# Patient Record
Sex: MALE | URBAN - METROPOLITAN AREA
[De-identification: names, ages, dates, MRNs, and addresses within clinical notes are randomized per-mention and may not be internally consistent; named-entity substitution may affect disease eponyms.]

---

## 2019-08-09 ENCOUNTER — APPOINTMENT (RX ONLY)
Dept: URBAN - METROPOLITAN AREA CLINIC 20 | Facility: CLINIC | Age: 59
Setting detail: DERMATOLOGY
End: 2019-08-09

## 2019-08-09 DIAGNOSIS — L82.1 OTHER SEBORRHEIC KERATOSIS: ICD-10-CM

## 2019-08-09 DIAGNOSIS — D18.0 HEMANGIOMA: ICD-10-CM

## 2019-08-09 DIAGNOSIS — Z71.89 OTHER SPECIFIED COUNSELING: ICD-10-CM

## 2019-08-09 DIAGNOSIS — L81.4 OTHER MELANIN HYPERPIGMENTATION: ICD-10-CM

## 2019-08-09 DIAGNOSIS — D22 MELANOCYTIC NEVI: ICD-10-CM

## 2019-08-09 PROBLEM — E13.9 OTHER SPECIFIED DIABETES MELLITUS WITHOUT COMPLICATIONS: Status: ACTIVE | Noted: 2019-08-09

## 2019-08-09 PROBLEM — D22.62 MELANOCYTIC NEVI OF LEFT UPPER LIMB, INCLUDING SHOULDER: Status: ACTIVE | Noted: 2019-08-09

## 2019-08-09 PROBLEM — D22.5 MELANOCYTIC NEVI OF TRUNK: Status: ACTIVE | Noted: 2019-08-09

## 2019-08-09 PROBLEM — D18.01 HEMANGIOMA OF SKIN AND SUBCUTANEOUS TISSUE: Status: ACTIVE | Noted: 2019-08-09

## 2019-08-09 PROBLEM — I10 ESSENTIAL (PRIMARY) HYPERTENSION: Status: ACTIVE | Noted: 2019-08-09

## 2019-08-09 PROBLEM — D22.61 MELANOCYTIC NEVI OF RIGHT UPPER LIMB, INCLUDING SHOULDER: Status: ACTIVE | Noted: 2019-08-09

## 2019-08-09 PROCEDURE — ? OBSERVATION AND MEASURE

## 2019-08-09 PROCEDURE — ? ADDITIONAL NOTES

## 2019-08-09 PROCEDURE — 99203 OFFICE O/P NEW LOW 30 MIN: CPT

## 2019-08-09 PROCEDURE — ? COUNSELING

## 2019-08-09 PROCEDURE — ? SUNSCREEN RECOMMENDATIONS

## 2019-08-09 ASSESSMENT — LOCATION SIMPLE DESCRIPTION DERM
LOCATION SIMPLE: RIGHT FOREHEAD
LOCATION SIMPLE: LEFT SCALP
LOCATION SIMPLE: RIGHT HAND
LOCATION SIMPLE: UPPER BACK
LOCATION SIMPLE: LEFT HAND
LOCATION SIMPLE: RIGHT FOREARM
LOCATION SIMPLE: RIGHT UPPER BACK
LOCATION SIMPLE: LEFT FOREARM

## 2019-08-09 ASSESSMENT — LOCATION DETAILED DESCRIPTION DERM
LOCATION DETAILED: LEFT RADIAL DORSAL HAND
LOCATION DETAILED: RIGHT INFERIOR MEDIAL FOREHEAD
LOCATION DETAILED: RIGHT INFERIOR UPPER BACK
LOCATION DETAILED: RIGHT VENTRAL DISTAL FOREARM
LOCATION DETAILED: RIGHT MEDIAL UPPER BACK
LOCATION DETAILED: LEFT CENTRAL FRONTAL SCALP
LOCATION DETAILED: LEFT PROXIMAL DORSAL FOREARM
LOCATION DETAILED: SUPERIOR THORACIC SPINE
LOCATION DETAILED: LEFT VENTRAL PROXIMAL FOREARM
LOCATION DETAILED: INFERIOR THORACIC SPINE
LOCATION DETAILED: RIGHT RADIAL DORSAL HAND
LOCATION DETAILED: RIGHT PROXIMAL DORSAL FOREARM

## 2019-08-09 ASSESSMENT — LOCATION ZONE DERM
LOCATION ZONE: HAND
LOCATION ZONE: SCALP
LOCATION ZONE: ARM
LOCATION ZONE: FACE
LOCATION ZONE: TRUNK

## 2019-11-14 ENCOUNTER — EH NON-PROVIDER (OUTPATIENT)
Dept: OCCUPATIONAL MEDICINE | Facility: CLINIC | Age: 59
End: 2019-11-14

## 2019-11-14 ENCOUNTER — EMPLOYEE HEALTH (OUTPATIENT)
Dept: OCCUPATIONAL MEDICINE | Facility: CLINIC | Age: 59
End: 2019-11-14

## 2019-11-14 ENCOUNTER — HOSPITAL ENCOUNTER (OUTPATIENT)
Facility: MEDICAL CENTER | Age: 59
End: 2019-11-14
Attending: NURSE PRACTITIONER
Payer: COMMERCIAL

## 2019-11-14 DIAGNOSIS — Z02.89 VISIT FOR OCCUPATIONAL HEALTH EXAMINATION: ICD-10-CM

## 2019-11-14 DIAGNOSIS — Z02.89 VISIT FOR OCCUPATIONAL HEALTH EXAMINATION: Primary | ICD-10-CM

## 2019-11-14 LAB
AMP AMPHETAMINE: NORMAL
BAR BARBITURATES: NORMAL
BZO BENZODIAZEPINES: NORMAL
COC COCAINE: NORMAL
INT CON NEG: NORMAL
INT CON POS: NORMAL
MDMA ECSTASY: NORMAL
MET METHAMPHETAMINES: NORMAL
MTD METHADONE: NORMAL
OPI OPIATES: NORMAL
OXY OXYCODONE: NORMAL
PCP PHENCYCLIDINE: NORMAL
POC URINE DRUG SCREEN OCDRS: NORMAL
THC: NORMAL

## 2019-11-14 PROCEDURE — 94375 RESPIRATORY FLOW VOLUME LOOP: CPT | Performed by: NURSE PRACTITIONER

## 2019-11-14 PROCEDURE — 86735 MUMPS ANTIBODY: CPT | Performed by: NURSE PRACTITIONER

## 2019-11-14 PROCEDURE — 90686 IIV4 VACC NO PRSV 0.5 ML IM: CPT | Performed by: NURSE PRACTITIONER

## 2019-11-14 PROCEDURE — 86480 TB TEST CELL IMMUN MEASURE: CPT | Performed by: NURSE PRACTITIONER

## 2019-11-14 PROCEDURE — 86762 RUBELLA ANTIBODY: CPT | Performed by: NURSE PRACTITIONER

## 2019-11-14 PROCEDURE — 90715 TDAP VACCINE 7 YRS/> IM: CPT | Performed by: NURSE PRACTITIONER

## 2019-11-14 PROCEDURE — 86765 RUBEOLA ANTIBODY: CPT | Performed by: NURSE PRACTITIONER

## 2019-11-14 PROCEDURE — 8915 PR COMPREHENSIVE PHYSICAL: Performed by: PREVENTIVE MEDICINE

## 2019-11-14 PROCEDURE — 80305 DRUG TEST PRSMV DIR OPT OBS: CPT | Performed by: NURSE PRACTITIONER

## 2019-11-14 PROCEDURE — 86787 VARICELLA-ZOSTER ANTIBODY: CPT | Performed by: NURSE PRACTITIONER

## 2019-11-15 LAB
MEV IGG SER IA-ACNC: 2.16
MUV IGG SER IA-ACNC: 2.42
RUBV AB SER QL: 283.2 IU/ML
VZV IGG SER IA-ACNC: 2.19

## 2019-11-18 LAB
GAMMA INTERFERON BACKGROUND BLD IA-ACNC: 0.17 IU/ML
M TB IFN-G BLD-IMP: NEGATIVE
M TB IFN-G CD4+ BCKGRND COR BLD-ACNC: 0.03 IU/ML
MITOGEN IGNF BCKGRD COR BLD-ACNC: >10 IU/ML
QFT TB2 - NIL TBQ2: 0.04 IU/ML

## 2019-11-20 ENCOUNTER — EH NON-PROVIDER (OUTPATIENT)
Dept: OCCUPATIONAL MEDICINE | Facility: CLINIC | Age: 59
End: 2019-11-20

## 2019-11-20 DIAGNOSIS — Z71.85 IMMUNIZATION COUNSELING: ICD-10-CM

## 2021-03-15 DIAGNOSIS — Z23 NEED FOR VACCINATION: ICD-10-CM

## 2022-04-05 ENCOUNTER — OFFICE VISIT (OUTPATIENT)
Dept: INTERNAL MEDICINE | Facility: OTHER | Age: 62
End: 2022-04-05
Payer: COMMERCIAL

## 2022-04-05 VITALS
HEIGHT: 65 IN | DIASTOLIC BLOOD PRESSURE: 89 MMHG | OXYGEN SATURATION: 97 % | TEMPERATURE: 100.2 F | HEART RATE: 60 BPM | WEIGHT: 153.8 LBS | SYSTOLIC BLOOD PRESSURE: 156 MMHG | BODY MASS INDEX: 25.62 KG/M2

## 2022-04-05 DIAGNOSIS — I25.10 CORONARY ARTERY DISEASE INVOLVING NATIVE CORONARY ARTERY OF NATIVE HEART WITHOUT ANGINA PECTORIS: ICD-10-CM

## 2022-04-05 DIAGNOSIS — I10 PRIMARY HYPERTENSION: ICD-10-CM

## 2022-04-05 DIAGNOSIS — Z76.89 ENCOUNTER TO ESTABLISH CARE: ICD-10-CM

## 2022-04-05 DIAGNOSIS — R68.82 LOW LIBIDO: ICD-10-CM

## 2022-04-05 DIAGNOSIS — Z87.898 HISTORY OF PREDIABETES: ICD-10-CM

## 2022-04-05 DIAGNOSIS — Z12.2 SCREENING FOR LUNG CANCER: ICD-10-CM

## 2022-04-05 DIAGNOSIS — R06.2 WHEEZING: ICD-10-CM

## 2022-04-05 PROCEDURE — 99204 OFFICE O/P NEW MOD 45 MIN: CPT | Mod: GC | Performed by: STUDENT IN AN ORGANIZED HEALTH CARE EDUCATION/TRAINING PROGRAM

## 2022-04-05 RX ORDER — METOPROLOL SUCCINATE 50 MG/1
50 TABLET, EXTENDED RELEASE ORAL DAILY
COMMUNITY
Start: 2020-09-24 | End: 2022-05-13 | Stop reason: SDUPTHER

## 2022-04-05 RX ORDER — ATORVASTATIN CALCIUM 80 MG/1
80 TABLET, FILM COATED ORAL DAILY
COMMUNITY
Start: 2020-09-24 | End: 2022-04-05

## 2022-04-05 RX ORDER — LOSARTAN POTASSIUM 100 MG/1
50 TABLET ORAL DAILY
COMMUNITY
Start: 2020-09-24 | End: 2022-06-10 | Stop reason: SDUPTHER

## 2022-04-05 ASSESSMENT — ENCOUNTER SYMPTOMS
NAUSEA: 0
BLOOD IN STOOL: 0
VOMITING: 0
CONSTIPATION: 0
SPEECH CHANGE: 0
NECK PAIN: 0
SENSORY CHANGE: 0
BLURRED VISION: 0
ORTHOPNEA: 0
CLAUDICATION: 0
NERVOUS/ANXIOUS: 0
FOCAL WEAKNESS: 0
PALPITATIONS: 0
DIZZINESS: 0
HEMOPTYSIS: 0
SHORTNESS OF BREATH: 0
BRUISES/BLEEDS EASILY: 0
SINUS PAIN: 0
WHEEZING: 0
WEIGHT LOSS: 0
MYALGIAS: 0
DOUBLE VISION: 0
COUGH: 0
HEADACHES: 0
PHOTOPHOBIA: 0
EYE PAIN: 0
FEVER: 0
HEARTBURN: 0
SPUTUM PRODUCTION: 0
BACK PAIN: 0
SORE THROAT: 0
CHILLS: 0
DEPRESSION: 0
TREMORS: 0

## 2022-04-05 ASSESSMENT — PATIENT HEALTH QUESTIONNAIRE - PHQ9: CLINICAL INTERPRETATION OF PHQ2 SCORE: 0

## 2022-04-05 ASSESSMENT — LIFESTYLE VARIABLES: SUBSTANCE_ABUSE: 0

## 2022-04-05 NOTE — PROGRESS NOTES
Teaching Physician Attestation      Level of Participation    I have personally interviewed and examined the patient.  In addition, I discussed with the resident physician the patient's history, exam, assessment and plan in detail.  Topics listed in my addendum were the focus of the visit.  Healthcare maintenance was not addressed this visit unless listed as a topic in my addendum.  I agree with the plan as written along with the following additions/modifications:      Establishing Care      PMH: Tobacco, CAD/Hx MI s/p stent    No chest pain or shortness of breath.  Note has pre-existing right eye and mouth facial droop from history of trauma.  No history of stroke.    HTN  -Counseled on DASH diet, check home measurements, follow-up in 1 to 2 weeks    wheezing  -No respiratory distress, satting well.  Per report good exercise tolerance. Counseled on smoking cessation, check PFTs      Nocturia, low libido  -Next visit, checking a1c, also testostone level given report of low testosternoe in past per patient in prep for next visit.        encournage cardio f/u    Return to clinic in 1 or 2 weeks

## 2022-04-05 NOTE — PATIENT INSTRUCTIONS
- Continue current medications  - Get lab work done fasting  - Get CT chest and PFT's  - Referral Cards  - Follow up in 2-4 weeks

## 2022-04-05 NOTE — PROGRESS NOTES
New Patient    Hugo Villela is a 61 y.o. male who presents today with the following:    CC: Establish Care with Primary Care Physician     HPI:    Mr. Villela is a 60 y/o male with known History of CAD s/p RCA stent placement in 2009, HTN, DLD and prediabetes.   He comes today to establish care with me.  Patient used to get health care in [place], but he moved to [place] about 8 months ago.  Records showed that he has not followed up with Cards since 2017, back then he was supposed to get an ECHO and follow up with cardiology but I did not find records of follow up.  Patient denies any SOB, chest pain or palpitations, he is able to walk and climb stairs w/o experiencing SOB or chest pain.  He takes Metoprolol 50 mg daily, Losartan 100 mg daily, Lipitor 80 mg daily, Aspirin 81 mg daily.  He has not had any lab work in recent years.  Today he compliant of low libido and nocturia for about 6 months, he states that gets up to the restroom at night 1-2 times.  In the past 6 years ago or so received Testosterone injections for low testosterone.  On physical exam noticed mild right eye and face droop which is his baseline He had a work related accident decades ago and fractured his skull He hit his right side of his head.    ROS:       Review of Systems   Constitutional: Negative for chills, fever, malaise/fatigue and weight loss.   HENT: Negative for congestion, nosebleeds, sinus pain, sore throat and tinnitus.    Eyes: Negative for blurred vision, double vision, photophobia and pain.   Respiratory: Negative for cough, hemoptysis, sputum production, shortness of breath and wheezing.    Cardiovascular: Negative for chest pain, palpitations, orthopnea, claudication and leg swelling.   Gastrointestinal: Negative for blood in stool, constipation, heartburn, melena, nausea and vomiting.   Genitourinary: Negative for dysuria, frequency, hematuria and urgency.   Musculoskeletal: Negative for back pain, joint pain, myalgias and  neck pain.   Skin: Negative for rash.   Neurological: Negative for dizziness, tremors, sensory change, speech change, focal weakness and headaches.   Endo/Heme/Allergies: Does not bruise/bleed easily.   Psychiatric/Behavioral: Negative for depression, substance abuse and suicidal ideas. The patient is not nervous/anxious.          Past Medical History:   Diagnosis Date   • Acute myocardial infarction of other inferior wall, episode of care unspecified 6/25/2012   • Acute myocardial infarction of other lateral wall, episode of care unspecified 6/25/2012   • Chest pain, unspecified 6/25/2012   • Coronary atherosclerosis of native coronary artery 6/25/2012   • Dizziness and giddiness 6/25/2012   • Mixed hyperlipidemia 6/25/2012   • Pure hyperglyceridemia 6/25/2012   • S/P coronary artery stent placement 6/25/2012   • Tobacco use disorder 6/25/2012       Past Surgical History:   Procedure Laterality Date   • ORIF, FRACTURE, FEMUR  1994   • STENT PLACEMENT         Family History   Problem Relation Age of Onset   • Stroke Mother        Social History     Tobacco Use   • Smoking status: Current Every Day Smoker     Packs/day: 0.50     Years: 40.00     Pack years: 20.00     Types: Cigarettes     Start date: 4/5/1982   • Smokeless tobacco: Never Used   Vaping Use   • Vaping Use: Never used   Substance Use Topics   • Alcohol use: Yes     Alcohol/week: 1.2 oz     Types: 2 Cans of beer per week     Comment: almost every day in the past year.   • Drug use: Never       Current Outpatient Medications   Medication Sig Dispense Refill   • losartan (COZAAR) 100 MG Tab Take 100 mg by mouth every day.     • metoprolol SR (TOPROL XL) 50 MG TABLET SR 24 HR Take 50 mg by mouth every day.     • aspirin EC (ECOTRIN) 81 MG Tablet Delayed Response Take 81 mg by mouth every day.     • atorvastatin (LIPITOR) 80 MG tablet TAKE 1 TABLET BY MOUTH AT BEDTIME 30 Tab 6     No current facility-administered medications for this visit.       Physical  "Exam:  /89 (BP Location: Left arm, Patient Position: Sitting, BP Cuff Size: Adult)   Pulse 60   Temp 37.9 °C (100.2 °F) (Temporal)   Ht 1.651 m (5' 5\")   Wt 69.8 kg (153 lb 12.8 oz)   SpO2 97%   BMI 25.59 kg/m²      General: Well-developed, well-nourished male in no distress  HEENT: Conjuntiva and lids are within normal limits.  External auditory canals are within normal limits.  Tympanic membranes are clear with a normal light refflex. Mild right and face droop at baseline, sec to work related trauma many years ago. Nasal mucosa is normal.  Oral pharynx is normal and free of exudate.  Neck: Supple, non-tender with nothyromegaly noted.  Lympatic: Pre and Post auricular, sub-mandibular, anterior and posterior cervical and supraclavicular areas are without notable lymphadenopathy  Lungs: Clear to auscultation and perucssion bilaterally with good respiratory effort. Mild wheezes upper lobes with deep inspirartion, No crackes or rhonci are heard.  Heart: Normal rate, regular rhythm with no murmurs, rubs or gallops heard.  Abdomen: Soft, non-tender, non-distended with normal-active bowel sounds.  Nor organomegaly noted.  Extremites: No edema  Psych: Patient is awake, alert and oriented with recent and remote memory intact. Mood and affect are normal.       Assessment and Plan:     1. Coronary artery disease involving native coronary artery of native heart without angina pectoris  - RCA stent in 2009 used to follow up with cards no records since 2017  - Compliant with current therapy (statin, aspirin, BB and ARB)  - No recent lab work (many years)  - Patient denied any CP, SOB or palpitations  - He has good exercise tolerance, can walk for long periods of time and climb stairs w/o experiencing symptoms         PLAN:  - Referral to Cards  - Continue current therapy  - Weight loss, regular exercise, low salt  - Stop Smoking, counseling given  - Patient wants to stop smoking  - Follow up in 2-4 weeks    2. " Primary hypertension  - Today elevated 156/89  - He mentioned that his BP is usually higher when he goes for his Dr or Dentist appointments, at home normal, but he has not been checking his BP recently frequently  - Recommended to monitor BP daily and bring BP log next visit  - Low salt diet, regular exercise, stop smoking    3. Wheezing  - Wheezing with deep breathing only, mild wheezes  - Denies SOB or exercise intolerance  - Sent for PFT's  - Stop smoking      4. Low libido  - Hx of low testosterone on therapy years ago  - I addressed potential side effects of T in a person with CAD and smoking  - We will get serum T    5. History of prediabetes  - Hx of Pre DM  - Patient with nocturia, but no frequency during the day  - We will check CMP and A1c    6. Screening for lung cancer  - Hx of smoking for more than 40 years  - Currently 1/2 pack daily  - We will get CT chest low dose  - Counseling given to stop smoking              Problem List Items Addressed This Visit     Coronary artery disease involving native coronary artery of native heart without angina pectoris    Relevant Medications    losartan (COZAAR) 100 MG Tab    metoprolol SR (TOPROL XL) 50 MG TABLET SR 24 HR    Other Relevant Orders    CBC WITH DIFFERENTIAL    Comp Metabolic Panel    Lipid Profile    REFERRAL TO CARDIOLOGY      Other Visit Diagnoses     Primary hypertension        Relevant Medications    losartan (COZAAR) 100 MG Tab    metoprolol SR (TOPROL XL) 50 MG TABLET SR 24 HR    Other Relevant Orders    CBC WITH DIFFERENTIAL    Comp Metabolic Panel    Wheezing        Relevant Orders    PULMONARY FUNCTION TESTS -Test requested: Complete Pulmonary Function Test    Low libido        Relevant Orders    TESTOSTERONE SERUM    History of prediabetes        Relevant Orders    HEMOGLOBIN A1C    Screening for lung cancer        Relevant Orders    CT-CHEST (THORAX) W/O          Return in about 2 weeks (around 4/19/2022).    There are no Patient Instructions  on file for this visit.    Signed by: Evert Everett M.D.      Dr. Karlos M.D. PGY-2  [facility]

## 2022-04-11 ENCOUNTER — APPOINTMENT (OUTPATIENT)
Dept: RADIOLOGY | Facility: MEDICAL CENTER | Age: 62
End: 2022-04-11
Attending: STUDENT IN AN ORGANIZED HEALTH CARE EDUCATION/TRAINING PROGRAM
Payer: COMMERCIAL

## 2022-04-11 ENCOUNTER — TELEPHONE (OUTPATIENT)
Dept: HEMATOLOGY ONCOLOGY | Facility: MEDICAL CENTER | Age: 62
End: 2022-04-11
Payer: COMMERCIAL

## 2022-04-11 NOTE — TELEPHONE ENCOUNTER
Received referral to lung cancer screening program.  Chart review to assess for lung cancer screening program eligibility.   1. Age 55-77 yrs of age? Yes 61 y.o.  2. 30 pack year hx of smoking, or greater? No 0.5 [id number]= [id number] hx  3. Current smoker or if quit, has pt quit within last 15 yrs?Yes  Current smoker  4. Any signs or symptoms of lung cancer? None noted  5. Previous history of lung cancer? None noted  6. Chest CT within past 12 mos.? None noted  Patient does not meet eligibility criteria. [facility] scheduling notified to schedule the shared decision making visit.      Patient DOES NOT meet criteria for LCSP due to pack year history. The LCSP requirement is to meet at least a 30 pack year history and the pt is at 20.

## 2022-04-19 ENCOUNTER — OFFICE VISIT (OUTPATIENT)
Dept: INTERNAL MEDICINE | Facility: OTHER | Age: 62
End: 2022-04-19
Payer: COMMERCIAL

## 2022-04-19 VITALS
HEIGHT: 65 IN | DIASTOLIC BLOOD PRESSURE: 77 MMHG | SYSTOLIC BLOOD PRESSURE: 145 MMHG | HEART RATE: 96 BPM | WEIGHT: 155.6 LBS | BODY MASS INDEX: 25.92 KG/M2 | TEMPERATURE: 97.9 F | OXYGEN SATURATION: 93 %

## 2022-04-19 DIAGNOSIS — R79.89 ELEVATED SERUM CREATININE: ICD-10-CM

## 2022-04-19 DIAGNOSIS — N18.31 STAGE 3A CHRONIC KIDNEY DISEASE: ICD-10-CM

## 2022-04-19 DIAGNOSIS — I10 PRIMARY HYPERTENSION: ICD-10-CM

## 2022-04-19 DIAGNOSIS — R73.03 PREDIABETES: ICD-10-CM

## 2022-04-19 PROCEDURE — 99213 OFFICE O/P EST LOW 20 MIN: CPT | Mod: GE | Performed by: STUDENT IN AN ORGANIZED HEALTH CARE EDUCATION/TRAINING PROGRAM

## 2022-04-19 ASSESSMENT — ENCOUNTER SYMPTOMS
SINUS PAIN: 0
WEIGHT LOSS: 0
SENSORY CHANGE: 0
BRUISES/BLEEDS EASILY: 0
PALPITATIONS: 0
TINGLING: 0
SHORTNESS OF BREATH: 0
HALLUCINATIONS: 0
FOCAL WEAKNESS: 0
NAUSEA: 0
SPUTUM PRODUCTION: 0
SPEECH CHANGE: 0
MYALGIAS: 0
HEMOPTYSIS: 0
SORE THROAT: 0
ORTHOPNEA: 0
HEADACHES: 0
NECK PAIN: 0
ABDOMINAL PAIN: 0
VOMITING: 0
BLOOD IN STOOL: 0
DEPRESSION: 0
PHOTOPHOBIA: 0
DOUBLE VISION: 0
DIZZINESS: 0
CHILLS: 0
BACK PAIN: 0
FEVER: 0
COUGH: 0
HEARTBURN: 0
WEAKNESS: 0
BLURRED VISION: 0

## 2022-04-19 ASSESSMENT — LIFESTYLE VARIABLES: SUBSTANCE_ABUSE: 0

## 2022-04-19 ASSESSMENT — FIBROSIS 4 INDEX: FIB4 SCORE: 2.03

## 2022-04-19 NOTE — PROGRESS NOTES
Teaching Physician Attestation      Level of Participation    I discussed with the resident physician the patient's history, exam, assessment and plan in detail.  Topics listed in my addendum were the focus of the visit.  Healthcare maintenance was not addressed this visit unless listed as a topic in my addendum.  I agree with plan as written along with the following additions/modifications:      Elevated Cr, etiology unclear.  Possibly secondary to CKD from chronic hypertension  -no sx, hydrated.  -Repeat BMP in 2 weeks with continued hydration to make sure stable, will also check urine micro to creatinine.  If any symptoms he calls immediately.    False glycohemoglobin elevation  -Does not correlate with fasting glucose, which is only 106.  This would be expected given his elevated creatinine, particularly if chronic.  We will use fasting glucoses to track in the future, no other intervention at this time    HTN  -home bps normal, although consistently moderately elevated here.  He will bring in BP cuff in 2 weeks for calibration and education, further management at this time.      reducting tobacco.  pfts tomorrow.  Cardio appt upcoming

## 2022-04-19 NOTE — PROGRESS NOTES
Established Patient    Patient Care Team:  Evert Everett M.D. as PCP - General (Internal Medicine)    HPI:  Hugo Villela is a 61 y.o. male who presents today with the following Chief Complaint(s):   Chief Complaint   Patient presents with   • Lab Results     Mr. Villela is here for follow up with lab work. He just established care with me 2 weeks ago, he has known CAD s/p RCA stent placement in 2009, DLD, Prediabetes, HTN.  Labs were relevant for elevated creatinine 1.82 and reduced GFR:42, no known CKD per chart review, but also not recent lab work available in his records.  Patient is not taking any medications that could hurt his kidneys and was not aware of this issue in the past.  Other relevant lab work 6.3% A1c this interpretation could be not reliable due to CKD? We will repeat BMP in a week.  Testosterone was wnl, lipid panel mild elevated triglycerides.  He is scheduled to get PFT's tomorrow and made an appointment with Cards scheduled for May 5th.  Patient brought BP log with all his reading at target, most of them 120/70 range. Again here noticed elevated BP.  Patient did not meet criteria to get low dose CT chest.  Otherwise patient states feeling well, does not have any symptoms or complaints, he is urinating fine, denied frequency or nocturia, or sensation of bloated lower abdomen, no other urinary symptoms or flank pain.     Review of Systems   Review of Systems   Constitutional: Negative for chills, fever, malaise/fatigue and weight loss.   HENT: Negative for ear discharge, nosebleeds, sinus pain, sore throat and tinnitus.    Eyes: Negative for blurred vision, double vision and photophobia.   Respiratory: Negative for cough, hemoptysis, sputum production and shortness of breath.    Cardiovascular: Negative for chest pain, palpitations, orthopnea and leg swelling.   Gastrointestinal: Negative for abdominal pain, blood in stool, heartburn, melena, nausea and vomiting.   Genitourinary:  Negative for dysuria, frequency, hematuria and urgency.   Musculoskeletal: Negative for back pain, joint pain, myalgias and neck pain.   Skin: Negative for rash.   Neurological: Negative for dizziness, tingling, sensory change, speech change, focal weakness, weakness and headaches.   Endo/Heme/Allergies: Does not bruise/bleed easily.   Psychiatric/Behavioral: Negative for depression, hallucinations, substance abuse and suicidal ideas.         Past Medical History:   Diagnosis Date   • Acute myocardial infarction of other inferior wall, episode of care unspecified 6/25/2012   • Acute myocardial infarction of other lateral wall, episode of care unspecified 6/25/2012   • Chest pain, unspecified 6/25/2012   • Coronary atherosclerosis of native coronary artery 6/25/2012   • Dizziness and giddiness 6/25/2012   • Mixed hyperlipidemia 6/25/2012   • Pure hyperglyceridemia 6/25/2012   • S/P coronary artery stent placement 6/25/2012   • Tobacco use disorder 6/25/2012       Patient Active Problem List    Diagnosis Date Noted   • Prediabetes 05/18/2015   • HTN (hypertension) 05/07/2015   • Acute myocardial infarction of other lateral wall, episode of care unspecified 06/25/2012   • Coronary artery disease involving native coronary artery of native heart without angina pectoris 06/25/2012   • Pure hyperglyceridemia 06/25/2012   • Tobacco use disorder 06/25/2012   • Acute myocardial infarction of other inferior wall, episode of care unspecified 06/25/2012   • S/P coronary artery stent placement 06/25/2012       Allergies:Patient has no known allergies.    Current Outpatient Medications   Medication Sig Dispense Refill   • losartan (COZAAR) 100 MG Tab Take 100 mg by mouth every day.     • metoprolol SR (TOPROL XL) 50 MG TABLET SR 24 HR Take 50 mg by mouth every day.     • aspirin EC (ECOTRIN) 81 MG Tablet Delayed Response Take 81 mg by mouth every day.     • atorvastatin (LIPITOR) 80 MG tablet TAKE 1 TABLET BY MOUTH AT BEDTIME 30  "Tab 6     No current facility-administered medications for this visit.       Social History     Tobacco Use   • Smoking status: Current Every Day Smoker     Packs/day: 0.50     Years: 40.00     Pack years: 20.00     Types: Cigarettes     Start date: 4/5/1982   • Smokeless tobacco: Never Used   Vaping Use   • Vaping Use: Never used   Substance Use Topics   • Alcohol use: Yes     Alcohol/week: 1.2 oz     Types: 2 Cans of beer per week     Comment: almost every day in the past year.   • Drug use: Never       Family History   Problem Relation Age of Onset   • Stroke Mother          Physical Exam  Vitals:  /77 (BP Location: Left arm, Patient Position: Sitting, BP Cuff Size: Adult)   Pulse 96   Temp 36.6 °C (97.9 °F) (Temporal)   Ht 1.651 m (5' 5\")   Wt 70.6 kg (155 lb 9.6 oz)   SpO2 93%  Body mass index is 25.89 kg/m².     Constitutional:  Not in acute distress, well appearing.  HEENT:   NC/AT, PERRLA  Cardiovascular: Regular rate and rhythm. No murmurs or gallops.      Lungs:   Clear to auscultation bilaterally. No wheezes or crackles. No respiratory distress.  Abdomen: Not distended, soft, not tender. No guarding or rigidity. No masses.  Extremities:  No cyanosis/clubbing/edema. No obvious deformities.  Skin:  Warm and dry.  No visible rashes.  Neurologic: Alert & oriented x 3, CN II-XII grossly intact, strength and sensation grossly intact.  No focal deficits noted.  Psychiatric:  Affect normal, mood normal, judgment normal.      Assessment and Plan:     Elevated creatinine  - Initial labs after establishing relevant for elevated creatinine and reduced GFR  - NO known Kidney disease, no recent lab available  - Unclear chronicity  - Patient does not have any complaints, no urinary symptoms, not taking any medications that could hurt his kidneys  - Creatinine:1.82, GFR:42          PLAN:  - Repeat CMP next week  - Avoid nephrotoxins  - Drink plenty fluid 20497 oz daily  - Follow up in 4 weeks    CAD  - " Optimized medications  - Compliant  - He will establish care with Cards May 5th    HTN  - Normotensive at home BP average 120/70  - Here elevated both visits  - CTM BP few times per week  - Bring BP machine to office in 4 weeks    ? Prediabetes  - A1c 6.3%  - A1c could be overestimated due to renal dysfunction  - We will repeat BMP in a week  - Microalb/creat ratio  - Repeat A1c in 3 months  - NO medical therapy for now  - Encourage weight loss, regular exercise, healthy diet    Return in about 4 weeks (around 5/17/2022).        Please note that this dictation was created using voice recognition software. I have made every reasonable attempt to correct obvious errors, but I expect that there are errors of grammar and possibly content that I did not discover before finalizing the note.    Total face-to-face time spent in medical decision making:    Dr. Karlos M.D. PGY-2  [facility]

## 2022-04-19 NOTE — PATIENT INSTRUCTIONS
- Continue home medications  - Monitor BP 3 times per week  - Bring BP machine next visit  - Get BMP and microalb/creatinine ratio next week  - A1c in 3 months  - Get PFT's and follow up with Cardiologist  - Follow up next month

## 2022-04-20 ENCOUNTER — NON-PROVIDER VISIT (OUTPATIENT)
Dept: SLEEP MEDICINE | Facility: MEDICAL CENTER | Age: 62
End: 2022-04-20
Attending: STUDENT IN AN ORGANIZED HEALTH CARE EDUCATION/TRAINING PROGRAM
Payer: COMMERCIAL

## 2022-04-20 VITALS — HEIGHT: 64 IN | BODY MASS INDEX: 26.12 KG/M2 | WEIGHT: 153 LBS

## 2022-04-20 DIAGNOSIS — R06.2 WHEEZING: ICD-10-CM

## 2022-04-20 PROCEDURE — 94729 DIFFUSING CAPACITY: CPT | Performed by: INTERNAL MEDICINE

## 2022-04-20 PROCEDURE — 94060 EVALUATION OF WHEEZING: CPT | Performed by: INTERNAL MEDICINE

## 2022-04-20 PROCEDURE — 94726 PLETHYSMOGRAPHY LUNG VOLUMES: CPT | Performed by: INTERNAL MEDICINE

## 2022-04-20 ASSESSMENT — PULMONARY FUNCTION TESTS
FEV1/FVC: 57
FVC: 2.57
FEV1_PERCENT_CHANGE: 57
FEV1/FVC: 57
FEV1/FVC: 53.31
FEV1/FVC_PERCENT_PREDICTED: 68
FEV1_LLN: 2.43
FEV1/FVC_PREDICTED: 78
FEV1/FVC_PERCENT_CHANGE: 82
FEV1/FVC_PERCENT_PREDICTED: 73
FEV1_PERCENT_CHANGE: 47
FEV1/FVC_PERCENT_PREDICTED: 78
FVC_PERCENT_PREDICTED: 44
FEV1/FVC_PERCENT_PREDICTED: 68
FEV1: 1.37
FEV1/FVC_PERCENT_CHANGE: -6
FEV1/FVC_PERCENT_PREDICTED: 72
FVC: 1.63
FVC_LLN: 3.10
FVC_PERCENT_PREDICTED: 69
FEV1_PERCENT_PREDICTED: 47
FEV1_PREDICTED: 2.91
FVC_PREDICTED: 3.71
FEV1/FVC: 53
FEV1: 0.93
FEV1/FVC_PERCENT_LLN: 66
FEV1_PERCENT_PREDICTED: 32

## 2022-04-20 ASSESSMENT — FIBROSIS 4 INDEX: FIB4 SCORE: 2.03

## 2022-04-20 NOTE — PROCEDURES
Technician: Yue Elkins RRT, CPFT  Good patient effort & cooperation.  The results of this test meet the ATS/ERS standards for acceptability & reproducibility.  Test was performed on the [employer] Plethysmograph-Elite DX system.  Predicted equations for Spirometry are GLI-2012, ITS for lung volumes, and GLI-2017 for DLCO.  The DLCO was uncorrected for Hgb.  A bronchodilator of Ventolin HFA -2puffs via spacer administered.  DLCO performed during dilation period. Patient C/O being claustrophobic, but was able to perform the Pleth maneuver without incident.    Interpretation;   Baseline spirometry shows airflow obstruction with FEV1/FVC ratio 57 and FEV1 of 0.93 L or 32% predicted.  There is robust bronchodilator response with postbronchodilator FEV1 of 1.37 L or 47% predicted.  Total lung capacity is within normal limits at 106% predicted however there is significant air trapping with residual volume of 194% predicted.  Diffusion capacity is preserved at 88% predicted.  Pulmonary function testing shows airflow obstruction with significant bronchodilator responsiveness and air trapping with preserved DLCO.  Constellation of findings may be related to COPD, alternatively bronchiectasis or chronic obstructive asthma may present similarly.  Correlate clinically.

## 2022-04-28 ENCOUNTER — TELEPHONE (OUTPATIENT)
Dept: CARDIOLOGY | Facility: MEDICAL CENTER | Age: 62
End: 2022-04-28
Payer: COMMERCIAL

## 2022-04-28 NOTE — TELEPHONE ENCOUNTER
Spoke to patient in regards to obtaining records for NP appointment with Dr. Gonzales. Per patient has never been treated by a previous cardiologist. Confirmed all recent notes, labs, and cardiac imaging are in Epic. Confirmed with patient appointment time, location, and date.

## 2022-05-05 ENCOUNTER — OFFICE VISIT (OUTPATIENT)
Dept: CARDIOLOGY | Facility: MEDICAL CENTER | Age: 62
End: 2022-05-05
Attending: STUDENT IN AN ORGANIZED HEALTH CARE EDUCATION/TRAINING PROGRAM
Payer: COMMERCIAL

## 2022-05-05 VITALS
HEART RATE: 74 BPM | WEIGHT: 155 LBS | BODY MASS INDEX: 25.83 KG/M2 | SYSTOLIC BLOOD PRESSURE: 130 MMHG | DIASTOLIC BLOOD PRESSURE: 70 MMHG | RESPIRATION RATE: 16 BRPM | OXYGEN SATURATION: 93 % | HEIGHT: 65 IN

## 2022-05-05 DIAGNOSIS — I10 ESSENTIAL HYPERTENSION: ICD-10-CM

## 2022-05-05 DIAGNOSIS — N18.31 STAGE 3A CHRONIC KIDNEY DISEASE: ICD-10-CM

## 2022-05-05 DIAGNOSIS — R42 DIZZINESS: ICD-10-CM

## 2022-05-05 DIAGNOSIS — E78.5 DYSLIPIDEMIA: ICD-10-CM

## 2022-05-05 DIAGNOSIS — I45.10 RBBB: ICD-10-CM

## 2022-05-05 DIAGNOSIS — Z95.5 S/P CORONARY ARTERY STENT PLACEMENT: ICD-10-CM

## 2022-05-05 DIAGNOSIS — R07.89 OTHER CHEST PAIN: ICD-10-CM

## 2022-05-05 DIAGNOSIS — I25.10 CORONARY ARTERY DISEASE INVOLVING NATIVE CORONARY ARTERY OF NATIVE HEART WITHOUT ANGINA PECTORIS: ICD-10-CM

## 2022-05-05 DIAGNOSIS — R73.03 PREDIABETES: ICD-10-CM

## 2022-05-05 PROCEDURE — 99406 BEHAV CHNG SMOKING 3-10 MIN: CPT | Performed by: INTERNAL MEDICINE

## 2022-05-05 PROCEDURE — 93000 ELECTROCARDIOGRAM COMPLETE: CPT | Performed by: INTERNAL MEDICINE

## 2022-05-05 PROCEDURE — 99204 OFFICE O/P NEW MOD 45 MIN: CPT | Mod: 25 | Performed by: INTERNAL MEDICINE

## 2022-05-05 ASSESSMENT — ENCOUNTER SYMPTOMS
PND: 0
IRREGULAR HEARTBEAT: 0
NAUSEA: 0
BACK PAIN: 0
WHEEZING: 0
SYNCOPE: 0
NUMBNESS: 0
LIGHT-HEADEDNESS: 0
CONSTIPATION: 0
PALPITATIONS: 0
PARESTHESIAS: 0
WEAKNESS: 0
COUGH: 0
VOMITING: 0
SLEEP DISTURBANCES DUE TO BREATHING: 0
EXCESSIVE DAYTIME SLEEPINESS: 0
MYALGIAS: 0
BLURRED VISION: 0
SORE THROAT: 0
NEAR-SYNCOPE: 1
DIARRHEA: 0
DIAPHORESIS: 0
FLANK PAIN: 0
DOUBLE VISION: 0
DIZZINESS: 1
SHORTNESS OF BREATH: 1
ORTHOPNEA: 0
DYSPNEA ON EXERTION: 0
BLOATING: 0
LOSS OF BALANCE: 0
NIGHT SWEATS: 0
FALLS: 0
FEVER: 0
DECREASED APPETITE: 0
HEADACHES: 0

## 2022-05-05 ASSESSMENT — FIBROSIS 4 INDEX: FIB4 SCORE: 2.03

## 2022-05-05 NOTE — PROGRESS NOTES
Cardiology Initial Consultation Note    Date of note:    5/5/2022    Primary Care Provider: Evert Everett M.D.  Referring Provider: Evert Everett M.D.     Patient Name: Hugo Villela   YOB: 1960  MRN:              [id number]    Chief Complaint   Patient presents with   • Coronary Artery Disease     NP Dx: Coronary artery disease involving native coronary artery of native heart without angina pectoris       History of Present Illness: Mr. Hugo Villela is a 61 y.o. male whose current medical problems include CAD s/p BMS to RCA in 2009, hypertension, dyslipidemia, familial hyperlipidemia who is here for cardiac consultation for CAD and to establish care.    Patient states that he was at home, woke up in the morning around 6 AM, went to restroom and felt that something was wrong.  Passed out twice at home, wife brought him to the hospital and underwent LHC with stent placemen to the RCA.  Did not have chest pain/pressure or shortness of breath with the event.  No episodes such since then.  Was seeing Dr. Swartz.    Was living in [place] for the past few years for work.  Recently moved back to [place].  Last cardiac workup was in 2017.    In terms of physical activity, does not feel that he can exert himself.  Feels that he is going to pass out if he tries to lift something heavy.  Has always been active.  Symptoms started couple of months ago.    Cardiovascular Risk Factors:  1. Smoking status: Current smoker  2. Type II Diabetes Mellitus: Prediabetes   Lab Results   Component Value Date/Time    HBA1C 6.1 (H) 04/27/2022 07:26 AM    HBA1C 6.3 (H) 04/08/2022 07:45 AM     3. Hypertension: Yes  4. Dyslipidemia: Yes   Cholesterol,Tot   Date Value Ref Range Status   04/08/2022 165 100 - 199 mg/dL Final     HDL   Date Value Ref Range Status   04/08/2022 59 >39 mg/dL Final     Triglycerides   Date Value Ref Range Status   04/08/2022 170 (H) 0 - 149 mg/dL Final     5. Family history of early Coronary Artery  Disease in a first degree relative (Male less than 55 years of age; Female less than 65 years of age): Denies  6.  Obesity and/or Metabolic Syndrome: No  7. Sedentary lifestyle: No    Review of Systems   Constitutional: Negative for decreased appetite, diaphoresis, fever, malaise/fatigue and night sweats.   HENT: Negative for congestion and sore throat.    Eyes: Negative for blurred vision and double vision.   Cardiovascular: Positive for chest pain and near-syncope. Negative for cyanosis, dyspnea on exertion, irregular heartbeat, leg swelling, orthopnea, palpitations, paroxysmal nocturnal dyspnea and syncope.   Respiratory: Positive for shortness of breath. Negative for cough, sleep disturbances due to breathing and wheezing.    Endocrine: Negative for cold intolerance and heat intolerance.   Musculoskeletal: Negative for back pain, falls and myalgias.   Gastrointestinal: Negative for bloating, constipation, diarrhea, nausea and vomiting.   Genitourinary: Negative for dysuria and flank pain.   Neurological: Positive for dizziness. Negative for excessive daytime sleepiness, headaches, light-headedness, loss of balance, numbness, paresthesias and weakness.       Past Medical History:   Diagnosis Date   • Acute myocardial infarction of other inferior wall, episode of care unspecified 6/25/2012   • Acute myocardial infarction of other lateral wall, episode of care unspecified 6/25/2012   • Chest pain, unspecified 6/25/2012   • Coronary atherosclerosis of native coronary artery 6/25/2012   • Dizziness and giddiness 6/25/2012   • Mixed hyperlipidemia 6/25/2012   • Pure hyperglyceridemia 6/25/2012   • S/P coronary artery stent placement 6/25/2012   • Tobacco use disorder 6/25/2012         Past Surgical History:   Procedure Laterality Date   • ORIF, FRACTURE, FEMUR  1994   • STENT PLACEMENT           Current Outpatient Medications   Medication Sig Dispense Refill   • losartan (COZAAR) 100 MG Tab Take 100 mg by mouth every  "day.     • metoprolol SR (TOPROL XL) 50 MG TABLET SR 24 HR Take 50 mg by mouth every day.     • aspirin EC (ECOTRIN) 81 MG Tablet Delayed Response Take 81 mg by mouth every day.     • atorvastatin (LIPITOR) 80 MG tablet TAKE 1 TABLET BY MOUTH AT BEDTIME 30 Tab 6     No current facility-administered medications for this visit.         No Known Allergies      Family History   Problem Relation Age of Onset   • Stroke Mother          Social History     Socioeconomic History   • Marital status:      Spouse name: Not on file   • Number of children: Not on file   • Years of education: Not on file   • Highest education level: Not on file   Occupational History   • Not on file   Tobacco Use   • Smoking status: Current Every Day Smoker     Packs/day: 0.50     Years: 40.00     Pack years: 20.00     Types: Cigarettes     Start date: 4/5/1982   • Smokeless tobacco: Never Used   Vaping Use   • Vaping Use: Never used   Substance and Sexual Activity   • Alcohol use: Not Currently     Alcohol/week: 1.2 oz     Types: 2 Cans of beer per week     Comment: almost every day in the past year.   • Drug use: Never   • Sexual activity: Yes     Partners: Female     Birth control/protection: None   Other Topics Concern   • Not on file   Social History Narrative   • Not on file     Social Determinants of Health     Financial Resource Strain: Not on file   Food Insecurity: Not on file   Transportation Needs: Not on file   Physical Activity: Not on file   Stress: Not on file   Social Connections: Not on file   Intimate Partner Violence: Not on file   Housing Stability: Not on file         Physical Exam:  Ambulatory Vitals  /70 (BP Location: Left arm, Patient Position: Sitting, BP Cuff Size: Adult)   Pulse 74   Resp 16   Ht 1.651 m (5' 5\")   Wt 70.3 kg (155 lb)   SpO2 93%    Oxygen Therapy:  Pulse Oximetry: 93 %  BP Readings from Last 4 Encounters:   05/05/22 130/70   04/19/22 145/77   04/05/22 156/89   07/21/14 130/80 "       Weight/BMI: Body mass index is 25.79 kg/m².  Wt Readings from Last 4 Encounters:   05/05/22 70.3 kg (155 lb)   04/20/22 69.4 kg (153 lb)   04/19/22 70.6 kg (155 lb 9.6 oz)   04/05/22 69.8 kg (153 lb 12.8 oz)       General: No acute distress. Well nourished.  HEENT: EOM grossly intact, no scleral icterus, no pharyngeal erythema.   Neck:  Trachea midline, no visible masses  CVS: RRR. No JVD at 90  Resp: Normal respiratory effort. Normal appearing chest.  Abdomen: Not overtly obese.  MSK/Ext: No clubbing or cyanosis.  Skin: Dry appearing, no rashes.  Neurological: CN III-XII grossly intact. No focal deficits.   Psych: A&O x 3, appropriate affect, good judgement      Lab Data Review:  Lab Results   Component Value Date/Time    CHOLSTRLTOT 165 04/08/2022 07:45 AM    HDL 59 04/08/2022 07:45 AM    TRIGLYCERIDE 170 (H) 04/08/2022 07:45 AM       Lab Results   Component Value Date/Time    SODIUM 142 04/27/2022 07:26 AM    POTASSIUM 5.0 04/27/2022 07:26 AM    CHLORIDE 107 (H) 04/27/2022 07:26 AM    CO2 20 04/27/2022 07:26 AM    GLUCOSE 111 (H) 04/27/2022 07:26 AM    BUN 22 04/27/2022 07:26 AM    CREATININE 1.39 (H) 04/27/2022 07:26 AM    BUNCREATRAT 16 04/27/2022 07:26 AM     Lab Results   Component Value Date/Time    ALKPHOSPHAT 76 04/08/2022 07:45 AM    ASTSGOT 39 04/08/2022 07:45 AM    ALTSGPT 21 04/08/2022 07:45 AM    TBILIRUBIN 0.5 04/08/2022 07:45 AM      Lab Results   Component Value Date/Time    WBC 8.1 04/08/2022 07:45 AM     Lab Results   Component Value Date/Time    HBA1C 6.1 (H) 04/27/2022 07:26 AM    HBA1C 6.3 (H) 04/08/2022 07:45 AM         Cardiac Imaging and Procedures Review:    EKG dated 5/5/2022: My personal interpretation is sinus rhythm with RBBB and LAFB    Outside EKG dated 8/8/2017: Sinus rhythm, RBBB and LAFB    Outside Echo dated 8/29/2017:    Summary    Overall normal LV systolic function, EF is 68%    Basal inferior wall hypokinesis    Mild mitral insufficiency    Estimated RVSP is 13mmHg     No prior echo for comparison     Outside Nuclear Perfusion Imaging (8/23/2017):   1. Severe predominantly fixed perfusion defect   involving 31% of the inferoseptal segments and apex   with associated hypokinesis, probably infarcted   myocardium with minimal shai-infarct ischemia involving    the proximal inferoseptal wall.     2. Normal left ventricular size with calculated LVEF of    50%.         Assessment & Plan     1. Coronary artery disease involving native coronary artery of native heart without angina pectoris  EKG    EC-ECHOCARDIOGRAM COMPLETE W/O CONT    NM-CARDIAC STRESS TEST   2. RBBB  EC-ECHOCARDIOGRAM COMPLETE W/O CONT    NM-CARDIAC STRESS TEST   3. Dyslipidemia     4. S/P coronary artery stent placement  NM-CARDIAC STRESS TEST   5. Essential hypertension     6. Prediabetes     7. Stage 3a chronic kidney disease (HCC)     8. Other chest pain  EC-ECHOCARDIOGRAM COMPLETE W/O CONT    NM-CARDIAC STRESS TEST   9. Dizziness           Shared Medical Decision Making:  Patient reports new onset symptoms of dizziness and feelings of passing out with heavy exertion which are similar to his previous symptoms when he underwent coronary angiogram with stent placement.  Has not had cardiac testing done since 2017.  After discussion, obtain pharmacological nuclear cardiac stress test to rule out obstructive CAD.  Patient received bare-metal stent in 2009 and continues to smoke.    Obtain transthoracic echocardiogram to evaluate underlying cardiac structure and function.  Rule out structural or valvular heart abnormality given new onset dizziness.    Continue aspirin 81 mg daily.    Reports low blood pressure in the morning along with feeling tired and fatigued.  Advised to decrease losartan to 50 mg daily and continue to monitor BP with goal less than 130/80.  Understands that if BP starts to increase above goal, can increase the dose back to 100 mg daily.     Continue Toprol-XL 50 mg daily.    Lipid panel reviewed  with LDL within goal.  Continue Lipitor 80 mg daily.    In regards to tobacco dependence, spent approximately 4 minutes with the patient to discuss harmful cardiovascular effects including in-stent restenosis, risk factor for stroke and recurrent heart attack.  After discussion, patient is motivated to quit smoking.  Will discuss with his PCP if he needs nicotine replacement to help with quitting.      All of patient's excellent questions were answered to the best of my knowledge and to his satisfaction.  It was a pleasure seeing Mr. Hugo Villela in my clinic today. Return in about 3 months (around 8/5/2022). Patient is aware to call the cardiology clinic with any questions or concerns.      Prince Gonzales MD  [facility]  [facility], [facility]  [address] [address]  Phone: [phone]  Fax: [phone]    Please note that this dictation was created using voice recognition software. I have made every reasonable attempt to correct obvious errors, but it is possible there are errors of grammar and possibly content that I did not discover before finalizing the note.

## 2022-05-05 NOTE — PATIENT INSTRUCTIONS
Take losartan 50 mg (1/2 tablet) and monitor your BP.  Goal BP less than 130/80 but if your BP starts to increase above 140/80 go back to 100 mg.

## 2022-05-06 LAB — EKG IMPRESSION: NORMAL

## 2022-05-13 ENCOUNTER — OFFICE VISIT (OUTPATIENT)
Dept: INTERNAL MEDICINE | Facility: OTHER | Age: 62
End: 2022-05-13
Payer: COMMERCIAL

## 2022-05-13 VITALS
BODY MASS INDEX: 26.09 KG/M2 | DIASTOLIC BLOOD PRESSURE: 85 MMHG | HEIGHT: 65 IN | WEIGHT: 156.6 LBS | HEART RATE: 56 BPM | OXYGEN SATURATION: 91 % | SYSTOLIC BLOOD PRESSURE: 140 MMHG | TEMPERATURE: 98.8 F

## 2022-05-13 DIAGNOSIS — R73.03 PREDIABETES: ICD-10-CM

## 2022-05-13 DIAGNOSIS — N18.31 STAGE 3A CHRONIC KIDNEY DISEASE: ICD-10-CM

## 2022-05-13 DIAGNOSIS — J44.9 CHRONIC OBSTRUCTIVE PULMONARY DISEASE, UNSPECIFIED COPD TYPE (HCC): ICD-10-CM

## 2022-05-13 DIAGNOSIS — E78.5 HLD (HYPERLIPIDEMIA): ICD-10-CM

## 2022-05-13 DIAGNOSIS — I25.10 CORONARY ARTERY DISEASE INVOLVING NATIVE CORONARY ARTERY OF NATIVE HEART WITHOUT ANGINA PECTORIS: ICD-10-CM

## 2022-05-13 PROCEDURE — 99214 OFFICE O/P EST MOD 30 MIN: CPT | Mod: GC | Performed by: STUDENT IN AN ORGANIZED HEALTH CARE EDUCATION/TRAINING PROGRAM

## 2022-05-13 RX ORDER — METOPROLOL SUCCINATE 50 MG/1
50 TABLET, EXTENDED RELEASE ORAL DAILY
Qty: 30 TABLET | Refills: 24 | Status: SHIPPED | OUTPATIENT
Start: 2022-05-13 | End: 2022-06-09 | Stop reason: SDUPTHER

## 2022-05-13 RX ORDER — ATORVASTATIN CALCIUM 80 MG/1
80 TABLET, FILM COATED ORAL
Qty: 30 TABLET | Refills: 6 | Status: SHIPPED | OUTPATIENT
Start: 2022-05-13 | End: 2023-02-06 | Stop reason: SDUPTHER

## 2022-05-13 RX ORDER — BUDESONIDE AND FORMOTEROL FUMARATE DIHYDRATE 160; 4.5 UG/1; UG/1
2 AEROSOL RESPIRATORY (INHALATION) 2 TIMES DAILY
Qty: 1 EACH | Refills: 3 | Status: SHIPPED | OUTPATIENT
Start: 2022-05-13 | End: 2022-06-10

## 2022-05-13 RX ORDER — ALBUTEROL SULFATE 90 UG/1
2 AEROSOL, METERED RESPIRATORY (INHALATION) EVERY 4 HOURS PRN
Qty: 1 EACH | Refills: 3 | Status: SHIPPED | OUTPATIENT
Start: 2022-05-13

## 2022-05-13 ASSESSMENT — ENCOUNTER SYMPTOMS
SPEECH CHANGE: 0
MYALGIAS: 0
DIZZINESS: 0
HEADACHES: 0
VOMITING: 0
WEIGHT LOSS: 0
TINGLING: 0
COUGH: 0
CHILLS: 0
BRUISES/BLEEDS EASILY: 0
FOCAL WEAKNESS: 0
BACK PAIN: 0
HEARTBURN: 0
ABDOMINAL PAIN: 0
DOUBLE VISION: 0
NAUSEA: 0
FEVER: 0
PHOTOPHOBIA: 0
BLOOD IN STOOL: 0
BLURRED VISION: 0
NERVOUS/ANXIOUS: 0
ORTHOPNEA: 0
SENSORY CHANGE: 0
SPUTUM PRODUCTION: 0
PALPITATIONS: 0
SORE THROAT: 0
SINUS PAIN: 0
HEMOPTYSIS: 0
NECK PAIN: 0
CONSTIPATION: 0
DEPRESSION: 0
SHORTNESS OF BREATH: 0

## 2022-05-13 ASSESSMENT — LIFESTYLE VARIABLES: SUBSTANCE_ABUSE: 0

## 2022-05-13 ASSESSMENT — FIBROSIS 4 INDEX: FIB4 SCORE: 2.03

## 2022-05-13 NOTE — PATIENT INSTRUCTIONS
- Continue same regimen heart medications  -- Losartan 50 mg daily not 100  - Smoking cessation  - Daily BP monitoring  - Start Symbicort twice daily  - Albuterol as needed   -- Follow up in 1 month

## 2022-05-13 NOTE — PROGRESS NOTES
Established Patient    Patient Care Team:  Evert Everett M.D. as PCP - General (Internal Medicine)    HPI:  Hugo Villela is a 61 y.o. male who presents today with the following Chief Complaint(s):   Chief Complaint   Patient presents with   • Lab Results   • Medication Refill     Mr. Villela is a 62 y/o male with CAD, HTN, CKD3a, COPD, DLD. Comes for follow up with lab work  Labs showed improved A1c, and also improved kidney function.  Patient had a recent appointment with cards they are getting NMST and ECHO in the following few weeks.  Patient had some episodes of lightheadedness and low BP readings, this addressed by CARDS recommending decreasing Losartan to 50 mg.  Since adjustment in therapy BP has been between 110-120's.  Today his BP at home was 122/77, but BP here elevated 177/82, I rechecked his BP getting a reading of 140/85 manually.    Review of Systems   Review of Systems   Constitutional: Negative for chills, fever, malaise/fatigue and weight loss.   HENT: Negative for ear discharge, nosebleeds, sinus pain, sore throat and tinnitus.    Eyes: Negative for blurred vision, double vision and photophobia.   Respiratory: Negative for cough, hemoptysis, sputum production and shortness of breath.    Cardiovascular: Negative for chest pain, palpitations, orthopnea and leg swelling.   Gastrointestinal: Negative for abdominal pain, blood in stool, constipation, heartburn, nausea and vomiting.   Genitourinary: Negative for dysuria, frequency, hematuria and urgency.   Musculoskeletal: Negative for back pain, joint pain, myalgias and neck pain.   Skin: Negative for rash.   Neurological: Negative for dizziness, tingling, sensory change, speech change, focal weakness and headaches.   Endo/Heme/Allergies: Does not bruise/bleed easily.   Psychiatric/Behavioral: Negative for depression, substance abuse and suicidal ideas. The patient is not nervous/anxious.          Past Medical History:   Diagnosis Date   •  Acute myocardial infarction of other inferior wall, episode of care unspecified 6/25/2012   • Acute myocardial infarction of other lateral wall, episode of care unspecified 6/25/2012   • Chest pain, unspecified 6/25/2012   • Coronary atherosclerosis of native coronary artery 6/25/2012   • Dizziness and giddiness 6/25/2012   • Mixed hyperlipidemia 6/25/2012   • Pure hyperglyceridemia 6/25/2012   • S/P coronary artery stent placement 6/25/2012   • Tobacco use disorder 6/25/2012       Patient Active Problem List    Diagnosis Date Noted   • Prediabetes 05/18/2015   • HTN (hypertension) 05/07/2015   • Coronary artery disease involving native coronary artery of native heart without angina pectoris 06/25/2012   • Pure hyperglyceridemia 06/25/2012   • Tobacco use disorder 06/25/2012   • S/P coronary artery stent placement 06/25/2012       Allergies:Patient has no known allergies.    Current Outpatient Medications   Medication Sig Dispense Refill   • metoprolol SR (TOPROL XL) 50 MG TABLET SR 24 HR Take 1 Tablet by mouth every day. 30 Tablet 24   • aspirin EC (ECOTRIN) 81 MG Tablet Delayed Response Take 1 Tablet by mouth every day. 30 Tablet 3   • atorvastatin (LIPITOR) 80 MG tablet Take 1 Tablet by mouth at bedtime. 30 Tablet 6   • budesonide-formoterol (SYMBICORT) 160-4.5 MCG/ACT Aerosol Inhale 2 Puffs 2 times a day. 1 Each 3   • albuterol 108 (90 Base) MCG/ACT Aero Soln inhalation aerosol Inhale 2 Puffs every four hours as needed for Shortness of Breath. 1 Each 3   • losartan (COZAAR) 100 MG Tab Take 50 mg by mouth every day.       No current facility-administered medications for this visit.       Social History     Tobacco Use   • Smoking status: Current Every Day Smoker     Packs/day: 0.50     Years: 40.00     Pack years: 20.00     Types: Cigarettes     Start date: 4/5/1982   • Smokeless tobacco: Never Used   Vaping Use   • Vaping Use: Never used   Substance Use Topics   • Alcohol use: Not Currently     Alcohol/week: 1.2  "oz     Types: 2 Cans of beer per week     Comment: almost every day in the past year.   • Drug use: Never       Family History   Problem Relation Age of Onset   • Stroke Mother          Physical Exam  Vitals:  BP (!) 177/82 (BP Location: Left arm, Patient Position: Sitting, BP Cuff Size: Adult)   Pulse (!) 56   Temp 37.1 °C (98.8 °F) (Temporal)   Ht 1.651 m (5' 5\")   Wt 71 kg (156 lb 9.6 oz)   SpO2 91%  Body mass index is 26.06 kg/m².     Constitutional:  Not in acute distress, well appearing.  HEENT:   NC/AT, PERRLA.  Cardiovascular: Regular rate and rhythm. No murmurs or gallops.      Lungs:   Clear to auscultation bilaterally. No wheezes or crackles. No respiratory distress.  Abdomen: Not distended, soft, not tender. No guarding or rigidity. No masses.  Extremities:  No cyanosis/clubbing/edema. No obvious deformities.  Skin:  Warm and dry.  No visible rashes.  Neurologic: Alert & oriented x 3, CN II-XII grossly intact, strength and sensation grossly intact.  No focal deficits noted.  Psychiatric:  Affect normal, mood normal, judgment normal.      Assessment and Plan:     CAD s/p stent placement in 2017  - Patient now established with [facility]  - Recent visit to [facility] on 5/5 scheduled for NMST and ECHO   - Follow up with [facility] in July after above completion   - Patient stated occasionally experiencing wheezing         PLAN:  - Continue heart medications   - New Dx of COPD we will start therapy  - Get NMST on 5/19 and ECHO in [place] [facility]  - Follow up with cards in July   - Follow up with me in 3-4 months    CKD 3a  - New labs slightly improved creatinine and GFR  - Creat: 1.39, GFR:58  - BP well controlled  - A1c:6.1 improved  - We will monitor Kidney function every 6 months or so    Hypertension   - BP recent weeks was in the soft side and patient had episodes of lightheadedness  - BP log showed some BP reading in the low 100's and high 90's  - Losartan was decreased to 50 mg from 100 by Cardiology in recent " appointment.  - Today elevated BP initially 177/82 but at 9:15 I got a 140/85 manually.  - Since adjustment in dose his BP has been stable in the range of 110-120's and asymptomatic  - Patient instructed to continue Losartan 50 mg     HLD  - On statin 80 mg  - Lipid at goal LDL:77  - Continue therapy    Prediabetes  - Improved  - 6.1 from 6.3  - CTM    COPD  - PFT's consistent with COPD  - FEV1/FVC <70%  - COPD stage 2  - Starting Symbicort BID  - Albuterol PRN  - Follow up in 4 weeks        Return in about 4 weeks (around 6/10/2022).      Please note that this dictation was created using voice recognition software. I have made every reasonable attempt to correct obvious errors, but I expect that there are errors of grammar and possibly content that I did not discover before finalizing the note.    Total face-to-face time spent in medical decision making:    Dr. Karlos M.D. PGY-2  [facility]

## 2022-05-19 ENCOUNTER — HOSPITAL ENCOUNTER (OUTPATIENT)
Dept: RADIOLOGY | Facility: MEDICAL CENTER | Age: 62
End: 2022-05-19
Attending: INTERNAL MEDICINE
Payer: COMMERCIAL

## 2022-05-19 DIAGNOSIS — I45.10 RBBB: ICD-10-CM

## 2022-05-19 DIAGNOSIS — R07.89 OTHER CHEST PAIN: ICD-10-CM

## 2022-05-19 DIAGNOSIS — I25.10 CORONARY ARTERY DISEASE INVOLVING NATIVE CORONARY ARTERY OF NATIVE HEART WITHOUT ANGINA PECTORIS: ICD-10-CM

## 2022-05-19 DIAGNOSIS — Z95.5 S/P CORONARY ARTERY STENT PLACEMENT: ICD-10-CM

## 2022-05-19 PROCEDURE — 78452 HT MUSCLE IMAGE SPECT MULT: CPT | Mod: 26 | Performed by: INTERNAL MEDICINE

## 2022-05-19 PROCEDURE — 700111 HCHG RX REV CODE 636 W/ 250 OVERRIDE (IP)

## 2022-05-19 PROCEDURE — 93018 CV STRESS TEST I&R ONLY: CPT | Performed by: INTERNAL MEDICINE

## 2022-05-19 PROCEDURE — A9502 TC99M TETROFOSMIN: HCPCS

## 2022-05-19 RX ORDER — REGADENOSON 0.08 MG/ML
INJECTION, SOLUTION INTRAVENOUS
Status: COMPLETED
Start: 2022-05-19 | End: 2022-05-19

## 2022-05-19 RX ORDER — AMINOPHYLLINE 25 MG/ML
100 INJECTION, SOLUTION INTRAVENOUS
Status: DISCONTINUED | OUTPATIENT
Start: 2022-05-19 | End: 2022-05-20 | Stop reason: HOSPADM

## 2022-05-19 RX ORDER — REGADENOSON 0.08 MG/ML
0.4 INJECTION, SOLUTION INTRAVENOUS ONCE
Status: COMPLETED | OUTPATIENT
Start: 2022-05-19 | End: 2022-05-19

## 2022-05-19 RX ADMIN — REGADENOSON 0.4 MG: 0.08 INJECTION, SOLUTION INTRAVENOUS at 11:56

## 2022-05-27 ENCOUNTER — TELEPHONE (OUTPATIENT)
Dept: CARDIOLOGY | Facility: MEDICAL CENTER | Age: 62
End: 2022-05-27
Payer: COMMERCIAL

## 2022-05-27 NOTE — TELEPHONE ENCOUNTER
----- Message from Prince Gonzales M.D. sent at 5/27/2022  9:37 AM PDT -----  Can you please let the patient know that the stress test shows evidence of previous heart attack with no reversible ischemia.  His ejection fraction is decreased on the stress test so we will wait for the echocardiogram to be completed for better assessment.   Thanks.

## 2022-06-03 ENCOUNTER — HOSPITAL ENCOUNTER (OUTPATIENT)
Dept: CARDIOLOGY | Facility: MEDICAL CENTER | Age: 62
End: 2022-06-03
Attending: INTERNAL MEDICINE
Payer: COMMERCIAL

## 2022-06-03 DIAGNOSIS — R07.89 OTHER CHEST PAIN: ICD-10-CM

## 2022-06-03 DIAGNOSIS — I25.10 CORONARY ARTERY DISEASE INVOLVING NATIVE CORONARY ARTERY OF NATIVE HEART WITHOUT ANGINA PECTORIS: ICD-10-CM

## 2022-06-03 DIAGNOSIS — I45.10 RBBB: ICD-10-CM

## 2022-06-03 LAB
LV EJECT FRACT  99904: 55
LV EJECT FRACT MOD 2C 99903: 50.86
LV EJECT FRACT MOD 4C 99902: 59.08
LV EJECT FRACT MOD BP 99901: 54.78

## 2022-06-03 PROCEDURE — 93306 TTE W/DOPPLER COMPLETE: CPT | Mod: 26 | Performed by: INTERNAL MEDICINE

## 2022-06-03 PROCEDURE — 93306 TTE W/DOPPLER COMPLETE: CPT

## 2022-06-09 DIAGNOSIS — I25.10 CORONARY ARTERY DISEASE INVOLVING NATIVE CORONARY ARTERY OF NATIVE HEART WITHOUT ANGINA PECTORIS: ICD-10-CM

## 2022-06-09 RX ORDER — METOPROLOL SUCCINATE 50 MG/1
50 TABLET, EXTENDED RELEASE ORAL DAILY
Qty: 30 TABLET | Refills: 6 | Status: SHIPPED | OUTPATIENT
Start: 2022-06-09 | End: 2022-12-07 | Stop reason: SDUPTHER

## 2022-06-09 NOTE — TELEPHONE ENCOUNTER
Last seen: 05.13.2022 by Dr. Everett  Next appt: 06.10.2022 with Dr. Everett    Was the patient seen in the last year in this department? Yes   Does patient have an active prescription for medications requested? No   Received Request Via: Pharmacy

## 2022-06-10 ENCOUNTER — OFFICE VISIT (OUTPATIENT)
Dept: INTERNAL MEDICINE | Facility: OTHER | Age: 62
End: 2022-06-10
Payer: COMMERCIAL

## 2022-06-10 VITALS
BODY MASS INDEX: 24.83 KG/M2 | TEMPERATURE: 98.9 F | HEIGHT: 65 IN | DIASTOLIC BLOOD PRESSURE: 68 MMHG | SYSTOLIC BLOOD PRESSURE: 121 MMHG | OXYGEN SATURATION: 92 % | HEART RATE: 60 BPM | WEIGHT: 149 LBS

## 2022-06-10 DIAGNOSIS — I10 PRIMARY HYPERTENSION: ICD-10-CM

## 2022-06-10 DIAGNOSIS — N18.31 STAGE 3A CHRONIC KIDNEY DISEASE: ICD-10-CM

## 2022-06-10 DIAGNOSIS — R73.03 PREDIABETES: ICD-10-CM

## 2022-06-10 DIAGNOSIS — J44.9 CHRONIC OBSTRUCTIVE PULMONARY DISEASE, UNSPECIFIED COPD TYPE (HCC): ICD-10-CM

## 2022-06-10 PROCEDURE — 99213 OFFICE O/P EST LOW 20 MIN: CPT | Mod: GE | Performed by: STUDENT IN AN ORGANIZED HEALTH CARE EDUCATION/TRAINING PROGRAM

## 2022-06-10 RX ORDER — FLUTICASONE PROPIONATE AND SALMETEROL 250; 50 UG/1; UG/1
1 POWDER RESPIRATORY (INHALATION) EVERY 12 HOURS
Qty: 1 EACH | Refills: 3 | Status: SHIPPED | OUTPATIENT
Start: 2022-06-10 | End: 2022-10-06 | Stop reason: SDUPTHER

## 2022-06-10 RX ORDER — LOSARTAN POTASSIUM 100 MG/1
100 TABLET ORAL DAILY
Qty: 90 EACH | Refills: 6 | Status: SHIPPED | OUTPATIENT
Start: 2022-06-10 | End: 2023-06-12 | Stop reason: SDUPTHER

## 2022-06-10 ASSESSMENT — ENCOUNTER SYMPTOMS
VOMITING: 0
WEIGHT LOSS: 0
COUGH: 0
ABDOMINAL PAIN: 0
EYE PAIN: 0
SPUTUM PRODUCTION: 0
DOUBLE VISION: 0
NECK PAIN: 0
WEAKNESS: 0
NAUSEA: 0
SHORTNESS OF BREATH: 0
BLURRED VISION: 0
SORE THROAT: 0
HEADACHES: 0
BRUISES/BLEEDS EASILY: 0
DIZZINESS: 0
BLOOD IN STOOL: 0
SENSORY CHANGE: 0
HALLUCINATIONS: 0
CHILLS: 0
HEMOPTYSIS: 0
HEARTBURN: 0
FOCAL WEAKNESS: 0
TINGLING: 0
SPEECH CHANGE: 0
MYALGIAS: 0
BACK PAIN: 0
PALPITATIONS: 0
ORTHOPNEA: 0
FEVER: 0
DEPRESSION: 0
PHOTOPHOBIA: 0
SINUS PAIN: 0
NERVOUS/ANXIOUS: 0

## 2022-06-10 ASSESSMENT — LIFESTYLE VARIABLES: SUBSTANCE_ABUSE: 0

## 2022-06-10 ASSESSMENT — FIBROSIS 4 INDEX: FIB4 SCORE: 2.03

## 2022-06-10 ASSESSMENT — PATIENT HEALTH QUESTIONNAIRE - PHQ9: CLINICAL INTERPRETATION OF PHQ2 SCORE: 0

## 2022-06-10 NOTE — PATIENT INSTRUCTIONS
- Continue Heart medicines unchanged  - Stop Symbicort  - Start Advair 1 puff twice a day  - Get lab work  - Follow up in 2-4 weeks

## 2022-06-10 NOTE — PROGRESS NOTES
"Teaching Physician Attestation      Level of Participation    I discussed with the resident physician the patient's history, exam, assessment and plan in detail.  Topics listed in my addendum were the focus of the visit.  Healthcare maintenance was not addressed this visit unless listed as a topic in my addendum.  I agree with plan as written along with the following additions/modifications:    Asthma with possible COPD overlap in setting of tobacco use  -Wheezing significantly improved on Symbicort, but patient feels that he had \"cold\" like symptoms that began with using the inhaler but have improved over the last few weeks, notable for loss of taste.  No need for rescue inhalers.  PFTs show reduced ratio with significant bronchodilator response and RV trapping.  Symbicort is not covered by his insurance    Plan  -Change controller medication to Advair  -Patient has rescue inhaler with spacer if needed  -Not currently complaining of upper respiratory allergy symptoms, but if develops would treat aggressively  -Patient is working on smoking cessation  -Symptoms sound more like a viral URI than side effect of the Symbicort, but advised he can rinse his mouth with water after using the inhaler to prevent any potential oral side effects.  Since he is more than 2 weeks out from the likely URI symptoms no utility for swabbing for COVID as would not .    Return to clinic in 2 weeks to follow-up on possible viral URI symptoms and treatment of asthma.  "

## 2022-06-11 NOTE — PROGRESS NOTES
Established Patient    Patient Care Team:  Evert Everett M.D. as PCP - General (Internal Medicine)    HPI:  Hugo Villela is a 61 y.o. male who presents today with the following Chief Complaint(s):   Chief Complaint   Patient presents with   • Follow-Up     Patient here for cardiac work up follow up     Mr. Villela is a 62 y/o male with history CAD, HTN, CKD 3a, DLD and recent diagnosis of COPD.  Patient is here for follow up since he was just started on Symbicort 2 weeks ago. PFT's showed: Pulmonary function testing shows airflow obstruction with significant bronchodilator responsiveness and air trapping with preserved DLCO.  Constellation of findings may be related to COPD, alternatively bronchiectasis or chronic obstructive asthma may present similarly.   Patient states significant improvement in his breathing, but also mentioned right after he started new inhaler he felt his throat raspy, also mentioned stuffy nose and difficulties smelling, these symptoms much improved over the past few days.  Patient also tells me that his Insurance did not cover Symbicort and he had to pay out of pocket few hundred dollars.    Review of Systems   Review of Systems   Constitutional: Negative for chills, fever, malaise/fatigue and weight loss.   HENT: Positive for congestion. Negative for ear discharge, nosebleeds, sinus pain, sore throat and tinnitus.    Eyes: Negative for blurred vision, double vision, photophobia and pain.   Respiratory: Negative for cough, hemoptysis, sputum production and shortness of breath.    Cardiovascular: Negative for chest pain, palpitations, orthopnea and leg swelling.   Gastrointestinal: Negative for abdominal pain, blood in stool, heartburn, melena, nausea and vomiting.   Genitourinary: Negative for dysuria, frequency, hematuria and urgency.   Musculoskeletal: Negative for back pain, joint pain, myalgias and neck pain.   Skin: Negative for rash.   Neurological: Negative for dizziness,  tingling, sensory change, speech change, focal weakness, weakness and headaches.   Endo/Heme/Allergies: Does not bruise/bleed easily.   Psychiatric/Behavioral: Negative for depression, hallucinations, substance abuse and suicidal ideas. The patient is not nervous/anxious.          Past Medical History:   Diagnosis Date   • Acute myocardial infarction of other inferior wall, episode of care unspecified 6/25/2012   • Acute myocardial infarction of other lateral wall, episode of care unspecified 6/25/2012   • Chest pain, unspecified 6/25/2012   • Coronary atherosclerosis of native coronary artery 6/25/2012   • Dizziness and giddiness 6/25/2012   • Mixed hyperlipidemia 6/25/2012   • Pure hyperglyceridemia 6/25/2012   • S/P coronary artery stent placement 6/25/2012   • Tobacco use disorder 6/25/2012       Patient Active Problem List    Diagnosis Date Noted   • Prediabetes 05/18/2015   • HTN (hypertension) 05/07/2015   • Coronary artery disease involving native coronary artery of native heart without angina pectoris 06/25/2012   • Pure hyperglyceridemia 06/25/2012   • Tobacco use disorder 06/25/2012   • S/P coronary artery stent placement 06/25/2012       Allergies:Patient has no known allergies.    Current Outpatient Medications   Medication Sig Dispense Refill   • losartan (COZAAR) 100 MG Tab Take 1 Tablet by mouth every day. 90 Each 6   • fluticasone-salmeterol (ADVAIR) 250-50 MCG/ACT AEROSOL POWDER, BREATH ACTIVATED Inhale 1 Puff every 12 hours. 1 Each 3   • metoprolol SR (TOPROL XL) 50 MG TABLET SR 24 HR Take 1 Tablet by mouth every day. 30 Tablet 6   • aspirin EC (ECOTRIN) 81 MG Tablet Delayed Response Take 1 Tablet by mouth every day. 30 Tablet 3   • atorvastatin (LIPITOR) 80 MG tablet Take 1 Tablet by mouth at bedtime. 30 Tablet 6   • albuterol 108 (90 Base) MCG/ACT Aero Soln inhalation aerosol Inhale 2 Puffs every four hours as needed for Shortness of Breath. 1 Each 3     No current facility-administered  "medications for this visit.       Social History     Tobacco Use   • Smoking status: Current Every Day Smoker     Packs/day: 0.50     Years: 40.00     Pack years: 20.00     Types: Cigarettes     Start date: 4/5/1982   • Smokeless tobacco: Never Used   • Tobacco comment: only 4-6 cigarettes daily   Vaping Use   • Vaping Use: Never used   Substance Use Topics   • Alcohol use: Not Currently     Alcohol/week: 1.2 oz     Types: 2 Cans of beer per week     Comment: almost every day in the past year.   • Drug use: Never       Family History   Problem Relation Age of Onset   • Stroke Mother          Physical Exam  Vitals:  /68 (BP Location: Right arm, Patient Position: Sitting, BP Cuff Size: Large adult)   Pulse 60   Temp 37.2 °C (98.9 °F) (Temporal)   Ht 1.651 m (5' 5\")   Wt 67.6 kg (149 lb)   SpO2 92%  Body mass index is 24.79 kg/m².       Constitutional:  Not in acute distress, well appearing.  HEENT:   NC/AT, PERRLA.  Cardiovascular: Regular rate and rhythm. No murmurs or gallops.      Lungs:   Clear to auscultation bilaterally. No wheezes or crackles. No respiratory distress.  Abdomen: Not distended, soft, not tender. No guarding or rigidity. No masses.  Extremities:  No cyanosis/clubbing/edema. No obvious deformities.  Skin:  Warm and dry.  No visible rashes.  Neurologic: Alert & oriented x 3, CN II-XII grossly intact, strength and sensation grossly intact.  No focal deficits noted.  Psychiatric:  Affect normal, mood normal, judgment normal.      Assessment and Plan:     COPD  - Long time smoker  - Since he established care with me this year noticed wheezing  - Current smoker but has cut down smoking considerably, currently smoking 4-5 cigarettes daily.  - PFT's:  Show Airflow obstruction with significant bronchodilator responsiveness and air trapping with preserved DLCO.  Constellation of findings may be related to COPD, alternatively bronchiectasis or chronic obstructive asthma may present similarly.   - " Improvement after started Symbicort, unfortunately not covered by his Insurance and it is very expensive.  - Also some small concerns for potential side effects (stuffy nose, sensation of something stuck in his throat) symptoms improved.  - Discussed with patient changing inhaler to Advair        PLAN:  - Start Advair 1 puff BID  - Rescue inhaler PRN  - Stop smoking   - If new inhaler not covered we could consider corticosteroid inhaler since he had excellent response to treatment and PFT's findings.  - Follow up in 2-4 weeks      Refilled BP medication, also placed lab work for next visit.    Return in about 4 weeks (around 7/8/2022).        Please note that this dictation was created using voice recognition software. I have made every reasonable attempt to correct obvious errors, but I expect that there are errors of grammar and possibly content that I did not discover before finalizing the note.    Total face-to-face time spent in medical decision making:    Dr. Karlos M.D. PGY2  [facility]

## 2022-07-11 ENCOUNTER — OFFICE VISIT (OUTPATIENT)
Dept: INTERNAL MEDICINE | Facility: OTHER | Age: 62
End: 2022-07-11
Payer: COMMERCIAL

## 2022-07-11 VITALS
WEIGHT: 152 LBS | BODY MASS INDEX: 25.33 KG/M2 | HEIGHT: 65 IN | OXYGEN SATURATION: 98 % | HEART RATE: 74 BPM | DIASTOLIC BLOOD PRESSURE: 80 MMHG | SYSTOLIC BLOOD PRESSURE: 130 MMHG | TEMPERATURE: 99.2 F

## 2022-07-11 DIAGNOSIS — J44.9 CHRONIC OBSTRUCTIVE PULMONARY DISEASE, UNSPECIFIED COPD TYPE (HCC): ICD-10-CM

## 2022-07-11 DIAGNOSIS — N18.31 STAGE 3A CHRONIC KIDNEY DISEASE: ICD-10-CM

## 2022-07-11 DIAGNOSIS — R73.03 PREDIABETES: ICD-10-CM

## 2022-07-11 PROCEDURE — 99213 OFFICE O/P EST LOW 20 MIN: CPT | Mod: GE | Performed by: STUDENT IN AN ORGANIZED HEALTH CARE EDUCATION/TRAINING PROGRAM

## 2022-07-11 ASSESSMENT — ENCOUNTER SYMPTOMS
SPEECH CHANGE: 0
SPUTUM PRODUCTION: 0
WEIGHT LOSS: 0
PHOTOPHOBIA: 0
CLAUDICATION: 0
FEVER: 0
HALLUCINATIONS: 0
COUGH: 0
PALPITATIONS: 0
DEPRESSION: 0
HEMOPTYSIS: 0
NAUSEA: 0
SORE THROAT: 0
WEAKNESS: 0
DIZZINESS: 0
DOUBLE VISION: 0
CHILLS: 0
BLURRED VISION: 0
BRUISES/BLEEDS EASILY: 0
HEADACHES: 0
NECK PAIN: 0
FOCAL WEAKNESS: 0
BACK PAIN: 0
SHORTNESS OF BREATH: 0
ABDOMINAL PAIN: 0
NERVOUS/ANXIOUS: 0
HEARTBURN: 0
VOMITING: 0
SENSORY CHANGE: 0
ORTHOPNEA: 0
TINGLING: 0
SINUS PAIN: 0
BLOOD IN STOOL: 0
MYALGIAS: 0

## 2022-07-11 ASSESSMENT — FIBROSIS 4 INDEX: FIB4 SCORE: 2.06

## 2022-07-11 ASSESSMENT — LIFESTYLE VARIABLES: SUBSTANCE_ABUSE: 0

## 2022-07-11 NOTE — PATIENT INSTRUCTIONS
- Continue same therapy  - Continue Advair  - Follow up with cards  - Follow up with me in 3-4  months  - Get labs 1 week before appointment

## 2022-07-11 NOTE — PROGRESS NOTES
Teaching Physician Attestation      Level of Participation    I discussed with the resident physician the patient's history, exam, assessment and plan in detail.  Topics listed in my addendum were the focus of the visit.  Healthcare maintenance was not addressed this visit unless listed as a topic in my addendum.  I agree with plan as written along with the following additions/modifications:      Asthma with possible COPD component  -Improved dramatically on Advair, no rescue inhaler use, clear lungs, saturation has gone from 92% to 98% on room air.  Continue Advair, rescue inhaler as needed  -Counseled on critical nature of tobacco cessation to improve his lung symptoms, he is gone from 1 pack/day to 5 cigarettes/day, would praised patient for symptoms, patient wishes to continue to reduce cigarettes on his own, will check in in 3 months for progress    HTN  -repeat bp 130/80, nl at home, thus at goal, no symptoms.  -Repeat BMP for monitoring    CKD  -Possibly secondary to hypertension, stable at approximately 1.4, controlling hypertension as above, will follow up within one month for full workup.    Prediabetes  -Previously discussed, stable at 6.1, cont diet and exercise    Return to clinic in 3 months

## 2022-07-11 NOTE — PROGRESS NOTES
Established Patient    Patient Care Team:  Evert Everett M.D. as PCP - General (Internal Medicine)    HPI:  Hugo Villela is a 62 y.o. male who presents today with the following Chief Complaint(s):   Chief Complaint   Patient presents with   • Follow-Up     4 week follow up   • Lab Results   • COPD   • Medication Reaction     Pt would like to discuss medications and if they are working   • Medication Refill     Needs refills on all medications- 90 day supplies     Mr. Villela is a 61 y/o male with known Hx of CAD, CKD3a, DLD, Prediabetes and recent diagnosis of mild COPD.  He comes today with new lab work and also to follow up for COPD follow up since we just changed medications last month.  Patient was started initially on Symbicort for COPD but was not covered by Insurance and patient unable to pay out of pocket, he was started last month on Advair. He states feeling well, denied any adverse symptoms, SOB , chest pain or wheezing.  Lab work unchanged CKD3a, creatinine stable 1.4, GFR:54.  A1c:6.1% also unchanged.    Review of Systems   Review of Systems   Constitutional: Negative for chills, fever, malaise/fatigue and weight loss.   HENT: Negative for congestion, nosebleeds, sinus pain, sore throat and tinnitus.    Eyes: Negative for blurred vision, double vision and photophobia.   Respiratory: Negative for cough, hemoptysis, sputum production and shortness of breath.    Cardiovascular: Negative for chest pain, palpitations, orthopnea, claudication and leg swelling.   Gastrointestinal: Negative for abdominal pain, blood in stool, heartburn, melena, nausea and vomiting.   Genitourinary: Negative for dysuria, frequency, hematuria and urgency.   Musculoskeletal: Negative for back pain, joint pain, myalgias and neck pain.   Skin: Negative for rash.   Neurological: Negative for dizziness, tingling, sensory change, speech change, focal weakness, weakness and headaches.   Endo/Heme/Allergies: Negative for  environmental allergies. Does not bruise/bleed easily.   Psychiatric/Behavioral: Negative for depression, hallucinations, substance abuse and suicidal ideas. The patient is not nervous/anxious.          Past Medical History:   Diagnosis Date   • Acute myocardial infarction of other inferior wall, episode of care unspecified 6/25/2012   • Acute myocardial infarction of other lateral wall, episode of care unspecified 6/25/2012   • Chest pain, unspecified 6/25/2012   • Coronary atherosclerosis of native coronary artery 6/25/2012   • Dizziness and giddiness 6/25/2012   • Mixed hyperlipidemia 6/25/2012   • Pure hyperglyceridemia 6/25/2012   • S/P coronary artery stent placement 6/25/2012   • Tobacco use disorder 6/25/2012       Patient Active Problem List    Diagnosis Date Noted   • Prediabetes 05/18/2015   • HTN (hypertension) 05/07/2015   • Coronary artery disease involving native coronary artery of native heart without angina pectoris 06/25/2012   • Pure hyperglyceridemia 06/25/2012   • Tobacco use disorder 06/25/2012   • S/P coronary artery stent placement 06/25/2012       Allergies:Patient has no known allergies.    Current Outpatient Medications   Medication Sig Dispense Refill   • losartan (COZAAR) 100 MG Tab Take 1 Tablet by mouth every day. 90 Each 6   • fluticasone-salmeterol (ADVAIR) 250-50 MCG/ACT AEROSOL POWDER, BREATH ACTIVATED Inhale 1 Puff every 12 hours. 1 Each 3   • metoprolol SR (TOPROL XL) 50 MG TABLET SR 24 HR Take 1 Tablet by mouth every day. 30 Tablet 6   • aspirin EC (ECOTRIN) 81 MG Tablet Delayed Response Take 1 Tablet by mouth every day. 30 Tablet 3   • atorvastatin (LIPITOR) 80 MG tablet Take 1 Tablet by mouth at bedtime. 30 Tablet 6   • albuterol 108 (90 Base) MCG/ACT Aero Soln inhalation aerosol Inhale 2 Puffs every four hours as needed for Shortness of Breath. 1 Each 3     No current facility-administered medications for this visit.       Social History     Tobacco Use   • Smoking status:  "Current Every Day Smoker     Packs/day: 0.50     Years: 40.00     Pack years: 20.00     Types: Cigarettes     Start date: 4/5/1982   • Smokeless tobacco: Never Used   • Tobacco comment: only 4-6 cigarettes daily   Vaping Use   • Vaping Use: Never used   Substance Use Topics   • Alcohol use: Not Currently     Alcohol/week: 1.2 oz     Types: 2 Cans of beer per week     Comment: almost every day in the past year.   • Drug use: Never       Family History   Problem Relation Age of Onset   • Stroke Mother          Physical Exam  Vitals:  BP (!) 155/84 (BP Location: Left arm, Patient Position: Sitting, BP Cuff Size: Adult)   Pulse 74   Temp 37.3 °C (99.2 °F) (Temporal)   Ht 1.651 m (5' 5\")   Wt 68.9 kg (152 lb)   SpO2 98%  Body mass index is 25.29 kg/m².     Constitutional:  Not in acute distress, well appearing.  HEENT:   NC/AT, PERRLA.  Cardiovascular: Regular rate and rhythm. No murmurs or gallops.      Lungs:   Clear to auscultation bilaterally. No wheezes or crackles. No respiratory distress.  Abdomen: Not distended, soft, not tender. No guarding or rigidity. No masses.  Extremities:  No cyanosis/clubbing/edema. No obvious deformities.  Skin:  Warm and dry.  No visible rashes.  Neurologic: Alert & oriented x 3, CN II-XII grossly intact, strength and sensation grossly intact.  No focal deficits noted.  Psychiatric:  Affect normal, mood normal, judgment normal.      Assessment and Plan:     COPD  - Recent diagnosis  - Initially started on Symbicort but is not covered by Insurance and can not afford it out of pocket (over 300 $ month)  - Started last month on Advair with great results  - Denies SOB, chest pain or wheezing  - Continues to smoke 4-5 cigarettes a day, but significantly improved since he started seeing me (used to smoke a pack daily).  - Physical exam unremarkable, Lung fields well ventilated, no wheezing.        PLAN:  - Continue Advair  - Stop smoking  - Follow up within 3-4 months    Prediabetes  - " A1c unchanged 6.1%  - Repeat within 4-6 months  - Weight loss, regular exercise    CKD 3a  - At baseline  - Recent labs creat:1.46, GFR:54  - Avoid NSAID's  - Stop smoking, BP control, regular exercise    ### Schedule Annual Visit within 3-4 months      Return in about 4 months (around 11/11/2022).        Please note that this dictation was created using voice recognition software. I have made every reasonable attempt to correct obvious errors, but I expect that there are errors of grammar and possibly content that I did not discover before finalizing the note.    Total face-to-face time spent in medical decision making:    Dr. Karlos M.D. PGY-3  [facility]

## 2022-08-15 ENCOUNTER — OFFICE VISIT (OUTPATIENT)
Dept: CARDIOLOGY | Facility: MEDICAL CENTER | Age: 62
End: 2022-08-15
Payer: COMMERCIAL

## 2022-08-15 VITALS
HEART RATE: 79 BPM | RESPIRATION RATE: 16 BRPM | WEIGHT: 153 LBS | HEIGHT: 65 IN | DIASTOLIC BLOOD PRESSURE: 70 MMHG | SYSTOLIC BLOOD PRESSURE: 134 MMHG | OXYGEN SATURATION: 93 % | BODY MASS INDEX: 25.49 KG/M2

## 2022-08-15 DIAGNOSIS — I25.10 CORONARY ARTERY DISEASE INVOLVING NATIVE CORONARY ARTERY OF NATIVE HEART WITHOUT ANGINA PECTORIS: ICD-10-CM

## 2022-08-15 DIAGNOSIS — I10 ESSENTIAL HYPERTENSION: ICD-10-CM

## 2022-08-15 DIAGNOSIS — E78.5 DYSLIPIDEMIA: ICD-10-CM

## 2022-08-15 DIAGNOSIS — Z95.5 S/P CORONARY ARTERY STENT PLACEMENT: ICD-10-CM

## 2022-08-15 PROCEDURE — 99214 OFFICE O/P EST MOD 30 MIN: CPT | Performed by: INTERNAL MEDICINE

## 2022-08-15 ASSESSMENT — FIBROSIS 4 INDEX: FIB4 SCORE: 2.06

## 2022-08-15 NOTE — PROGRESS NOTES
Cardiology Follow-up Consultation Note    Date of note:    8/19/2022    Primary Care Provider: Evert Everett M.D.    Name:             Hugo Villela   YOB: 1960  MRN:               [id number]    Chief Complaint   Patient presents with    Coronary Artery Disease     F/V Dx: Coronary artery disease involving native coronary artery of native heart without angina pectoris       HISTORY OF PRESENT ILLNESS  Mr. Hugo Villela is a 62 y.o. male who returns to see us for follow-up     Pertinent history:  CAD s/p BMS to RCA in 2009: was at home, woke up in the morning around 6 AM, went to restroom and felt that something was wrong.  Passed out twice at home, wife brought him to the hospital and underwent LHC with stent placemen to the RCA.  Did not have chest pain/pressure or shortness of breath with the event.  No similar episodes since then.  Was seeing Dr. Swartz.  Hypertension  Dyslipidemia  Familial hyperlipidemia    Last clinic visit: 5/5/2022    Interim History:  During his last visit, he reported symptoms of dizziness and feelings of passing out with heavy exertion.  Was diagnosed with asthma/COPD and started on inhaler with resolution of symptoms.  No episodes since then.  Feels well without chest pain, pressure, presyncope or syncopal events.      Past Medical History:   Diagnosis Date    Acute myocardial infarction of other inferior wall, episode of care unspecified 6/25/2012    Acute myocardial infarction of other lateral wall, episode of care unspecified 6/25/2012    Chest pain, unspecified 6/25/2012    Coronary atherosclerosis of native coronary artery 6/25/2012    Dizziness and giddiness 6/25/2012    Mixed hyperlipidemia 6/25/2012    Pure hyperglyceridemia 6/25/2012    S/P coronary artery stent placement 6/25/2012    Tobacco use disorder 6/25/2012         Past Surgical History:   Procedure Laterality Date    ORIF, FRACTURE, FEMUR  1994    STENT PLACEMENT           Current Outpatient  Medications   Medication Sig Dispense Refill    losartan (COZAAR) 100 MG Tab Take 1 Tablet by mouth every day. 90 Each 6    fluticasone-salmeterol (ADVAIR) 250-50 MCG/ACT AEROSOL POWDER, BREATH ACTIVATED Inhale 1 Puff every 12 hours. 1 Each 3    metoprolol SR (TOPROL XL) 50 MG TABLET SR 24 HR Take 1 Tablet by mouth every day. 30 Tablet 6    aspirin EC (ECOTRIN) 81 MG Tablet Delayed Response Take 1 Tablet by mouth every day. 30 Tablet 3    atorvastatin (LIPITOR) 80 MG tablet Take 1 Tablet by mouth at bedtime. 30 Tablet 6    albuterol 108 (90 Base) MCG/ACT Aero Soln inhalation aerosol Inhale 2 Puffs every four hours as needed for Shortness of Breath. 1 Each 3     No current facility-administered medications for this visit.         No Known Allergies      Family History   Problem Relation Age of Onset    Stroke Mother          Social History     Socioeconomic History    Marital status:      Spouse name: Not on file    Number of children: Not on file    Years of education: Not on file    Highest education level: Not on file   Occupational History    Not on file   Tobacco Use    Smoking status: Every Day     Packs/day: 0.25     Years: 40.00     Pack years: 10.00     Types: Cigarettes     Start date: 4/5/1982    Smokeless tobacco: Never    Tobacco comments:     Only 6 cigarettes daily   Vaping Use    Vaping Use: Never used   Substance and Sexual Activity    Alcohol use: Yes     Alcohol/week: 2.4 - 3.6 oz     Types: 4 - 6 Cans of beer per week     Comment: every week, 2x a week    Drug use: Never    Sexual activity: Yes     Partners: Female     Birth control/protection: None   Other Topics Concern    Not on file   Social History Narrative    Not on file     Social Determinants of Health     Financial Resource Strain: Not on file   Food Insecurity: Not on file   Transportation Needs: Not on file   Physical Activity: Not on file   Stress: Not on file   Social Connections: Not on file   Intimate Partner Violence: Not  "on file   Housing Stability: Not on file         Physical Exam:  Ambulatory Vitals  /70 (BP Location: Left arm, Patient Position: Sitting, BP Cuff Size: Adult)   Pulse 79   Resp 16   Ht 1.651 m (5' 5\")   Wt 69.4 kg (153 lb)   SpO2 93%    Oxygen Therapy:     BP Readings from Last 4 Encounters:   08/15/22 134/70   07/11/22 130/80   06/10/22 121/68   05/13/22 (!) 140/85       Weight/BMI: Body mass index is 25.46 kg/m².  Wt Readings from Last 4 Encounters:   08/15/22 69.4 kg (153 lb)   07/11/22 68.9 kg (152 lb)   06/10/22 67.6 kg (149 lb)   05/13/22 71 kg (156 lb 9.6 oz)       GEN: Well developed, well nourished and in no acute distress.  HEART: no significant JVD, regular rate and rhythm, normal S1 and S2, no murmurs, no third heart sounds, normal cardiac palpation  LUNG: clear to auscultation bilaterally, no wheezing, no crackles, normal respiratory effort on room air  ABDOMEN: soft, non-tender, non-distended, normal bowel sounds throughout  EXTREMITIES: no peripheral edema noted  VASCULAR: no significantly elevated jugular venous pressure, no carotid bruits noted, radial pulses 2+ and equal      Lab Data Review:  Lab Results   Component Value Date/Time    CHOLSTRLTOT 165 04/08/2022 07:45 AM    HDL 59 04/08/2022 07:45 AM    TRIGLYCERIDE 170 (H) 04/08/2022 07:45 AM       Lab Results   Component Value Date/Time    SODIUM 138 07/08/2022 08:27 AM    POTASSIUM 5.2 07/08/2022 08:27 AM    CHLORIDE 103 07/08/2022 08:27 AM    CO2 20 07/08/2022 08:27 AM    GLUCOSE 108 (H) 07/08/2022 08:27 AM    BUN 32 (H) 07/08/2022 08:27 AM    CREATININE 1.46 (H) 07/08/2022 08:27 AM    BUNCREATRAT 22 07/08/2022 08:27 AM     Lab Results   Component Value Date/Time    ALKPHOSPHAT 76 04/08/2022 07:45 AM    ASTSGOT 39 04/08/2022 07:45 AM    ALTSGPT 21 04/08/2022 07:45 AM    TBILIRUBIN 0.5 04/08/2022 07:45 AM      Lab Results   Component Value Date/Time    WBC 8.1 04/08/2022 07:45 AM     Lab Results   Component Value Date/Time    HBA1C " 6.1 (H) 07/08/2022 08:27 AM    HBA1C 6.1 (H) 04/27/2022 07:26 AM       Cardiac Imaging and Procedures Review:    EKG dated 5/5/2022: My personal interpretation is sinus rhythm, RBBB and LAFB,  ms    Echo dated 6/3/2022:   No normal left ventricular systolic function with EF 50-55%, mildly dilated LV.  Abnormal septal motion consistent with bundle branch block otherwise no wall motion abnormality    Nuclear Perfusion Imaging (5/19/2022):    NUCLEAR IMAGING INTERPRETATION   There is area of decreased perfusion of severe severity in the apex and    moderate severity from apical to basal inferior and inferoseptal wall    segments consistent with prior infarct vs artifact with shai-infarct    ischemia.  Extracardiac uptake noted.   Global hypokinesis.  LV ejection fraction = 40%.  Correlate with    echocardiogram.   ECG INTERPRETATION   Nondiagnostic stress ECG portion of regadenoson study.      Radiology test Review:  PFTs 4/20/2022:   Interpretation;   Baseline spirometry shows airflow obstruction with FEV1/FVC ratio 57 and FEV1 of 0.93 L or 32% predicted.  There is robust bronchodilator response with postbronchodilator FEV1 of 1.37 L or 47% predicted.  Total lung capacity is within normal limits at 106% predicted however there is significant air trapping with residual volume of 194% predicted.  Diffusion capacity is preserved at 88% predicted.  Pulmonary function testing shows airflow obstruction with significant bronchodilator responsiveness and air trapping with preserved DLCO.  Constellation of findings may be related to COPD, alternatively bronchiectasis or chronic obstructive asthma may present similarly.  Correlate clinically.       Assessment & Plan     1. Coronary artery disease involving native coronary artery of native heart without angina pectoris        2. S/P coronary artery stent placement        3. Essential hypertension        4. Dyslipidemia            Doing well from a cardiovascular  perspective.  Episodes of dizziness and feelings of passing out with exertion have resolved since he started Spiriva.  Stress test showed prior infarct with no ischemia.  Continue aspirin 80 mg daily.    Blood pressure under excellent control.  Continue losartan 100 mg and metoprolol XL 50 mg daily.    Lipid panel reviewed with LDL within goal.  Continue Lipitor 80 mg daily.      All of patient's excellent questions were answered to the best of my knowledge and to his satisfaction.  It was a pleasure seeing Mr. Hugo Villela in my clinic today. Return in about 1 year (around 8/15/2023). Patient is aware to call the cardiology clinic with any questions or concerns.      Prince Gonzales MD  [facility]  [place] [facility]  [address] [address]  Phone: [phone]  Fax: [phone]    Please note that this dictation was created using voice recognition software. I have made every reasonable attempt to correct obvious errors, but it is possible there are errors of grammar and possibly content that I did not discover before finalizing the note.

## 2022-10-06 DIAGNOSIS — J44.9 CHRONIC OBSTRUCTIVE PULMONARY DISEASE, UNSPECIFIED COPD TYPE (HCC): ICD-10-CM

## 2022-10-06 RX ORDER — FLUTICASONE PROPIONATE AND SALMETEROL 250; 50 UG/1; UG/1
1 POWDER RESPIRATORY (INHALATION) EVERY 12 HOURS
Qty: 1 EACH | Refills: 1 | Status: SHIPPED | OUTPATIENT
Start: 2022-10-06 | End: 2022-12-09 | Stop reason: SDUPTHER

## 2022-10-06 NOTE — TELEPHONE ENCOUNTER
Last seen: 7.11.22 by Dr. Everett  Next appt: 2.6.23 with Dr. Evertet    Was the patient seen in the last year in this department? Yes   Does patient have an active prescription for medications requested? No   Received Request Via: Pharmacy

## 2022-12-07 DIAGNOSIS — I25.10 CORONARY ARTERY DISEASE INVOLVING NATIVE CORONARY ARTERY OF NATIVE HEART WITHOUT ANGINA PECTORIS: ICD-10-CM

## 2022-12-07 DIAGNOSIS — J44.9 CHRONIC OBSTRUCTIVE PULMONARY DISEASE, UNSPECIFIED COPD TYPE (HCC): ICD-10-CM

## 2022-12-07 NOTE — TELEPHONE ENCOUNTER
Received request via: Pharmacy    Was the patient seen in the last year in this department? Yes    Does the patient have an active prescription (recently filled or refills available) for medication(s) requested? No    Does the patient have USP Plus and need 100 day supply (blood pressure, diabetes and cholesterol meds only)? No

## 2022-12-09 RX ORDER — METOPROLOL SUCCINATE 50 MG/1
50 TABLET, EXTENDED RELEASE ORAL DAILY
Qty: 30 TABLET | Refills: 6 | Status: SHIPPED | OUTPATIENT
Start: 2022-12-09 | End: 2023-06-13 | Stop reason: SDUPTHER

## 2022-12-09 RX ORDER — FLUTICASONE PROPIONATE AND SALMETEROL 250; 50 UG/1; UG/1
1 POWDER RESPIRATORY (INHALATION) EVERY 12 HOURS
Qty: 1 EACH | Refills: 1 | Status: SHIPPED | OUTPATIENT
Start: 2022-12-09 | End: 2023-02-10 | Stop reason: SDUPTHER

## 2023-02-06 DIAGNOSIS — E78.5 HLD (HYPERLIPIDEMIA): ICD-10-CM

## 2023-02-06 RX ORDER — ATORVASTATIN CALCIUM 80 MG/1
80 TABLET, FILM COATED ORAL
Qty: 30 TABLET | Refills: 6 | Status: SHIPPED | OUTPATIENT
Start: 2023-02-06 | End: 2023-08-07 | Stop reason: SDUPTHER

## 2023-02-06 NOTE — TELEPHONE ENCOUNTER
Received request via: Pharmacy    Was the patient seen in the last year in this department? Yes    Does the patient have an active prescription (recently filled or refills available) for medication(s) requested? No    Does the patient have longterm Plus and need 100 day supply (blood pressure, diabetes and cholesterol meds only)? Patient does not have SCP

## 2023-02-10 ENCOUNTER — TELEPHONE (OUTPATIENT)
Dept: INTERNAL MEDICINE | Facility: OTHER | Age: 63
End: 2023-02-10
Payer: COMMERCIAL

## 2023-02-10 DIAGNOSIS — J44.9 CHRONIC OBSTRUCTIVE PULMONARY DISEASE, UNSPECIFIED COPD TYPE (HCC): ICD-10-CM

## 2023-02-10 RX ORDER — FLUTICASONE PROPIONATE AND SALMETEROL 250; 50 UG/1; UG/1
1 POWDER RESPIRATORY (INHALATION) EVERY 12 HOURS
Qty: 1 EACH | Refills: 1 | Status: SHIPPED | OUTPATIENT
Start: 2023-02-10 | End: 2023-04-15 | Stop reason: SDUPTHER

## 2023-02-10 NOTE — TELEPHONE ENCOUNTER
Received request via: Pharmacy    Was the patient seen in the last year in this department? Yes    Does the patient have an active prescription (recently filled or refills available) for medication(s) requested? No    Does the patient have USP Plus and need 100 day supply (blood pressure, diabetes and cholesterol meds only)? Patient does not have SCP    Requesting a refill on Wixela

## 2023-04-14 ENCOUNTER — TELEPHONE (OUTPATIENT)
Dept: INTERNAL MEDICINE | Facility: OTHER | Age: 63
End: 2023-04-14
Payer: COMMERCIAL

## 2023-04-14 DIAGNOSIS — J44.9 CHRONIC OBSTRUCTIVE PULMONARY DISEASE, UNSPECIFIED COPD TYPE (HCC): ICD-10-CM

## 2023-04-14 NOTE — TELEPHONE ENCOUNTER
Wixela 250-50inb     Received request via: Pharmacy    Was the patient seen in the last year in this department? Yes    Does the patient have an active prescription (recently filled or refills available) for medication(s) requested? No    Does the patient have intermediate Plus and need 100 day supply (blood pressure, diabetes and cholesterol meds only)? Patient does not have SCP

## 2023-04-15 RX ORDER — FLUTICASONE PROPIONATE AND SALMETEROL 250; 50 UG/1; UG/1
1 POWDER RESPIRATORY (INHALATION) EVERY 12 HOURS
Qty: 1 EACH | Refills: 1 | Status: SHIPPED | OUTPATIENT
Start: 2023-04-15 | End: 2023-06-14 | Stop reason: SDUPTHER

## 2023-06-12 DIAGNOSIS — I10 PRIMARY HYPERTENSION: ICD-10-CM

## 2023-06-12 RX ORDER — LOSARTAN POTASSIUM 100 MG/1
100 TABLET ORAL DAILY
Qty: 90 EACH | Refills: 6 | Status: SHIPPED | OUTPATIENT
Start: 2023-06-12 | End: 2023-09-11 | Stop reason: SDUPTHER

## 2023-06-13 DIAGNOSIS — I25.10 CORONARY ARTERY DISEASE INVOLVING NATIVE CORONARY ARTERY OF NATIVE HEART WITHOUT ANGINA PECTORIS: ICD-10-CM

## 2023-06-13 RX ORDER — METOPROLOL SUCCINATE 50 MG/1
50 TABLET, EXTENDED RELEASE ORAL DAILY
Qty: 30 TABLET | Refills: 6 | Status: SHIPPED | OUTPATIENT
Start: 2023-06-13 | End: 2023-09-11 | Stop reason: SDUPTHER

## 2023-06-14 DIAGNOSIS — J44.9 CHRONIC OBSTRUCTIVE PULMONARY DISEASE, UNSPECIFIED COPD TYPE (HCC): ICD-10-CM

## 2023-06-14 RX ORDER — FLUTICASONE PROPIONATE AND SALMETEROL 250; 50 UG/1; UG/1
1 POWDER RESPIRATORY (INHALATION) EVERY 12 HOURS
Qty: 1 EACH | Refills: 1 | Status: SHIPPED | OUTPATIENT
Start: 2023-06-14 | End: 2023-08-14 | Stop reason: SDUPTHER

## 2023-08-07 DIAGNOSIS — E78.5 HLD (HYPERLIPIDEMIA): ICD-10-CM

## 2023-08-07 RX ORDER — ATORVASTATIN CALCIUM 80 MG/1
80 TABLET, FILM COATED ORAL
Qty: 30 TABLET | Refills: 6 | Status: SHIPPED | OUTPATIENT
Start: 2023-08-07 | End: 2023-09-11 | Stop reason: SDUPTHER

## 2023-08-14 DIAGNOSIS — J44.9 CHRONIC OBSTRUCTIVE PULMONARY DISEASE, UNSPECIFIED COPD TYPE (HCC): ICD-10-CM

## 2023-08-14 RX ORDER — FLUTICASONE PROPIONATE AND SALMETEROL 250; 50 UG/1; UG/1
1 POWDER RESPIRATORY (INHALATION) EVERY 12 HOURS
Qty: 1 EACH | Refills: 1 | Status: SHIPPED | OUTPATIENT
Start: 2023-08-14 | End: 2023-09-11

## 2023-09-07 DIAGNOSIS — I25.10 CORONARY ARTERY DISEASE INVOLVING NATIVE CORONARY ARTERY OF NATIVE HEART WITHOUT ANGINA PECTORIS: ICD-10-CM

## 2023-09-08 SDOH — ECONOMIC STABILITY: FOOD INSECURITY: WITHIN THE PAST 12 MONTHS, YOU WORRIED THAT YOUR FOOD WOULD RUN OUT BEFORE YOU GOT MONEY TO BUY MORE.: NEVER TRUE

## 2023-09-08 SDOH — ECONOMIC STABILITY: HOUSING INSECURITY: IN THE LAST 12 MONTHS, HOW MANY PLACES HAVE YOU LIVED?: 1

## 2023-09-08 SDOH — ECONOMIC STABILITY: INCOME INSECURITY: IN THE LAST 12 MONTHS, WAS THERE A TIME WHEN YOU WERE NOT ABLE TO PAY THE MORTGAGE OR RENT ON TIME?: NO

## 2023-09-08 SDOH — ECONOMIC STABILITY: TRANSPORTATION INSECURITY
IN THE PAST 12 MONTHS, HAS LACK OF RELIABLE TRANSPORTATION KEPT YOU FROM MEDICAL APPOINTMENTS, MEETINGS, WORK OR FROM GETTING THINGS NEEDED FOR DAILY LIVING?: NO

## 2023-09-08 SDOH — ECONOMIC STABILITY: TRANSPORTATION INSECURITY
IN THE PAST 12 MONTHS, HAS THE LACK OF TRANSPORTATION KEPT YOU FROM MEDICAL APPOINTMENTS OR FROM GETTING MEDICATIONS?: NO

## 2023-09-08 SDOH — ECONOMIC STABILITY: FOOD INSECURITY: WITHIN THE PAST 12 MONTHS, THE FOOD YOU BOUGHT JUST DIDN'T LAST AND YOU DIDN'T HAVE MONEY TO GET MORE.: NEVER TRUE

## 2023-09-08 SDOH — ECONOMIC STABILITY: HOUSING INSECURITY
IN THE LAST 12 MONTHS, WAS THERE A TIME WHEN YOU DID NOT HAVE A STEADY PLACE TO SLEEP OR SLEPT IN A SHELTER (INCLUDING NOW)?: NO

## 2023-09-08 SDOH — HEALTH STABILITY: PHYSICAL HEALTH: ON AVERAGE, HOW MANY MINUTES DO YOU ENGAGE IN EXERCISE AT THIS LEVEL?: 20 MIN

## 2023-09-08 SDOH — HEALTH STABILITY: MENTAL HEALTH
STRESS IS WHEN SOMEONE FEELS TENSE, NERVOUS, ANXIOUS, OR CAN'T SLEEP AT NIGHT BECAUSE THEIR MIND IS TROUBLED. HOW STRESSED ARE YOU?: RATHER MUCH

## 2023-09-08 SDOH — HEALTH STABILITY: PHYSICAL HEALTH: ON AVERAGE, HOW MANY DAYS PER WEEK DO YOU ENGAGE IN MODERATE TO STRENUOUS EXERCISE (LIKE A BRISK WALK)?: 5 DAYS

## 2023-09-08 SDOH — ECONOMIC STABILITY: TRANSPORTATION INSECURITY
IN THE PAST 12 MONTHS, HAS LACK OF TRANSPORTATION KEPT YOU FROM MEETINGS, WORK, OR FROM GETTING THINGS NEEDED FOR DAILY LIVING?: NO

## 2023-09-08 ASSESSMENT — LIFESTYLE VARIABLES
HOW OFTEN DO YOU HAVE SIX OR MORE DRINKS ON ONE OCCASION: NEVER
HOW MANY STANDARD DRINKS CONTAINING ALCOHOL DO YOU HAVE ON A TYPICAL DAY: 1 OR 2

## 2023-09-08 ASSESSMENT — SOCIAL DETERMINANTS OF HEALTH (SDOH)
HOW OFTEN DO YOU HAVE SIX OR MORE DRINKS ON ONE OCCASION: NEVER
HOW OFTEN DO YOU ATTEND CHURCH OR RELIGIOUS SERVICES?: NEVER
HOW OFTEN DO YOU ATTEND CHURCH OR RELIGIOUS SERVICES?: NEVER
HOW OFTEN DO YOU GET TOGETHER WITH FRIENDS OR RELATIVES?: NEVER
HOW MANY DRINKS CONTAINING ALCOHOL DO YOU HAVE ON A TYPICAL DAY WHEN YOU ARE DRINKING: 1 OR 2
HOW OFTEN DO YOU GET TOGETHER WITH FRIENDS OR RELATIVES?: NEVER
WITHIN THE PAST 12 MONTHS, YOU WORRIED THAT YOUR FOOD WOULD RUN OUT BEFORE YOU GOT THE MONEY TO BUY MORE: NEVER TRUE

## 2023-09-11 ENCOUNTER — OFFICE VISIT (OUTPATIENT)
Dept: MEDICAL GROUP | Facility: MEDICAL CENTER | Age: 63
End: 2023-09-11
Payer: COMMERCIAL

## 2023-09-11 VITALS
DIASTOLIC BLOOD PRESSURE: 78 MMHG | OXYGEN SATURATION: 96 % | BODY MASS INDEX: 24.49 KG/M2 | TEMPERATURE: 98 F | HEART RATE: 62 BPM | HEIGHT: 65 IN | WEIGHT: 147 LBS | SYSTOLIC BLOOD PRESSURE: 130 MMHG

## 2023-09-11 DIAGNOSIS — N18.30 STAGE 3 CHRONIC KIDNEY DISEASE, UNSPECIFIED WHETHER STAGE 3A OR 3B CKD: ICD-10-CM

## 2023-09-11 DIAGNOSIS — E11.9 TYPE 2 DIABETES MELLITUS WITHOUT COMPLICATION, WITHOUT LONG-TERM CURRENT USE OF INSULIN (HCC): ICD-10-CM

## 2023-09-11 DIAGNOSIS — I10 PRIMARY HYPERTENSION: ICD-10-CM

## 2023-09-11 DIAGNOSIS — E78.5 HLD (HYPERLIPIDEMIA): ICD-10-CM

## 2023-09-11 DIAGNOSIS — J44.9 CHRONIC OBSTRUCTIVE PULMONARY DISEASE, UNSPECIFIED COPD TYPE (HCC): ICD-10-CM

## 2023-09-11 DIAGNOSIS — I25.10 CORONARY ARTERY DISEASE INVOLVING NATIVE CORONARY ARTERY OF NATIVE HEART WITHOUT ANGINA PECTORIS: ICD-10-CM

## 2023-09-11 DIAGNOSIS — T14.8XXA BRUISING: ICD-10-CM

## 2023-09-11 PROCEDURE — 99204 OFFICE O/P NEW MOD 45 MIN: CPT | Performed by: STUDENT IN AN ORGANIZED HEALTH CARE EDUCATION/TRAINING PROGRAM

## 2023-09-11 PROCEDURE — 3075F SYST BP GE 130 - 139MM HG: CPT | Performed by: STUDENT IN AN ORGANIZED HEALTH CARE EDUCATION/TRAINING PROGRAM

## 2023-09-11 PROCEDURE — 3078F DIAST BP <80 MM HG: CPT | Performed by: STUDENT IN AN ORGANIZED HEALTH CARE EDUCATION/TRAINING PROGRAM

## 2023-09-11 RX ORDER — METOPROLOL SUCCINATE 50 MG/1
50 TABLET, EXTENDED RELEASE ORAL DAILY
Qty: 90 TABLET | Refills: 3 | Status: SHIPPED | OUTPATIENT
Start: 2023-09-11 | End: 2025-09-10

## 2023-09-11 RX ORDER — LOSARTAN POTASSIUM 100 MG/1
100 TABLET ORAL DAILY
Qty: 90 EACH | Refills: 3 | Status: SHIPPED | OUTPATIENT
Start: 2023-09-11 | End: 2025-12-22

## 2023-09-11 RX ORDER — TIOTROPIUM BROMIDE 18 UG/1
18 CAPSULE ORAL; RESPIRATORY (INHALATION) DAILY
Qty: 30 CAPSULE | Refills: 3 | Status: SHIPPED | OUTPATIENT
Start: 2023-09-11

## 2023-09-11 RX ORDER — ATORVASTATIN CALCIUM 80 MG/1
80 TABLET, FILM COATED ORAL
Qty: 90 TABLET | Refills: 3 | Status: SHIPPED | OUTPATIENT
Start: 2023-09-11

## 2023-09-11 RX ORDER — FLUTICASONE PROPIONATE AND SALMETEROL 250; 50 UG/1; UG/1
1 POWDER RESPIRATORY (INHALATION) EVERY 12 HOURS
COMMUNITY

## 2023-09-11 ASSESSMENT — FIBROSIS 4 INDEX: FIB4 SCORE: 1.6

## 2023-09-11 NOTE — PROGRESS NOTES
"Subjective:     CC:  Diagnoses of Coronary artery disease involving native coronary artery of native heart without angina pectoris, Stage 3 chronic kidney disease, unspecified whether stage 3a or 3b CKD (HCC), HLD (hyperlipidemia), Primary hypertension, Chronic obstructive pulmonary disease, unspecified COPD type (HCC), Type 2 diabetes mellitus without complication, without long-term current use of insulin (HCC), and Bruising were pertinent to this visit.    HISTORY OF THE PRESENT ILLNESS: Patient is a 63 y.o. male. This pleasant patient is here today to establish care and discuss     Former patient with Dr. Everett    Last seen 7/11/2022 by primary care  Past medical history of CAD status post stent, CKD 3A, DLD, prediabetes, mild COPD  Followed by cardiology 8/15/2022  On losartan, metoprolol, aspirin, on Spiriva    Problem   Stage 3 Chronic Kidney Disease (Hcc)    This is a chronic condition.  First noted around 2022, etiology unclear. However, in the setting of hypertension, diabetes (controlled lifestyle), denies NSAID use  \" 7/9/2022 ultrasound from [place] \" el estudio no demostro lesiones en las estructuras anatomicas de los rinones. \"        Copd (Chronic Obstructive Pulmonary Disease) (Hcc)      Prior PFT: FEV1/FVC ratio 57 and FEV1 of 0.93 L or 32% predicted  postbronchodilator FEV1 of 1.37 L or 47% predicted.  Total lung capacity is within normal limits at 106%      Type 2 Diabetes Mellitus (Hcc)   Htn (Hypertension)    On losartan 100mg     Coronary Artery Disease Involving Native Coronary Artery of Native Heart Without Angina Pectoris    Had Anteolateral wall MI s/p stent  On asa/ atorvastatin 80mg daily          Health Maintenance:     ROS:   ROS at baseline      Objective:       Exam: /78 (BP Location: Left arm, Patient Position: Sitting)   Pulse 62   Temp 36.7 °C (98 °F) (Temporal)   Ht 1.651 m (5' 5\")   Wt 66.7 kg (147 lb)   SpO2 96%  Body mass index is 24.46 kg/m².    Physical " Exam  Constitutional:       Appearance: Normal appearance.   Cardiovascular:      Rate and Rhythm: Normal rate and regular rhythm.   Pulmonary:      Effort: Pulmonary effort is normal.      Breath sounds: Normal breath sounds.   Neurological:      Mental Status: He is alert.           Labs:     Assessment & Plan:   63 y.o. male with the following -      1. Coronary artery disease involving native coronary artery of native heart without angina pectoris  Chronic, stable  Aspirin atorvastatin and metoprolol taking without adverse effect  - CBC WITHOUT DIFFERENTIAL; Future  - HEMOGLOBIN A1C; Future  - TSH WITH REFLEX TO FT4; Future  - Lipid Profile; Future  - atorvastatin (LIPITOR) 80 MG tablet; Take 1 Tablet by mouth at bedtime.  Dispense: 90 Tablet; Refill: 3  - metoprolol SR (TOPROL XL) 50 MG TABLET SR 24 HR; Take 1 Tablet by mouth every day.  Dispense: 90 Tablet; Refill: 3    2. Stage 3 chronic kidney disease, unspecified whether stage 3a or 3b CKD (HCC)  Chronic, unstable  Etiology unknown in setting of hypertension and type 2 diabetes on losartan 100 mg daily  We will repeat labs consider referral to nephrology  - CBC WITHOUT DIFFERENTIAL; Future  - HEMOGLOBIN A1C; Future  - TSH WITH REFLEX TO FT4; Future  - Lipid Profile; Future  - Comp Metabolic Panel; Future  - PROTEIN/CREAT RATIO URINE; Future  - VITAMIN D,25 HYDROXY (DEFICIENCY); Future  - URINALYSIS; Future  - URIC ACID; Future    3. HLD (hyperlipidemia)  Chronic, stable  Atorvastatin 80 mg daily in setting of CAD status post stent    4. Primary hypertension  , Stable  Well-controlled on losartan 100 mg daily  - losartan (COZAAR) 100 MG Tab; Take 1 Tablet by mouth every day.  Dispense: 90 Each; Refill: 3    5. Chronic obstructive pulmonary disease, unspecified COPD type (HCC)  COPD diagnosed by prior PFT response to inhalers  Currently on Wixela  We will send in Spiriva  - tiotropium (SPIRIVA HANDIHALER) 18 MCG Cap; Place 1 Capsule into inhaler and inhale  every day.  Dispense: 30 Capsule; Refill: 3    6. Type 2 diabetes mellitus without complication, without long-term current use of insulin (HCC)  Chronic, stable  Controlled by lifestyle  On atorvastatin and losartan    - HEMOGLOBIN A1C; Future    7. Bruising  Chronic, stable reported in last few months has noted increase in bruising possibly related to weight loss versus CKD versus aspirin patient request for INR  - Prothrombin Time; Future        Return in about 4 weeks (around 10/9/2023) for Lab review, Med check reviewed kidney labs refer to nephrology.    Please note that this dictation was created using voice recognition software. I have made every reasonable attempt to correct obvious errors, but I expect that there are errors of grammar and possibly content that I did not discover before finalizing the note.

## 2023-09-27 ENCOUNTER — HOSPITAL ENCOUNTER (OUTPATIENT)
Dept: LAB | Facility: MEDICAL CENTER | Age: 63
End: 2023-09-27
Attending: STUDENT IN AN ORGANIZED HEALTH CARE EDUCATION/TRAINING PROGRAM
Payer: COMMERCIAL

## 2023-09-27 DIAGNOSIS — E11.9 TYPE 2 DIABETES MELLITUS WITHOUT COMPLICATION, WITHOUT LONG-TERM CURRENT USE OF INSULIN (HCC): ICD-10-CM

## 2023-09-27 DIAGNOSIS — T14.8XXA BRUISING: ICD-10-CM

## 2023-09-27 DIAGNOSIS — N18.30 STAGE 3 CHRONIC KIDNEY DISEASE, UNSPECIFIED WHETHER STAGE 3A OR 3B CKD: ICD-10-CM

## 2023-09-27 DIAGNOSIS — I25.10 CORONARY ARTERY DISEASE INVOLVING NATIVE CORONARY ARTERY OF NATIVE HEART WITHOUT ANGINA PECTORIS: ICD-10-CM

## 2023-09-27 LAB
25(OH)D3 SERPL-MCNC: 41 NG/ML (ref 30–100)
ALBUMIN SERPL BCP-MCNC: 4.7 G/DL (ref 3.2–4.9)
ALBUMIN/GLOB SERPL: 1.7 G/DL
ALP SERPL-CCNC: 67 U/L (ref 30–99)
ALT SERPL-CCNC: 17 U/L (ref 2–50)
ANION GAP SERPL CALC-SCNC: 10 MMOL/L (ref 7–16)
APPEARANCE UR: CLEAR
AST SERPL-CCNC: 27 U/L (ref 12–45)
BACTERIA #/AREA URNS HPF: NEGATIVE /HPF
BILIRUB SERPL-MCNC: 0.5 MG/DL (ref 0.1–1.5)
BILIRUB UR QL STRIP.AUTO: NEGATIVE
BUN SERPL-MCNC: 38 MG/DL (ref 8–22)
CALCIUM ALBUM COR SERPL-MCNC: 8.6 MG/DL (ref 8.5–10.5)
CALCIUM SERPL-MCNC: 9.2 MG/DL (ref 8.5–10.5)
CHLORIDE SERPL-SCNC: 107 MMOL/L (ref 96–112)
CHOLEST SERPL-MCNC: 149 MG/DL (ref 100–199)
CO2 SERPL-SCNC: 24 MMOL/L (ref 20–33)
COLOR UR: YELLOW
CREAT SERPL-MCNC: 1.97 MG/DL (ref 0.5–1.4)
EPI CELLS #/AREA URNS HPF: NEGATIVE /HPF
ERYTHROCYTE [DISTWIDTH] IN BLOOD BY AUTOMATED COUNT: 53.4 FL (ref 35.9–50)
EST. AVERAGE GLUCOSE BLD GHB EST-MCNC: 111 MG/DL
FASTING STATUS PATIENT QL REPORTED: NORMAL
GFR SERPLBLD CREATININE-BSD FMLA CKD-EPI: 37 ML/MIN/1.73 M 2
GLOBULIN SER CALC-MCNC: 2.8 G/DL (ref 1.9–3.5)
GLUCOSE SERPL-MCNC: 107 MG/DL (ref 65–99)
GLUCOSE UR STRIP.AUTO-MCNC: NEGATIVE MG/DL
HBA1C MFR BLD: 5.5 % (ref 4–5.6)
HCT VFR BLD AUTO: 41.9 % (ref 42–52)
HDLC SERPL-MCNC: 74 MG/DL
HGB BLD-MCNC: 13.2 G/DL (ref 14–18)
HYALINE CASTS #/AREA URNS LPF: ABNORMAL /LPF
INR PPP: 0.95 (ref 0.87–1.13)
KETONES UR STRIP.AUTO-MCNC: NEGATIVE MG/DL
LDLC SERPL CALC-MCNC: 64 MG/DL
LEUKOCYTE ESTERASE UR QL STRIP.AUTO: NEGATIVE
MCH RBC QN AUTO: 34 PG (ref 27–33)
MCHC RBC AUTO-ENTMCNC: 31.5 G/DL (ref 32.3–36.5)
MCV RBC AUTO: 108 FL (ref 81.4–97.8)
MICRO URNS: ABNORMAL
NITRITE UR QL STRIP.AUTO: NEGATIVE
PH UR STRIP.AUTO: 6 [PH] (ref 5–8)
PLATELET # BLD AUTO: 233 K/UL (ref 164–446)
PMV BLD AUTO: 11 FL (ref 9–12.9)
POTASSIUM SERPL-SCNC: 4.6 MMOL/L (ref 3.6–5.5)
PROT SERPL-MCNC: 7.5 G/DL (ref 6–8.2)
PROT UR QL STRIP: 30 MG/DL
PROTHROMBIN TIME: 12.8 SEC (ref 12–14.6)
RBC # BLD AUTO: 3.88 M/UL (ref 4.7–6.1)
RBC # URNS HPF: ABNORMAL /HPF
RBC UR QL AUTO: NEGATIVE
SODIUM SERPL-SCNC: 141 MMOL/L (ref 135–145)
SP GR UR STRIP.AUTO: 1.02
TRIGL SERPL-MCNC: 55 MG/DL (ref 0–149)
TSH SERPL DL<=0.005 MIU/L-ACNC: 2.32 UIU/ML (ref 0.38–5.33)
URATE SERPL-MCNC: 8.8 MG/DL (ref 2.5–8.3)
UROBILINOGEN UR STRIP.AUTO-MCNC: 0.2 MG/DL
WBC # BLD AUTO: 8.7 K/UL (ref 4.8–10.8)
WBC #/AREA URNS HPF: ABNORMAL /HPF

## 2023-09-27 PROCEDURE — 82306 VITAMIN D 25 HYDROXY: CPT

## 2023-09-27 PROCEDURE — 84443 ASSAY THYROID STIM HORMONE: CPT

## 2023-09-27 PROCEDURE — 82570 ASSAY OF URINE CREATININE: CPT

## 2023-09-27 PROCEDURE — 36415 COLL VENOUS BLD VENIPUNCTURE: CPT

## 2023-09-27 PROCEDURE — 81001 URINALYSIS AUTO W/SCOPE: CPT

## 2023-09-27 PROCEDURE — 83036 HEMOGLOBIN GLYCOSYLATED A1C: CPT

## 2023-09-27 PROCEDURE — 85610 PROTHROMBIN TIME: CPT

## 2023-09-27 PROCEDURE — 80061 LIPID PANEL: CPT

## 2023-09-27 PROCEDURE — 84550 ASSAY OF BLOOD/URIC ACID: CPT

## 2023-09-27 PROCEDURE — 80053 COMPREHEN METABOLIC PANEL: CPT

## 2023-09-27 PROCEDURE — 85027 COMPLETE CBC AUTOMATED: CPT

## 2023-09-27 PROCEDURE — 84156 ASSAY OF PROTEIN URINE: CPT

## 2023-09-29 LAB
CREAT UR-MCNC: 143.13 MG/DL
PROT UR-MCNC: 56 MG/DL (ref 0–15)
PROT/CREAT UR: 391 MG/G (ref 15–68)

## 2023-10-19 ENCOUNTER — OFFICE VISIT (OUTPATIENT)
Dept: MEDICAL GROUP | Facility: MEDICAL CENTER | Age: 63
End: 2023-10-19
Payer: COMMERCIAL

## 2023-10-19 VITALS
SYSTOLIC BLOOD PRESSURE: 158 MMHG | TEMPERATURE: 98.2 F | HEIGHT: 65 IN | BODY MASS INDEX: 24.46 KG/M2 | DIASTOLIC BLOOD PRESSURE: 86 MMHG | HEART RATE: 66 BPM | OXYGEN SATURATION: 95 % | WEIGHT: 146.8 LBS

## 2023-10-19 DIAGNOSIS — M10.9 GOUT, UNSPECIFIED CAUSE, UNSPECIFIED CHRONICITY, UNSPECIFIED SITE: ICD-10-CM

## 2023-10-19 DIAGNOSIS — N18.32 STAGE 3B CHRONIC KIDNEY DISEASE: ICD-10-CM

## 2023-10-19 DIAGNOSIS — E11.9 TYPE 2 DIABETES MELLITUS WITHOUT COMPLICATION, WITHOUT LONG-TERM CURRENT USE OF INSULIN (HCC): ICD-10-CM

## 2023-10-19 DIAGNOSIS — I10 PRIMARY HYPERTENSION: ICD-10-CM

## 2023-10-19 DIAGNOSIS — N18.32 STAGE 3B CHRONIC KIDNEY DISEASE (CKD): ICD-10-CM

## 2023-10-19 LAB
HBA1C MFR BLD: 5.5 % (ref ?–5.8)
POCT INT CON NEG: NEGATIVE
POCT INT CON POS: POSITIVE

## 2023-10-19 PROCEDURE — 3079F DIAST BP 80-89 MM HG: CPT | Performed by: STUDENT IN AN ORGANIZED HEALTH CARE EDUCATION/TRAINING PROGRAM

## 2023-10-19 PROCEDURE — 83036 HEMOGLOBIN GLYCOSYLATED A1C: CPT | Performed by: STUDENT IN AN ORGANIZED HEALTH CARE EDUCATION/TRAINING PROGRAM

## 2023-10-19 PROCEDURE — 3077F SYST BP >= 140 MM HG: CPT | Performed by: STUDENT IN AN ORGANIZED HEALTH CARE EDUCATION/TRAINING PROGRAM

## 2023-10-19 PROCEDURE — 92250 FUNDUS PHOTOGRAPHY W/I&R: CPT | Mod: TC | Performed by: STUDENT IN AN ORGANIZED HEALTH CARE EDUCATION/TRAINING PROGRAM

## 2023-10-19 PROCEDURE — 99214 OFFICE O/P EST MOD 30 MIN: CPT | Performed by: STUDENT IN AN ORGANIZED HEALTH CARE EDUCATION/TRAINING PROGRAM

## 2023-10-19 RX ORDER — ALLOPURINOL 100 MG/1
50 TABLET ORAL DAILY
Qty: 45 TABLET | Refills: 3 | Status: SHIPPED | OUTPATIENT
Start: 2023-10-19

## 2023-10-19 ASSESSMENT — PATIENT HEALTH QUESTIONNAIRE - PHQ9: CLINICAL INTERPRETATION OF PHQ2 SCORE: 0

## 2023-10-19 ASSESSMENT — FIBROSIS 4 INDEX: FIB4 SCORE: 1.77

## 2023-10-19 NOTE — PROGRESS NOTES
"Subjective:     CC:     HPI:   Hugo presents today with    Last seen by me 9/11/2023    History of CKD stage III  CAD status post stent  COPD mild  Presents for lab review    CMP shows creatinine 1.97, liver enzymes not elevated, IFG  Continue area on UA  CBC showed macrocytic anemia normal white blood cell and platelet count  LDL less than 70 at goal  Uric acid 8.8  GFR 37  TSH was fine    Etiology of CKD likely related to hypertension/cardiac disease  Refer to nephrology   Last Friday seen at outside clinic and report BP was 120s.       Problem   Stage 3 Chronic Kidney Disease (Hcc)    This is a chronic condition.  First noted around 2022, in the setting of hypertension, diabetes (controlled lifestyle), denies NSAID use.  Initial creatinine was 1.8 that improved to 1.4.  From 2022 through 2023 patient was in [place] reportedly had followed with nephrologist and had ultrasound which did not show any lesions/anatomical abnormalities of the kidneys per translation.  Recent creatinine back to 1.9.  There is proteinuria    \" 7/9/2022 ultrasound from [place] \" el estudio no demostro lesiones en las estructuras anatomicas de los rinones. \"        Type 2 Diabetes Mellitus (Hcc)    Well-controlled     Htn (Hypertension)    On losartan 100mg  In setting of whitecoat hypertension  His wife works in the laboratory and report does checks patient's blood pressure annually and no blood pressure between 130s at home         Health Maintenance:     ROS:  ROS    Objective:     Exam:  BP (!) 158/86 (BP Location: Left arm, Patient Position: Sitting)   Pulse 66   Temp 36.8 °C (98.2 °F) (Temporal)   Ht 1.651 m (5' 5\")   Wt 66.6 kg (146 lb 12.8 oz)   SpO2 95%   BMI 24.43 kg/m²  Body mass index is 24.43 kg/m².    Physical Exam  Constitutional:       Appearance: Normal appearance.   Cardiovascular:      Rate and Rhythm: Normal rate and regular rhythm.   Pulmonary:      Effort: Pulmonary effort is normal.      Breath sounds: Normal " breath sounds.   Musculoskeletal:      Cervical back: Normal range of motion and neck supple.   Lymphadenopathy:      Cervical: No cervical adenopathy.   Neurological:      Mental Status: He is alert.       Diabetic foot exam: No lesions or calluses noted. 2+ pedal pulses. Sensation intact with 10 out of 10 on monofilament test.    Labs:   Assessment & Plan:     63 y.o. male with the following -     1. Type 2 diabetes mellitus without complication, without long-term current use of insulin ([facility])  Chronic, stable  Well controlled  We will start Jardiance 10 mg in setting of proteinuria, type 2 diabetes, CKD.  Adverse effect discussed  - POCT Hemoglobin A1C  - US-RENAL; Future  - Empagliflozin (JARDIANCE) 10 MG Tab tablet; Take 1 Tablet by mouth every day.  Dispense: 90 Each; Refill: 3  - Diabetic Monofilament LE Exam  - POCT Retinal Eye Exam  - Referral to Nephrology    2. Stage 3b chronic kidney disease (CKD) ([facility])  Chronic, stable  The setting of diabetes, hypertension, gout  Plan  We will start allopurinol at 50 mg daily  We will start Jardiance 10 mg daily in setting of CKD 3 type 2 diabetes and proteinuria  We will refer to nephrology for further guidance  - US-RENAL; Future  - Empagliflozin (JARDIANCE) 10 MG Tab tablet; Take 1 Tablet by mouth every day.  Dispense: 90 Each; Refill: 3  - allopurinol (ZYLOPRIM) 100 MG Tab; Take 0.5 Tablets by mouth every day.  Dispense: 45 Tablet; Refill: 3  - URIC ACID; Future  - Basic Metabolic Panel; Future  - Referral to Nephrology    3. Primary hypertension  Chronic, stable  Poorly controlled in clinic however patient does have a history of whitecoat hypertension  Report home blood pressure in the 130s and last week was seen in clinic elsewhere with blood pressure in the 130s  - US-RENAL; Future  - Referral to Nephrology    4. Gout, unspecified cause, unspecified chronicity, unspecified site  chronic, Stable  Reported history of gout uric acid elevated 8+  - allopurinol  (ZYLOPRIM) 100 MG Tab; Take 0.5 Tablets by mouth every day.  Dispense: 45 Tablet; Refill: 3  - URIC ACID; Future    5. Stage 3b chronic kidney disease (HCC)  See assessment and plan to          Return in about 3 months (around 1/19/2024) for Lab review.    Please note that this dictation was created using voice recognition software. I have made every reasonable attempt to correct obvious errors, but I expect that there are errors of grammar and possibly content that I did not discover before finalizing the note.

## 2023-10-24 ENCOUNTER — OFFICE VISIT (OUTPATIENT)
Dept: NEPHROLOGY | Facility: MEDICAL CENTER | Age: 63
End: 2023-10-24
Payer: COMMERCIAL

## 2023-10-24 VITALS
SYSTOLIC BLOOD PRESSURE: 144 MMHG | BODY MASS INDEX: 24.32 KG/M2 | DIASTOLIC BLOOD PRESSURE: 84 MMHG | HEART RATE: 68 BPM | HEIGHT: 65 IN | TEMPERATURE: 97 F | OXYGEN SATURATION: 96 % | WEIGHT: 146 LBS

## 2023-10-24 DIAGNOSIS — I10 PRIMARY HYPERTENSION: ICD-10-CM

## 2023-10-24 DIAGNOSIS — N18.32 STAGE 3B CHRONIC KIDNEY DISEASE: ICD-10-CM

## 2023-10-24 DIAGNOSIS — E11.9 TYPE 2 DIABETES MELLITUS WITHOUT COMPLICATION, WITHOUT LONG-TERM CURRENT USE OF INSULIN (HCC): ICD-10-CM

## 2023-10-24 DIAGNOSIS — R80.9 PROTEINURIA, UNSPECIFIED TYPE: ICD-10-CM

## 2023-10-24 PROCEDURE — 3077F SYST BP >= 140 MM HG: CPT | Performed by: INTERNAL MEDICINE

## 2023-10-24 PROCEDURE — 3079F DIAST BP 80-89 MM HG: CPT | Performed by: INTERNAL MEDICINE

## 2023-10-24 PROCEDURE — 99204 OFFICE O/P NEW MOD 45 MIN: CPT | Performed by: INTERNAL MEDICINE

## 2023-10-24 ASSESSMENT — ENCOUNTER SYMPTOMS
HYPERTENSION: 1
COUGH: 0
CHILLS: 0
VOMITING: 0
SHORTNESS OF BREATH: 0
FEVER: 0
NAUSEA: 0

## 2023-10-24 ASSESSMENT — FIBROSIS 4 INDEX: FIB4 SCORE: 1.77

## 2023-10-24 NOTE — PROGRESS NOTES
"Subjective     Hugo Villela is a 63 y.o. male who presents with Chronic Kidney Disease and Hypertension            The patient is Greek-speaking gentleman, communication was through   He was diagnosed with chronic kidney disease about 2 years ago, creatinine was elevated at 1.2 and since been stable  Patient has questionable history of diabetes but recent hemoglobin A1c at 5.5  Patient has no recent use of NSAIDs or IV contrast exposure.    Chronic Kidney Disease  This is a chronic problem. The current episode started more than 1 year ago. The problem occurs constantly. The problem has been waxing and waning. Pertinent negatives include no chest pain, chills, coughing, fever, nausea, urinary symptoms or vomiting.   Hypertension  This is a chronic problem. The current episode started more than 1 year ago. The problem has been waxing and waning since onset. The problem is uncontrolled. Pertinent negatives include no chest pain, malaise/fatigue, peripheral edema or shortness of breath. Risk factors for coronary artery disease include male gender and diabetes mellitus. Past treatments include angiotensin blockers and beta blockers. The current treatment provides significant improvement. There are no compliance problems.  Hypertensive end-organ damage includes kidney disease. Identifiable causes of hypertension include chronic renal disease.       Review of Systems   Constitutional:  Negative for chills, fever and malaise/fatigue.   Respiratory:  Negative for cough and shortness of breath.    Cardiovascular:  Negative for chest pain and leg swelling.   Gastrointestinal:  Negative for nausea and vomiting.   Genitourinary:  Negative for dysuria, frequency and urgency.              Objective     BP (!) 144/84 (BP Location: Right arm, Patient Position: Sitting, BP Cuff Size: Adult)   Pulse 68   Temp 36.1 °C (97 °F) (Temporal)   Ht 1.651 m (5' 5\")   Wt 66.2 kg (146 lb)   SpO2 96%   BMI 24.30 kg/m²  "     Physical Exam  Vitals and nursing note reviewed.   Constitutional:       General: He is awake.      Appearance: He is not ill-appearing.   HENT:      Head: Normocephalic and atraumatic.      Right Ear: External ear normal.      Left Ear: External ear normal.      Nose: Nose normal.      Mouth/Throat:      Pharynx: No oropharyngeal exudate or posterior oropharyngeal erythema.   Eyes:      General:         Right eye: No discharge.         Left eye: No discharge.      Conjunctiva/sclera: Conjunctivae normal.   Cardiovascular:      Rate and Rhythm: Normal rate and regular rhythm.   Pulmonary:      Effort: Pulmonary effort is normal. No respiratory distress.      Breath sounds: Normal breath sounds. No wheezing.   Abdominal:      General: Abdomen is flat. Bowel sounds are normal.   Musculoskeletal:         General: No tenderness.      Cervical back: No rigidity. No muscular tenderness.      Right lower leg: No edema.      Left lower leg: No edema.   Skin:     General: Skin is warm and dry.      Coloration: Skin is not jaundiced.      Findings: No lesion or rash.   Neurological:      General: No focal deficit present.      Mental Status: He is alert and oriented to person, place, and time. Mental status is at baseline.   Psychiatric:         Mood and Affect: Mood normal.         Behavior: Behavior normal.         Thought Content: Thought content normal.       Past Medical History:   Diagnosis Date    Acute myocardial infarction of other inferior wall, episode of care unspecified 6/25/2012    Acute myocardial infarction of other lateral wall, episode of care unspecified 6/25/2012    Chest pain, unspecified 6/25/2012    Coronary atherosclerosis of native coronary artery 6/25/2012    Dizziness and giddiness 6/25/2012    Mixed hyperlipidemia 6/25/2012    Pure hyperglyceridemia 6/25/2012    S/P coronary artery stent placement 6/25/2012    Tobacco use disorder 6/25/2012     Social History     Socioeconomic History     Marital status:      Spouse name: Not on file    Number of children: Not on file    Years of education: Not on file    Highest education level: 9th grade   Occupational History    Not on file   Tobacco Use    Smoking status: Every Day     Current packs/day: 0.25     Average packs/day: 0.3 packs/day for 41.6 years (10.4 ttl pk-yrs)     Types: Cigarettes     Start date: 4/5/1982    Smokeless tobacco: Never    Tobacco comments:     Only 6 cigarettes daily   Vaping Use    Vaping Use: Never used   Substance and Sexual Activity    Alcohol use: Not Currently     Alcohol/week: 2.4 - 3.6 oz     Types: 4 - 6 Cans of beer per week     Comment: every week, 2x a week    Drug use: Never    Sexual activity: Yes     Partners: Female     Birth control/protection: None   Other Topics Concern    Not on file   Social History Narrative    Not on file     Social Determinants of Health     Financial Resource Strain: Not on file   Food Insecurity: No Food Insecurity (9/8/2023)    Hunger Vital Sign     Worried About Running Out of Food in the Last Year: Never true     Ran Out of Food in the Last Year: Never true   Transportation Needs: No Transportation Needs (9/8/2023)    PRAPARE - Transportation     Lack of Transportation (Medical): No     Lack of Transportation (Non-Medical): No   Physical Activity: Insufficiently Active (9/8/2023)    Exercise Vital Sign     Days of Exercise per Week: 5 days     Minutes of Exercise per Session: 20 min   Stress: Stress Concern Present (9/8/2023)    [place] [employer] - Occupational Stress Questionnaire     Feeling of Stress : Rather much   Social Connections: Unknown (9/8/2023)    Social Connection and Isolation Panel [NHANES]     Frequency of Communication with Friends and Family: Not on file     Frequency of Social Gatherings with Friends and Family: Never     Attends Zoroastrian Services: Never     Active Member of Clubs or Organizations: Not on file     Attends Club or  Organization Meetings: Not on file     Marital Status:    Intimate Partner Violence: Not on file   Housing Stability: Low Risk  (9/8/2023)    Housing Stability Vital Sign     Unable to Pay for Housing in the Last Year: No     Number of Places Lived in the Last Year: 1     Unstable Housing in the Last Year: No     Family History   Problem Relation Age of Onset    Stroke Mother      Recent Labs     06/29/23  0607 09/27/23  0735   ALBUMIN 4.9* 4.7   HDL  --  74   TRIGLYCERIDE  --  55   SODIUM 138 141   POTASSIUM 4.9 4.6   CHLORIDE 104 107   CO2 19* 24   BUN 21 38*   CREATININE 1.94* 1.97*                             Assessment & Plan        1. Stage 3b chronic kidney disease (HCC)  Etiology is not very clear  Patient has no uremic symptoms  No acute need for dialysis  Check renal ultrasound  Renal dose of medication  Avoid nephrotoxins  Continue same medication regimen  Patient was advised to call us if symptoms worsen  Repeat labs  Consider a kidney biopsy if no significant improvement    2. Primary hypertension  Uncontrolled  Patient was advised to be on low-sodium diet  Continue ARB  Patient check blood pressure at home regularly to see if need to adjust medication    3. Type 2 diabetes mellitus without complication, without long-term current use of insulin ([facility])    4. Proteinuria, unspecified type  Repeat labs  Consider a kidney biopsy

## 2023-11-16 ENCOUNTER — HOSPITAL ENCOUNTER (OUTPATIENT)
Dept: RADIOLOGY | Facility: MEDICAL CENTER | Age: 63
End: 2023-11-16
Attending: STUDENT IN AN ORGANIZED HEALTH CARE EDUCATION/TRAINING PROGRAM
Payer: COMMERCIAL

## 2023-11-16 DIAGNOSIS — I10 PRIMARY HYPERTENSION: ICD-10-CM

## 2023-11-16 DIAGNOSIS — N18.32 STAGE 3B CHRONIC KIDNEY DISEASE (CKD): ICD-10-CM

## 2023-11-16 DIAGNOSIS — E11.9 TYPE 2 DIABETES MELLITUS WITHOUT COMPLICATION, WITHOUT LONG-TERM CURRENT USE OF INSULIN (HCC): ICD-10-CM

## 2023-11-16 PROCEDURE — 76775 US EXAM ABDO BACK WALL LIM: CPT

## 2024-01-24 ENCOUNTER — APPOINTMENT (OUTPATIENT)
Dept: NEPHROLOGY | Facility: MEDICAL CENTER | Age: 64
End: 2024-01-24
Payer: COMMERCIAL

## 2024-01-25 ENCOUNTER — APPOINTMENT (OUTPATIENT)
Dept: MEDICAL GROUP | Facility: MEDICAL CENTER | Age: 64
End: 2024-01-25
Payer: COMMERCIAL

## 2024-02-16 ENCOUNTER — PATIENT MESSAGE (OUTPATIENT)
Dept: HEALTH INFORMATION MANAGEMENT | Facility: OTHER | Age: 64
End: 2024-02-16

## 2024-04-17 ENCOUNTER — OFFICE VISIT (OUTPATIENT)
Dept: MEDICAL GROUP | Facility: MEDICAL CENTER | Age: 64
End: 2024-04-17
Payer: COMMERCIAL

## 2024-04-17 VITALS
TEMPERATURE: 98 F | WEIGHT: 145.5 LBS | BODY MASS INDEX: 24.24 KG/M2 | HEART RATE: 65 BPM | SYSTOLIC BLOOD PRESSURE: 112 MMHG | DIASTOLIC BLOOD PRESSURE: 68 MMHG | HEIGHT: 65 IN | OXYGEN SATURATION: 94 %

## 2024-04-17 DIAGNOSIS — M10.9 GOUT, UNSPECIFIED CAUSE, UNSPECIFIED CHRONICITY, UNSPECIFIED SITE: ICD-10-CM

## 2024-04-17 DIAGNOSIS — N18.32 TYPE 2 DIABETES MELLITUS WITH STAGE 3B CHRONIC KIDNEY DISEASE, WITHOUT LONG-TERM CURRENT USE OF INSULIN (HCC): ICD-10-CM

## 2024-04-17 DIAGNOSIS — N18.32 STAGE 3B CHRONIC KIDNEY DISEASE (CKD): ICD-10-CM

## 2024-04-17 DIAGNOSIS — E11.22 TYPE 2 DIABETES MELLITUS WITH STAGE 3B CHRONIC KIDNEY DISEASE, WITHOUT LONG-TERM CURRENT USE OF INSULIN (HCC): ICD-10-CM

## 2024-04-17 DIAGNOSIS — J44.9 CHRONIC OBSTRUCTIVE PULMONARY DISEASE, UNSPECIFIED COPD TYPE (HCC): ICD-10-CM

## 2024-04-17 LAB
HBA1C MFR BLD: 5.5 % (ref ?–5.8)
POCT INT CON NEG: NEGATIVE
POCT INT CON POS: POSITIVE

## 2024-04-17 PROCEDURE — 83036 HEMOGLOBIN GLYCOSYLATED A1C: CPT | Performed by: STUDENT IN AN ORGANIZED HEALTH CARE EDUCATION/TRAINING PROGRAM

## 2024-04-17 PROCEDURE — 99214 OFFICE O/P EST MOD 30 MIN: CPT | Performed by: STUDENT IN AN ORGANIZED HEALTH CARE EDUCATION/TRAINING PROGRAM

## 2024-04-17 PROCEDURE — 3078F DIAST BP <80 MM HG: CPT | Performed by: STUDENT IN AN ORGANIZED HEALTH CARE EDUCATION/TRAINING PROGRAM

## 2024-04-17 PROCEDURE — 3074F SYST BP LT 130 MM HG: CPT | Performed by: STUDENT IN AN ORGANIZED HEALTH CARE EDUCATION/TRAINING PROGRAM

## 2024-04-17 RX ORDER — ALBUTEROL SULFATE 90 UG/1
2 AEROSOL, METERED RESPIRATORY (INHALATION) EVERY 4 HOURS PRN
Qty: 1 EACH | Refills: 11 | Status: SHIPPED | OUTPATIENT
Start: 2024-04-17

## 2024-04-17 RX ORDER — TIOTROPIUM BROMIDE 18 UG/1
18 CAPSULE ORAL; RESPIRATORY (INHALATION) DAILY
Qty: 90 CAPSULE | Refills: 3 | Status: SHIPPED | OUTPATIENT
Start: 2024-04-17

## 2024-04-17 RX ORDER — DAPAGLIFLOZIN 10 MG/1
10 TABLET, FILM COATED ORAL DAILY
Qty: 90 TABLET | Refills: 3 | Status: SHIPPED | OUTPATIENT
Start: 2024-04-17

## 2024-04-17 RX ORDER — ALLOPURINOL 100 MG/1
50 TABLET ORAL DAILY
Qty: 45 TABLET | Refills: 3 | Status: SHIPPED | OUTPATIENT
Start: 2024-04-17

## 2024-04-17 RX ORDER — FLUTICASONE PROPIONATE AND SALMETEROL 250; 50 UG/1; UG/1
1 POWDER RESPIRATORY (INHALATION) EVERY 12 HOURS
Qty: 180 EACH | Refills: 3 | Status: SHIPPED | OUTPATIENT
Start: 2024-04-17

## 2024-04-17 ASSESSMENT — ENCOUNTER SYMPTOMS
NAUSEA: 0
HEADACHES: 0
WEIGHT LOSS: 0
PALPITATIONS: 0
SHORTNESS OF BREATH: 0
FEVER: 0
CHILLS: 0
DIZZINESS: 0
WHEEZING: 0
VOMITING: 0

## 2024-04-17 ASSESSMENT — FIBROSIS 4 INDEX: FIB4 SCORE: 1.77

## 2024-04-17 ASSESSMENT — PATIENT HEALTH QUESTIONNAIRE - PHQ9: CLINICAL INTERPRETATION OF PHQ2 SCORE: 0

## 2024-04-17 NOTE — PROGRESS NOTES
"Subjective:     CC:     HPI:   Hugo presents today with    Past medical history of CAD status post stent, CKD 3B, DLD, T2DM, mild COPD  Specialist  - nephrology- [facility]  - cardiology- [facility]    Last seen by nephrology 10/2023  - repeat lab, consider biopsy if not improvement  - HTN uncontrolled continue arb  - proteinuria- unspecified  - labs from nephrology not done.     Did not receive jardiance.   Will start farxiga.   A1c today 5.5    Health Maintenance:     ROS:  Review of Systems   Constitutional:  Negative for chills, fever and weight loss.   HENT:  Negative for hearing loss.    Respiratory:  Negative for shortness of breath and wheezing.    Cardiovascular:  Negative for chest pain and palpitations.   Gastrointestinal:  Negative for nausea and vomiting.   Genitourinary:  Negative for frequency and urgency.   Skin:  Negative for rash.   Neurological:  Negative for dizziness and headaches.       Objective:     Exam:  /68 (BP Location: Left arm)   Pulse 65   Temp 36.7 °C (98 °F)   Ht 1.651 m (5' 5\")   Wt 66 kg (145 lb 8.1 oz)   SpO2 94%   BMI 24.21 kg/m²  Body mass index is 24.21 kg/m².    Physical Exam  Constitutional:       Appearance: Normal appearance.   Cardiovascular:      Rate and Rhythm: Normal rate and regular rhythm.   Pulmonary:      Effort: Pulmonary effort is normal.      Breath sounds: Normal breath sounds.   Neurological:      Mental Status: He is alert.         Labs:     Assessment & Plan:     63 y.o. male with the following -     1. Type 2 diabetes mellitus with stage 3b chronic kidney disease, without long-term current use of insulin (HCC)  Chronic stable  History of A1c > 6.5%  Will start farxiga in setting of CKD and T2DM  - POCT Hemoglobin A1C    2. Chronic obstructive pulmonary disease, unspecified COPD type (HCC)  Chronic stable well controlled with advair and spiriva    3. Stage 3b chronic kidney disease (CKD)  Chronic stable  Avoid nephrotoxin  Will start " farxiga      4. Gout, unspecified cause, unspecified chronicity, unspecified site  Chronic stable   Allopurinol in setting of CKD       Return in about 6 months (around 10/17/2024) for Lab review, Med check.    Please note that this dictation was created using voice recognition software. I have made every reasonable attempt to correct obvious errors, but I expect that there are errors of grammar and possibly content that I did not discover before finalizing the note.

## 2024-04-17 NOTE — LETTER
April 17, 2024        Hugo Villela      Current Outpatient Medications   Medication Sig Dispense Refill    allopurinol (ZYLOPRIM) 100 MG Tab Take 0.5 Tablets by mouth every day. 45 Tablet 3    fluticasone-salmeterol (ADVAIR) 250-50 MCG/ACT AEROSOL POWDER, BREATH ACTIVATED Inhale 1 Puff every 12 hours.      atorvastatin (LIPITOR) 80 MG tablet Take 1 Tablet by mouth at bedtime. 90 Tablet 3    losartan (COZAAR) 100 MG Tab Take 1 Tablet by mouth every day. 90 Each 3    metoprolol SR (TOPROL XL) 50 MG TABLET SR 24 HR Take 1 Tablet by mouth every day. 90 Tablet 3    tiotropium (SPIRIVA HANDIHALER) 18 MCG Cap Place 1 Capsule into inhaler and inhale every day. 30 Capsule 3    aspirin EC (ECOTRIN) 81 MG Tablet Delayed Response Take 1 Tablet by mouth every day. 30 Tablet 3    albuterol 108 (90 Base) MCG/ACT Aero Soln inhalation aerosol Inhale 2 Puffs every four hours as needed for Shortness of Breath. 1 Each 3     No current facility-administered medications for this visit.                               Dmitry Ritter M.D.

## 2024-06-07 ENCOUNTER — HOSPITAL ENCOUNTER (OUTPATIENT)
Dept: LAB | Facility: MEDICAL CENTER | Age: 64
End: 2024-06-07
Attending: STUDENT IN AN ORGANIZED HEALTH CARE EDUCATION/TRAINING PROGRAM
Payer: COMMERCIAL

## 2024-06-07 ENCOUNTER — HOSPITAL ENCOUNTER (OUTPATIENT)
Dept: LAB | Facility: MEDICAL CENTER | Age: 64
End: 2024-06-07
Attending: INTERNAL MEDICINE
Payer: COMMERCIAL

## 2024-06-07 DIAGNOSIS — E11.9 TYPE 2 DIABETES MELLITUS WITHOUT COMPLICATION, WITHOUT LONG-TERM CURRENT USE OF INSULIN (HCC): ICD-10-CM

## 2024-06-07 DIAGNOSIS — I10 PRIMARY HYPERTENSION: ICD-10-CM

## 2024-06-07 DIAGNOSIS — N18.32 STAGE 3B CHRONIC KIDNEY DISEASE (CKD): ICD-10-CM

## 2024-06-07 DIAGNOSIS — N18.32 STAGE 3B CHRONIC KIDNEY DISEASE: ICD-10-CM

## 2024-06-07 DIAGNOSIS — M10.9 GOUT, UNSPECIFIED CAUSE, UNSPECIFIED CHRONICITY, UNSPECIFIED SITE: ICD-10-CM

## 2024-06-07 LAB
ANION GAP SERPL CALC-SCNC: 12 MMOL/L (ref 7–16)
ANION GAP SERPL CALC-SCNC: 12 MMOL/L (ref 7–16)
BUN SERPL-MCNC: 36 MG/DL (ref 8–22)
BUN SERPL-MCNC: 37 MG/DL (ref 8–22)
CALCIUM SERPL-MCNC: 9.3 MG/DL (ref 8.5–10.5)
CALCIUM SERPL-MCNC: 9.3 MG/DL (ref 8.5–10.5)
CHLORIDE SERPL-SCNC: 107 MMOL/L (ref 96–112)
CHLORIDE SERPL-SCNC: 107 MMOL/L (ref 96–112)
CO2 SERPL-SCNC: 21 MMOL/L (ref 20–33)
CO2 SERPL-SCNC: 21 MMOL/L (ref 20–33)
CREAT SERPL-MCNC: 2.42 MG/DL (ref 0.5–1.4)
CREAT SERPL-MCNC: 2.43 MG/DL (ref 0.5–1.4)
CREAT UR-MCNC: 110.82 MG/DL
ERYTHROCYTE [DISTWIDTH] IN BLOOD BY AUTOMATED COUNT: 48.3 FL (ref 35.9–50)
GFR SERPLBLD CREATININE-BSD FMLA CKD-EPI: 29 ML/MIN/1.73 M 2
GFR SERPLBLD CREATININE-BSD FMLA CKD-EPI: 29 ML/MIN/1.73 M 2
GLUCOSE SERPL-MCNC: 101 MG/DL (ref 65–99)
GLUCOSE SERPL-MCNC: 101 MG/DL (ref 65–99)
HCT VFR BLD AUTO: 43.6 % (ref 42–52)
HGB BLD-MCNC: 14.3 G/DL (ref 14–18)
MCH RBC QN AUTO: 33.6 PG (ref 27–33)
MCHC RBC AUTO-ENTMCNC: 32.8 G/DL (ref 32.3–36.5)
MCV RBC AUTO: 102.3 FL (ref 81.4–97.8)
MICROALBUMIN UR-MCNC: 2.1 MG/DL
MICROALBUMIN/CREAT UR: 19 MG/G (ref 0–30)
PLATELET # BLD AUTO: 222 K/UL (ref 164–446)
PMV BLD AUTO: 10.7 FL (ref 9–12.9)
POTASSIUM SERPL-SCNC: 5.3 MMOL/L (ref 3.6–5.5)
POTASSIUM SERPL-SCNC: 5.6 MMOL/L (ref 3.6–5.5)
RBC # BLD AUTO: 4.26 M/UL (ref 4.7–6.1)
SODIUM SERPL-SCNC: 140 MMOL/L (ref 135–145)
SODIUM SERPL-SCNC: 140 MMOL/L (ref 135–145)
URATE SERPL-MCNC: 9.1 MG/DL (ref 2.5–8.3)
WBC # BLD AUTO: 7.9 K/UL (ref 4.8–10.8)

## 2024-06-07 PROCEDURE — 80048 BASIC METABOLIC PNL TOTAL CA: CPT

## 2024-06-07 PROCEDURE — 82570 ASSAY OF URINE CREATININE: CPT

## 2024-06-07 PROCEDURE — 84550 ASSAY OF BLOOD/URIC ACID: CPT

## 2024-06-07 PROCEDURE — 36415 COLL VENOUS BLD VENIPUNCTURE: CPT

## 2024-06-07 PROCEDURE — 80048 BASIC METABOLIC PNL TOTAL CA: CPT | Mod: 91

## 2024-06-07 PROCEDURE — 82043 UR ALBUMIN QUANTITATIVE: CPT

## 2024-06-07 PROCEDURE — 85027 COMPLETE CBC AUTOMATED: CPT

## 2024-06-12 ENCOUNTER — OFFICE VISIT (OUTPATIENT)
Dept: NEPHROLOGY | Facility: MEDICAL CENTER | Age: 64
End: 2024-06-12
Payer: COMMERCIAL

## 2024-06-12 VITALS
OXYGEN SATURATION: 97 % | WEIGHT: 146 LBS | SYSTOLIC BLOOD PRESSURE: 118 MMHG | HEIGHT: 65 IN | DIASTOLIC BLOOD PRESSURE: 66 MMHG | BODY MASS INDEX: 24.32 KG/M2 | TEMPERATURE: 98.9 F | HEART RATE: 76 BPM

## 2024-06-12 DIAGNOSIS — N17.9 AKI (ACUTE KIDNEY INJURY) (HCC): ICD-10-CM

## 2024-06-12 DIAGNOSIS — R80.9 PROTEINURIA, UNSPECIFIED TYPE: ICD-10-CM

## 2024-06-12 DIAGNOSIS — E11.69 TYPE 2 DIABETES MELLITUS WITH OTHER SPECIFIED COMPLICATION, WITHOUT LONG-TERM CURRENT USE OF INSULIN (HCC): ICD-10-CM

## 2024-06-12 DIAGNOSIS — N18.32 STAGE 3B CHRONIC KIDNEY DISEASE: ICD-10-CM

## 2024-06-12 DIAGNOSIS — I10 PRIMARY HYPERTENSION: ICD-10-CM

## 2024-06-12 PROCEDURE — 99214 OFFICE O/P EST MOD 30 MIN: CPT | Performed by: INTERNAL MEDICINE

## 2024-06-12 PROCEDURE — 3074F SYST BP LT 130 MM HG: CPT | Performed by: INTERNAL MEDICINE

## 2024-06-12 PROCEDURE — 3078F DIAST BP <80 MM HG: CPT | Performed by: INTERNAL MEDICINE

## 2024-06-12 ASSESSMENT — FIBROSIS 4 INDEX: FIB4 SCORE: 1.86

## 2024-06-12 ASSESSMENT — ENCOUNTER SYMPTOMS
FEVER: 0
HYPERTENSION: 1
VOMITING: 0
NAUSEA: 0
CHILLS: 0
COUGH: 0
SHORTNESS OF BREATH: 0

## 2024-06-12 NOTE — PROGRESS NOTES
"Subjective     Hugo Villela is a 63 y.o. male who presents with Chronic Kidney Disease            Chronic Kidney Disease  This is a chronic problem. The current episode started more than 1 year ago. The problem occurs constantly. The problem has been gradually worsening. Pertinent negatives include no chest pain, chills, coughing, fever, nausea, urinary symptoms or vomiting.   Hypertension  This is a chronic problem. The current episode started more than 1 year ago. The problem is unchanged. The problem is controlled. Pertinent negatives include no chest pain, malaise/fatigue, peripheral edema or shortness of breath. Risk factors for coronary artery disease include diabetes mellitus and male gender. Past treatments include angiotensin blockers. The current treatment provides significant improvement. There are no compliance problems.  Hypertensive end-organ damage includes kidney disease. Identifiable causes of hypertension include chronic renal disease.       Review of Systems   Constitutional:  Negative for chills, fever and malaise/fatigue.   Respiratory:  Negative for cough and shortness of breath.    Cardiovascular:  Negative for chest pain and leg swelling.   Gastrointestinal:  Negative for nausea and vomiting.   Genitourinary:  Negative for dysuria, frequency and urgency.              Objective     /66 (BP Location: Right arm, Patient Position: Sitting, BP Cuff Size: Adult)   Pulse 76   Temp 37.2 °C (98.9 °F) (Temporal)   Ht 1.651 m (5' 5\")   Wt 66.2 kg (146 lb)   SpO2 97%   BMI 24.30 kg/m²      Physical Exam  Vitals and nursing note reviewed.   Constitutional:       General: He is not in acute distress.     Appearance: He is not ill-appearing.   HENT:      Head: Normocephalic and atraumatic.      Right Ear: External ear normal.      Left Ear: External ear normal.      Nose: Nose normal.   Eyes:      General:         Right eye: No discharge.         Left eye: No discharge.      " Conjunctiva/sclera: Conjunctivae normal.   Cardiovascular:      Rate and Rhythm: Normal rate and regular rhythm.      Heart sounds: No murmur heard.  Pulmonary:      Effort: Pulmonary effort is normal. No respiratory distress.      Breath sounds: Normal breath sounds. No wheezing.   Musculoskeletal:         General: No tenderness or deformity.      Right lower leg: No edema.      Left lower leg: No edema.   Skin:     General: Skin is warm.   Neurological:      General: No focal deficit present.      Mental Status: He is alert and oriented to person, place, and time.   Psychiatric:         Mood and Affect: Mood normal.         Behavior: Behavior normal.       Past Medical History:   Diagnosis Date    Acute myocardial infarction of other inferior wall, episode of care unspecified 6/25/2012    Acute myocardial infarction of other lateral wall, episode of care unspecified 6/25/2012    Chest pain, unspecified 6/25/2012    Coronary atherosclerosis of native coronary artery 6/25/2012    Dizziness and giddiness 6/25/2012    Mixed hyperlipidemia 6/25/2012    Pure hyperglyceridemia 6/25/2012    S/P coronary artery stent placement 6/25/2012    Tobacco use disorder 6/25/2012     Social History     Socioeconomic History    Marital status:      Spouse name: Not on file    Number of children: Not on file    Years of education: Not on file    Highest education level: 9th grade   Occupational History    Not on file   Tobacco Use    Smoking status: Every Day     Current packs/day: 0.25     Average packs/day: 0.3 packs/day for 42.2 years (10.5 ttl pk-yrs)     Types: Cigarettes     Start date: 4/5/1982    Smokeless tobacco: Never    Tobacco comments:     Only 6 cigarettes daily   Vaping Use    Vaping status: Never Used   Substance and Sexual Activity    Alcohol use: Not Currently     Alcohol/week: 2.4 - 3.6 oz     Types: 4 - 6 Cans of beer per week     Comment: every week, 2x a week    Drug use: Never    Sexual activity: Yes      Partners: Female     Birth control/protection: None   Other Topics Concern    Not on file   Social History Narrative    Not on file     Social Determinants of Health     Financial Resource Strain: Not on file   Food Insecurity: No Food Insecurity (9/8/2023)    Hunger Vital Sign     Worried About Running Out of Food in the Last Year: Never true     Ran Out of Food in the Last Year: Never true   Transportation Needs: No Transportation Needs (9/8/2023)    PRAPARE - Transportation     Lack of Transportation (Medical): No     Lack of Transportation (Non-Medical): No   Physical Activity: Insufficiently Active (9/8/2023)    Exercise Vital Sign     Days of Exercise per Week: 5 days     Minutes of Exercise per Session: 20 min   Stress: Stress Concern Present (9/8/2023)    [employer] [place] [employer] - Occupational Stress Questionnaire     Feeling of Stress : Rather much   Social Connections: Unknown (9/8/2023)    Social Connection and Isolation Panel [NHANES]     Frequency of Communication with Friends and Family: Not on file     Frequency of Social Gatherings with Friends and Family: Never     Attends Islam Services: Never     Active Member of Clubs or Organizations: Not on file     Attends Club or Organization Meetings: Not on file     Marital Status:    Intimate Partner Violence: Not on file   Housing Stability: Low Risk  (9/8/2023)    Housing Stability Vital Sign     Unable to Pay for Housing in the Last Year: No     Number of Places Lived in the Last Year: 1     Unstable Housing in the Last Year: No     Family History   Problem Relation Age of Onset    Stroke Mother      Recent Labs     06/29/23  0607 09/27/23  0735 06/07/24  0812 06/07/24  0813   ALBUMIN 4.9* 4.7  --   --    HDL  --  74  --   --    TRIGLYCERIDE  --  55  --   --    SODIUM 138 141 140 140   POTASSIUM 4.9 4.6 5.3 5.6*   CHLORIDE 104 107 107 107   CO2 19* 24 21 21   BUN 21 38* 37* 36*   CREATININE 1.94* 1.97* 2.42* 2.43*                            Assessment & Plan        1. ROBERT (acute kidney injury) (HCC)  Etiology is not very clear, possible diabetic nephropathy however I am concerned about the rapid worsening  I advised the patient to undergo a kidney biopsy  Check serology  2. Stage 3b chronic kidney disease  No uremic symptoms  Renal dose of medication  Avoid nephrotoxins  Continue same medication regimen  Patient was advised to call us if symptoms worsen      3. Primary hypertension  Controlled  Continue same medication regimen  Continue low-sodium diet      4. Proteinuria, unspecified type  Continue ARB  Kidney biopsy    5. Type 2 diabetes mellitus with other specified complication, without long-term current use of insulin (HCC)

## 2024-06-19 ENCOUNTER — APPOINTMENT (OUTPATIENT)
Dept: ADMISSIONS | Facility: MEDICAL CENTER | Age: 64
End: 2024-06-19
Attending: INTERNAL MEDICINE
Payer: COMMERCIAL

## 2024-06-24 ENCOUNTER — PRE-ADMISSION TESTING (OUTPATIENT)
Dept: ADMISSIONS | Facility: MEDICAL CENTER | Age: 64
End: 2024-06-24
Attending: INTERNAL MEDICINE
Payer: COMMERCIAL

## 2024-06-26 DIAGNOSIS — E11.22 TYPE 2 DIABETES MELLITUS WITH STAGE 3B CHRONIC KIDNEY DISEASE, WITHOUT LONG-TERM CURRENT USE OF INSULIN (HCC): ICD-10-CM

## 2024-06-26 DIAGNOSIS — J44.9 CHRONIC OBSTRUCTIVE PULMONARY DISEASE, UNSPECIFIED COPD TYPE (HCC): ICD-10-CM

## 2024-06-26 DIAGNOSIS — N18.32 TYPE 2 DIABETES MELLITUS WITH STAGE 3B CHRONIC KIDNEY DISEASE, WITHOUT LONG-TERM CURRENT USE OF INSULIN (HCC): ICD-10-CM

## 2024-06-26 NOTE — TELEPHONE ENCOUNTER
Received request via: Pharmacy    Was the patient seen in the last year in this department? Yes    Does the patient have an active prescription (recently filled or refills available) for medication(s) requested? No    Pharmacy Name: [facility]/pharmacy #[id number] - [place], [place] - [address]     Does the patient have longterm Plus and need 100 day supply (blood pressure, diabetes and cholesterol meds only)? Patient does not have SCP

## 2024-06-27 RX ORDER — DAPAGLIFLOZIN 10 MG/1
10 TABLET, FILM COATED ORAL DAILY
Qty: 90 TABLET | Refills: 3 | Status: SHIPPED | OUTPATIENT
Start: 2024-06-27

## 2024-07-02 ENCOUNTER — APPOINTMENT (OUTPATIENT)
Dept: ADMISSIONS | Facility: MEDICAL CENTER | Age: 64
End: 2024-07-02
Attending: INTERNAL MEDICINE
Payer: COMMERCIAL

## 2024-07-02 DIAGNOSIS — Z01.812 PRE-OPERATIVE LABORATORY EXAMINATION: ICD-10-CM

## 2024-07-02 LAB
ERYTHROCYTE [DISTWIDTH] IN BLOOD BY AUTOMATED COUNT: 47.8 FL (ref 35.9–50)
HCT VFR BLD AUTO: 46.3 % (ref 42–52)
HGB BLD-MCNC: 15.5 G/DL (ref 14–18)
MCH RBC QN AUTO: 33.4 PG (ref 27–33)
MCHC RBC AUTO-ENTMCNC: 33.5 G/DL (ref 32.3–36.5)
MCV RBC AUTO: 99.8 FL (ref 81.4–97.8)
PLATELET # BLD AUTO: 192 K/UL (ref 164–446)
PMV BLD AUTO: 11.8 FL (ref 9–12.9)
RBC # BLD AUTO: 4.64 M/UL (ref 4.7–6.1)
WBC # BLD AUTO: 9.6 K/UL (ref 4.8–10.8)

## 2024-07-02 PROCEDURE — 36415 COLL VENOUS BLD VENIPUNCTURE: CPT

## 2024-07-02 PROCEDURE — 85027 COMPLETE CBC AUTOMATED: CPT

## 2024-07-10 ENCOUNTER — APPOINTMENT (OUTPATIENT)
Dept: RADIOLOGY | Facility: MEDICAL CENTER | Age: 64
End: 2024-07-10
Attending: INTERNAL MEDICINE
Payer: COMMERCIAL

## 2024-07-10 ENCOUNTER — HOSPITAL ENCOUNTER (OUTPATIENT)
Facility: MEDICAL CENTER | Age: 64
End: 2024-07-10
Attending: INTERNAL MEDICINE | Admitting: INTERNAL MEDICINE
Payer: COMMERCIAL

## 2024-07-10 VITALS
DIASTOLIC BLOOD PRESSURE: 71 MMHG | SYSTOLIC BLOOD PRESSURE: 115 MMHG | WEIGHT: 146.16 LBS | RESPIRATION RATE: 16 BRPM | HEIGHT: 65 IN | TEMPERATURE: 97.2 F | HEART RATE: 52 BPM | BODY MASS INDEX: 24.35 KG/M2 | OXYGEN SATURATION: 97 %

## 2024-07-10 DIAGNOSIS — N17.9 AKI (ACUTE KIDNEY INJURY) (HCC): ICD-10-CM

## 2024-07-10 DIAGNOSIS — I10 PRIMARY HYPERTENSION: ICD-10-CM

## 2024-07-10 DIAGNOSIS — N18.32 STAGE 3B CHRONIC KIDNEY DISEASE: ICD-10-CM

## 2024-07-10 LAB
INR PPP: 0.92 (ref 0.87–1.13)
PATHOLOGY CONSULT NOTE: NORMAL
PROTHROMBIN TIME: 12.4 SEC (ref 12–14.6)

## 2024-07-10 PROCEDURE — 160047 HCHG PACU  - EA ADDL 30 MINS PHASE II

## 2024-07-10 PROCEDURE — 700105 HCHG RX REV CODE 258: Performed by: STUDENT IN AN ORGANIZED HEALTH CARE EDUCATION/TRAINING PROGRAM

## 2024-07-10 PROCEDURE — 700111 HCHG RX REV CODE 636 W/ 250 OVERRIDE (IP)

## 2024-07-10 PROCEDURE — 85610 PROTHROMBIN TIME: CPT

## 2024-07-10 PROCEDURE — 4401636 CT-NEEDLE CORE BX-RENAL

## 2024-07-10 PROCEDURE — 160035 HCHG PACU - 1ST 60 MINS PHASE I

## 2024-07-10 PROCEDURE — 160046 HCHG PACU - 1ST 60 MINS PHASE II

## 2024-07-10 PROCEDURE — 88300 SURGICAL PATH GROSS: CPT

## 2024-07-10 PROCEDURE — 700111 HCHG RX REV CODE 636 W/ 250 OVERRIDE (IP): Performed by: STUDENT IN AN ORGANIZED HEALTH CARE EDUCATION/TRAINING PROGRAM

## 2024-07-10 PROCEDURE — 160002 HCHG RECOVERY MINUTES (STAT)

## 2024-07-10 RX ORDER — MIDAZOLAM HYDROCHLORIDE 1 MG/ML
INJECTION INTRAMUSCULAR; INTRAVENOUS
Status: COMPLETED
Start: 2024-07-10 | End: 2024-07-10

## 2024-07-10 RX ORDER — ONDANSETRON 2 MG/ML
4 INJECTION INTRAMUSCULAR; INTRAVENOUS PRN
Status: DISCONTINUED | OUTPATIENT
Start: 2024-07-10 | End: 2024-07-10 | Stop reason: HOSPADM

## 2024-07-10 RX ORDER — MIDAZOLAM HYDROCHLORIDE 1 MG/ML
.5-2 INJECTION INTRAMUSCULAR; INTRAVENOUS PRN
Status: DISCONTINUED | OUTPATIENT
Start: 2024-07-10 | End: 2024-07-10 | Stop reason: HOSPADM

## 2024-07-10 RX ORDER — SODIUM CHLORIDE 9 MG/ML
500 INJECTION, SOLUTION INTRAVENOUS
Status: DISCONTINUED | OUTPATIENT
Start: 2024-07-10 | End: 2024-07-10 | Stop reason: HOSPADM

## 2024-07-10 RX ADMIN — FENTANYL CITRATE 25 MCG: 50 INJECTION, SOLUTION INTRAMUSCULAR; INTRAVENOUS at 13:45

## 2024-07-10 RX ADMIN — MIDAZOLAM HYDROCHLORIDE 1 MG: 1 INJECTION, SOLUTION INTRAMUSCULAR; INTRAVENOUS at 13:37

## 2024-07-10 RX ADMIN — MIDAZOLAM HYDROCHLORIDE 1 MG: 2 INJECTION, SOLUTION INTRAMUSCULAR; INTRAVENOUS at 13:37

## 2024-07-10 RX ADMIN — FENTANYL CITRATE 50 MCG: 50 INJECTION, SOLUTION INTRAMUSCULAR; INTRAVENOUS at 13:37

## 2024-07-10 RX ADMIN — MIDAZOLAM HYDROCHLORIDE 0.5 MG: 2 INJECTION, SOLUTION INTRAMUSCULAR; INTRAVENOUS at 13:45

## 2024-07-10 ASSESSMENT — FIBROSIS 4 INDEX: FIB4 SCORE: 2.182820625326996762

## 2024-07-18 ENCOUNTER — TELEPHONE (OUTPATIENT)
Dept: NEPHROLOGY | Facility: MEDICAL CENTER | Age: 64
End: 2024-07-18

## 2024-07-18 ENCOUNTER — OFFICE VISIT (OUTPATIENT)
Dept: MEDICAL GROUP | Facility: MEDICAL CENTER | Age: 64
End: 2024-07-18
Payer: COMMERCIAL

## 2024-07-18 VITALS
BODY MASS INDEX: 24.72 KG/M2 | TEMPERATURE: 97.4 F | SYSTOLIC BLOOD PRESSURE: 126 MMHG | DIASTOLIC BLOOD PRESSURE: 68 MMHG | HEIGHT: 65 IN | OXYGEN SATURATION: 93 % | WEIGHT: 148.37 LBS | HEART RATE: 67 BPM

## 2024-07-18 DIAGNOSIS — N18.32 STAGE 3B CHRONIC KIDNEY DISEASE: ICD-10-CM

## 2024-07-18 DIAGNOSIS — I10 PRIMARY HYPERTENSION: ICD-10-CM

## 2024-07-18 DIAGNOSIS — J44.9 CHRONIC OBSTRUCTIVE PULMONARY DISEASE, UNSPECIFIED COPD TYPE (HCC): ICD-10-CM

## 2024-07-18 PROCEDURE — 3078F DIAST BP <80 MM HG: CPT | Performed by: STUDENT IN AN ORGANIZED HEALTH CARE EDUCATION/TRAINING PROGRAM

## 2024-07-18 PROCEDURE — 99214 OFFICE O/P EST MOD 30 MIN: CPT | Performed by: STUDENT IN AN ORGANIZED HEALTH CARE EDUCATION/TRAINING PROGRAM

## 2024-07-18 PROCEDURE — 3074F SYST BP LT 130 MM HG: CPT | Performed by: STUDENT IN AN ORGANIZED HEALTH CARE EDUCATION/TRAINING PROGRAM

## 2024-07-18 RX ORDER — FLUTICASONE FUROATE AND VILANTEROL 100; 25 UG/1; UG/1
1 POWDER RESPIRATORY (INHALATION) DAILY
Qty: 60 EACH | Refills: 5 | Status: SHIPPED | OUTPATIENT
Start: 2024-07-18

## 2024-07-18 ASSESSMENT — ENCOUNTER SYMPTOMS
FEVER: 0
PALPITATIONS: 0
WHEEZING: 0
CHILLS: 0
DIZZINESS: 0
SHORTNESS OF BREATH: 0
NAUSEA: 0
HEADACHES: 0
WEIGHT LOSS: 0
VOMITING: 0

## 2024-07-18 ASSESSMENT — FIBROSIS 4 INDEX: FIB4 SCORE: 2.182820625326996762

## 2024-07-24 ENCOUNTER — HOSPITAL ENCOUNTER (OUTPATIENT)
Dept: LAB | Facility: MEDICAL CENTER | Age: 64
End: 2024-07-24
Attending: INTERNAL MEDICINE
Payer: COMMERCIAL

## 2024-07-24 DIAGNOSIS — N18.32 STAGE 3B CHRONIC KIDNEY DISEASE: ICD-10-CM

## 2024-07-24 DIAGNOSIS — I10 PRIMARY HYPERTENSION: ICD-10-CM

## 2024-07-24 DIAGNOSIS — N17.9 AKI (ACUTE KIDNEY INJURY) (HCC): ICD-10-CM

## 2024-07-24 LAB
ANION GAP SERPL CALC-SCNC: 12 MMOL/L (ref 7–16)
BUN SERPL-MCNC: 25 MG/DL (ref 8–22)
C3 SERPL-MCNC: 88.4 MG/DL (ref 87–200)
C4 SERPL-MCNC: 22.8 MG/DL (ref 19–52)
CALCIUM SERPL-MCNC: 10.3 MG/DL (ref 8.5–10.5)
CHLORIDE SERPL-SCNC: 106 MMOL/L (ref 96–112)
CO2 SERPL-SCNC: 23 MMOL/L (ref 20–33)
CREAT SERPL-MCNC: 1.63 MG/DL (ref 0.5–1.4)
CREAT UR-MCNC: 155.35 MG/DL
ERYTHROCYTE [DISTWIDTH] IN BLOOD BY AUTOMATED COUNT: 46.8 FL (ref 35.9–50)
GFR SERPLBLD CREATININE-BSD FMLA CKD-EPI: 47 ML/MIN/1.73 M 2
GLUCOSE SERPL-MCNC: 98 MG/DL (ref 65–99)
HCT VFR BLD AUTO: 43.2 % (ref 42–52)
HGB BLD-MCNC: 14.4 G/DL (ref 14–18)
MCH RBC QN AUTO: 33.1 PG (ref 27–33)
MCHC RBC AUTO-ENTMCNC: 33.3 G/DL (ref 32.3–36.5)
MCV RBC AUTO: 99.3 FL (ref 81.4–97.8)
MICROALBUMIN UR-MCNC: 2.2 MG/DL
MICROALBUMIN/CREAT UR: 14 MG/G (ref 0–30)
PLATELET # BLD AUTO: 176 K/UL (ref 164–446)
PMV BLD AUTO: 12.1 FL (ref 9–12.9)
POTASSIUM SERPL-SCNC: 4.3 MMOL/L (ref 3.6–5.5)
PROT UR-MCNC: 18 MG/DL (ref 0–15)
RBC # BLD AUTO: 4.35 M/UL (ref 4.7–6.1)
SODIUM SERPL-SCNC: 141 MMOL/L (ref 135–145)
WBC # BLD AUTO: 8.4 K/UL (ref 4.8–10.8)

## 2024-07-24 PROCEDURE — 85027 COMPLETE CBC AUTOMATED: CPT

## 2024-07-24 PROCEDURE — 86038 ANTINUCLEAR ANTIBODIES: CPT

## 2024-07-24 PROCEDURE — 82570 ASSAY OF URINE CREATININE: CPT

## 2024-07-24 PROCEDURE — 86160 COMPLEMENT ANTIGEN: CPT | Mod: 91

## 2024-07-24 PROCEDURE — 82043 UR ALBUMIN QUANTITATIVE: CPT

## 2024-07-24 PROCEDURE — 84155 ASSAY OF PROTEIN SERUM: CPT

## 2024-07-24 PROCEDURE — 83516 IMMUNOASSAY NONANTIBODY: CPT | Mod: 91

## 2024-07-24 PROCEDURE — 84156 ASSAY OF PROTEIN URINE: CPT

## 2024-07-24 PROCEDURE — 80048 BASIC METABOLIC PNL TOTAL CA: CPT

## 2024-07-24 PROCEDURE — 84165 PROTEIN E-PHORESIS SERUM: CPT

## 2024-07-24 PROCEDURE — 86162 COMPLEMENT TOTAL (CH50): CPT

## 2024-07-24 PROCEDURE — 36415 COLL VENOUS BLD VENIPUNCTURE: CPT

## 2024-07-27 LAB
CH50 SERPL-ACNC: 62.5 U/ML (ref 38.7–89.9)
NUCLEAR IGG SER QL IA: NORMAL

## 2024-07-28 LAB
MYELOPEROXIDASE AB SER-ACNC: 0 AU/ML (ref 0–19)
PROTEINASE3 AB SER-ACNC: 2 AU/ML (ref 0–19)

## 2024-07-29 LAB
ALBUMIN SERPL ELPH-MCNC: 4.38 G/DL (ref 3.75–5.01)
ALPHA1 GLOB SERPL ELPH-MCNC: 0.23 G/DL (ref 0.19–0.46)
ALPHA2 GLOB SERPL ELPH-MCNC: 0.63 G/DL (ref 0.48–1.05)
B-GLOBULIN SERPL ELPH-MCNC: 0.83 G/DL (ref 0.48–1.1)
GAMMA GLOB SERPL ELPH-MCNC: 1.13 G/DL (ref 0.62–1.51)
INTERPRETATION SERPL IFE-IMP: NORMAL
MONOCLON BAND OBS SERPL: NORMAL
MONOCLONAL PROTEIN NL11656: NORMAL G/DL
PATHOLOGY STUDY: NORMAL
PROT SERPL-MCNC: 7.2 G/DL (ref 6.3–8.2)

## 2024-08-16 DIAGNOSIS — J44.9 CHRONIC OBSTRUCTIVE PULMONARY DISEASE, UNSPECIFIED COPD TYPE (HCC): ICD-10-CM

## 2024-08-16 DIAGNOSIS — I25.10 CORONARY ARTERY DISEASE INVOLVING NATIVE CORONARY ARTERY OF NATIVE HEART WITHOUT ANGINA PECTORIS: ICD-10-CM

## 2024-08-16 DIAGNOSIS — I10 PRIMARY HYPERTENSION: ICD-10-CM

## 2024-08-16 RX ORDER — LOSARTAN POTASSIUM 100 MG/1
100 TABLET ORAL DAILY
Qty: 90 EACH | Refills: 3 | Status: SHIPPED | OUTPATIENT
Start: 2024-08-16 | End: 2026-11-27

## 2024-08-16 RX ORDER — ALBUTEROL SULFATE 90 UG/1
2 AEROSOL, METERED RESPIRATORY (INHALATION) EVERY 4 HOURS PRN
Qty: 1 EACH | Refills: 11 | Status: SHIPPED | OUTPATIENT
Start: 2024-08-16

## 2024-08-16 RX ORDER — FLUTICASONE FUROATE AND VILANTEROL 100; 25 UG/1; UG/1
1 POWDER RESPIRATORY (INHALATION) DAILY
Qty: 60 EACH | Refills: 5 | Status: SHIPPED | OUTPATIENT
Start: 2024-08-16

## 2024-08-16 RX ORDER — METOPROLOL SUCCINATE 50 MG/1
50 TABLET, EXTENDED RELEASE ORAL DAILY
Qty: 90 TABLET | Refills: 3 | Status: SHIPPED | OUTPATIENT
Start: 2024-08-16 | End: 2026-08-16

## 2024-09-04 ENCOUNTER — OFFICE VISIT (OUTPATIENT)
Dept: NEPHROLOGY | Facility: MEDICAL CENTER | Age: 64
End: 2024-09-04
Payer: COMMERCIAL

## 2024-09-04 VITALS
TEMPERATURE: 99.2 F | OXYGEN SATURATION: 93 % | WEIGHT: 149 LBS | HEART RATE: 64 BPM | DIASTOLIC BLOOD PRESSURE: 70 MMHG | HEIGHT: 65 IN | BODY MASS INDEX: 24.83 KG/M2 | SYSTOLIC BLOOD PRESSURE: 126 MMHG

## 2024-09-04 DIAGNOSIS — N18.32 STAGE 3B CHRONIC KIDNEY DISEASE: ICD-10-CM

## 2024-09-04 DIAGNOSIS — R80.9 PROTEINURIA, UNSPECIFIED TYPE: ICD-10-CM

## 2024-09-04 DIAGNOSIS — I10 PRIMARY HYPERTENSION: ICD-10-CM

## 2024-09-04 PROCEDURE — G2211 COMPLEX E/M VISIT ADD ON: HCPCS | Performed by: INTERNAL MEDICINE

## 2024-09-04 PROCEDURE — 3074F SYST BP LT 130 MM HG: CPT | Performed by: INTERNAL MEDICINE

## 2024-09-04 PROCEDURE — 3078F DIAST BP <80 MM HG: CPT | Performed by: INTERNAL MEDICINE

## 2024-09-04 PROCEDURE — 99214 OFFICE O/P EST MOD 30 MIN: CPT | Performed by: INTERNAL MEDICINE

## 2024-09-04 ASSESSMENT — ENCOUNTER SYMPTOMS
CHILLS: 0
VOMITING: 0
HYPERTENSION: 1
SHORTNESS OF BREATH: 0
NAUSEA: 0
COUGH: 0
FEVER: 0

## 2024-09-04 ASSESSMENT — FIBROSIS 4 INDEX: FIB4 SCORE: 2.38

## 2024-09-04 NOTE — ASSESSMENT & PLAN NOTE
Controlled  Continue same medication regimen  Continue low-sodium diet      
Kidney biopsies showed arterionephrosclerosis  Kidney function slightly better  No uremic symptoms  Renal dose of medication  Avoid nephrotoxins  Continue same medication regimen  Patient was advised to call us if symptoms worsen    
Never smoker

## 2024-09-04 NOTE — PROGRESS NOTES
"Subjective     Hugo Villela is a 64 y.o. male who presents with Chronic Kidney Disease and Hypertension            Chronic Kidney Disease  This is a chronic problem. The current episode started more than 1 year ago. The problem occurs constantly. The problem has been gradually improving. Pertinent negatives include no chest pain, chills, coughing, fever, nausea, urinary symptoms or vomiting.   Hypertension  This is a chronic problem. The current episode started more than 1 year ago. The problem is unchanged. The problem is controlled. Pertinent negatives include no chest pain, malaise/fatigue, peripheral edema or shortness of breath. Risk factors for coronary artery disease include male gender. Past treatments include angiotensin blockers. The current treatment provides significant improvement. There are no compliance problems.  Hypertensive end-organ damage includes kidney disease. Identifiable causes of hypertension include chronic renal disease.       Review of Systems   Constitutional:  Negative for chills, fever and malaise/fatigue.   Respiratory:  Negative for cough and shortness of breath.    Cardiovascular:  Negative for chest pain and leg swelling.   Gastrointestinal:  Negative for nausea and vomiting.   Genitourinary:  Negative for dysuria, frequency and urgency.              Objective     /70 (BP Location: Right arm, Patient Position: Sitting, BP Cuff Size: Adult)   Pulse 64   Temp 37.3 °C (99.2 °F) (Temporal)   Ht 1.651 m (5' 5\")   Wt 67.6 kg (149 lb)   SpO2 93%   BMI 24.79 kg/m²      Physical Exam  Vitals and nursing note reviewed.   Constitutional:       General: He is not in acute distress.     Appearance: He is not ill-appearing.   HENT:      Head: Normocephalic and atraumatic.      Right Ear: External ear normal.      Left Ear: External ear normal.      Nose: Nose normal.   Eyes:      General:         Right eye: No discharge.         Left eye: No discharge.      Conjunctiva/sclera: " Conjunctivae normal.   Cardiovascular:      Rate and Rhythm: Normal rate and regular rhythm.      Heart sounds: No murmur heard.  Pulmonary:      Effort: Pulmonary effort is normal. No respiratory distress.      Breath sounds: Normal breath sounds. No wheezing.   Musculoskeletal:         General: No tenderness or deformity.      Right lower leg: No edema.      Left lower leg: No edema.   Skin:     General: Skin is warm.   Neurological:      General: No focal deficit present.      Mental Status: He is alert and oriented to person, place, and time.   Psychiatric:         Mood and Affect: Mood normal.         Behavior: Behavior normal.       Past Medical History:   Diagnosis Date    Acute myocardial infarction of other inferior wall, episode of care unspecified 06/25/2012    Acute myocardial infarction of other lateral wall, episode of care unspecified 06/25/2012    Asthma     Breath shortness     Bronchitis     Chest pain, unspecified 06/25/2012    Coronary atherosclerosis of native coronary artery 06/25/2012    Dental disorder     Diabetes (HCC)     Dizziness and giddiness 06/25/2012    High cholesterol     Hypertension     Mixed hyperlipidemia 06/25/2012    Pure hyperglyceridemia 06/25/2012    Renal disorder     S/P coronary artery stent placement 06/25/2012    Tobacco use disorder 06/25/2012     Social History     Socioeconomic History    Marital status:      Spouse name: Not on file    Number of children: Not on file    Years of education: Not on file    Highest education level: 9th grade   Occupational History    Not on file   Tobacco Use    Smoking status: Every Day     Current packs/day: 0.25     Average packs/day: 0.3 packs/day for 42.4 years (10.6 ttl pk-yrs)     Types: Cigarettes     Start date: 4/5/1982    Smokeless tobacco: Never    Tobacco comments:     Only 6 cigarettes daily.  Patient declines smoking cessation at this time.    Vaping Use    Vaping status: Never Used   Substance and Sexual  Activity    Alcohol use: Not Currently     Alcohol/week: 2.4 - 3.6 oz     Types: 4 - 6 Cans of beer per week     Comment: every week, 2x a week    Drug use: Never    Sexual activity: Yes     Partners: Female     Birth control/protection: None   Other Topics Concern    Not on file   Social History Narrative    Not on file     Social Determinants of Health     Financial Resource Strain: Not on file   Food Insecurity: No Food Insecurity (9/8/2023)    Hunger Vital Sign     Worried About Running Out of Food in the Last Year: Never true     Ran Out of Food in the Last Year: Never true   Transportation Needs: No Transportation Needs (9/8/2023)    PRAPARE - Transportation     Lack of Transportation (Medical): No     Lack of Transportation (Non-Medical): No   Physical Activity: Insufficiently Active (9/8/2023)    Exercise Vital Sign     Days of Exercise per Week: 5 days     Minutes of Exercise per Session: 20 min   Stress: Stress Concern Present (9/8/2023)    [employer] [place] [employer] - Occupational Stress Questionnaire     Feeling of Stress : Rather much   Social Connections: Unknown (9/8/2023)    Social Connection and Isolation Panel [NHANES]     Frequency of Communication with Friends and Family: Not on file     Frequency of Social Gatherings with Friends and Family: Never     Attends Orthodoxy Services: Never     Active Member of Clubs or Organizations: Not on file     Attends Club or Organization Meetings: Not on file     Marital Status:    Intimate Partner Violence: Not on file   Housing Stability: Low Risk  (9/8/2023)    Housing Stability Vital Sign     Unable to Pay for Housing in the Last Year: No     Number of Places Lived in the Last Year: 1     Unstable Housing in the Last Year: No     Family History   Problem Relation Age of Onset    Stroke Mother     Cancer Sister      Recent Labs     09/27/23  0735 06/07/24  0812 06/07/24  0813 07/24/24  1608   ALBUMIN 4.7  --   --  4.38   HDL 74  --   --    --    TRIGLYCERIDE 55  --   --   --    SODIUM 141 140 140 141   POTASSIUM 4.6 5.3 5.6* 4.3   CHLORIDE 107 107 107 106   CO2 24 21 21 23   BUN 38* 37* 36* 25*   CREATININE 1.97* 2.42* 2.43* 1.63*                             Assessment & Plan        Assessment & Plan  Primary hypertension  Controlled  Continue same medication regimen  Continue low-sodium diet      Stage 3b chronic kidney disease  Kidney biopsies showed arterionephrosclerosis  Kidney function slightly better  No uremic symptoms  Renal dose of medication  Avoid nephrotoxins  Continue same medication regimen  Patient was advised to call us if symptoms worsen    Proteinuria, unspecified type  Continue ARB

## 2024-12-17 ENCOUNTER — APPOINTMENT (OUTPATIENT)
Dept: RADIOLOGY | Facility: MEDICAL CENTER | Age: 64
DRG: 682 | End: 2024-12-17
Attending: EMERGENCY MEDICINE
Payer: COMMERCIAL

## 2024-12-17 ENCOUNTER — HOSPITAL ENCOUNTER (INPATIENT)
Facility: MEDICAL CENTER | Age: 64
LOS: 6 days | DRG: 682 | End: 2024-12-23
Attending: EMERGENCY MEDICINE | Admitting: INTERNAL MEDICINE
Payer: COMMERCIAL

## 2024-12-17 DIAGNOSIS — I10 PRIMARY HYPERTENSION: ICD-10-CM

## 2024-12-17 DIAGNOSIS — I50.21 ACUTE SYSTOLIC HEART FAILURE (HCC): ICD-10-CM

## 2024-12-17 DIAGNOSIS — R44.3 HALLUCINATIONS: ICD-10-CM

## 2024-12-17 DIAGNOSIS — J44.9 CHRONIC OBSTRUCTIVE PULMONARY DISEASE, UNSPECIFIED COPD TYPE (HCC): ICD-10-CM

## 2024-12-17 DIAGNOSIS — J44.1 ACUTE EXACERBATION OF CHRONIC OBSTRUCTIVE PULMONARY DISEASE (COPD) (HCC): ICD-10-CM

## 2024-12-17 DIAGNOSIS — R79.89 ELEVATED TROPONIN: ICD-10-CM

## 2024-12-17 DIAGNOSIS — K76.7 HEPATORENAL SYNDROME (HCC): ICD-10-CM

## 2024-12-17 PROBLEM — N17.9 ACUTE RENAL FAILURE (HCC): Status: ACTIVE | Noted: 2024-12-17

## 2024-12-17 LAB
ALBUMIN SERPL BCP-MCNC: 3.9 G/DL (ref 3.2–4.9)
ALBUMIN SERPL BCP-MCNC: 3.9 G/DL (ref 3.2–4.9)
ALBUMIN/GLOB SERPL: 1.3 G/DL
ALBUMIN/GLOB SERPL: 1.4 G/DL
ALP SERPL-CCNC: 223 U/L (ref 30–99)
ALP SERPL-CCNC: 234 U/L (ref 30–99)
ALT SERPL-CCNC: 3143 U/L (ref 2–50)
ALT SERPL-CCNC: 3241 U/L (ref 2–50)
AMMONIA PLAS-SCNC: 41 UMOL/L (ref 11–45)
AMORPHOUS CRYSTALS 1764: PRESENT /HPF
ANION GAP SERPL CALC-SCNC: 12 MMOL/L (ref 7–16)
ANION GAP SERPL CALC-SCNC: 19 MMOL/L (ref 7–16)
APAP SERPL-MCNC: <5 UG/ML (ref 10–30)
APPEARANCE UR: CLEAR
AST SERPL-CCNC: 4272 U/L (ref 12–45)
AST SERPL-CCNC: 4600 U/L (ref 12–45)
BACTERIA #/AREA URNS HPF: ABNORMAL /HPF
BASOPHILS # BLD AUTO: 0.4 % (ref 0–1.8)
BASOPHILS # BLD: 0.06 K/UL (ref 0–0.12)
BILIRUB SERPL-MCNC: 1 MG/DL (ref 0.1–1.5)
BILIRUB SERPL-MCNC: 1.3 MG/DL (ref 0.1–1.5)
BILIRUB UR QL STRIP.AUTO: NEGATIVE
BUN SERPL-MCNC: 77 MG/DL (ref 8–22)
BUN SERPL-MCNC: 80 MG/DL (ref 8–22)
CA OXALATE CRYSTAL  1765: PRESENT /HPF
CALCIUM ALBUM COR SERPL-MCNC: 8.4 MG/DL (ref 8.5–10.5)
CALCIUM ALBUM COR SERPL-MCNC: 8.6 MG/DL (ref 8.5–10.5)
CALCIUM SERPL-MCNC: 8.3 MG/DL (ref 8.5–10.5)
CALCIUM SERPL-MCNC: 8.5 MG/DL (ref 8.5–10.5)
CASTS URNS QL MICRO: ABNORMAL /LPF (ref 0–2)
CHLORIDE SERPL-SCNC: 94 MMOL/L (ref 96–112)
CHLORIDE SERPL-SCNC: 97 MMOL/L (ref 96–112)
CK SERPL-CCNC: 168 U/L (ref 0–154)
CO2 SERPL-SCNC: 19 MMOL/L (ref 20–33)
CO2 SERPL-SCNC: 25 MMOL/L (ref 20–33)
COLOR UR: YELLOW
CREAT SERPL-MCNC: 3.39 MG/DL (ref 0.5–1.4)
CREAT SERPL-MCNC: 3.91 MG/DL (ref 0.5–1.4)
EKG IMPRESSION: NORMAL
EOSINOPHIL # BLD AUTO: 0.01 K/UL (ref 0–0.51)
EOSINOPHIL NFR BLD: 0.1 % (ref 0–6.9)
EPITHELIAL CELLS 1715: ABNORMAL /HPF (ref 0–5)
EPITHELIAL CELLS RENAL  1715R: PRESENT /HPF
ERYTHROCYTE [DISTWIDTH] IN BLOOD BY AUTOMATED COUNT: 61.1 FL (ref 35.9–50)
ETHANOL BLD-MCNC: 17.3 MG/DL
GFR SERPLBLD CREATININE-BSD FMLA CKD-EPI: 16 ML/MIN/1.73 M 2
GFR SERPLBLD CREATININE-BSD FMLA CKD-EPI: 19 ML/MIN/1.73 M 2
GLOBULIN SER CALC-MCNC: 2.8 G/DL (ref 1.9–3.5)
GLOBULIN SER CALC-MCNC: 3 G/DL (ref 1.9–3.5)
GLUCOSE SERPL-MCNC: 111 MG/DL (ref 65–99)
GLUCOSE SERPL-MCNC: 113 MG/DL (ref 65–99)
GLUCOSE UR STRIP.AUTO-MCNC: NEGATIVE MG/DL
HAV IGM SERPL QL IA: NONREACTIVE
HBV CORE IGM SER QL: NONREACTIVE
HBV SURFACE AG SER QL: NONREACTIVE
HCT VFR BLD AUTO: 47.1 % (ref 42–52)
HCV AB SER QL: NONREACTIVE
HGB BLD-MCNC: 15 G/DL (ref 14–18)
HYALINE CAST   1831: PRESENT /LPF
IMM GRANULOCYTES # BLD AUTO: 0.06 K/UL (ref 0–0.11)
IMM GRANULOCYTES NFR BLD AUTO: 0.4 % (ref 0–0.9)
KETONES UR STRIP.AUTO-MCNC: NEGATIVE MG/DL
LACTATE SERPL-SCNC: 1.5 MMOL/L (ref 0.5–2)
LACTATE SERPL-SCNC: 1.8 MMOL/L (ref 0.5–2)
LEUKOCYTE ESTERASE UR QL STRIP.AUTO: NEGATIVE
LYMPHOCYTES # BLD AUTO: 0.41 K/UL (ref 1–4.8)
LYMPHOCYTES NFR BLD: 3 % (ref 22–41)
MCH RBC QN AUTO: 30.9 PG (ref 27–33)
MCHC RBC AUTO-ENTMCNC: 31.8 G/DL (ref 32.3–36.5)
MCV RBC AUTO: 96.9 FL (ref 81.4–97.8)
MICRO URNS: ABNORMAL
MONOCYTES # BLD AUTO: 1.25 K/UL (ref 0–0.85)
MONOCYTES NFR BLD AUTO: 9.1 % (ref 0–13.4)
NEUTROPHILS # BLD AUTO: 12.02 K/UL (ref 1.82–7.42)
NEUTROPHILS NFR BLD: 87 % (ref 44–72)
NITRITE UR QL STRIP.AUTO: NEGATIVE
NRBC # BLD AUTO: 0.09 K/UL
NRBC BLD-RTO: 0.7 /100 WBC (ref 0–0.2)
NT-PROBNP SERPL IA-MCNC: ABNORMAL PG/ML (ref 0–125)
OSMOLALITY SERPL: 313 MOSM/KG H2O (ref 278–298)
PH UR STRIP.AUTO: 5 [PH] (ref 5–8)
PLATELET # BLD AUTO: 163 K/UL (ref 164–446)
PMV BLD AUTO: 10.4 FL (ref 9–12.9)
POTASSIUM SERPL-SCNC: 6 MMOL/L (ref 3.6–5.5)
POTASSIUM SERPL-SCNC: 6.1 MMOL/L (ref 3.6–5.5)
PROT SERPL-MCNC: 6.7 G/DL (ref 6–8.2)
PROT SERPL-MCNC: 6.9 G/DL (ref 6–8.2)
PROT UR QL STRIP: 30 MG/DL
RBC # BLD AUTO: 4.86 M/UL (ref 4.7–6.1)
RBC # URNS HPF: ABNORMAL /HPF (ref 0–2)
RBC UR QL AUTO: ABNORMAL
SALICYLATES SERPL-MCNC: <1 MG/DL (ref 15–25)
SODIUM SERPL-SCNC: 132 MMOL/L (ref 135–145)
SODIUM SERPL-SCNC: 134 MMOL/L (ref 135–145)
SP GR UR STRIP.AUTO: 1.01
TROPONIN T SERPL-MCNC: 183 NG/L (ref 6–19)
UROBILINOGEN UR STRIP.AUTO-MCNC: 0.2 EU/DL
WBC # BLD AUTO: 13.8 K/UL (ref 4.8–10.8)
WBC #/AREA URNS HPF: ABNORMAL /HPF

## 2024-12-17 PROCEDURE — 83930 ASSAY OF BLOOD OSMOLALITY: CPT

## 2024-12-17 PROCEDURE — P9047 ALBUMIN (HUMAN), 25%, 50ML: HCPCS | Mod: JZ | Performed by: INTERNAL MEDICINE

## 2024-12-17 PROCEDURE — 93005 ELECTROCARDIOGRAM TRACING: CPT | Mod: TC | Performed by: EMERGENCY MEDICINE

## 2024-12-17 PROCEDURE — 81001 URINALYSIS AUTO W/SCOPE: CPT

## 2024-12-17 PROCEDURE — 83605 ASSAY OF LACTIC ACID: CPT

## 2024-12-17 PROCEDURE — 99253 IP/OBS CNSLTJ NEW/EST LOW 45: CPT | Performed by: INTERNAL MEDICINE

## 2024-12-17 PROCEDURE — 80179 DRUG ASSAY SALICYLATE: CPT

## 2024-12-17 PROCEDURE — 700111 HCHG RX REV CODE 636 W/ 250 OVERRIDE (IP): Mod: JZ | Performed by: INTERNAL MEDICINE

## 2024-12-17 PROCEDURE — 82140 ASSAY OF AMMONIA: CPT

## 2024-12-17 PROCEDURE — 700105 HCHG RX REV CODE 258: Performed by: EMERGENCY MEDICINE

## 2024-12-17 PROCEDURE — 80053 COMPREHEN METABOLIC PANEL: CPT | Mod: 91

## 2024-12-17 PROCEDURE — 700102 HCHG RX REV CODE 250 W/ 637 OVERRIDE(OP): Performed by: INTERNAL MEDICINE

## 2024-12-17 PROCEDURE — 82077 ASSAY SPEC XCP UR&BREATH IA: CPT

## 2024-12-17 PROCEDURE — 80074 ACUTE HEPATITIS PANEL: CPT

## 2024-12-17 PROCEDURE — 99291 CRITICAL CARE FIRST HOUR: CPT

## 2024-12-17 PROCEDURE — 82550 ASSAY OF CK (CPK): CPT

## 2024-12-17 PROCEDURE — 71045 X-RAY EXAM CHEST 1 VIEW: CPT

## 2024-12-17 PROCEDURE — 83880 ASSAY OF NATRIURETIC PEPTIDE: CPT

## 2024-12-17 PROCEDURE — 84484 ASSAY OF TROPONIN QUANT: CPT

## 2024-12-17 PROCEDURE — 93005 ELECTROCARDIOGRAM TRACING: CPT | Mod: TC

## 2024-12-17 PROCEDURE — 74176 CT ABD & PELVIS W/O CONTRAST: CPT

## 2024-12-17 PROCEDURE — 85025 COMPLETE CBC W/AUTO DIFF WBC: CPT

## 2024-12-17 PROCEDURE — A9270 NON-COVERED ITEM OR SERVICE: HCPCS | Performed by: INTERNAL MEDICINE

## 2024-12-17 PROCEDURE — 51798 US URINE CAPACITY MEASURE: CPT

## 2024-12-17 PROCEDURE — 80143 DRUG ASSAY ACETAMINOPHEN: CPT

## 2024-12-17 PROCEDURE — 36415 COLL VENOUS BLD VENIPUNCTURE: CPT

## 2024-12-17 PROCEDURE — 770022 HCHG ROOM/CARE - ICU (200)

## 2024-12-17 PROCEDURE — 87086 URINE CULTURE/COLONY COUNT: CPT

## 2024-12-17 RX ORDER — IPRATROPIUM BROMIDE AND ALBUTEROL SULFATE 2.5; .5 MG/3ML; MG/3ML
3 SOLUTION RESPIRATORY (INHALATION)
Status: DISCONTINUED | OUTPATIENT
Start: 2024-12-17 | End: 2024-12-18

## 2024-12-17 RX ORDER — CALCIUM GLUCONATE 20 MG/ML
2 INJECTION, SOLUTION INTRAVENOUS ONCE
Status: COMPLETED | OUTPATIENT
Start: 2024-12-17 | End: 2024-12-17

## 2024-12-17 RX ORDER — FOLIC ACID 1 MG/1
1 TABLET ORAL DAILY
Status: DISCONTINUED | OUTPATIENT
Start: 2024-12-17 | End: 2024-12-18

## 2024-12-17 RX ORDER — PROMETHAZINE HYDROCHLORIDE 25 MG/1
12.5-25 TABLET ORAL EVERY 4 HOURS PRN
Status: DISCONTINUED | OUTPATIENT
Start: 2024-12-17 | End: 2024-12-18

## 2024-12-17 RX ORDER — HEPARIN SODIUM 5000 [USP'U]/ML
5000 INJECTION, SOLUTION INTRAVENOUS; SUBCUTANEOUS EVERY 8 HOURS
Status: DISCONTINUED | OUTPATIENT
Start: 2024-12-17 | End: 2024-12-23 | Stop reason: HOSPADM

## 2024-12-17 RX ORDER — ONDANSETRON 2 MG/ML
4 INJECTION INTRAMUSCULAR; INTRAVENOUS EVERY 4 HOURS PRN
Status: DISCONTINUED | OUTPATIENT
Start: 2024-12-17 | End: 2024-12-23 | Stop reason: HOSPADM

## 2024-12-17 RX ORDER — ALBUMIN (HUMAN) 12.5 G/50ML
25 SOLUTION INTRAVENOUS EVERY 8 HOURS
Status: COMPLETED | OUTPATIENT
Start: 2024-12-17 | End: 2024-12-19

## 2024-12-17 RX ORDER — FUROSEMIDE 10 MG/ML
80 INJECTION INTRAMUSCULAR; INTRAVENOUS ONCE
Status: DISCONTINUED | OUTPATIENT
Start: 2024-12-17 | End: 2024-12-17

## 2024-12-17 RX ORDER — GAUZE BANDAGE 2" X 2"
100 BANDAGE TOPICAL DAILY
Status: DISCONTINUED | OUTPATIENT
Start: 2024-12-17 | End: 2024-12-18

## 2024-12-17 RX ORDER — PROMETHAZINE HYDROCHLORIDE 12.5 MG/1
12.5-25 SUPPOSITORY RECTAL EVERY 4 HOURS PRN
Status: DISCONTINUED | OUTPATIENT
Start: 2024-12-17 | End: 2024-12-23 | Stop reason: HOSPADM

## 2024-12-17 RX ORDER — ONDANSETRON 4 MG/1
4 TABLET, ORALLY DISINTEGRATING ORAL EVERY 4 HOURS PRN
Status: DISCONTINUED | OUTPATIENT
Start: 2024-12-17 | End: 2024-12-18

## 2024-12-17 RX ORDER — PROCHLORPERAZINE EDISYLATE 5 MG/ML
5-10 INJECTION INTRAMUSCULAR; INTRAVENOUS EVERY 4 HOURS PRN
Status: DISCONTINUED | OUTPATIENT
Start: 2024-12-17 | End: 2024-12-23 | Stop reason: HOSPADM

## 2024-12-17 RX ORDER — SODIUM CHLORIDE, SODIUM LACTATE, POTASSIUM CHLORIDE, CALCIUM CHLORIDE 600; 310; 30; 20 MG/100ML; MG/100ML; MG/100ML; MG/100ML
1000 INJECTION, SOLUTION INTRAVENOUS ONCE
Status: COMPLETED | OUTPATIENT
Start: 2024-12-17 | End: 2024-12-17

## 2024-12-17 RX ORDER — ACETAMINOPHEN 325 MG/1
650 TABLET ORAL EVERY 6 HOURS PRN
Status: DISCONTINUED | OUTPATIENT
Start: 2024-12-17 | End: 2024-12-18

## 2024-12-17 RX ORDER — HYDRALAZINE HYDROCHLORIDE 20 MG/ML
10 INJECTION INTRAMUSCULAR; INTRAVENOUS EVERY 4 HOURS PRN
Status: DISCONTINUED | OUTPATIENT
Start: 2024-12-17 | End: 2024-12-23 | Stop reason: HOSPADM

## 2024-12-17 RX ADMIN — HEPARIN SODIUM 5000 UNITS: 5000 INJECTION, SOLUTION INTRAVENOUS; SUBCUTANEOUS at 21:51

## 2024-12-17 RX ADMIN — ALBUMIN (HUMAN) 25 G: 0.25 INJECTION, SOLUTION INTRAVENOUS at 16:24

## 2024-12-17 RX ADMIN — FOLIC ACID 1 MG: 1 TABLET ORAL at 16:27

## 2024-12-17 RX ADMIN — CALCIUM GLUCONATE 2 G: 20 INJECTION, SOLUTION INTRAVENOUS at 19:55

## 2024-12-17 RX ADMIN — Medication 100 MG: at 16:27

## 2024-12-17 RX ADMIN — SODIUM CHLORIDE, POTASSIUM CHLORIDE, SODIUM LACTATE AND CALCIUM CHLORIDE 1000 ML: 600; 310; 30; 20 INJECTION, SOLUTION INTRAVENOUS at 12:54

## 2024-12-17 RX ADMIN — THERA TABS 1 TABLET: TAB at 16:27

## 2024-12-17 RX ADMIN — HEPARIN SODIUM 5000 UNITS: 5000 INJECTION, SOLUTION INTRAVENOUS; SUBCUTANEOUS at 16:25

## 2024-12-17 RX ADMIN — ALBUMIN (HUMAN) 25 G: 0.25 INJECTION, SOLUTION INTRAVENOUS at 21:56

## 2024-12-17 ASSESSMENT — ENCOUNTER SYMPTOMS
NAUSEA: 0
HEARTBURN: 0
DIARRHEA: 0
ABDOMINAL PAIN: 1
SHORTNESS OF BREATH: 0
CONSTIPATION: 0
CHILLS: 0
VOMITING: 0
PALPITATIONS: 0
COUGH: 0
FEVER: 0
SPUTUM PRODUCTION: 0
BLOOD IN STOOL: 0
LOSS OF CONSCIOUSNESS: 0

## 2024-12-17 ASSESSMENT — PAIN DESCRIPTION - PAIN TYPE
TYPE: ACUTE PAIN

## 2024-12-17 ASSESSMENT — FIBROSIS 4 INDEX
FIB4 SCORE: 31.73
FIB4 SCORE: 2.38

## 2024-12-17 NOTE — PROGRESS NOTES
Critical Care Progress Note    Date of admission  12/17/2024    Chief Complaint  64 y.o. male admitted 12/17/2024 with acute encephalopathy, acute on chronic kidney disease, acute hepatitis.  He has a history of primary hypertension, CKD, CAD with prior PCI, DM type II, dyslipidemia, asthma/COPD overlap syndrome.    Hospital Course      12/18 -    Vent day 1.  Start IV methylprednisolone, 62.5 mg every 6 hours.  Continue bronchodilators.  Begin metoprolol to tartrate, 25 mg twice daily      Interval Problem Update  Reviewed last 24 hour events:      SR  Obtunded on BiPAP - intubated  99.3  -283 mL in the last 24      Review of Systems  Review of Systems   Unable to perform ROS: Acuity of condition        Vital Signs for last 24 hours   Temp:  [36.6 °C (97.9 °F)] 36.6 °C (97.9 °F)  Pulse:  [77-93] 87  Resp:  [15-30] 24  BP: (114-153)/(56-75) 137/66  SpO2:  [74 %-100 %] 95 %    Hemodynamic parameters for last 24 hours       Respiratory Information for the last 24 hours  Vent Mode: APVCMV  Rate (breaths/min): 24  Vt Target (mL): 450  PEEP/CPAP: 10  MAP: 15  Control VTE (exp VT): 453    Physical Exam   Physical Exam  Constitutional:       Appearance: He is ill-appearing.   Eyes:      Pupils: Pupils are equal, round, and reactive to light.   Cardiovascular:      Comments: Sinus rhythm  Pulmonary:      Breath sounds: Wheezing and rales (Scattered crackles) present.      Comments: Poor air movement  Abdominal:      General: There is no distension.      Tenderness: There is no abdominal tenderness.   Musculoskeletal:      Cervical back: Neck supple. No rigidity.      Right lower leg: No edema.      Left lower leg: No edema.   Skin:     General: Skin is warm.   Neurological:      Comments: Obtunded prior to intubation         Medications  Current Facility-Administered Medications   Medication Dose Route Frequency Provider Last Rate Last Admin    Respiratory Therapy Consult   Nebulization Continuous RT John Galvez,  M.D.        famotidine (Pepcid) tablet 20 mg  20 mg Enteral Tube DAILY John Galvez M.D.        Or    famotidine (Pepcid) injection 20 mg  20 mg Intravenous DAILY John Galvez M.D.   20 mg at 12/18/24 0930    senna-docusate (Pericolace Or Senokot S) 8.6-50 MG per tablet 2 Tablet  2 Tablet Enteral Tube BID John Galvez M.D.        And    polyethylene glycol/lytes (Miralax) Packet 1 Packet  1 Packet Enteral Tube QDAY PRN John Galvez M.D.        And    magnesium hydroxide (Milk Of Magnesia) suspension 30 mL  30 mL Enteral Tube QDAY PRN John Galvez M.D.        And    bisacodyl (Dulcolax) suppository 10 mg  10 mg Rectal QDAY PRN John Galvez M.D. MD Alert...ICU Electrolyte Replacement per Pharmacy   Other PHARMACY TO DOSE John Galvez M.D.        lidocaine (Xylocaine) 1 % injection 2 mL  2 mL Tracheal Tube Q30 MIN PRN John Galvez M.D.        propofol (DIPRIVAN) injection  0-80 mcg/kg/min (Ideal) Intravenous Continuous John Galvez M.D. 7.4 mL/hr at 12/18/24 0931 20 mcg/kg/min at 12/18/24 0931    ipratropium-albuterol (DUONEB) nebulizer solution  3 mL Nebulization Q2HRS PRN (RT) John Galvez M.D.   3 mL at 12/18/24 1124    ipratropium-albuterol (DUONEB) nebulizer solution  3 mL Nebulization Q4HRS (RT) John Galvez M.D.   3 mL at 12/18/24 0905    HYDROmorphone (Dilaudid) injection 0.5-2 mg  0.5-2 mg Intravenous Q HOUR PRN John Galvez M.D.   1 mg at 12/18/24 1243    HYDROmorphone (DILAUDID) 0.2 mg/mL in 50mL NS (Continuous Infusion)   Intravenous Continuous John Galvez M.D.   Dose not Required at 12/18/24 0900    methylPREDNISolone sod succ (SOLU-MEDROL) 125 MG injection 62.5 mg  62.5 mg Intravenous Q6HRS John Galvez M.D.   62.5 mg at 12/18/24 1121    acetaminophen (Tylenol) tablet 650 mg  650 mg Enteral Tube Q6HRS PRN John Galvez M.D.        promethazine  (Phenergan) tablet 12.5-25 mg  12.5-25 mg Enteral Tube Q4HRS PRN John Galvez M.D.        ondansetron (Zofran ODT) dispertab 4 mg  4 mg Enteral Tube Q4HRS PRN John Galvez M.D.        [START ON 12/19/2024] multivitamin tablet 1 Tablet  1 Tablet Enteral Tube DAILY John Galvez M.D.        And    [START ON 12/19/2024] folic acid (Folvite) tablet 1 mg  1 mg Enteral Tube DAILY John Galvez M.D.        thiamine (B-1) 500 mg in dextrose 5% 100 mL IVPB  500 mg Intravenous DAILY John Galvez M.D.        metoprolol tartrate (Lopressor) tablet 25 mg  25 mg Enteral Tube BID John Galvez M.D.        aspirin (Asa) chewable tab 81 mg  81 mg Enteral Tube DAILY John Galvez M.D.        insulin lispro (HumaLOG,AdmeLOG) subcutaneous injection  2-9 Units Subcutaneous Q6HRS John Galvez M.D.        And    dextrose 50% (D50W) injection 25 g  25 g Intravenous Q15 MIN PRN John Galvez M.D.        Respiratory Therapy Consult   Nebulization Continuous RT John Galvez M.D.        heparin injection 5,000 Units  5,000 Units Subcutaneous Q8HRS Lizeth Preciado M.D.   5,000 Units at 12/18/24 0604    hydrALAZINE (Apresoline) injection 10 mg  10 mg Intravenous Q4HRS PRN Lizeth Preciado M.D.        ondansetron (Zofran) syringe/vial injection 4 mg  4 mg Intravenous Q4HRS PRN Lizeth Preciado M.D.        promethazine (Phenergan) suppository 12.5-25 mg  12.5-25 mg Rectal Q4HRS PRN Lizeth Preciado M.D.        prochlorperazine (Compazine) injection 5-10 mg  5-10 mg Intravenous Q4HRS PRN Lizeth Preciado M.D.        albumin human 25% solution 25 g  25 g Intravenous Q8HRS Lizeth Preciado M.D. 150 mL/hr at 12/18/24 0608 25 g at 12/18/24 0608    [Held by provider] mometasone-formoterol (Dulera) 100-5 MCG/ACT inhaler 2 Puff  2 Puff Inhalation BID (RT) Lizeth Preciado M.D.           Fluids    Intake/Output Summary (Last 24 hours) at 12/18/2024  1407  Last data filed at 12/18/2024 1200  Gross per 24 hour   Intake 1000.69 ml   Output 1265 ml   Net -264.31 ml       Laboratory  Recent Labs     12/18/24  0414 12/18/24  0551 12/18/24  1020   ISTATAPH 7.116* 7.135* 7.432   ISTATAPCO2 >100.0* 90.9* 38.4   ISTATAPO2 86 68* 49*   ISTATATCO2 36 33 27*   XZKHOES6MXP 91* 85* 86*   ISTATARTHCO3 32.8* 30.6* 25.6   ISTATARTBE -1 -2 1   ISTATTEMP 36.6 C 36.8 C 36.8 C   ISTATFIO2  --  50 40   ISTATSPEC Arterial Arterial Arterial   ISTATAPHTC 7.121* 7.138* 7.435   [id number] 83 67 49*     Recent Labs     12/17/24  1600 12/18/24  0400   CPKTOTAL 168* 134     Recent Labs     12/17/24  1020 12/17/24  1600 12/18/24  0400   SODIUM 132* 134* 133*   POTASSIUM 6.1* 6.0* 6.2*   CHLORIDE 94* 97 96   CO2 19* 25 26   BUN 80* 77* 73*   CREATININE 3.91* 3.39* 2.94*   MAGNESIUM  --   --  2.5   PHOSPHORUS  --   --  6.7*   CALCIUM 8.5 8.3* 9.2     Recent Labs     12/17/24  1020 12/17/24  1600 12/18/24  0400   ALTSGPT 3241* 3143* 2315*   ASTSGOT 4600* 4272* 2263*   ALKPHOSPHAT 234* 223* 195*   TBILIRUBIN 1.3 1.0 1.1   GLUCOSE 113* 111* 113*     Recent Labs     12/17/24  1020 12/17/24  1600 12/18/24  0400   WBC 13.8*  --  12.5*   NEUTSPOLYS 87.00*  --  87.10*   LYMPHOCYTES 3.00*  --  4.00*   MONOCYTES 9.10  --  7.40   EOSINOPHILS 0.10  --  1.10   BASOPHILS 0.40  --  0.10   ASTSGOT 4600* 4272* 2263*   ALTSGPT 3241* 3143* 2315*   ALKPHOSPHAT 234* 223* 195*   TBILIRUBIN 1.3 1.0 1.1     Recent Labs     12/17/24  1020 12/18/24  0400   RBC 4.86 4.66*   HEMOGLOBIN 15.0 14.2   HEMATOCRIT 47.1 44.7   PLATELETCT 163* 117*   PROTHROMBTM  --  20.5*   INR  --  1.75*       Imaging  X-Ray:  I have personally reviewed the images and compared with prior images. and My impression is: Faint left lower lobe opacification    Assessment/Plan  * Acute-on-chronic kidney injury (HCC)- (present on admission)  Assessment & Plan  Underlying stage 3b CKD  No hydronephrosis on imaging  Suspect intravascular volume  depletion  Monitor renal function and urine output  Avoid nephrotoxins and renal dose medications    Acute respiratory failure with hypoxia and hypercapnia (HCC)  Assessment & Plan  Intubated 12/18  ABCDEF bundle  SAT/SBT as appropriate  Mobility level 1    Acute encephalopathy  Assessment & Plan  Ammonia normal  Acute metabolic encephalopathy  Limit sedatives and mind altering medications as much as possible  Follow neuro exam    Alcohol dependence (HCC)  Assessment & Plan  High-dose IV thiamine and supplemental vitamins  Observe for evidence of withdrawal  Cessation counseling when clinically appropriate    Elevated LFTs  Assessment & Plan  History of alcohol dependence with ongoing alcohol use  Hepatitis panel negative  Trend liver enzymes and synthetic function  Avoid hepatotoxins    Acute exacerbation of chronic obstructive pulmonary disease (COPD) (HCC)- (present on admission)  Assessment & Plan  PFTs on 4/20/2022: FEV1 1.37 L (47%), FEV1/FVC 53%, DLCO 88%  Begin methylprednisolone, 62.5 mg IV every 6 hours  Bronchodilators  Resume Dulera, 100/5, 2 puffs twice daily when he is liberated from the ventilator  RT protocols    Type 2 diabetes mellitus (HCC)- (present on admission)  Assessment & Plan  Check glycohemoglobin  Sliding scale insulin    Primary hypertension- (present on admission)  Assessment & Plan  Goal SBP less than 160  Start metoprolol tartrate, 25 mg twice daily  Hold losartan with ROBERT    CAD (coronary artery disease)- (present on admission)  Assessment & Plan  History of CAD with prior MI and PCI  Resume aspirin, 81 mg daily  Start metoprolol tartrate, 25 mg twice daily  Hold statin with elevated aminotransferases    Dyslipidemia- (present on admission)  Assessment & Plan  Hold statin with markedly elevated LFTs         VTE:  Heparin  Ulcer: H2 Antagonist  Lines: Central Line  Ongoing indication addressed and Garibay Catheter  Ongoing indication addressed    I have performed a physical exam and  reviewed and updated ROS and Plan today (12/18/2024). In review of yesterday's note (12/17/2024), there are no changes except as documented above.     I have assessed and reassessed his respiratory status with ventilator adjustments, airway mechanics, ventilator waveforms, blood pressure, hemodynamics, cardiovascular status and his neurologic status.  He is at increased risk for worsening respiratory and cardiovascular system dysfunction.    Discussed patient condition and risk of morbidity and/or mortality with RN, RT, and Pharmacy    The patient remains critically ill.  Critical care time = 140 minutes in directly providing and coordinating critical care and extensive data review.  No time overlap and excludes procedures.    The time spent is in addition to the 25-minute spent by IVÁN Ascencio earlier today.    John Galvez MD  Pulmonary and Critical Care Medicine

## 2024-12-17 NOTE — CONSULTS
Pulmonary & Critical Care Consultation    Date of consult: 12/17/2024    Referring Physician  Lizeth Preciado M.D.    Reason for Consultation  Altered mental status    History of Presenting Illness  64 y.o. male who presented 12/17/2024 with a history of CAD s/p PCI x 2 in 2017, COPD/Asthma overlap syndrome, Diabetes, HLD, HTN, CKD 3B and significant alcohol dependence.  Per his son and wife at bedside with intermittent contributions from the patient they report he began to have changes to his mental status about 2-3 days ago. His son reports he has been in bed for about 2 days and he thought his dad was just tired.  His wife works nights and his son works days so they don't all see each other much.  His son reports his dad has been a heavy drinker for many years and has a daily consumption of at least 3-4 large beers but they are unsure of the exact amount.  He states his dad does not get really drunk ever but has a significant baseline of intoxication.  The patient denies any chest pain, shortness of breath or edema.  He is unable to tell me if he has been urinating normally.  His family reports he really has not eaten in 2 days.  He endorsed abdominal pain initially but then when pressed on the issue denies having any abdominal pain at all.  They all deny recent illnesses like viral gastro or URI.  He has not traveled in years.  He was raised in [place] and has spent his whole life here except a short while in [place].  He is retired.      Code Status  No Order    Review of Systems  Review of Systems   Constitutional:  Positive for malaise/fatigue. Negative for chills and fever.   Respiratory:  Negative for cough, sputum production and shortness of breath.    Cardiovascular:  Negative for chest pain, palpitations and leg swelling.   Gastrointestinal:  Positive for abdominal pain. Negative for blood in stool, constipation, diarrhea, heartburn, nausea and vomiting.   Genitourinary:  Negative for dysuria.    Neurological:  Negative for loss of consciousness.       Past Medical History   has a past medical history of Acute myocardial infarction of other inferior wall, episode of care unspecified (06/25/2012), Acute myocardial infarction of other lateral wall, episode of care unspecified (06/25/2012), Asthma, Breath shortness, Bronchitis, Chest pain, unspecified (06/25/2012), Coronary atherosclerosis of native coronary artery (06/25/2012), Dental disorder, Diabetes ([facility]), Dizziness and giddiness (06/25/2012), High cholesterol, Hypertension, Mixed hyperlipidemia (06/25/2012), Pure hyperglyceridemia (06/25/2012), Renal disorder, S/P coronary artery stent placement (06/25/2012), and Tobacco use disorder (06/25/2012).    He has no past medical history of Acute nasopharyngitis, Anesthesia, Anginal syndrome ([facility]), Arrhythmia, Arthritis, Blood clotting disorder ([facility]), Bowel habit changes, Cancer ([facility]), Carcinoma in situ of respiratory system, Cataract, Congestive heart failure ([facility]), Continuous ambulatory peritoneal dialysis status ([facility]), Coughing blood, Dialysis patient ([facility]), Disorder of thyroid, Glaucoma, Gynecological disorder, Heart burn, Heart murmur, Heart valve disease, Hemorrhagic disorder ([facility]), Hepatitis A, Hepatitis B, Hepatitis C, Hiatus hernia syndrome, Indigestion, Infectious disease, Jaundice, Pacemaker, Pneumonia, Pregnant, Psychiatric problem, Rheumatic fever, Seizure ([facility]), Sleep apnea, Snoring, Stroke ([facility]), Tuberculosis, Urinary bladder disorder, or Urinary incontinence.    Surgical History   has a past surgical history that includes stent placement; orif, fracture, femur (1994); other cardiac surgery; and other.    Family History  family history includes Cancer in his sister; Stroke in his mother.    Social History   reports that he has been smoking cigarettes. He started smoking about 42 years ago. He has a 10.7 pack-year smoking history. He has never used smokeless tobacco. He reports that he does not  currently use alcohol after a past usage of about 2.4 - 3.6 oz of alcohol per week. He reports that he does not use drugs.    Medications  Home Medications       Reviewed by Loco Wood (Pharmacy Tech) on 12/17/24 at 1411  Med List Status: Complete     Medication Last Dose Status   albuterol 108 (90 Base) MCG/ACT Aero Soln inhalation aerosol 12/17/2024 Active   allopurinol (ZYLOPRIM) 100 MG Tab Not Taking Active   aspirin EC (ECOTRIN) 81 MG Tablet Delayed Response 12/16/2024 Active   atorvastatin (LIPITOR) 80 MG tablet 12/16/2024 Active   dapagliflozin propanediol (FARXIGA) 10 MG Tab Not Taking Active   fluticasone furoate-vilanterol (BREO ELLIPTA) 100-25 MCG/ACT AEROSOL POWDER, BREATH ACTIVATED 12/16/2024 Active   losartan (COZAAR) 100 MG Tab 12/16/2024 Active   metoprolol SR (TOPROL XL) 50 MG TABLET SR 24 HR 12/16/2024 Active   tiotropium (SPIRIVA HANDIHALER) 18 MCG Cap 12/16/2024 Active   VITAMIN D PO 12/16/2024 Active                  Audit from Redirected Encounters    **Home medications have not yet been reviewed for this encounter**       No current facility-administered medications for this encounter.       Allergies  Allergies   Allergen Reactions    Lisinopril Cough       Vital Signs last 24 hours  Temp:  [36.8 °C (98.2 °F)] 36.8 °C (98.2 °F)  Pulse:  [62-85] 85  Resp:  [17-24] 20  BP: (116-150)/(61-75) 150/75  SpO2:  [88 %-94 %] 92 %    Physical Exam  Physical Exam  Constitutional:       Appearance: He is ill-appearing.   HENT:      Head: Atraumatic.      Mouth/Throat:      Mouth: Mucous membranes are dry.   Eyes:      Extraocular Movements: Extraocular movements intact.   Cardiovascular:      Rate and Rhythm: Normal rate and regular rhythm.   Pulmonary:      Effort: Pulmonary effort is normal. No respiratory distress.      Breath sounds: Normal breath sounds. No wheezing or rales.   Abdominal:      General: Abdomen is flat. There is no distension.      Palpations: Abdomen is soft. There is no  mass.      Tenderness: There is no abdominal tenderness.   Musculoskeletal:         General: No swelling, tenderness or deformity.   Skin:     General: Skin is warm and dry.      Coloration: Skin is not jaundiced.      Findings: No bruising or lesion.   Neurological:      General: No focal deficit present.      Mental Status: He is oriented to person, place, and time.         Fluids  No intake or output data in the 24 hours ending 12/17/24 1535    Laboratory  Recent Results (from the past 48 hours)   CBC with Differential    Collection Time: 12/17/24 10:20 AM   Result Value Ref Range    WBC 13.8 (H) 4.8 - 10.8 K/uL    RBC 4.86 4.70 - 6.10 M/uL    Hemoglobin 15.0 14.0 - 18.0 g/dL    Hematocrit 47.1 42.0 - 52.0 %    MCV 96.9 81.4 - 97.8 fL    MCH 30.9 27.0 - 33.0 pg    MCHC 31.8 (L) 32.3 - 36.5 g/dL    RDW 61.1 (H) 35.9 - 50.0 fL    Platelet Count 163 (L) 164 - 446 K/uL    MPV 10.4 9.0 - 12.9 fL    Neutrophils-Polys 87.00 (H) 44.00 - 72.00 %    Lymphocytes 3.00 (L) 22.00 - 41.00 %    Monocytes 9.10 0.00 - 13.40 %    Eosinophils 0.10 0.00 - 6.90 %    Basophils 0.40 0.00 - 1.80 %    Immature Granulocytes 0.40 0.00 - 0.90 %    Nucleated RBC 0.70 (H) 0.00 - 0.20 /100 WBC    Neutrophils (Absolute) 12.02 (H) 1.82 - 7.42 K/uL    Lymphs (Absolute) 0.41 (L) 1.00 - 4.80 K/uL    Monos (Absolute) 1.25 (H) 0.00 - 0.85 K/uL    Eos (Absolute) 0.01 0.00 - 0.51 K/uL    Baso (Absolute) 0.06 0.00 - 0.12 K/uL    Immature Granulocytes (abs) 0.06 0.00 - 0.11 K/uL    NRBC (Absolute) 0.09 K/uL   Comp Metabolic Panel    Collection Time: 12/17/24 10:20 AM   Result Value Ref Range    Sodium 132 (L) 135 - 145 mmol/L    Potassium 6.1 (H) 3.6 - 5.5 mmol/L    Chloride 94 (L) 96 - 112 mmol/L    Co2 19 (L) 20 - 33 mmol/L    Anion Gap 19.0 (H) 7.0 - 16.0    Glucose 113 (H) 65 - 99 mg/dL    Bun 80 (H) 8 - 22 mg/dL    Creatinine 3.91 (H) 0.50 - 1.40 mg/dL    Calcium 8.5 8.5 - 10.5 mg/dL    Correct Calcium 8.6 8.5 - 10.5 mg/dL    AST(SGOT) 4600 (HH) 12 -  45 U/L    ALT(SGPT) 3241 (HH) 2 - 50 U/L    Alkaline Phosphatase 234 (H) 30 - 99 U/L    Total Bilirubin 1.3 0.1 - 1.5 mg/dL    Albumin 3.9 3.2 - 4.9 g/dL    Total Protein 6.9 6.0 - 8.2 g/dL    Globulin 3.0 1.9 - 3.5 g/dL    A-G Ratio 1.3 g/dL   proBrain Natriuretic Peptide, NT    Collection Time: 24 10:20 AM   Result Value Ref Range    NT-proBNP 51449 (H) 0 - 125 pg/mL   Troponin    Collection Time: 24 10:20 AM   Result Value Ref Range    Troponin T 183 (H) 6 - 19 ng/L   ESTIMATED GFR    Collection Time: 24 10:20 AM   Result Value Ref Range    GFR (CKD-EPI) 16 (A) >60 mL/min/1.73 m 2   AMMONIA    Collection Time: 24 12:50 PM   Result Value Ref Range    Ammonia 41 11 - 45 umol/L   DIAGNOSTIC ALCOHOL    Collection Time: 24 12:50 PM   Result Value Ref Range    Diagnostic Alcohol 17.3 (H) <10.1 mg/dL   HEPATITIS PANEL ACUTE(4 COMPONENTS)    Collection Time: 24 12:50 PM   Result Value Ref Range    Hepatitis B Surface Antigen Nonreactive Non-Reactive    Hepatitis B Cors Ab,IgM Nonreactive Non-Reactive    Hepatitis A Virus Ab, IgM Nonreactive Non-Reactive    Hepatitis C Antibody Nonreactive Non-Reactive   ACETAMINOPHEN    Collection Time: 24 12:50 PM   Result Value Ref Range    Acetaminophen -Tylenol <5.0 (L) 10.0 - 30.0 ug/mL   Salicylate    Collection Time: 24 12:50 PM   Result Value Ref Range    Salicylates, Quant. <1.0 (L) 15.0 - 25.0 mg/dL   LACTIC ACID    Collection Time: 24 12:50 PM   Result Value Ref Range    Lactic Acid 1.8 0.5 - 2.0 mmol/L   EKG    Collection Time: 24 12:52 PM   Result Value Ref Range    Report       [facility]    Test Date:  2024  Pt Name:    NICOLE DUDLEY                  Department: ER  MRN:        [id number]                      Room:  Gender:     Male                         Technician: [id number]  :        1960                   Requested By:ER TRIAGE PROTOCOL  Order #:    [id number]                     Reading MD: MARGOT PENA MD    Measurements  Intervals                                Axis  Rate:       90                           P:          75  AL:         122                          QRS:        -114  QRSD:       146                          T:          -12  QT:         421  QTc:        515    Interpretive Statements  Sinus rhythm  Right bundle branch block  Inferior infarct, old  Compared to ECG 05/05/2022 14:22:51  Myocardial infarct finding now present  Left anterior fascicular block no longer present  Electronically Signed On 12- 12:52:29 PST by MARGOT PENA MD         Imaging  CT-ABDOMEN-PELVIS W/O   Final Result      1.  No evidence of bowel obstruction or focal inflammatory change.      2.  Small of ascites within the abdomen and pelvis.      3.  Lobulated appearance of the surface of the liver likely representing presence of hepatocellular dysfunction.      4.  Bibasilar atelectasis with small bilateral pleural effusions.      DX-CHEST-PORTABLE (1 VIEW)   Final Result      No evidence of acute cardiopulmonary process.      US-RENAL    (Results Pending)       Assessment/Plan  #Altered Mental status - multifactorial potentially 2/2 Hepatic dysfunction or uremia  Plan:  - Monitor in ICU  - Trend hepatic function labs  - Address ROBERT as below    #Acute liver injury - possibly on chronic liver injury  #AST/ALT elevation  #Alcohol abuse/dependence  - Last drink: 3 days ago, ETOH 17  - Typical use/duration: daily 3-4 tall cans at a minimum  - Acute hepatitis Panel negative  Plan:  - Admit to ICU  - Monitor for signs of withdrawal  - Vitamin supplementation (Thiamine 100mg daily, Folic acid 1mg daily and MVI daily) ordered  - Aggressive electrolyte repletion   - Seizure precautions  - Aspiration precautions  - NPO until bedside swallow  - Restraints as needed  - Social work consultation  - Cessation counseling when appropriate  - Avoid hepatotoxins    #Acute on Chronic (Stage 3B  CKD) with concern for hepatorenal syndrome (creatinine baseline 1.6)  - Aterionephrosclerosis by biopsy  - Primary nephrologist Dr. Najjar  #Hyperkalemia  Plan:  - Consult nephrology - Discussed with Dr. Stark   - Recheck CMP now post LR bolus  - Monitor Hyperkalemia  - Albumin 25g Q8H x 6 doses    #Acute Hypoxic Respiratory Failure  #Asthma/COPD overlap - PFT 2022 with Ratio of 53, FEV1 1.37L (47%) with significant bronchodilator response, air trapping.  Plan:  - Continue dulera in hospital (takes Breo per med rec at home)  - Biofire  - Duonebs PRN  - HOB elevation  - Aspiration precautions  - Titrate oxygen to an SpO2 of 88-94%  - Airway clearance as needed    #History of CAD s/p PCI in 2017  #Known right bundle branch block  Plan:  - Holding Atorvastatin in the setting of liver injury  - Holding metoprolol   - Holding Losartan due to ROBERT    #Leukocytosis with unclear etiology  Plan:  - monitor for fevers and signs of infection    #Thrombocytopenia likely 2/2 liver dysfunction  Plan:  - Monitor on am labs    #Hyponatremia  Plan:  - Recheck post fluid resuscitation    #AGMA likely 2/2 renal dysfunction  Plan:  - Lactic acid trended down  - Monitor with renal therapies        Discussed patient condition and risk of morbidity and/or mortality with RN and RT.    The patient remains critically ill.  Critical care time = 60 minutes in directly providing and coordinating critical care and extensive data review.  No time overlap and excludes procedures.    Lizeth Preciado MD RD  Pulmonary and Critical Care    Available on Voalte

## 2024-12-17 NOTE — ED TRIAGE NOTES
Hugo Villela  64 y.o.  male  Chief Complaint   Patient presents with    Flu Like Symptoms     Cough, shortness of breath for past few days. No fevers or chest pain. Unable to obtain accurate O2 at in triage - most accurate 88-90% on room air. Pt appears mildly dyspneic. Placed on 2L O2.     Hallucinations     Pt's wife reports he was having hallucinations yesterday. No mental health hx, but hx of same with low sodium in the past. Pt alert & oriented now, not attending to internal stimuli at this time.      Labs ordered. Patient educated on triage process, to alert staff if any changes in condition.

## 2024-12-17 NOTE — ED NOTES
Report rec'd from KRISHNA Cavazos.  Pt on 2 liters NC, increased to 3 liters.   Pt wife at bedside. Call light in reach.

## 2024-12-17 NOTE — ED PROVIDER NOTES
ED Provider Note    CHIEF COMPLAINT  Chief Complaint   Patient presents with    Flu Like Symptoms     Cough, shortness of breath for past few days. No fevers or chest pain. Unable to obtain accurate O2 at in triage - most accurate 88-90% on room air. Pt appears mildly dyspneic. Placed on 2L O2.     Hallucinations     Pt's wife reports he was having hallucinations yesterday. No mental health hx, but hx of same with low sodium in the past. Pt alert & oriented now, not attending to internal stimuli at this time.        EXTERNAL RECORDS REVIEWED  Inpatient Notes previous admission from July of this year at which time patient was having renal issues and had kidney biopsy    HPI/ROS  LIMITATION TO HISTORY   Select: Altered mental status / Confusion  OUTSIDE HISTORIAN(S):  Wife and son at bedside  Hugo Villela is a 64 y.o. male who presents to the emergency department chief complaint of altered mental status.  Wife reports that the patient's had waxing and waning mental status for several weeks however over the last couple days is gotten progressively worse to the point where he is very confused and has had also been what she feels are hallucinations.  The patient is intermittently coherent he does state that he drinks 2 or 3 beers a day.  Wife and son at bedside report that he likely drinks much more than this.  Patient states he takes occasional Tylenol for pain.  He reports previous history of cardiac issues and previous MI in 2017 but has not had any recent heart issues no chest pain or shortness of breath wife reports no fevers no chills he has had minor nausea no vomiting and no problems urination or bowel movements.  Further history of present illness is limited by altered mental status.    PAST MEDICAL HISTORY   has a past medical history of Acute myocardial infarction of other inferior wall, episode of care unspecified (06/25/2012), Acute myocardial infarction of other lateral wall, episode of care unspecified  (06/25/2012), Asthma, Breath shortness, Bronchitis, Chest pain, unspecified (06/25/2012), Coronary atherosclerosis of native coronary artery (06/25/2012), Dental disorder, Diabetes (HCC), Dizziness and giddiness (06/25/2012), High cholesterol, Hypertension, Mixed hyperlipidemia (06/25/2012), Pure hyperglyceridemia (06/25/2012), Renal disorder, S/P coronary artery stent placement (06/25/2012), and Tobacco use disorder (06/25/2012).    SURGICAL HISTORY   has a past surgical history that includes stent placement; orif, fracture, femur (1994); other cardiac surgery; and other.    FAMILY HISTORY  Family History   Problem Relation Age of Onset    Stroke Mother     Cancer Sister        SOCIAL HISTORY  Social History     Tobacco Use    Smoking status: Every Day     Current packs/day: 0.25     Average packs/day: 0.3 packs/day for 42.7 years (10.7 ttl pk-yrs)     Types: Cigarettes     Start date: 4/5/1982    Smokeless tobacco: Never    Tobacco comments:     Only 6 cigarettes daily.  Patient declines smoking cessation at this time.    Vaping Use    Vaping status: Never Used   Substance and Sexual Activity    Alcohol use: Not Currently     Alcohol/week: 2.4 - 3.6 oz     Types: 4 - 6 Cans of beer per week     Comment: every week, 2x a week    Drug use: Never    Sexual activity: Yes     Partners: Female     Birth control/protection: None       CURRENT MEDICATIONS  Home Medications       Reviewed by Danette Khan R.N. (Registered Nurse) on 12/17/24 at 1008  Med List Status: Not Addressed     Medication Last Dose Status   albuterol 108 (90 Base) MCG/ACT Aero Soln inhalation aerosol  Active   allopurinol (ZYLOPRIM) 100 MG Tab  Active   aspirin EC (ECOTRIN) 81 MG Tablet Delayed Response  Active   atorvastatin (LIPITOR) 80 MG tablet  Active   dapagliflozin propanediol (FARXIGA) 10 MG Tab  Active   fluticasone furoate-vilanterol (BREO ELLIPTA) 100-25 MCG/ACT AEROSOL POWDER, BREATH ACTIVATED  Active   losartan (COZAAR) 100 MG Tab   "Active   metoprolol SR (TOPROL XL) 50 MG TABLET SR 24 HR  Active   tiotropium (SPIRIVA HANDIHALER) 18 MCG Cap  Active   VITAMIN D PO  Active                  Audit from Redirected Encounters    **Home medications have not yet been reviewed for this encounter**         ALLERGIES  Allergies   Allergen Reactions    Lisinopril Cough       PHYSICAL EXAM  VITAL SIGNS: /61   Pulse 62   Temp 36.8 °C (98.2 °F) (Temporal)   Resp (!) 24   Ht 1.651 m (5' 5\")   Wt 67.6 kg (149 lb)   SpO2 88%   BMI 24.79 kg/m²      Constitutional: Fluctuating mental status patient's occasionally ANO x 3 but mostly ANO x 2 with confusion  HEENT: Atraumatic normocephalic, pupils are equal round reactive to light extraocular movements are intact. The nares is clear, external ears are normal, mouth shows moist mucous membranes normal dentition for age  Neck: Supple, no tracheal deviation, moderate JVD  Cardiovascular: Regular rate and rhythm no murmur rub or gallop 2+ pulses peripherally x4  Thorax & Lungs: No respiratory distress, no wheezes rales or rhonchi, No chest tenderness.   GI: Tenderness in the epigastrium  Skin: Clammy and diaphoretic  Musculoskeletal: Moving all extremities with full range and 5 of 5 strength no acute  deformity  Neurologic: Following commands and moving all extremities  Psychiatric: Altered      EKG/LABS  Results for orders placed or performed during the hospital encounter of 12/17/24   CBC with Differential    Collection Time: 12/17/24 10:20 AM   Result Value Ref Range    WBC 13.8 (H) 4.8 - 10.8 K/uL    RBC 4.86 4.70 - 6.10 M/uL    Hemoglobin 15.0 14.0 - 18.0 g/dL    Hematocrit 47.1 42.0 - 52.0 %    MCV 96.9 81.4 - 97.8 fL    MCH 30.9 27.0 - 33.0 pg    MCHC 31.8 (L) 32.3 - 36.5 g/dL    RDW 61.1 (H) 35.9 - 50.0 fL    Platelet Count 163 (L) 164 - 446 K/uL    MPV 10.4 9.0 - 12.9 fL    Neutrophils-Polys 87.00 (H) 44.00 - 72.00 %    Lymphocytes 3.00 (L) 22.00 - 41.00 %    Monocytes 9.10 0.00 - 13.40 %    " Eosinophils 0.10 0.00 - 6.90 %    Basophils 0.40 0.00 - 1.80 %    Immature Granulocytes 0.40 0.00 - 0.90 %    Nucleated RBC 0.70 (H) 0.00 - 0.20 /100 WBC    Neutrophils (Absolute) 12.02 (H) 1.82 - 7.42 K/uL    Lymphs (Absolute) 0.41 (L) 1.00 - 4.80 K/uL    Monos (Absolute) 1.25 (H) 0.00 - 0.85 K/uL    Eos (Absolute) 0.01 0.00 - 0.51 K/uL    Baso (Absolute) 0.06 0.00 - 0.12 K/uL    Immature Granulocytes (abs) 0.06 0.00 - 0.11 K/uL    NRBC (Absolute) 0.09 K/uL   Comp Metabolic Panel    Collection Time: 12/17/24 10:20 AM   Result Value Ref Range    Sodium 132 (L) 135 - 145 mmol/L    Potassium 6.1 (H) 3.6 - 5.5 mmol/L    Chloride 94 (L) 96 - 112 mmol/L    Co2 19 (L) 20 - 33 mmol/L    Anion Gap 19.0 (H) 7.0 - 16.0    Glucose 113 (H) 65 - 99 mg/dL    Bun 80 (H) 8 - 22 mg/dL    Creatinine 3.91 (H) 0.50 - 1.40 mg/dL    Calcium 8.5 8.5 - 10.5 mg/dL    Correct Calcium 8.6 8.5 - 10.5 mg/dL    AST(SGOT) 4600 (HH) 12 - 45 U/L    ALT(SGPT) 3241 (HH) 2 - 50 U/L    Alkaline Phosphatase 234 (H) 30 - 99 U/L    Total Bilirubin 1.3 0.1 - 1.5 mg/dL    Albumin 3.9 3.2 - 4.9 g/dL    Total Protein 6.9 6.0 - 8.2 g/dL    Globulin 3.0 1.9 - 3.5 g/dL    A-G Ratio 1.3 g/dL   proBrain Natriuretic Peptide, NT    Collection Time: 12/17/24 10:20 AM   Result Value Ref Range    NT-proBNP 07717 (H) 0 - 125 pg/mL   Troponin    Collection Time: 12/17/24 10:20 AM   Result Value Ref Range    Troponin T 183 (H) 6 - 19 ng/L   ESTIMATED GFR    Collection Time: 12/17/24 10:20 AM   Result Value Ref Range    GFR (CKD-EPI) 16 (A) >60 mL/min/1.73 m 2   AMMONIA    Collection Time: 12/17/24 12:50 PM   Result Value Ref Range    Ammonia 41 11 - 45 umol/L   DIAGNOSTIC ALCOHOL    Collection Time: 12/17/24 12:50 PM   Result Value Ref Range    Diagnostic Alcohol 17.3 (H) <10.1 mg/dL   HEPATITIS PANEL ACUTE(4 COMPONENTS)    Collection Time: 12/17/24 12:50 PM   Result Value Ref Range    Hepatitis B Surface Antigen Nonreactive Non-Reactive    Hepatitis B Cors Ab,IgM  Nonreactive Non-Reactive    Hepatitis A Virus Ab, IgM Nonreactive Non-Reactive    Hepatitis C Antibody Nonreactive Non-Reactive   ACETAMINOPHEN    Collection Time: 24 12:50 PM   Result Value Ref Range    Acetaminophen -Tylenol <5.0 (L) 10.0 - 30.0 ug/mL   Salicylate    Collection Time: 24 12:50 PM   Result Value Ref Range    Salicylates, Quant. <1.0 (L) 15.0 - 25.0 mg/dL   LACTIC ACID    Collection Time: 24 12:50 PM   Result Value Ref Range    Lactic Acid 1.8 0.5 - 2.0 mmol/L   EKG    Collection Time: 24 12:52 PM   Result Value Ref Range    Report       [facility] Emergency Dept.    Test Date:  2024  Pt Name:    NICOLE DUDLEY                  Department: ER  MRN:        [id number]                      Room:  Gender:     Male                         Technician: [id number]  :        1960                   Requested By:ER TRIAGE PROTOCOL  Order #:    [id number]                    Reading MD: MARGOT PENA MD    Measurements  Intervals                                Axis  Rate:       90                           P:          75  WI:         122                          QRS:        -114  QRSD:       146                          T:          -12  QT:         421  QTc:        515    Interpretive Statements  Sinus rhythm  Right bundle branch block  Inferior infarct, old  Compared to ECG 2022 14:22:51  Myocardial infarct finding now present  Left anterior fascicular block no longer present  Electronically Signed On 2024 12:52:29 PST by MARGOT PENA MD        I have independently interpreted this EKG    RADIOLOGY/PROCEDURES   Point of Care Ultrasound    ED POINT OF CARE ULTRASOUND: LIMITED CARDIAC    Indication for exam: Elevated troponin and BNP  LVEF: normal  Pericardial Effusion:not present  RV Strain:not present  Pulmonary B Lines:not present    Image retained through [facility] as seen below:       Additional interpretation: Normal ejection fraction without  sign of acute heart failure or pulmonary edema    This study is a limited ultrasound examination performed and interpreted to evaluate for limited conditions as outlined above. There may be other clinically important information contained in the images that is outside this scope. When clinically warranted, a comprehensive ultrasound through the appropriate department is considered.      Radiologist interpretation:  CT-ABDOMEN-PELVIS W/O   Final Result      1.  No evidence of bowel obstruction or focal inflammatory change.      2.  Small of ascites within the abdomen and pelvis.      3.  Lobulated appearance of the surface of the liver likely representing presence of hepatocellular dysfunction.      4.  Bibasilar atelectasis with small bilateral pleural effusions.      DX-CHEST-PORTABLE (1 VIEW)   Final Result      No evidence of acute cardiopulmonary process.      US-RENAL    (Results Pending)       COURSE & MEDICAL DECISION MAKING    ASSESSMENT, COURSE AND PLAN  Care Narrative: 64-year-old male presents with worsening mental status over the last week and severe altered mental status over the last couple days.  Labs demonstrate several severe derangements including transaminitis with AST over 5 at 4500 and ALT over 3000.  Patient has a normal ejection fraction on bedside echocardiogram.  Patient also has no evidence of pulmonary B-lines.  He has some minimal collapsibility of the IVC but does not appear overly volume deplete on bedside echocardiogram.  His acetaminophen is negative his ammonia is negative his lactic acid is normal.  He remains altered with severe derangement of the liver transaminases as well as his renal function and slight elevation of troponin.  Patient's been evaluated and discussed at bedside with intensivist Dr. Preciado.  Her help with this patient's greatly appreciated admitted to the ICU in guarded condition.    CRITICAL CARE  The very real possibilty of a deterioration of this patient's  condition required the highest level of my preparedness for sudden, emergent intervention.  I provided critical care services, which included medication orders, frequent reevaluations of the patient's condition and response to treatment, ordering and reviewing test results, and discussing the case with various consultants.  The critical care time associated with the care of the patient was 45 minutes. Review chart for interventions. This time is exclusive of any other billable procedures.     FINAL DIAGNOSIS  1. Hallucinations Active   2. Hepatorenal syndrome (HCC) Active   3. Elevated troponin Active   4.  Altered mental status  5.  Elevated BNP  6.  Acute transaminitis.  7.  Acute on chronic renal insufficiency  8.  Bedside echocardiogram by ERP  9.  Critical care 45 minutes     Electronically signed by: Adelfo Horan M.D.,

## 2024-12-17 NOTE — PROGRESS NOTES
4 Eyes Skin Assessment Completed by KRISHNA Chin and KRISHNA Sanz.    Head WDL  Ears WDL  Nose WDL  Mouth WDL  Neck WDL  Breast/Chest WDL  Shoulder Blades WDL  Spine WDL  (R) Arm/Elbow/Hand WDL  (L) Arm/Elbow/Hand WDL  Abdomen WDL  Groin WDL  Scrotum/Coccyx/Buttocks WDL  (R) Leg WDL  (L) Leg WDL  (R) Heel/Foot/Toe Redness and Blanchingtop of feet-slow to haydee heels- pressure areas 2/2 socks and slippers  (L) Heel/Foot/Toe Redness and Blanching top of feet  -slow to haydee heel.pressure areas 2/2 socks and slippers        Devices In Places ECG, Blood Pressure Cuff, Pulse Ox, Garibay, SCD's, and Nasal Cannula      Interventions In Place Gray Ear Foams, Pillows, Low Air Loss Mattress, Heels Loaded W/Pillows, and Pressure Redistribution Mattress    Possible Skin Injury No    Pictures Uploaded Into Epic Yes  Wound Consult Placed N/A  RN Wound Prevention Protocol Ordered Yes

## 2024-12-18 ENCOUNTER — APPOINTMENT (OUTPATIENT)
Dept: RADIOLOGY | Facility: MEDICAL CENTER | Age: 64
DRG: 682 | End: 2024-12-18
Attending: INTERNAL MEDICINE
Payer: COMMERCIAL

## 2024-12-18 ENCOUNTER — APPOINTMENT (OUTPATIENT)
Dept: RADIOLOGY | Facility: MEDICAL CENTER | Age: 64
DRG: 682 | End: 2024-12-18
Attending: EMERGENCY MEDICINE
Payer: COMMERCIAL

## 2024-12-18 PROBLEM — R79.89 ELEVATED LFTS: Status: ACTIVE | Noted: 2024-12-18

## 2024-12-18 PROBLEM — G93.40 ACUTE ENCEPHALOPATHY: Status: ACTIVE | Noted: 2024-12-18

## 2024-12-18 PROBLEM — J44.1 ACUTE EXACERBATION OF CHRONIC OBSTRUCTIVE PULMONARY DISEASE (COPD) (HCC): Status: ACTIVE | Noted: 2023-09-11

## 2024-12-18 PROBLEM — J96.01 ACUTE RESPIRATORY FAILURE WITH HYPOXIA AND HYPERCAPNIA (HCC): Status: ACTIVE | Noted: 2024-12-18

## 2024-12-18 PROBLEM — N18.9 ACUTE-ON-CHRONIC KIDNEY INJURY (HCC): Status: ACTIVE | Noted: 2024-12-17

## 2024-12-18 PROBLEM — J96.02 ACUTE RESPIRATORY FAILURE WITH HYPOXIA AND HYPERCAPNIA (HCC): Status: ACTIVE | Noted: 2024-12-18

## 2024-12-18 PROBLEM — F10.20 ALCOHOL DEPENDENCE (HCC): Status: ACTIVE | Noted: 2024-12-18

## 2024-12-18 PROBLEM — J98.11 ATELECTASIS: Status: ACTIVE | Noted: 2024-12-18

## 2024-12-18 LAB
ALBUMIN SERPL BCP-MCNC: 4.4 G/DL (ref 3.2–4.9)
ALBUMIN/GLOB SERPL: 1.6 G/DL
ALP SERPL-CCNC: 195 U/L (ref 30–99)
ALT SERPL-CCNC: 2315 U/L (ref 2–50)
ANION GAP SERPL CALC-SCNC: 11 MMOL/L (ref 7–16)
ANION GAP SERPL CALC-SCNC: 14 MMOL/L (ref 7–16)
AST SERPL-CCNC: 2263 U/L (ref 12–45)
BASE EXCESS BLDA CALC-SCNC: -1 MMOL/L (ref -4–3)
BASE EXCESS BLDA CALC-SCNC: -2 MMOL/L (ref -4–3)
BASE EXCESS BLDA CALC-SCNC: 1 MMOL/L (ref -4–3)
BASOPHILS # BLD AUTO: 0.1 % (ref 0–1.8)
BASOPHILS # BLD: 0.01 K/UL (ref 0–0.12)
BILIRUB SERPL-MCNC: 1.1 MG/DL (ref 0.1–1.5)
BODY TEMPERATURE: ABNORMAL DEGREES
BREATHS SETTING VENT: 24
BUN SERPL-MCNC: 64 MG/DL (ref 8–22)
BUN SERPL-MCNC: 73 MG/DL (ref 8–22)
CALCIUM ALBUM COR SERPL-MCNC: 8.9 MG/DL (ref 8.5–10.5)
CALCIUM SERPL-MCNC: 9.2 MG/DL (ref 8.5–10.5)
CALCIUM SERPL-MCNC: 9.8 MG/DL (ref 8.5–10.5)
CHLORIDE SERPL-SCNC: 96 MMOL/L (ref 96–112)
CHLORIDE SERPL-SCNC: 96 MMOL/L (ref 96–112)
CK SERPL-CCNC: 134 U/L (ref 0–154)
CO2 BLDA-SCNC: 27 MMOL/L (ref 32–48)
CO2 BLDA-SCNC: 33 MMOL/L (ref 32–48)
CO2 BLDA-SCNC: 36 MMOL/L (ref 32–48)
CO2 SERPL-SCNC: 26 MMOL/L (ref 20–33)
CO2 SERPL-SCNC: 27 MMOL/L (ref 20–33)
CREAT SERPL-MCNC: 2.49 MG/DL (ref 0.5–1.4)
CREAT SERPL-MCNC: 2.94 MG/DL (ref 0.5–1.4)
CYTOLOGY REG CYTOL: NORMAL
DELSYS IDSYS: ABNORMAL
END TIDAL CARBON DIOXIDE IECO2: 19 MMHG
EOSINOPHIL # BLD AUTO: 0.14 K/UL (ref 0–0.51)
EOSINOPHIL NFR BLD: 1.1 % (ref 0–6.9)
ERYTHROCYTE [DISTWIDTH] IN BLOOD BY AUTOMATED COUNT: 60.6 FL (ref 35.9–50)
FUNGUS SPEC FUNGUS STN: NORMAL
GFR SERPLBLD CREATININE-BSD FMLA CKD-EPI: 23 ML/MIN/1.73 M 2
GFR SERPLBLD CREATININE-BSD FMLA CKD-EPI: 28 ML/MIN/1.73 M 2
GLOBULIN SER CALC-MCNC: 2.7 G/DL (ref 1.9–3.5)
GLUCOSE BLD STRIP.AUTO-MCNC: 103 MG/DL (ref 65–99)
GLUCOSE BLD STRIP.AUTO-MCNC: 117 MG/DL (ref 65–99)
GLUCOSE BLD STRIP.AUTO-MCNC: 137 MG/DL (ref 65–99)
GLUCOSE SERPL-MCNC: 107 MG/DL (ref 65–99)
GLUCOSE SERPL-MCNC: 113 MG/DL (ref 65–99)
GRAM STN SPEC: NORMAL
HCO3 BLDA-SCNC: 25.6 MMOL/L (ref 21–28)
HCO3 BLDA-SCNC: 30.6 MMOL/L (ref 21–28)
HCO3 BLDA-SCNC: 32.8 MMOL/L (ref 21–28)
HCT VFR BLD AUTO: 44.7 % (ref 42–52)
HGB BLD-MCNC: 14.2 G/DL (ref 14–18)
HOROWITZ INDEX BLDA+IHG-RTO: 123 MM[HG]
HOROWITZ INDEX BLDA+IHG-RTO: 136 MM[HG]
IMM GRANULOCYTES # BLD AUTO: 0.04 K/UL (ref 0–0.11)
IMM GRANULOCYTES NFR BLD AUTO: 0.3 % (ref 0–0.9)
INR PPP: 1.75 (ref 0.87–1.13)
LACTATE BLD-SCNC: 0.3 MMOL/L (ref 0.5–2)
LACTATE BLD-SCNC: 0.4 MMOL/L (ref 0.5–2)
LACTATE BLD-SCNC: 1.3 MMOL/L (ref 0.5–2)
LPM ILPM: 6 LPM
LYMPHOCYTES # BLD AUTO: 0.5 K/UL (ref 1–4.8)
LYMPHOCYTES NFR BLD: 4 % (ref 22–41)
MAGNESIUM SERPL-MCNC: 2.5 MG/DL (ref 1.5–2.5)
MCH RBC QN AUTO: 30.5 PG (ref 27–33)
MCHC RBC AUTO-ENTMCNC: 31.8 G/DL (ref 32.3–36.5)
MCV RBC AUTO: 95.9 FL (ref 81.4–97.8)
MODE IMODE: ABNORMAL
MONOCYTES # BLD AUTO: 0.92 K/UL (ref 0–0.85)
MONOCYTES NFR BLD AUTO: 7.4 % (ref 0–13.4)
MYCOBACTERIUM SPEC CULT: NORMAL
NEUTROPHILS # BLD AUTO: 10.88 K/UL (ref 1.82–7.42)
NEUTROPHILS NFR BLD: 87.1 % (ref 44–72)
NRBC # BLD AUTO: 0.17 K/UL
NRBC BLD-RTO: 1.4 /100 WBC (ref 0–0.2)
O2/TOTAL GAS SETTING VFR VENT: 40 %
O2/TOTAL GAS SETTING VFR VENT: 50 %
PCO2 BLDA: 38.4 MMHG (ref 32–48)
PCO2 BLDA: 90.9 MMHG (ref 32–48)
PCO2 BLDA: >100 MMHG (ref 32–48)
PCO2 TEMP ADJ BLDA: 38.1 MMHG (ref 32–48)
PCO2 TEMP ADJ BLDA: 90.1 MMHG (ref 32–48)
PCO2 TEMP ADJ BLDA: >100 MMHG (ref 32–48)
PEEP END EXPIRATORY PRESSURE IPEEP: 8 CMH20
PH BLDA: 7.12 [PH] (ref 7.35–7.45)
PH BLDA: 7.13 [PH] (ref 7.35–7.45)
PH BLDA: 7.43 [PH] (ref 7.35–7.45)
PH TEMP ADJ BLDA: 7.12 [PH] (ref 7.35–7.45)
PH TEMP ADJ BLDA: 7.14 [PH] (ref 7.35–7.45)
PH TEMP ADJ BLDA: 7.43 [PH] (ref 7.35–7.45)
PHOSPHATE SERPL-MCNC: 6.7 MG/DL (ref 2.5–4.5)
PLATELET # BLD AUTO: 117 K/UL (ref 164–446)
PMV BLD AUTO: 9.9 FL (ref 9–12.9)
PO2 BLDA: 49 MMHG (ref 83–108)
PO2 BLDA: 68 MMHG (ref 83–108)
PO2 BLDA: 86 MMHG (ref 83–108)
PO2 TEMP ADJ BLDA: 49 MMHG (ref 64–87)
PO2 TEMP ADJ BLDA: 67 MMHG (ref 64–87)
PO2 TEMP ADJ BLDA: 83 MMHG (ref 64–87)
POTASSIUM SERPL-SCNC: 3.8 MMOL/L (ref 3.6–5.5)
POTASSIUM SERPL-SCNC: 6.2 MMOL/L (ref 3.6–5.5)
PROT SERPL-MCNC: 7.1 G/DL (ref 6–8.2)
PROTHROMBIN TIME: 20.5 SEC (ref 12–14.6)
RBC # BLD AUTO: 4.66 M/UL (ref 4.7–6.1)
RHODAMINE-AURAMINE STN SPEC: NORMAL
RHODAMINE-AURAMINE STN SPEC: NORMAL
SAO2 % BLDA: 85 % (ref 93–99)
SAO2 % BLDA: 86 % (ref 93–99)
SAO2 % BLDA: 91 % (ref 93–99)
SIGNIFICANT IND 70042: NORMAL
SITE SITE: NORMAL
SODIUM SERPL-SCNC: 133 MMOL/L (ref 135–145)
SODIUM SERPL-SCNC: 137 MMOL/L (ref 135–145)
SOURCE SOURCE: NORMAL
SPECIMEN DRAWN FROM PATIENT: ABNORMAL
TIDAL VOLUME IVT: 450 ML
WBC # BLD AUTO: 12.5 K/UL (ref 4.8–10.8)

## 2024-12-18 PROCEDURE — 82550 ASSAY OF CK (CPK): CPT

## 2024-12-18 PROCEDURE — 87206 SMEAR FLUORESCENT/ACID STAI: CPT

## 2024-12-18 PROCEDURE — 700102 HCHG RX REV CODE 250 W/ 637 OVERRIDE(OP): Performed by: INTERNAL MEDICINE

## 2024-12-18 PROCEDURE — 92950 HEART/LUNG RESUSCITATION CPR: CPT

## 2024-12-18 PROCEDURE — 99255 IP/OBS CONSLTJ NEW/EST HI 80: CPT | Performed by: INTERNAL MEDICINE

## 2024-12-18 PROCEDURE — 700111 HCHG RX REV CODE 636 W/ 250 OVERRIDE (IP): Performed by: INTERNAL MEDICINE

## 2024-12-18 PROCEDURE — 88305 TISSUE EXAM BY PATHOLOGIST: CPT

## 2024-12-18 PROCEDURE — 770022 HCHG ROOM/CARE - ICU (200)

## 2024-12-18 PROCEDURE — 0B9F8ZX DRAINAGE OF RIGHT LOWER LUNG LOBE, VIA NATURAL OR ARTIFICIAL OPENING ENDOSCOPIC, DIAGNOSTIC: ICD-10-PCS | Performed by: INTERNAL MEDICINE

## 2024-12-18 PROCEDURE — 99152 MOD SED SAME PHYS/QHP 5/>YRS: CPT

## 2024-12-18 PROCEDURE — 94799 UNLISTED PULMONARY SVC/PX: CPT

## 2024-12-18 PROCEDURE — 88112 CYTOPATH CELL ENHANCE TECH: CPT

## 2024-12-18 PROCEDURE — 87102 FUNGUS ISOLATION CULTURE: CPT

## 2024-12-18 PROCEDURE — C1751 CATH, INF, PER/CENT/MIDLINE: HCPCS

## 2024-12-18 PROCEDURE — 83735 ASSAY OF MAGNESIUM: CPT

## 2024-12-18 PROCEDURE — 83605 ASSAY OF LACTIC ACID: CPT | Mod: 91

## 2024-12-18 PROCEDURE — 36556 INSERT NON-TUNNEL CV CATH: CPT | Performed by: INTERNAL MEDICINE

## 2024-12-18 PROCEDURE — 94660 CPAP INITIATION&MGMT: CPT

## 2024-12-18 PROCEDURE — 82962 GLUCOSE BLOOD TEST: CPT | Mod: 91

## 2024-12-18 PROCEDURE — 80053 COMPREHEN METABOLIC PANEL: CPT

## 2024-12-18 PROCEDURE — 0BH17EZ INSERTION OF ENDOTRACHEAL AIRWAY INTO TRACHEA, VIA NATURAL OR ARTIFICIAL OPENING: ICD-10-PCS | Performed by: INTERNAL MEDICINE

## 2024-12-18 PROCEDURE — 36556 INSERT NON-TUNNEL CV CATH: CPT

## 2024-12-18 PROCEDURE — 99291 CRITICAL CARE FIRST HOUR: CPT | Performed by: INTERNAL MEDICINE

## 2024-12-18 PROCEDURE — 85025 COMPLETE CBC W/AUTO DIFF WBC: CPT

## 2024-12-18 PROCEDURE — 76775 US EXAM ABDO BACK WALL LIM: CPT

## 2024-12-18 PROCEDURE — 87116 MYCOBACTERIA CULTURE: CPT

## 2024-12-18 PROCEDURE — 31645 BRNCHSC W/THER ASPIR 1ST: CPT | Performed by: INTERNAL MEDICINE

## 2024-12-18 PROCEDURE — P9047 ALBUMIN (HUMAN), 25%, 50ML: HCPCS | Mod: JZ | Performed by: INTERNAL MEDICINE

## 2024-12-18 PROCEDURE — 31500 INSERT EMERGENCY AIRWAY: CPT | Performed by: INTERNAL MEDICINE

## 2024-12-18 PROCEDURE — 5A1945Z RESPIRATORY VENTILATION, 24-96 CONSECUTIVE HOURS: ICD-10-PCS | Performed by: INTERNAL MEDICINE

## 2024-12-18 PROCEDURE — A9270 NON-COVERED ITEM OR SERVICE: HCPCS | Performed by: INTERNAL MEDICINE

## 2024-12-18 PROCEDURE — 700105 HCHG RX REV CODE 258: Performed by: INTERNAL MEDICINE

## 2024-12-18 PROCEDURE — 302214 INTUBATION BOX: Performed by: INTERNAL MEDICINE

## 2024-12-18 PROCEDURE — 31624 DX BRONCHOSCOPE/LAVAGE: CPT | Performed by: INTERNAL MEDICINE

## 2024-12-18 PROCEDURE — 82803 BLOOD GASES ANY COMBINATION: CPT

## 2024-12-18 PROCEDURE — 31500 INSERT EMERGENCY AIRWAY: CPT

## 2024-12-18 PROCEDURE — 84100 ASSAY OF PHOSPHORUS: CPT

## 2024-12-18 PROCEDURE — 94002 VENT MGMT INPAT INIT DAY: CPT

## 2024-12-18 PROCEDURE — 99292 CRITICAL CARE ADDL 30 MIN: CPT | Performed by: INTERNAL MEDICINE

## 2024-12-18 PROCEDURE — 87015 SPECIMEN INFECT AGNT CONCNTJ: CPT

## 2024-12-18 PROCEDURE — 94640 AIRWAY INHALATION TREATMENT: CPT

## 2024-12-18 PROCEDURE — 85610 PROTHROMBIN TIME: CPT

## 2024-12-18 PROCEDURE — 02HV33Z INSERTION OF INFUSION DEVICE INTO SUPERIOR VENA CAVA, PERCUTANEOUS APPROACH: ICD-10-PCS | Performed by: INTERNAL MEDICINE

## 2024-12-18 PROCEDURE — 87070 CULTURE OTHR SPECIMN AEROBIC: CPT

## 2024-12-18 PROCEDURE — 71045 X-RAY EXAM CHEST 1 VIEW: CPT

## 2024-12-18 PROCEDURE — 700101 HCHG RX REV CODE 250: Performed by: INTERNAL MEDICINE

## 2024-12-18 PROCEDURE — 302978 HCHG BRONCHOSCOPY-DIAGNOSTIC

## 2024-12-18 PROCEDURE — 36600 WITHDRAWAL OF ARTERIAL BLOOD: CPT

## 2024-12-18 PROCEDURE — 87205 SMEAR GRAM STAIN: CPT | Mod: 91

## 2024-12-18 PROCEDURE — 700111 HCHG RX REV CODE 636 W/ 250 OVERRIDE (IP): Mod: JZ | Performed by: INTERNAL MEDICINE

## 2024-12-18 PROCEDURE — 37799 UNLISTED PX VASCULAR SURGERY: CPT

## 2024-12-18 PROCEDURE — 80048 BASIC METABOLIC PNL TOTAL CA: CPT

## 2024-12-18 RX ORDER — METOPROLOL TARTRATE 25 MG/1
25 TABLET, FILM COATED ORAL 2 TIMES DAILY
Status: DISCONTINUED | OUTPATIENT
Start: 2024-12-18 | End: 2024-12-20

## 2024-12-18 RX ORDER — FUROSEMIDE 10 MG/ML
80 INJECTION INTRAMUSCULAR; INTRAVENOUS ONCE
Status: COMPLETED | OUTPATIENT
Start: 2024-12-18 | End: 2024-12-18

## 2024-12-18 RX ORDER — FOLIC ACID 1 MG/1
1 TABLET ORAL DAILY
Status: COMPLETED | OUTPATIENT
Start: 2024-12-19 | End: 2024-12-20

## 2024-12-18 RX ORDER — POLYETHYLENE GLYCOL 3350 17 G/17G
1 POWDER, FOR SOLUTION ORAL
Status: DISCONTINUED | OUTPATIENT
Start: 2024-12-18 | End: 2024-12-20

## 2024-12-18 RX ORDER — BISACODYL 10 MG
10 SUPPOSITORY, RECTAL RECTAL
Status: DISCONTINUED | OUTPATIENT
Start: 2024-12-18 | End: 2024-12-20

## 2024-12-18 RX ORDER — FAMOTIDINE 20 MG/1
20 TABLET, FILM COATED ORAL DAILY
Status: DISCONTINUED | OUTPATIENT
Start: 2024-12-18 | End: 2024-12-20

## 2024-12-18 RX ORDER — IPRATROPIUM BROMIDE AND ALBUTEROL SULFATE 2.5; .5 MG/3ML; MG/3ML
3 SOLUTION RESPIRATORY (INHALATION)
Status: DISCONTINUED | OUTPATIENT
Start: 2024-12-18 | End: 2024-12-23 | Stop reason: HOSPADM

## 2024-12-18 RX ORDER — ACETAMINOPHEN 325 MG/1
650 TABLET ORAL EVERY 6 HOURS PRN
Status: DISCONTINUED | OUTPATIENT
Start: 2024-12-18 | End: 2024-12-21

## 2024-12-18 RX ORDER — ASPIRIN 81 MG/1
81 TABLET, CHEWABLE ORAL DAILY
Status: DISCONTINUED | OUTPATIENT
Start: 2024-12-18 | End: 2024-12-21

## 2024-12-18 RX ORDER — HYDROMORPHONE HYDROCHLORIDE 1 MG/ML
.5-2 INJECTION, SOLUTION INTRAMUSCULAR; INTRAVENOUS; SUBCUTANEOUS
Status: DISCONTINUED | OUTPATIENT
Start: 2024-12-18 | End: 2024-12-19

## 2024-12-18 RX ORDER — ETOMIDATE 2 MG/ML
20 INJECTION INTRAVENOUS ONCE
Status: COMPLETED | OUTPATIENT
Start: 2024-12-18 | End: 2024-12-18

## 2024-12-18 RX ORDER — ONDANSETRON 4 MG/1
4 TABLET, ORALLY DISINTEGRATING ORAL EVERY 4 HOURS PRN
Status: DISCONTINUED | OUTPATIENT
Start: 2024-12-18 | End: 2024-12-21

## 2024-12-18 RX ORDER — INSULIN LISPRO 100 [IU]/ML
2-9 INJECTION, SOLUTION INTRAVENOUS; SUBCUTANEOUS EVERY 6 HOURS
Status: DISCONTINUED | OUTPATIENT
Start: 2024-12-18 | End: 2024-12-20

## 2024-12-18 RX ORDER — SODIUM CHLORIDE, SODIUM LACTATE, POTASSIUM CHLORIDE, CALCIUM CHLORIDE 600; 310; 30; 20 MG/100ML; MG/100ML; MG/100ML; MG/100ML
INJECTION, SOLUTION INTRAVENOUS CONTINUOUS
Status: DISCONTINUED | OUTPATIENT
Start: 2024-12-18 | End: 2024-12-19

## 2024-12-18 RX ORDER — ROCURONIUM BROMIDE 10 MG/ML
100 INJECTION, SOLUTION INTRAVENOUS ONCE
Status: COMPLETED | OUTPATIENT
Start: 2024-12-18 | End: 2024-12-18

## 2024-12-18 RX ORDER — METHYLPREDNISOLONE SODIUM SUCCINATE 125 MG/2ML
62.5 INJECTION, POWDER, LYOPHILIZED, FOR SOLUTION INTRAMUSCULAR; INTRAVENOUS EVERY 6 HOURS
Status: DISCONTINUED | OUTPATIENT
Start: 2024-12-18 | End: 2024-12-19

## 2024-12-18 RX ORDER — AMOXICILLIN 250 MG
2 CAPSULE ORAL 2 TIMES DAILY
Status: DISCONTINUED | OUTPATIENT
Start: 2024-12-18 | End: 2024-12-20

## 2024-12-18 RX ORDER — DEXTROSE MONOHYDRATE 25 G/50ML
25 INJECTION, SOLUTION INTRAVENOUS
Status: DISCONTINUED | OUTPATIENT
Start: 2024-12-18 | End: 2024-12-20

## 2024-12-18 RX ORDER — GAUZE BANDAGE 2" X 2"
100 BANDAGE TOPICAL DAILY
Status: DISCONTINUED | OUTPATIENT
Start: 2024-12-19 | End: 2024-12-18

## 2024-12-18 RX ORDER — IPRATROPIUM BROMIDE AND ALBUTEROL SULFATE 2.5; .5 MG/3ML; MG/3ML
3 SOLUTION RESPIRATORY (INHALATION)
Status: DISCONTINUED | OUTPATIENT
Start: 2024-12-18 | End: 2024-12-21

## 2024-12-18 RX ORDER — PROMETHAZINE HYDROCHLORIDE 25 MG/1
12.5-25 TABLET ORAL EVERY 4 HOURS PRN
Status: DISCONTINUED | OUTPATIENT
Start: 2024-12-18 | End: 2024-12-21

## 2024-12-18 RX ADMIN — THIAMINE HYDROCHLORIDE 500 MG: 100 INJECTION, SOLUTION INTRAMUSCULAR; INTRAVENOUS at 15:07

## 2024-12-18 RX ADMIN — HYDROMORPHONE HYDROCHLORIDE 1 MG: 1 INJECTION, SOLUTION INTRAMUSCULAR; INTRAVENOUS; SUBCUTANEOUS at 12:43

## 2024-12-18 RX ADMIN — FAMOTIDINE 20 MG: 10 INJECTION, SOLUTION INTRAVENOUS at 09:30

## 2024-12-18 RX ADMIN — ETOMIDATE 20 MG: 2 INJECTION, SOLUTION INTRAVENOUS at 08:46

## 2024-12-18 RX ADMIN — IPRATROPIUM BROMIDE AND ALBUTEROL SULFATE 3 ML: .5; 2.5 SOLUTION RESPIRATORY (INHALATION) at 09:05

## 2024-12-18 RX ADMIN — METOPROLOL TARTRATE 25 MG: 25 TABLET, FILM COATED ORAL at 14:48

## 2024-12-18 RX ADMIN — IPRATROPIUM BROMIDE AND ALBUTEROL SULFATE 3 ML: .5; 2.5 SOLUTION RESPIRATORY (INHALATION) at 18:21

## 2024-12-18 RX ADMIN — THERA TABS 1 TABLET: TAB at 05:42

## 2024-12-18 RX ADMIN — Medication 100 MG: at 05:42

## 2024-12-18 RX ADMIN — SODIUM CHLORIDE, POTASSIUM CHLORIDE, SODIUM LACTATE AND CALCIUM CHLORIDE: 600; 310; 30; 20 INJECTION, SOLUTION INTRAVENOUS at 14:54

## 2024-12-18 RX ADMIN — HEPARIN SODIUM 5000 UNITS: 5000 INJECTION, SOLUTION INTRAVENOUS; SUBCUTANEOUS at 14:48

## 2024-12-18 RX ADMIN — IPRATROPIUM BROMIDE AND ALBUTEROL SULFATE 3 ML: .5; 2.5 SOLUTION RESPIRATORY (INHALATION) at 11:24

## 2024-12-18 RX ADMIN — PROPOFOL 20 MCG/KG/MIN: 10 INJECTION, EMULSION INTRAVENOUS at 09:31

## 2024-12-18 RX ADMIN — INSULIN LISPRO 2 UNITS: 100 INJECTION, SOLUTION INTRAVENOUS; SUBCUTANEOUS at 23:47

## 2024-12-18 RX ADMIN — ALBUMIN (HUMAN) 25 G: 0.25 INJECTION, SOLUTION INTRAVENOUS at 21:19

## 2024-12-18 RX ADMIN — FUROSEMIDE 80 MG: 10 INJECTION INTRAMUSCULAR; INTRAVENOUS at 11:21

## 2024-12-18 RX ADMIN — HEPARIN SODIUM 5000 UNITS: 5000 INJECTION, SOLUTION INTRAVENOUS; SUBCUTANEOUS at 06:04

## 2024-12-18 RX ADMIN — IPRATROPIUM BROMIDE AND ALBUTEROL SULFATE 3 ML: .5; 2.5 SOLUTION RESPIRATORY (INHALATION) at 21:24

## 2024-12-18 RX ADMIN — METHYLPREDNISOLONE SODIUM SUCCINATE 62.5 MG: 125 INJECTION, POWDER, FOR SOLUTION INTRAMUSCULAR; INTRAVENOUS at 23:38

## 2024-12-18 RX ADMIN — ALBUMIN (HUMAN) 25 G: 0.25 INJECTION, SOLUTION INTRAVENOUS at 06:08

## 2024-12-18 RX ADMIN — ALBUMIN (HUMAN) 25 G: 0.25 INJECTION, SOLUTION INTRAVENOUS at 15:53

## 2024-12-18 RX ADMIN — HYDROMORPHONE HYDROCHLORIDE 2 MG: 1 INJECTION, SOLUTION INTRAMUSCULAR; INTRAVENOUS; SUBCUTANEOUS at 20:28

## 2024-12-18 RX ADMIN — PROPOFOL 40 MCG/KG/MIN: 10 INJECTION, EMULSION INTRAVENOUS at 18:01

## 2024-12-18 RX ADMIN — HEPARIN SODIUM 5000 UNITS: 5000 INJECTION, SOLUTION INTRAVENOUS; SUBCUTANEOUS at 21:19

## 2024-12-18 RX ADMIN — METHYLPREDNISOLONE SODIUM SUCCINATE 62.5 MG: 125 INJECTION, POWDER, FOR SOLUTION INTRAMUSCULAR; INTRAVENOUS at 17:56

## 2024-12-18 RX ADMIN — METHYLPREDNISOLONE SODIUM SUCCINATE 62.5 MG: 125 INJECTION, POWDER, FOR SOLUTION INTRAMUSCULAR; INTRAVENOUS at 11:21

## 2024-12-18 RX ADMIN — HYDROMORPHONE HYDROCHLORIDE 2 MG: 1 INJECTION, SOLUTION INTRAMUSCULAR; INTRAVENOUS; SUBCUTANEOUS at 23:39

## 2024-12-18 RX ADMIN — IPRATROPIUM BROMIDE AND ALBUTEROL SULFATE 3 ML: .5; 2.5 SOLUTION RESPIRATORY (INHALATION) at 14:26

## 2024-12-18 RX ADMIN — ROCURONIUM BROMIDE 100 MG: 10 INJECTION INTRAVENOUS at 08:46

## 2024-12-18 RX ADMIN — ASPIRIN 81 MG: 81 TABLET, CHEWABLE ORAL at 14:48

## 2024-12-18 RX ADMIN — SENNOSIDES AND DOCUSATE SODIUM 2 TABLET: 50; 8.6 TABLET ORAL at 17:55

## 2024-12-18 ASSESSMENT — PAIN DESCRIPTION - PAIN TYPE
TYPE: ACUTE PAIN

## 2024-12-18 ASSESSMENT — PULMONARY FUNCTION TESTS
EPAP_CMH2O: 8
EPAP_CMH2O: 8

## 2024-12-18 NOTE — ASSESSMENT & PLAN NOTE
He completed a course of high-dose IV thiamine  Continue multivitamins and folic acid  Observe for evidence of withdrawal - CIWA protocol  Cessation counseling

## 2024-12-18 NOTE — ASSESSMENT & PLAN NOTE
History of CAD with prior MI and PCI  Continue aspirin, 81 mg daily  Change metoprolol tartrate to metoprolol succinate, 50 mg daily  Hold statin with elevated aminotransferases

## 2024-12-18 NOTE — ASSESSMENT & PLAN NOTE
PFTs on 4/20/2022: FEV1 1.37 L (47%), FEV1/FVC 53%, DLCO 88%  Stop methylprednisolone  Begin prednisone, 40 mg daily  Bronchodilators  Continue Dulera, 100/5, 2 puffs twice daily  Resume Spiriva, 1 puff daily  RT protocols

## 2024-12-18 NOTE — PROGRESS NOTES
Lab called with critical result of AST 4272 and ALT 3143. Critical lab result read back.  Dr. Preciado notified of critical lab result at 1750.  Critical lab result read back by Dr. Preciado.

## 2024-12-18 NOTE — CARE PLAN
The patient is Stable - Low risk of patient condition declining or worsening    Shift Goals  Clinical Goals: SBP <180, monitor labs  Patient Goals: Rest  Family Goals: Updates    Progress made toward(s) clinical / shift goals:    Problem: Knowledge Deficit - Standard  Goal: Patient and family/care givers will demonstrate understanding of plan of care, disease process/condition, diagnostic tests and medications  Outcome: Progressing     Problem: Psychosocial  Goal: Patient's level of anxiety will decrease  Outcome: Progressing     Problem: Risk for Aspiration  Goal: Patient's risk for aspiration will be absent or decrease  Outcome: Progressing

## 2024-12-18 NOTE — ASSESSMENT & PLAN NOTE
Ammonia normal  Acute metabolic encephalopathy  Limit sedatives and mind altering medications as much as possible  Follow neuro exam  Resolved

## 2024-12-18 NOTE — PROGRESS NOTES
Critical Care Medicine   Brief Cross-Coverage Progress Note    Date of service: 12/18/2024  Time: 0400 - 6865        Assessment/Plan:  Altered mental status  Acute hypercapnic respiratory failure   - I received a call from the bedside nurse indicating the patient was previously alert & following commands was now obtunded and unresponsive.     - I received in signout that patient's neuro status tends to wax & wane quite a bit; went to bedside to examine patient   - STAT ABG ordered which showed pCO2 of 101    - Bipap ordered with follow up ABG in ~1-2 hrs  - I came to bedside to evaluate the patient, he was altered but was arousing more again   - Would consider CT head and/or EEG if this problem persists once pCO2 is not significantly elevated.            I spent 25 min in reviewing the patient's condition, physical examination, laboratory and imaging data, prior documentation, in discussion with bedside RN, Charge RN, RT, and patient's wife at bedside in formulating an assessment/plan.    Critical Care time: 25 min. No time overlap, procedures not included in time.

## 2024-12-18 NOTE — PROCEDURES
Date of service:  12/18/2024    Title:  Emergent endotracheal intubation    Indication:  Respiratory failure    Narrative:    A time out was performed identifying the correct patient, correct procedure and correct location prior to this procedure.    The patient was sedated and paralyzed with 20 mg of IV etomidate and 100 mg of IV rocuronium.  A 8.0 [place] endotracheal tube was placed directly into the trachea using a # 4 Glidescope without difficulty or apparent complication.  The CO2 detector made the appropriate color change, bilateral symmetrical breath sounds are present and fogging is present in the endotracheal tube.  All of this confirms that the endotracheal tube is indeed in the patient's trachea.  The patient tolerated the procedure quite nicely.  No complications are apparent.    This was an EMERGENT procedure.  It was medically necessary to perform this procedure emergently to prevent life threatening deterioration.    John Galvez MD  Pulmonary and Critical Care Medicine

## 2024-12-18 NOTE — ASSESSMENT & PLAN NOTE
History of alcohol dependence with ongoing alcohol use  Hepatitis panel negative  Trend liver enzymes and synthetic function  Avoid hepatotoxins

## 2024-12-18 NOTE — ASSESSMENT & PLAN NOTE
Underlying stage 3b CKD  No hydronephrosis on imaging  Monitor renal function and urine output  Avoid nephrotoxins and renal dose medications  Improved

## 2024-12-18 NOTE — ASSESSMENT & PLAN NOTE
Goal SBP less than 160  Change metoprolol tartrate to metoprolol succinate, 50 mg daily  Hold losartan with ROBERT

## 2024-12-18 NOTE — PROCEDURES
"Central venous catheter insertion    Date: 12/18/2024    Procedure: Central Venous Catheter Insertion    Indication: Acute respiratory failure    Physician:  Richy Rouse M.D.    Consent:  Emergent    Procedure:  A pre-procedure \"time out\" was performed.  The patient was prepped and draped in the usual sterile fashion.  Using ultrasound guided Seldinger technique, a left internal jugular triple lumen central venous catheter was placed on the first attempt without difficulty under full sterile barrier precautions.  The guidewire was removed.  All the ports were flushed using sterile saline and capped. The catheter was sutured in place and a sterile dressing was applied.  No immediate complications. A chest xray was ordered and will be reviewed.     "

## 2024-12-18 NOTE — CARE PLAN
Problem: Bronchoconstriction  Goal: Improve in air movement and diminished wheezing  Description: Target End Date:  2 to 3 days    1.  Implement inhaled treatments  2.  Evaluate and manage medication effects  Outcome: Progressing     Problem: Ventilation  Goal: Ability to achieve and maintain unassisted ventilation or tolerate decreased levels of ventilator support  Description: Target End Date:  4 days     Document on Vent flowsheet    1.  Support and monitor invasive and noninvasive mechanical ventilation  2.  Monitor ventilator weaning response  3.  Perform ventilator associated pneumonia prevention interventions  4.  Manage ventilation therapy by monitoring diagnostic test results  Outcome: Progressing   Vent day 1, No SBT, Duo Q4 hour, 24/450/10/60

## 2024-12-18 NOTE — CONSULTS
Nephrology Consultation    Date of Service  12/18/2024    Referring Physician  GILBERTO Huddleston*    Consulting Physician  Yogi Stark M.D.    Reason for Consultation  ROBERT    History of Presenting Illness  64 y.o. male with a history of coronary disease, COPD, diabetes, hypertension, CKD 3B, alcohol dependence who presented 12/17/2024 with encephalopathy.    Evaluation, the patient is intubated and sedated.  I am unable to obtain history from the patient.    Per chart review, patient was in bed for 2 days, reporting that he was tired.  After 2 days of being in bed, the patient's family brought him into the emergency department.  He was found to have mildly elevated alcohol levels in his blood.  He was also found to have hepatocellular acute liver injury, and ROBERT.  The patient was started on IV albumin.    This morning, patient was found to have hypercapnic respiratory failure and was intubated.  The patient made 1.2 L of urine overnight.      Review of Systems  Review of Systems   Unable to perform ROS: Intubated       Past Medical History  Past Medical History:   Diagnosis Date    Acute myocardial infarction of other inferior wall, episode of care unspecified 06/25/2012    Acute myocardial infarction of other lateral wall, episode of care unspecified 06/25/2012    Asthma     Breath shortness     Bronchitis     Chest pain, unspecified 06/25/2012    Coronary atherosclerosis of native coronary artery 06/25/2012    Dental disorder     Diabetes (HCC)     Dizziness and giddiness 06/25/2012    High cholesterol     Hypertension     Mixed hyperlipidemia 06/25/2012    Pure hyperglyceridemia 06/25/2012    Renal disorder     S/P coronary artery stent placement 06/25/2012    Tobacco use disorder 06/25/2012       Surgical History  Past Surgical History:   Procedure Laterality Date    ORIF, FRACTURE, FEMUR  1994    OTHER      Right femur    OTHER CARDIAC SURGERY      STENT PLACEMENT         Family History  Family History    Problem Relation Age of Onset    Stroke Mother     Cancer Sister        Social History  Social History     Socioeconomic History    Marital status:      Spouse name: Not on file    Number of children: Not on file    Years of education: Not on file    Highest education level: 9th grade   Occupational History    Not on file   Tobacco Use    Smoking status: Every Day     Current packs/day: 0.25     Average packs/day: 0.2 packs/day for 42.7 years (10.7 ttl pk-yrs)     Types: Cigarettes     Start date: 4/5/1982    Smokeless tobacco: Never    Tobacco comments:     Only 6 cigarettes daily.  Patient declines smoking cessation at this time.    Vaping Use    Vaping status: Never Used   Substance and Sexual Activity    Alcohol use: Not Currently     Alcohol/week: 2.4 - 3.6 oz     Types: 4 - 6 Cans of beer per week     Comment: every week, 2x a week    Drug use: Never    Sexual activity: Yes     Partners: Female     Birth control/protection: None   Other Topics Concern    Not on file   Social History Narrative    Not on file     Social Drivers of Health     Financial Resource Strain: Not on file   Food Insecurity: No Food Insecurity (9/8/2023)    Hunger Vital Sign     Worried About Running Out of Food in the Last Year: Never true     Ran Out of Food in the Last Year: Never true   Transportation Needs: No Transportation Needs (9/8/2023)    PRAPARE - Transportation     Lack of Transportation (Medical): No     Lack of Transportation (Non-Medical): No   Physical Activity: Insufficiently Active (9/8/2023)    Exercise Vital Sign     Days of Exercise per Week: 5 days     Minutes of Exercise per Session: 20 min   Stress: Stress Concern Present (9/8/2023)    [employer] - Occupational Stress Questionnaire     Feeling of Stress : Rather much   Social Connections: Unknown (9/8/2023)    Social Connection and Isolation Panel [NHANES]     Frequency of Communication with Friends and Family: Not on file      Frequency of Social Gatherings with Friends and Family: Never     Attends Latter-day Services: Never     Active Member of Clubs or Organizations: Not on file     Attends Club or Organization Meetings: Not on file     Marital Status:    Intimate Partner Violence: Not on file   Housing Stability: Low Risk  (9/8/2023)    Housing Stability Vital Sign     Unable to Pay for Housing in the Last Year: No     Number of Places Lived in the Last Year: 1     Unstable Housing in the Last Year: No       Medications  Current Facility-Administered Medications   Medication Dose Route Frequency Provider Last Rate Last Admin    Respiratory Therapy Consult   Nebulization Continuous RT John Galvez M.D.        famotidine (Pepcid) tablet 20 mg  20 mg Enteral Tube DAILY John Galvez M.D.        Or    famotidine (Pepcid) injection 20 mg  20 mg Intravenous DAILY John Galvez M.D.   20 mg at 12/18/24 0930    senna-docusate (Pericolace Or Senokot S) 8.6-50 MG per tablet 2 Tablet  2 Tablet Enteral Tube BID John Galvez M.D.        And    polyethylene glycol/lytes (Miralax) Packet 1 Packet  1 Packet Enteral Tube QDAY PRN John Galvez M.D.        And    magnesium hydroxide (Milk Of Magnesia) suspension 30 mL  30 mL Enteral Tube QDAY PRN John Galvez M.D.        And    bisacodyl (Dulcolax) suppository 10 mg  10 mg Rectal QDAY PRN John Galvez M.D. MD Alert...ICU Electrolyte Replacement per Pharmacy   Other PHARMACY TO DOSE John Galvez M.D.        lidocaine (Xylocaine) 1 % injection 2 mL  2 mL Tracheal Tube Q30 MIN PRN John Galvez M.D.        propofol (DIPRIVAN) injection  0-80 mcg/kg/min (Ideal) Intravenous Continuous John Galvez M.D. 7.4 mL/hr at 12/18/24 0931 20 mcg/kg/min at 12/18/24 0931    ipratropium-albuterol (DUONEB) nebulizer solution  3 mL Nebulization Q2HRS PRN (RT) John Galvez M.D.   3 mL at 12/18/24 1124     ipratropium-albuterol (DUONEB) nebulizer solution  3 mL Nebulization Q4HRS (RT) John Galvez M.D.   3 mL at 12/18/24 0905    HYDROmorphone (Dilaudid) injection 0.5-2 mg  0.5-2 mg Intravenous Q HOUR PRN John Galvez M.D.        HYDROmorphone (DILAUDID) 0.2 mg/mL in 50mL NS (Continuous Infusion)   Intravenous Continuous John Galvez M.D.   Dose not Required at 12/18/24 0900    methylPREDNISolone sod succ (SOLU-MEDROL) 125 MG injection 62.5 mg  62.5 mg Intravenous Q6HRS John Galvez M.D.   62.5 mg at 12/18/24 1121    heparin injection 5,000 Units  5,000 Units Subcutaneous Q8HRS Lizeth Preciado M.D.   5,000 Units at 12/18/24 0604    acetaminophen (Tylenol) tablet 650 mg  650 mg Oral Q6HRS PRN Lizeth Preciado M.D.        hydrALAZINE (Apresoline) injection 10 mg  10 mg Intravenous Q4HRS PRN Lizeth Preciado M.D.        ondansetron (Zofran) syringe/vial injection 4 mg  4 mg Intravenous Q4HRS PRN Lizeth Preciado M.D.        ondansetron (Zofran ODT) dispertab 4 mg  4 mg Oral Q4HRS PRN Lizeth Preciado M.D.        promethazine (Phenergan) tablet 12.5-25 mg  12.5-25 mg Oral Q4HRS PRN Lizeth Preciado M.D.        promethazine (Phenergan) suppository 12.5-25 mg  12.5-25 mg Rectal Q4HRS PRN Lizeth Preciado M.D.        prochlorperazine (Compazine) injection 5-10 mg  5-10 mg Intravenous Q4HRS PRN Lizeth Preciado M.D.        albumin human 25% solution 25 g  25 g Intravenous Q8HRS Lizeth Preciado M.D. 150 mL/hr at 12/18/24 0608 25 g at 12/18/24 0608    thiamine (Vitamin B-1) tablet 100 mg  100 mg Oral DAILY Lizeth Preciado M.D.   100 mg at 12/18/24 0542    And    multivitamin tablet 1 Tablet  1 Tablet Oral DAILY Lizeth Preciado M.D.   1 Tablet at 12/18/24 0542    And    folic acid (Folvite) tablet 1 mg  1 mg Oral DAILY Lizeth Preciado M.D.   1 mg at 12/17/24 1627    [Held by provider] mometasone-formoterol (Dulera) 100-5 MCG/ACT inhaler 2 Puff  2 Puff Inhalation BID (RT)  Lizeth Preciado M.D.           Allergies  Allergies   Allergen Reactions    Lisinopril Cough       Physical Exam  Temp:  [36.6 °C (97.9 °F)] 36.6 °C (97.9 °F)  Pulse:  [77-92] 89  Resp:  [15-30] 28  BP: (114-153)/(56-75) 141/73  SpO2:  [74 %-100 %] 99 %    Physical Exam  Constitutional:       General: He is not in acute distress.     Appearance: He is well-developed. He is ill-appearing.      Interventions: He is sedated and intubated.   HENT:      Mouth/Throat:      Pharynx: Oropharynx is clear. No oropharyngeal exudate.   Eyes:      General: No scleral icterus.        Right eye: No discharge.         Left eye: No discharge.   Neck:      Trachea: No tracheal deviation.   Cardiovascular:      Heart sounds: Normal heart sounds. No murmur heard.  Pulmonary:      Effort: Pulmonary effort is normal. He is intubated.      Breath sounds: No stridor. No rales.   Abdominal:      Palpations: Abdomen is soft.      Tenderness: There is no abdominal tenderness.   Musculoskeletal:         General: No deformity.      Right lower leg: No edema.      Left lower leg: No edema.   Skin:     General: Skin is warm and dry.   Neurological:      Comments: Unable to obtain due to intubation and sedation   Psychiatric:      Comments: Unable to obtain due to intubation and sedation         Fluids      Laboratory  Labs reviewed, pertinent labs below.  Recent Labs     12/17/24  1020 12/18/24  0400   WBC 13.8* 12.5*   RBC 4.86 4.66*   HEMOGLOBIN 15.0 14.2   HEMATOCRIT 47.1 44.7   MCV 96.9 95.9   MCH 30.9 30.5   MCHC 31.8* 31.8*   RDW 61.1* 60.6*   PLATELETCT 163* 117*   MPV 10.4 9.9     Recent Labs     12/17/24  1020 12/17/24  1600 12/18/24  0400   SODIUM 132* 134* 133*   POTASSIUM 6.1* 6.0* 6.2*   CHLORIDE 94* 97 96   CO2 19* 25 26   GLUCOSE 113* 111* 113*   BUN 80* 77* 73*   CREATININE 3.91* 3.39* 2.94*   CALCIUM 8.5 8.3* 9.2     Recent Labs     12/18/24  0400   INR 1.75*            URINALYSIS:  Lab Results   Component Value Date/Time     COLORURINE Yellow 12/17/2024 1555    CLARITY Clear 12/17/2024 1555    SPECGRAVITY 1.015 12/17/2024 1555    PHURINE 5.0 12/17/2024 1555    KETONES Negative 12/17/2024 1555    PROTEINURIN 30 (A) 12/17/2024 1555    BILIRUBINUR Negative 12/17/2024 1555    UROBILU 0.2 12/17/2024 1555    NITRITE Negative 12/17/2024 1555    LEUKESTERAS Negative 12/17/2024 1555    OCCULTBLOOD Small (A) 12/17/2024 1555     [facility] [place]  [place]   Component Value Date/Time    TOTPROTUR 18.0 (H) 07/24/2024 1608      Lab Results   Component Value Date/Time    CREATININEU 143.13 09/27/2023 0734       Imaging interpreted by radiologist. Imaging reports reviewed with pertinent findings below  DX-ABDOMEN FOR TUBE PLACEMENT   Final Result      Enteric tube tip projects over the stomach      DX-CHEST-PORTABLE (1 VIEW)   Final Result      1.  Line and tube position as described above   2.  No visible pneumothorax   3.  Increased bibasilar opacities, atelectasis or pneumonia      US-RENAL   Final Result      No hydronephrosis   Ascites      CT-ABDOMEN-PELVIS W/O   Final Result      1.  No evidence of bowel obstruction or focal inflammatory change.      2.  Small of ascites within the abdomen and pelvis.      3.  Lobulated appearance of the surface of the liver likely representing presence of hepatocellular dysfunction.      4.  Bibasilar atelectasis with small bilateral pleural effusions.      DX-CHEST-PORTABLE (1 VIEW)   Final Result      No evidence of acute cardiopulmonary process.            Assessment/Plan  64 y.o. male with a history of coronary disease, COPD, diabetes, hypertension, CKD 3B, alcohol dependence who presented 12/17/2024 with encephalopathy, found to have acute hepatocellular liver injury, ROBERT, acute hypercapnic respiratory failure requiring intubation 12/18/2024.    1.  ROBERT on CKD 3B.  Unclear etiology of ROBERT.  Nonoliguric.  Creatinine and GFR overall improving with volume expansion.  Recommend continue albumin 25% 25 g IV every 8 hours  for 6 doses (48 hours total).  There is no emergent need for dialysis.  Avoid NSAIDs and other nephrotoxins.  Check renal function panel daily.  The patient might need dialysis if hyperkalemia does not improve with medical management.    2.  Hyponatremia, mild. Limit hypotonic fluids.  Do not order salt tabs.  Check serum sodium daily.      3.  Hyperkalemia, mild.  Recommend one-time dose of Lasix 80 mg IV.  Check renal function panel at least once daily.    4.  Acute hepatocellular liver injury, possibly from alcoholic hepatitis.  Avoid hepatotoxins.  Check comprehensive metabolic panel at least once daily.    5.  Acute hypercapnic ventilator dependent respiratory failure, intubated 12/18/2024.  Repeat blood gas with improved pH.  Continue ventilator support as needed.    6.  Thrombocytopenia, worsening, likely due to liver injury.  Check CBC daily.    Patient is critically ill.  Discussed with Dr. John Galvez.  Prognosis guarded.    Yogi Stark MD  Nephrology  [facility]

## 2024-12-18 NOTE — PROCEDURES
Date of Procedure:  12/18/2024    Title of Procedure:  Diagnostic and therapeutic flexible fiberoptic bronchoscopy with bronchoalveolar lavage    Indication for Procedure:   Atelectasis    Post-procedure Diagnoses:    1.  Normal endobronchial anatomy  2.  No endobronchial tumor identified  3.  Moderate juicy white secretions seen in the distal trachea, left mainstem bronchus and right mainstem bronchus    Narrative:    A time out was performed identifying the correct patient, correct procedure and correct location prior to this procedure.    The patient was sedated, intubated and ventilated at the time of this procedure.  The flexible fiberoptic bronchoscope was inserted through the lumen of the endotracheal tube and advanced into the distal trachea without difficulty.  The airways were examined to the subsegmental bronchus level bilaterally.    The endobronchial anatomy was normal.  No tumor was identified.    There was a moderate amount of juicy white secretions seen in the distal trachea, left mainstem bronchus and right mainstem bronchus.  I therapeutically suctioned these secretions.    Bronchoalveolar lavage was carried out in the basilar segments of the right lower lobe bronchi using the standard technique with good fluid return.    Bronchoalveolar lavage fluid from the right lower lobe is submitted to the laboratory for cytology, gram stain, culture and sensitivity, acid fast bacilli smear and culture and fungal culture.    The patient tolerated the procedure quite nicely.  No complications were apparent.  The heart rate and rhythm, blood pressure and oxygenation saturation were continuously monitored.    This was an EMERGENT procedure.  It was medically necessary to perform this procedure emergently to prevent life threatening deterioration.    John Galvez MD  Pulmonary and Critical Care Medicine

## 2024-12-19 ENCOUNTER — APPOINTMENT (OUTPATIENT)
Dept: RADIOLOGY | Facility: MEDICAL CENTER | Age: 64
DRG: 682 | End: 2024-12-19
Attending: INTERNAL MEDICINE
Payer: COMMERCIAL

## 2024-12-19 ENCOUNTER — APPOINTMENT (OUTPATIENT)
Dept: CARDIOLOGY | Facility: MEDICAL CENTER | Age: 64
DRG: 682 | End: 2024-12-19
Attending: INTERNAL MEDICINE
Payer: COMMERCIAL

## 2024-12-19 LAB
ALBUMIN SERPL BCP-MCNC: 4.2 G/DL (ref 3.2–4.9)
ALBUMIN/GLOB SERPL: 2.1 G/DL
ALP SERPL-CCNC: 131 U/L (ref 30–99)
ALT SERPL-CCNC: 1057 U/L (ref 2–50)
ANION GAP SERPL CALC-SCNC: 11 MMOL/L (ref 7–16)
ANION GAP SERPL CALC-SCNC: 13 MMOL/L (ref 7–16)
ANION GAP SERPL CALC-SCNC: 13 MMOL/L (ref 7–16)
ANION GAP SERPL CALC-SCNC: 14 MMOL/L (ref 7–16)
AST SERPL-CCNC: 511 U/L (ref 12–45)
BACTERIA UR CULT: NORMAL
BASE EXCESS BLDA CALC-SCNC: 11 MMOL/L (ref -4–3)
BASOPHILS # BLD AUTO: 0.1 % (ref 0–1.8)
BASOPHILS # BLD: 0.01 K/UL (ref 0–0.12)
BILIRUB SERPL-MCNC: 2 MG/DL (ref 0.1–1.5)
BODY TEMPERATURE: ABNORMAL DEGREES
BREATHS SETTING VENT: 24
BUN SERPL-MCNC: 52 MG/DL (ref 8–22)
BUN SERPL-MCNC: 53 MG/DL (ref 8–22)
BUN SERPL-MCNC: 57 MG/DL (ref 8–22)
BUN SERPL-MCNC: 57 MG/DL (ref 8–22)
CALCIUM ALBUM COR SERPL-MCNC: 9.3 MG/DL (ref 8.5–10.5)
CALCIUM SERPL-MCNC: 9.2 MG/DL (ref 8.5–10.5)
CALCIUM SERPL-MCNC: 9.3 MG/DL (ref 8.5–10.5)
CALCIUM SERPL-MCNC: 9.5 MG/DL (ref 8.5–10.5)
CALCIUM SERPL-MCNC: 9.5 MG/DL (ref 8.5–10.5)
CHLORIDE SERPL-SCNC: 96 MMOL/L (ref 96–112)
CHLORIDE SERPL-SCNC: 97 MMOL/L (ref 96–112)
CHLORIDE SERPL-SCNC: 97 MMOL/L (ref 96–112)
CHLORIDE SERPL-SCNC: 98 MMOL/L (ref 96–112)
CO2 BLDA-SCNC: 36 MMOL/L (ref 32–48)
CO2 SERPL-SCNC: 31 MMOL/L (ref 20–33)
CO2 SERPL-SCNC: 32 MMOL/L (ref 20–33)
CREAT SERPL-MCNC: 2.01 MG/DL (ref 0.5–1.4)
CREAT SERPL-MCNC: 2.23 MG/DL (ref 0.5–1.4)
CREAT SERPL-MCNC: 2.25 MG/DL (ref 0.5–1.4)
CREAT SERPL-MCNC: 2.33 MG/DL (ref 0.5–1.4)
DELSYS IDSYS: ABNORMAL
END TIDAL CARBON DIOXIDE IECO2: 25 MMHG
EOSINOPHIL # BLD AUTO: 0 K/UL (ref 0–0.51)
EOSINOPHIL NFR BLD: 0 % (ref 0–6.9)
ERYTHROCYTE [DISTWIDTH] IN BLOOD BY AUTOMATED COUNT: 53.9 FL (ref 35.9–50)
EST. AVERAGE GLUCOSE BLD GHB EST-MCNC: 146 MG/DL
GFR SERPLBLD CREATININE-BSD FMLA CKD-EPI: 30 ML/MIN/1.73 M 2
GFR SERPLBLD CREATININE-BSD FMLA CKD-EPI: 32 ML/MIN/1.73 M 2
GFR SERPLBLD CREATININE-BSD FMLA CKD-EPI: 32 ML/MIN/1.73 M 2
GFR SERPLBLD CREATININE-BSD FMLA CKD-EPI: 36 ML/MIN/1.73 M 2
GLOBULIN SER CALC-MCNC: 2 G/DL (ref 1.9–3.5)
GLUCOSE BLD STRIP.AUTO-MCNC: 129 MG/DL (ref 65–99)
GLUCOSE BLD STRIP.AUTO-MCNC: 136 MG/DL (ref 65–99)
GLUCOSE BLD STRIP.AUTO-MCNC: 148 MG/DL (ref 65–99)
GLUCOSE BLD STRIP.AUTO-MCNC: 151 MG/DL (ref 65–99)
GLUCOSE SERPL-MCNC: 138 MG/DL (ref 65–99)
GLUCOSE SERPL-MCNC: 145 MG/DL (ref 65–99)
GLUCOSE SERPL-MCNC: 146 MG/DL (ref 65–99)
GLUCOSE SERPL-MCNC: 150 MG/DL (ref 65–99)
HBA1C MFR BLD: 6.7 % (ref 4–5.6)
HCO3 BLDA-SCNC: 35.1 MMOL/L (ref 21–28)
HCT VFR BLD AUTO: 38.9 % (ref 42–52)
HGB BLD-MCNC: 13.5 G/DL (ref 14–18)
HOROWITZ INDEX BLDA+IHG-RTO: 117 MM[HG]
IMM GRANULOCYTES # BLD AUTO: 0.06 K/UL (ref 0–0.11)
IMM GRANULOCYTES NFR BLD AUTO: 0.6 % (ref 0–0.9)
LACTATE BLD-SCNC: 1.1 MMOL/L (ref 0.5–2)
LV EJECT FRACT  99904: 45
LV EJECT FRACT MOD 2C 99903: 49.75
LV EJECT FRACT MOD 4C 99902: 54.08
LV EJECT FRACT MOD BP 99901: 52.3
LYMPHOCYTES # BLD AUTO: 0.09 K/UL (ref 1–4.8)
LYMPHOCYTES NFR BLD: 0.9 % (ref 22–41)
MAGNESIUM SERPL-MCNC: 2 MG/DL (ref 1.5–2.5)
MCH RBC QN AUTO: 31 PG (ref 27–33)
MCHC RBC AUTO-ENTMCNC: 34.7 G/DL (ref 32.3–36.5)
MCV RBC AUTO: 89.4 FL (ref 81.4–97.8)
MODE IMODE: ABNORMAL
MONOCYTES # BLD AUTO: 0.36 K/UL (ref 0–0.85)
MONOCYTES NFR BLD AUTO: 3.5 % (ref 0–13.4)
NEUTROPHILS # BLD AUTO: 9.79 K/UL (ref 1.82–7.42)
NEUTROPHILS NFR BLD: 94.9 % (ref 44–72)
NRBC # BLD AUTO: 0.05 K/UL
NRBC BLD-RTO: 0.5 /100 WBC (ref 0–0.2)
O2/TOTAL GAS SETTING VFR VENT: 60 %
PCO2 BLDA: 42.5 MMHG (ref 32–48)
PCO2 TEMP ADJ BLDA: 42.1 MMHG (ref 32–48)
PEEP END EXPIRATORY PRESSURE IPEEP: 10 CMH20
PH BLDA: 7.53 [PH] (ref 7.35–7.45)
PH TEMP ADJ BLDA: 7.53 [PH] (ref 7.35–7.45)
PHOSPHATE SERPL-MCNC: 2.3 MG/DL (ref 2.5–4.5)
PLATELET # BLD AUTO: 148 K/UL (ref 164–446)
PMV BLD AUTO: 10.8 FL (ref 9–12.9)
PO2 BLDA: 70 MMHG (ref 83–108)
PO2 TEMP ADJ BLDA: 69 MMHG (ref 64–87)
POTASSIUM SERPL-SCNC: 3.1 MMOL/L (ref 3.6–5.5)
POTASSIUM SERPL-SCNC: 3.2 MMOL/L (ref 3.6–5.5)
POTASSIUM SERPL-SCNC: 4 MMOL/L (ref 3.6–5.5)
POTASSIUM SERPL-SCNC: 4.5 MMOL/L (ref 3.6–5.5)
PROT SERPL-MCNC: 6.2 G/DL (ref 6–8.2)
RBC # BLD AUTO: 4.35 M/UL (ref 4.7–6.1)
SAO2 % BLDA: 95 % (ref 93–99)
SIGNIFICANT IND 70042: NORMAL
SITE SITE: NORMAL
SODIUM SERPL-SCNC: 140 MMOL/L (ref 135–145)
SODIUM SERPL-SCNC: 142 MMOL/L (ref 135–145)
SOURCE SOURCE: NORMAL
SPECIMEN DRAWN FROM PATIENT: ABNORMAL
TIDAL VOLUME IVT: 370 ML
WBC # BLD AUTO: 10.3 K/UL (ref 4.8–10.8)

## 2024-12-19 PROCEDURE — 94669 MECHANICAL CHEST WALL OSCILL: CPT

## 2024-12-19 PROCEDURE — A9270 NON-COVERED ITEM OR SERVICE: HCPCS | Performed by: INTERNAL MEDICINE

## 2024-12-19 PROCEDURE — 700117 HCHG RX CONTRAST REV CODE 255: Performed by: EMERGENCY MEDICINE

## 2024-12-19 PROCEDURE — 700111 HCHG RX REV CODE 636 W/ 250 OVERRIDE (IP): Mod: JZ | Performed by: INTERNAL MEDICINE

## 2024-12-19 PROCEDURE — 85025 COMPLETE CBC W/AUTO DIFF WBC: CPT

## 2024-12-19 PROCEDURE — 94640 AIRWAY INHALATION TREATMENT: CPT

## 2024-12-19 PROCEDURE — 94003 VENT MGMT INPAT SUBQ DAY: CPT

## 2024-12-19 PROCEDURE — 71045 X-RAY EXAM CHEST 1 VIEW: CPT

## 2024-12-19 PROCEDURE — 36600 WITHDRAWAL OF ARTERIAL BLOOD: CPT

## 2024-12-19 PROCEDURE — 93306 TTE W/DOPPLER COMPLETE: CPT | Mod: 26 | Performed by: INTERNAL MEDICINE

## 2024-12-19 PROCEDURE — 94799 UNLISTED PULMONARY SVC/PX: CPT

## 2024-12-19 PROCEDURE — 80053 COMPREHEN METABOLIC PANEL: CPT

## 2024-12-19 PROCEDURE — 700105 HCHG RX REV CODE 258: Performed by: INTERNAL MEDICINE

## 2024-12-19 PROCEDURE — 700102 HCHG RX REV CODE 250 W/ 637 OVERRIDE(OP): Performed by: INTERNAL MEDICINE

## 2024-12-19 PROCEDURE — 93306 TTE W/DOPPLER COMPLETE: CPT

## 2024-12-19 PROCEDURE — 99291 CRITICAL CARE FIRST HOUR: CPT | Performed by: INTERNAL MEDICINE

## 2024-12-19 PROCEDURE — 84100 ASSAY OF PHOSPHORUS: CPT

## 2024-12-19 PROCEDURE — 700101 HCHG RX REV CODE 250: Performed by: INTERNAL MEDICINE

## 2024-12-19 PROCEDURE — P9047 ALBUMIN (HUMAN), 25%, 50ML: HCPCS | Mod: JZ | Performed by: INTERNAL MEDICINE

## 2024-12-19 PROCEDURE — 80048 BASIC METABOLIC PNL TOTAL CA: CPT

## 2024-12-19 PROCEDURE — 82962 GLUCOSE BLOOD TEST: CPT | Mod: 91

## 2024-12-19 PROCEDURE — 700111 HCHG RX REV CODE 636 W/ 250 OVERRIDE (IP): Performed by: INTERNAL MEDICINE

## 2024-12-19 PROCEDURE — 770022 HCHG ROOM/CARE - ICU (200)

## 2024-12-19 PROCEDURE — 82803 BLOOD GASES ANY COMBINATION: CPT

## 2024-12-19 PROCEDURE — A9270 NON-COVERED ITEM OR SERVICE: HCPCS | Performed by: NURSE PRACTITIONER

## 2024-12-19 PROCEDURE — 83036 HEMOGLOBIN GLYCOSYLATED A1C: CPT

## 2024-12-19 PROCEDURE — 94150 VITAL CAPACITY TEST: CPT

## 2024-12-19 PROCEDURE — 83605 ASSAY OF LACTIC ACID: CPT

## 2024-12-19 PROCEDURE — 99292 CRITICAL CARE ADDL 30 MIN: CPT | Performed by: INTERNAL MEDICINE

## 2024-12-19 PROCEDURE — 99232 SBSQ HOSP IP/OBS MODERATE 35: CPT | Performed by: INTERNAL MEDICINE

## 2024-12-19 PROCEDURE — 700102 HCHG RX REV CODE 250 W/ 637 OVERRIDE(OP): Performed by: NURSE PRACTITIONER

## 2024-12-19 PROCEDURE — 83735 ASSAY OF MAGNESIUM: CPT

## 2024-12-19 RX ORDER — POTASSIUM CHLORIDE 7.45 MG/ML
10 INJECTION INTRAVENOUS
Status: COMPLETED | OUTPATIENT
Start: 2024-12-19 | End: 2024-12-19

## 2024-12-19 RX ORDER — DEXMEDETOMIDINE HYDROCHLORIDE 4 UG/ML
.1-1 INJECTION, SOLUTION INTRAVENOUS CONTINUOUS
Status: DISCONTINUED | OUTPATIENT
Start: 2024-12-19 | End: 2024-12-20

## 2024-12-19 RX ORDER — LORAZEPAM 2 MG/ML
1-2 INJECTION INTRAMUSCULAR
Status: DISCONTINUED | OUTPATIENT
Start: 2024-12-19 | End: 2024-12-20

## 2024-12-19 RX ORDER — DEXMEDETOMIDINE HYDROCHLORIDE 4 UG/ML
.1-1.5 INJECTION, SOLUTION INTRAVENOUS CONTINUOUS
Status: DISCONTINUED | OUTPATIENT
Start: 2024-12-19 | End: 2024-12-19

## 2024-12-19 RX ORDER — POTASSIUM CHLORIDE 14.9 MG/ML
20 INJECTION INTRAVENOUS ONCE
Status: DISCONTINUED | OUTPATIENT
Start: 2024-12-19 | End: 2024-12-19

## 2024-12-19 RX ORDER — POTASSIUM CHLORIDE 7.45 MG/ML
10 INJECTION INTRAVENOUS ONCE
Status: COMPLETED | OUTPATIENT
Start: 2024-12-19 | End: 2024-12-19

## 2024-12-19 RX ORDER — METHYLPREDNISOLONE SODIUM SUCCINATE 40 MG/ML
30 INJECTION, POWDER, LYOPHILIZED, FOR SOLUTION INTRAMUSCULAR; INTRAVENOUS EVERY 6 HOURS
Status: DISCONTINUED | OUTPATIENT
Start: 2024-12-19 | End: 2024-12-20

## 2024-12-19 RX ORDER — LORAZEPAM 2 MG/ML
2 INJECTION INTRAMUSCULAR EVERY 4 HOURS
Status: DISCONTINUED | OUTPATIENT
Start: 2024-12-19 | End: 2024-12-19

## 2024-12-19 RX ORDER — HYDROMORPHONE HYDROCHLORIDE 1 MG/ML
.5-1 INJECTION, SOLUTION INTRAMUSCULAR; INTRAVENOUS; SUBCUTANEOUS EVERY 4 HOURS PRN
Status: DISCONTINUED | OUTPATIENT
Start: 2024-12-19 | End: 2024-12-20

## 2024-12-19 RX ADMIN — METHYLPREDNISOLONE SODIUM SUCCINATE 30 MG: 40 INJECTION, POWDER, FOR SOLUTION INTRAMUSCULAR; INTRAVENOUS at 17:34

## 2024-12-19 RX ADMIN — SENNOSIDES AND DOCUSATE SODIUM 2 TABLET: 50; 8.6 TABLET ORAL at 05:40

## 2024-12-19 RX ADMIN — DIBASIC SODIUM PHOSPHATE, MONOBASIC POTASSIUM PHOSPHATE AND MONOBASIC SODIUM PHOSPHATE 500 MG: 852; 155; 130 TABLET ORAL at 11:40

## 2024-12-19 RX ADMIN — THIAMINE HYDROCHLORIDE 500 MG: 100 INJECTION, SOLUTION INTRAMUSCULAR; INTRAVENOUS at 05:33

## 2024-12-19 RX ADMIN — METHYLPREDNISOLONE SODIUM SUCCINATE 30 MG: 40 INJECTION, POWDER, FOR SOLUTION INTRAMUSCULAR; INTRAVENOUS at 11:42

## 2024-12-19 RX ADMIN — IPRATROPIUM BROMIDE AND ALBUTEROL SULFATE 3 ML: .5; 2.5 SOLUTION RESPIRATORY (INHALATION) at 02:09

## 2024-12-19 RX ADMIN — FOLIC ACID 1 MG: 1 TABLET ORAL at 05:40

## 2024-12-19 RX ADMIN — THERA TABS 1 TABLET: TAB at 05:40

## 2024-12-19 RX ADMIN — HEPARIN SODIUM 5000 UNITS: 5000 INJECTION, SOLUTION INTRAVENOUS; SUBCUTANEOUS at 05:40

## 2024-12-19 RX ADMIN — HEPARIN SODIUM 5000 UNITS: 5000 INJECTION, SOLUTION INTRAVENOUS; SUBCUTANEOUS at 14:34

## 2024-12-19 RX ADMIN — DIBASIC SODIUM PHOSPHATE, MONOBASIC POTASSIUM PHOSPHATE AND MONOBASIC SODIUM PHOSPHATE 500 MG: 852; 155; 130 TABLET ORAL at 17:36

## 2024-12-19 RX ADMIN — HUMAN ALBUMIN MICROSPHERES AND PERFLUTREN 3 ML: 10; .22 INJECTION, SOLUTION INTRAVENOUS at 18:00

## 2024-12-19 RX ADMIN — PROPOFOL 40 MCG/KG/MIN: 10 INJECTION, EMULSION INTRAVENOUS at 01:52

## 2024-12-19 RX ADMIN — POTASSIUM BICARBONATE 50 MEQ: 978 TABLET, EFFERVESCENT ORAL at 01:01

## 2024-12-19 RX ADMIN — METOPROLOL TARTRATE 25 MG: 25 TABLET, FILM COATED ORAL at 17:36

## 2024-12-19 RX ADMIN — HEPARIN SODIUM 5000 UNITS: 5000 INJECTION, SOLUTION INTRAVENOUS; SUBCUTANEOUS at 22:05

## 2024-12-19 RX ADMIN — METHYLPREDNISOLONE SODIUM SUCCINATE 62.5 MG: 125 INJECTION, POWDER, FOR SOLUTION INTRAMUSCULAR; INTRAVENOUS at 05:39

## 2024-12-19 RX ADMIN — METOPROLOL TARTRATE 25 MG: 25 TABLET, FILM COATED ORAL at 05:40

## 2024-12-19 RX ADMIN — DEXMEDETOMIDINE HYDROCHLORIDE 0.2 MCG/KG/HR: 4 INJECTION, SOLUTION INTRAVENOUS at 09:08

## 2024-12-19 RX ADMIN — ALBUMIN (HUMAN) 25 G: 0.25 INJECTION, SOLUTION INTRAVENOUS at 05:38

## 2024-12-19 RX ADMIN — ASPIRIN 81 MG: 81 TABLET, CHEWABLE ORAL at 05:40

## 2024-12-19 RX ADMIN — POTASSIUM CHLORIDE 10 MEQ: 7.45 INJECTION INTRAVENOUS at 11:01

## 2024-12-19 RX ADMIN — IPRATROPIUM BROMIDE AND ALBUTEROL SULFATE 3 ML: .5; 2.5 SOLUTION RESPIRATORY (INHALATION) at 14:15

## 2024-12-19 RX ADMIN — IPRATROPIUM BROMIDE AND ALBUTEROL SULFATE 3 ML: .5; 2.5 SOLUTION RESPIRATORY (INHALATION) at 22:11

## 2024-12-19 RX ADMIN — POTASSIUM CHLORIDE 10 MEQ: 7.45 INJECTION INTRAVENOUS at 12:25

## 2024-12-19 RX ADMIN — IPRATROPIUM BROMIDE AND ALBUTEROL SULFATE 3 ML: .5; 2.5 SOLUTION RESPIRATORY (INHALATION) at 10:16

## 2024-12-19 RX ADMIN — IPRATROPIUM BROMIDE AND ALBUTEROL SULFATE 3 ML: .5; 2.5 SOLUTION RESPIRATORY (INHALATION) at 06:13

## 2024-12-19 RX ADMIN — MOMETASONE FUROATE AND FORMOTEROL FUMARATE DIHYDRATE 2 PUFF: 100; 5 AEROSOL RESPIRATORY (INHALATION) at 19:56

## 2024-12-19 RX ADMIN — SODIUM CHLORIDE, POTASSIUM CHLORIDE, SODIUM LACTATE AND CALCIUM CHLORIDE: 600; 310; 30; 20 INJECTION, SOLUTION INTRAVENOUS at 06:08

## 2024-12-19 RX ADMIN — FAMOTIDINE 20 MG: 20 TABLET, FILM COATED ORAL at 05:40

## 2024-12-19 RX ADMIN — SENNOSIDES AND DOCUSATE SODIUM 2 TABLET: 50; 8.6 TABLET ORAL at 17:39

## 2024-12-19 RX ADMIN — METHYLPREDNISOLONE SODIUM SUCCINATE 30 MG: 40 INJECTION, POWDER, FOR SOLUTION INTRAMUSCULAR; INTRAVENOUS at 23:47

## 2024-12-19 RX ADMIN — POTASSIUM CHLORIDE 10 MEQ: 7.45 INJECTION INTRAVENOUS at 09:39

## 2024-12-19 RX ADMIN — IPRATROPIUM BROMIDE AND ALBUTEROL SULFATE 3 ML: .5; 2.5 SOLUTION RESPIRATORY (INHALATION) at 19:56

## 2024-12-19 ASSESSMENT — PAIN DESCRIPTION - PAIN TYPE
TYPE: ACUTE PAIN

## 2024-12-19 ASSESSMENT — LIFESTYLE VARIABLES
NAUSEA AND VOMITING: NO NAUSEA AND NO VOMITING
TREMOR: *
TOTAL SCORE: 0
ORIENTATION AND CLOUDING OF SENSORIUM: ORIENTED AND CAN DO SERIAL ADDITIONS
AUDITORY DISTURBANCES: NOT PRESENT
ANXIETY: MILDLY ANXIOUS
ANXIETY: NO ANXIETY (AT EASE)
VISUAL DISTURBANCES: NOT PRESENT
HEADACHE, FULLNESS IN HEAD: NOT PRESENT
PAROXYSMAL SWEATS: NO SWEAT VISIBLE
TOTAL SCORE: 4
NAUSEA AND VOMITING: NO NAUSEA AND NO VOMITING
AUDITORY DISTURBANCES: NOT PRESENT
VISUAL DISTURBANCES: NOT PRESENT
PAROXYSMAL SWEATS: NO SWEAT VISIBLE
TREMOR: NO TREMOR
AGITATION: NORMAL ACTIVITY
HEADACHE, FULLNESS IN HEAD: NOT PRESENT
ORIENTATION AND CLOUDING OF SENSORIUM: ORIENTED AND CAN DO SERIAL ADDITIONS
AGITATION: SOMEWHAT MORE THAN NORMAL ACTIVITY

## 2024-12-19 ASSESSMENT — ENCOUNTER SYMPTOMS
SHORTNESS OF BREATH: 0
ABDOMINAL PAIN: 0

## 2024-12-19 ASSESSMENT — PULMONARY FUNCTION TESTS: FVC: 0.95

## 2024-12-19 ASSESSMENT — FIBROSIS 4 INDEX: FIB4 SCORE: 25.73

## 2024-12-19 NOTE — PROGRESS NOTES
Nephrology Daily Progress Note    Date of Service  12/19/2024    Chief Complaint  64 y.o. male with a history of coronary disease, COPD, diabetes, hypertension, CKD 3B, alcohol dependence who presented 12/17/2024 with encephalopathy, found to have acute hepatocellular liver injury, ROBERT, acute hypercapnic respiratory failure requiring intubation 12/18/2024.    Interval Problem Update  12/19 -urine output 6.9 L after 1 dose of IV Lasix yesterday.  Patient remains intubated, but awake and following commands.  Indicates he is not in pain or short of breath.    Review of Systems  Review of Systems   Respiratory:  Negative for shortness of breath.    Cardiovascular:  Negative for chest pain.   Gastrointestinal:  Negative for abdominal pain.   All other systems reviewed and are negative.       Physical Exam  Pulse:  [81-97] 89  Resp:  [11-27] 17  BP: (115-149)/(56-81) 149/81  SpO2:  [91 %-98 %] 94 %    Physical Exam  Constitutional:       General: He is awake. He is not in acute distress.     Appearance: He is well-developed. He is ill-appearing.      Interventions: He is intubated.   HENT:      Mouth/Throat:      Pharynx: Oropharynx is clear. No oropharyngeal exudate.   Eyes:      General: No scleral icterus.        Right eye: No discharge.         Left eye: No discharge.   Neck:      Trachea: No tracheal deviation.   Cardiovascular:      Heart sounds: Normal heart sounds. No murmur heard.  Pulmonary:      Effort: Pulmonary effort is normal. He is intubated.      Breath sounds: No stridor. No rales.   Abdominal:      Palpations: Abdomen is soft.      Tenderness: There is no abdominal tenderness.   Musculoskeletal:         General: No deformity.      Right lower leg: No edema.      Left lower leg: No edema.   Skin:     General: Skin is warm and dry.   Neurological:      Comments: Follows commands appropriately   Psychiatric:      Comments: Unable to obtain due to intubation           Fluids    Intake/Output Summary (Last  24 hours) at 12/19/2024 1527  Last data filed at 12/19/2024 1453  Gross per 24 hour   Intake 3515.53 ml   Output 5055 ml   Net -1539.47 ml       Laboratory  Labs reviewed, pertinent labs below.  Recent Labs     12/17/24  1020 12/18/24  0400 12/19/24  0530   WBC 13.8* 12.5* 10.3   RBC 4.86 4.66* 4.35*   HEMOGLOBIN 15.0 14.2 13.5*   HEMATOCRIT 47.1 44.7 38.9*   MCV 96.9 95.9 89.4   MCH 30.9 30.5 31.0   MCHC 31.8* 31.8* 34.7   RDW 61.1* 60.6* 53.9*   PLATELETCT 163* 117* 148*   MPV 10.4 9.9 10.8     Recent Labs     12/18/24  2344 12/19/24  0530 12/19/24  1404   SODIUM 142 142 140   POTASSIUM 3.2* 3.1* 4.0   CHLORIDE 96 97 97   CO2 32 32 32   GLUCOSE 145* 150* 146*   BUN 57* 57* 52*   CREATININE 2.23* 2.33* 2.25*   CALCIUM 9.3 9.5 9.5     Recent Labs     12/18/24  0400   INR 1.75*           URINALYSIS:  Lab Results   Component Value Date/Time    COLORURINE Yellow 12/17/2024 1555    CLARITY Clear 12/17/2024 1555    SPECGRAVITY 1.015 12/17/2024 1555    PHURINE 5.0 12/17/2024 1555    KETONES Negative 12/17/2024 1555    PROTEINURIN 30 (A) 12/17/2024 1555    BILIRUBINUR Negative 12/17/2024 1555    UROBILU 0.2 12/17/2024 1555    NITRITE Negative 12/17/2024 1555    LEUKESTERAS Negative 12/17/2024 1555    OCCULTBLOOD Small (A) 12/17/2024 1555     UPC  Lab Results   Component Value Date/Time    TOTPROTUR 18.0 (H) 07/24/2024 1608      Lab Results   Component Value Date/Time    CREATININEU 143.13 09/27/2023 0734         Imaging interpreted by radiologist. Imaging reports reviewed with pertinent findings below  DX-CHEST-PORTABLE (1 VIEW)   Final Result         1.  No acute cardiopulmonary disease.   2.  Atherosclerosis      DX-ABDOMEN FOR TUBE PLACEMENT   Final Result      Enteric tube tip projects over the stomach      DX-CHEST-PORTABLE (1 VIEW)   Final Result      1.  Line and tube position as described above   2.  No visible pneumothorax   3.  Increased bibasilar opacities, atelectasis or pneumonia      US-RENAL   Final Result       No hydronephrosis   Ascites      CT-ABDOMEN-PELVIS W/O   Final Result      1.  No evidence of bowel obstruction or focal inflammatory change.      2.  Small of ascites within the abdomen and pelvis.      3.  Lobulated appearance of the surface of the liver likely representing presence of hepatocellular dysfunction.      4.  Bibasilar atelectasis with small bilateral pleural effusions.      DX-CHEST-PORTABLE (1 VIEW)   Final Result      No evidence of acute cardiopulmonary process.      EC-ECHOCARDIOGRAM COMPLETE W/O CONT    (Results Pending)         Current Facility-Administered Medications   Medication Dose Route Frequency Provider Last Rate Last Admin    methylPREDNISolone (SOLU-MEDROL) 40 MG injection 30 mg  30 mg Intravenous Q6HRS John Galvez M.D.   30 mg at 12/19/24 1142    dexmedetomidine (Precedex) 400 mcg/100mL infusion  0.1-1.5 mcg/kg/hr (Ideal) Intravenous Continuous John Galvez M.D.   Stopped at 12/19/24 1426    phosphorus (K-Phos-Neutral) per tablet 500 mg  500 mg Enteral Tube Q6HRS John Galvez M.D.   500 mg at 12/19/24 1140    famotidine (Pepcid) tablet 20 mg  20 mg Enteral Tube DAILY John Galvez M.D.   20 mg at 12/19/24 0540    Or    famotidine (Pepcid) injection 20 mg  20 mg Intravenous DAILY John Galvez M.D.   20 mg at 12/18/24 0930    senna-docusate (Pericolace Or Senokot S) 8.6-50 MG per tablet 2 Tablet  2 Tablet Enteral Tube BID John Galvez M.D.   2 Tablet at 12/19/24 0540    And    polyethylene glycol/lytes (Miralax) Packet 1 Packet  1 Packet Enteral Tube QDAY PRN John Galvez M.D.        And    magnesium hydroxide (Milk Of Magnesia) suspension 30 mL  30 mL Enteral Tube QDAY PRN John Galvez M.D.        And    bisacodyl (Dulcolax) suppository 10 mg  10 mg Rectal QDAY PRN John Galvez M.D. MD Alert...ICU Electrolyte Replacement per Pharmacy   Other PHARMACY TO DOSE John Galvez  M.D.        lidocaine (Xylocaine) 1 % injection 2 mL  2 mL Tracheal Tube Q30 MIN PRN John Galvez M.D.        ipratropium-albuterol (DUONEB) nebulizer solution  3 mL Nebulization Q2HRS PRN (RT) John Galvez M.D.   3 mL at 12/18/24 1124    ipratropium-albuterol (DUONEB) nebulizer solution  3 mL Nebulization Q4HRS (RT) John Galvez M.D.   3 mL at 12/19/24 1415    HYDROmorphone (Dilaudid) injection 0.5-2 mg  0.5-2 mg Intravenous Q HOUR PRN John Galvez M.D.   2 mg at 12/18/24 2339    HYDROmorphone (DILAUDID) 0.2 mg/mL in 50mL NS (Continuous Infusion)   Intravenous Continuous John Galvez M.D.   Dose not Required at 12/18/24 0900    acetaminophen (Tylenol) tablet 650 mg  650 mg Enteral Tube Q6HRS PRN John Galvez M.D.        promethazine (Phenergan) tablet 12.5-25 mg  12.5-25 mg Enteral Tube Q4HRS PRN John Galvez M.D.        ondansetron (Zofran ODT) dispertab 4 mg  4 mg Enteral Tube Q4HRS PRN John Galvez M.D.        multivitamin tablet 1 Tablet  1 Tablet Enteral Tube DAILY John Galvez M.D.   1 Tablet at 12/19/24 0540    And    folic acid (Folvite) tablet 1 mg  1 mg Enteral Tube DAILY John Galvez M.D.   1 mg at 12/19/24 0540    thiamine (B-1) 500 mg in dextrose 5% 100 mL IVPB  500 mg Intravenous DAILY John Galvez M.D. 200 mL/hr at 12/19/24 0533 500 mg at 12/19/24 0533    metoprolol tartrate (Lopressor) tablet 25 mg  25 mg Enteral Tube BID John Galvez M.D.   25 mg at 12/19/24 0540    aspirin (Asa) chewable tab 81 mg  81 mg Enteral Tube DAILY John Galvez M.D.   81 mg at 12/19/24 0540    insulin lispro (HumaLOG,AdmeLOG) subcutaneous injection  2-9 Units Subcutaneous Q6HRS John Galvez M.D.   2 Units at 12/18/24 2347    And    dextrose 50% (D50W) injection 25 g  25 g Intravenous Q15 MIN PRN John Galvez M.D.        Respiratory Therapy Consult   Nebulization Continuous RT  John Galvez M.D.        heparin injection 5,000 Units  5,000 Units Subcutaneous Q8HRS Lizeth Preciado M.D.   5,000 Units at 12/19/24 1434    hydrALAZINE (Apresoline) injection 10 mg  10 mg Intravenous Q4HRS PRN Lizeth Preciado M.D.        ondansetron (Zofran) syringe/vial injection 4 mg  4 mg Intravenous Q4HRS PRN Lizeth Preciado M.D.        promethazine (Phenergan) suppository 12.5-25 mg  12.5-25 mg Rectal Q4HRS PRN Lizeth Preciado M.D.        prochlorperazine (Compazine) injection 5-10 mg  5-10 mg Intravenous Q4HRS PRN Lizeth Preciado M.D.        [Held by provider] mometasone-formoterol (Dulera) 100-5 MCG/ACT inhaler 2 Puff  2 Puff Inhalation BID (RT) Lizeth Preciado M.D.             Assessment/Plan  64 y.o. male with a history of coronary disease, COPD, diabetes, hypertension, CKD 3B, alcohol dependence who presented 12/17/2024 with encephalopathy, found to have acute hepatocellular liver injury, ROBERT, acute hypercapnic respiratory failure requiring intubation 12/18/2024.     1.  ROBERT on CKD 3B.  Nonoliguric.  Creatinine and GFR back to baseline.  Continue 48-hour treatment of albumin 25% 25 g IV every 8 hours, will finish tonight.  No need for dialysis.  Avoid NSAIDs and other nephrotoxins.  Check renal function panel at least once daily.     2.  Hyponatremia, improved. Limit hypotonic fluids.  Do not order salt tabs.  Check serum sodium daily.      3.  Hyperkalemia, improved with 1 dose of Lasix, and then patient became hypokalemic.  Recommend cautious repletion of potassium in the setting of ROBERT on CKD 3B.  Check renal function panel at least once daily.     4.  Acute hepatocellular liver injury, possibly from alcoholic hepatitis.  Avoid hepatotoxins.  Check comprehensive metabolic panel at least once daily.  Recommend alcohol cessation counseling.     5.  Acute hypercapnic ventilator dependent respiratory failure, intubated 12/18/2024.  Patient remains intubated, but mental status much  improved.       6.  Thrombocytopenia, improving, was likely due to liver injury.  Check CBC daily.    As the patient is nonoliguric and kidney function has improved back to baseline, nephrology will sign off.  Discussed with Dr. John Galvez.      Yogi Stark MD  Nephrology  [facility]

## 2024-12-19 NOTE — PROGRESS NOTES
Critical Care Progress Note    Date of admission  12/17/2024    Chief Complaint  64 y.o. male admitted 12/17/2024 with acute encephalopathy, acute on chronic kidney disease, acute hepatitis.  He has a history of primary hypertension, CKD, CAD with prior PCI, DM type II, dyslipidemia, asthma/COPD overlap syndrome.    Hospital Course      12/18 -    Vent day 1.  Start IV methylprednisolone, 62.5 mg every 6 hours.  Continue bronchodilators.  Begin metoprolol tartrate, 25 mg twice daily.  12/19 -    Vent day 2.  Taper methylprednisolone, 30 mg IV every 6 hours.  Continue bronchodilators.  Continue metoprolol tartrate.  SAT/SBT.  Transition propofol to dexmedetomidine.  12/20 -    successfully liberated from the ventilator yesterday.  Stop methylprednisolone.  Begin prednisone, 40 mg daily.  Continue bronchodilators.  Change metoprolol tartrate to metoprolol succinate, 50 mg daily.  Begin CIWA protocol.  There is no evidence of alcohol withdrawal at this time.      Interval Problem Update  Reviewed last 24 hour events:      SR  99.5  +976 mL in the last 24      Hugo is doing very well today.  He feels much better.  He denies any dyspnea.  He has a mild cough.  He has no hemoptysis.  He denies abdominal pain, nausea or vomiting.  He has no angina, palpitations or syncope.  He is not feeling anxious or tremulous and he is not having hallucinations.      Review of Systems  Review of Systems   Constitutional:  Negative for chills, diaphoresis and fever.   HENT:  Negative for congestion, ear discharge, ear pain, nosebleeds, sinus pain and sore throat.    Eyes:  Negative for blurred vision, double vision, photophobia, pain, discharge and redness.   Respiratory:  Positive for cough and sputum production. Negative for hemoptysis, shortness of breath and stridor.    Cardiovascular:  Negative for chest pain, palpitations, leg swelling and PND.   Gastrointestinal:  Negative for abdominal pain, blood in stool, nausea and vomiting.    Genitourinary:  Negative for dysuria, flank pain, hematuria and urgency.   Musculoskeletal:  Negative for myalgias and neck pain.   Skin:  Negative for itching and rash.   Neurological:  Negative for focal weakness, seizures and headaches.   Endo/Heme/Allergies:  Does not bruise/bleed easily.   Psychiatric/Behavioral:  Positive for substance abuse. Negative for hallucinations. The patient is not nervous/anxious.         Vital Signs for last 24 hours   Temp:  [36.5 °C (97.7 °F)-36.7 °C (98 °F)] 36.7 °C (98 °F)  Pulse:  [] 83  Resp:  [11-47] 30  BP: (102-157)/(57-94) 136/78  SpO2:  [91 %-98 %] 98 %    Hemodynamic parameters for last 24 hours       Respiratory Information for the last 24 hours  Vent Mode: Spont  PEEP/CPAP: 8  P Support: 5  MAP: 9.5  Length of Weaning Trial (Hours): 1    Physical Exam   Physical Exam  HENT:      Head: Normocephalic and atraumatic.      Right Ear: External ear normal.      Left Ear: External ear normal.      Nose: Nose normal.      Mouth/Throat:      Mouth: Mucous membranes are moist.   Eyes:      General: No scleral icterus.     Pupils: Pupils are equal, round, and reactive to light.   Cardiovascular:      Comments: Sinus rhythm  Pulmonary:      Effort: Pulmonary effort is normal. No respiratory distress.      Breath sounds: No stridor. Rales (Very few crackles) present. No wheezing.      Comments: Poor air movement  Abdominal:      General: There is no distension.      Palpations: Abdomen is soft.      Tenderness: There is no abdominal tenderness. There is no guarding or rebound.   Musculoskeletal:         General: Normal range of motion.      Cervical back: Neck supple. No rigidity.      Right lower leg: No edema.      Left lower leg: No edema.   Skin:     General: Skin is warm.   Neurological:      General: No focal deficit present.      Mental Status: He is alert and oriented to person, place, and time.      Cranial Nerves: No cranial nerve deficit.   Psychiatric:          Mood and Affect: Mood normal.         Behavior: Behavior normal.         Thought Content: Thought content normal.         Judgment: Judgment normal.         Medications  Current Facility-Administered Medications   Medication Dose Route Frequency Provider Last Rate Last Admin    [START ON 12/21/2024] metoprolol SR (Toprol XL) tablet 50 mg  50 mg Oral Q DAY John Galvez M.D.        tiotropium (Spiriva Respimat) 2.5 mcg/Act inhalation spray 5 mcg  5 mcg Inhalation QDAILY (RT) John Galvez M.D.        predniSONE (Deltasone) tablet 40 mg  40 mg Oral DAILY John Galvez M.D.        LORazepam (Ativan) tablet 0.5 mg  0.5 mg Oral Q4HRS PRN John Galvez M.D.        LORazepam (Ativan) tablet 1 mg  1 mg Oral Q4HRS PRN John Galvez M.D.        Or    LORazepam (Ativan) injection 0.5 mg  0.5 mg Intravenous Q4HRS PRN John Galvez M.D.        LORazepam (Ativan) tablet 2 mg  2 mg Oral Q2HRS PRN Jonh Galvez M.D.        Or    LORazepam (Ativan) injection 1 mg  1 mg Intravenous Q2HRS PRN John Galvez M.D.        LORazepam (Ativan) tablet 3 mg  3 mg Oral Q HOUR PRN John Galvez M.D.        Or    LORazepam (Ativan) injection 1.5 mg  1.5 mg Intravenous Q HOUR PRN John Galvez M.D.        LORazepam (Ativan) tablet 4 mg  4 mg Oral Q15 MIN PRN John Galvez M.D.        Or    LORazepam (Ativan) injection 2 mg  2 mg Intravenous Q15 MIN PRN John Galvez M.D.        thiamine (Vitamin B-1) tablet 100 mg  100 mg Oral DAILY John Galvez M.D.        folic acid (Folvite) tablet 1 mg  1 mg Oral DAILY John Galvez M.D.        multivitamin tablet 1 Tablet  1 Tablet Oral DAILY John Galvez M.D.        senna-docusate (Pericolace Or Senokot S) 8.6-50 MG per tablet 2 Tablet  2 Tablet Enteral Tube BID John Galvez M.D.   2 Tablet at 12/20/24 0518    And    polyethylene glycol/lytes (Miralax) Packet 1  Packet  1 Packet Enteral Tube QDAY PRN John Galvez M.D.        And    magnesium hydroxide (Milk Of Magnesia) suspension 30 mL  30 mL Enteral Tube QDAY PRN John Galvez M.D.        And    bisacodyl (Dulcolax) suppository 10 mg  10 mg Rectal QDAY PRN John Galvez M.D. MD Alert...ICU Electrolyte Replacement per Pharmacy   Other PHARMACY TO DOSE John Galvez M.D.        ipratropium-albuterol (DUONEB) nebulizer solution  3 mL Nebulization Q2HRS PRN (RT) John Galvez M.D.   3 mL at 12/18/24 1124    ipratropium-albuterol (DUONEB) nebulizer solution  3 mL Nebulization Q4HRS (RT) John Galvez M.D.   3 mL at 12/20/24 1114    acetaminophen (Tylenol) tablet 650 mg  650 mg Enteral Tube Q6HRS PRN John Galvez M.D.        promethazine (Phenergan) tablet 12.5-25 mg  12.5-25 mg Enteral Tube Q4HRS PRN John Galvez M.D.        ondansetron (Zofran ODT) dispertab 4 mg  4 mg Enteral Tube Q4HRS PRN John Galvez M.D.        aspirin (Asa) chewable tab 81 mg  81 mg Enteral Tube DAILY John Galvez M.D.   81 mg at 12/20/24 0518    insulin lispro (HumaLOG,AdmeLOG) subcutaneous injection  2-9 Units Subcutaneous Q6HRS John Galvez M.D.   2 Units at 12/18/24 2347    And    dextrose 50% (D50W) injection 25 g  25 g Intravenous Q15 MIN PRN John Galvez M.D.        Respiratory Therapy Consult   Nebulization Continuous RT John Galvez M.D.        heparin injection 5,000 Units  5,000 Units Subcutaneous Q8HRS Lizeth Preciado M.D.   5,000 Units at 12/20/24 0518    hydrALAZINE (Apresoline) injection 10 mg  10 mg Intravenous Q4HRS PRN Lizeth Precidao M.D.        ondansetron (Zofran) syringe/vial injection 4 mg  4 mg Intravenous Q4HRS PRN Lizeth Preciado M.D.        promethazine (Phenergan) suppository 12.5-25 mg  12.5-25 mg Rectal Q4HRS PRN Lizeth Preciado M.D.        prochlorperazine (Compazine) injection 5-10 mg   5-10 mg Intravenous Q4HRS PRN Lizeth Preciado M.D.        mometasone-formoterol (Dulera) 100-5 MCG/ACT inhaler 2 Puff  2 Puff Inhalation BID (RT) John Galvez M.D.   2 Puff at 12/20/24 0753       Fluids    Intake/Output Summary (Last 24 hours) at 12/20/2024 1115  Last data filed at 12/20/2024 0800  Gross per 24 hour   Intake 1910.94 ml   Output 780 ml   Net 1130.94 ml       Laboratory  Recent Labs     12/18/24  0551 12/18/24  1020 12/19/24  0420   ISTATAPH 7.135* 7.432 7.525*   ISTATAPCO2 90.9* 38.4 42.5   ISTATAPO2 68* 49* 70*   ISTATATCO2 33 27* 36   [id number] 85* 86* 95   ISTATARTHCO3 30.6* 25.6 35.1*   ISTATARTBE -2 1 11*   ISTATTEMP 36.8 C 36.8 C 36.8 C   ISTATFIO2 50 40 60   ISTATSPEC Arterial Arterial Arterial   ISTATAPHTC 7.138* 7.435 7.528*   [id number] 67 49* 69     Recent Labs     12/17/24  1600 12/18/24  0400   CPKTOTAL 168* 134     Recent Labs     12/18/24  0400 12/18/24  1442 12/19/24  0530 12/19/24  1404 12/19/24  1730 12/19/24  2345 12/20/24  0513   SODIUM 133*   < > 142   < > 142 139 139   POTASSIUM 6.2*   < > 3.1*   < > 4.5 4.6 4.1   CHLORIDE 96   < > 97   < > 98 95* 94*   CO2 26   < > 32   < > 31 28 34*   BUN 73*   < > 57*   < > 53* 54* 58*   CREATININE 2.94*   < > 2.33*   < > 2.01* 2.15* 2.13*   MAGNESIUM 2.5  --  2.0  --   --   --  2.1   PHOSPHORUS 6.7*  --  2.3*  --   --   --  4.6*   CALCIUM 9.2   < > 9.5   < > 9.2 8.8 9.2    < > = values in this interval not displayed.     Recent Labs     12/18/24  0400 12/18/24  1442 12/19/24  0530 12/19/24  1404 12/19/24  1730 12/19/24  2345 12/20/24  0513   ALTSGPT 2315*  --  1057*  --   --   --  847*   ASTSGOT 2263*  --  511*  --   --   --  226*   ALKPHOSPHAT 195*  --  131*  --   --   --  147*   TBILIRUBIN 1.1  --  2.0*  --   --   --  1.6*   GLUCOSE 113*   < > 150*   < > 138* 152* 145*    < > = values in this interval not displayed.     Recent Labs     12/18/24  0400 12/19/24  0530 12/20/24  0513   WBC 12.5* 10.3 15.5*   NEUTSPOLYS 87.10*  94.90* 92.20*   LYMPHOCYTES 4.00* 0.90* 1.10*   MONOCYTES 7.40 3.50 6.10   EOSINOPHILS 1.10 0.00 0.00   BASOPHILS 0.10 0.10 0.10   ASTSGOT 2263* 511* 226*   ALTSGPT 2315* 1057* 847*   ALKPHOSPHAT 195* 131* 147*   TBILIRUBIN 1.1 2.0* 1.6*     Recent Labs     12/18/24  0400 12/19/24  0530 12/20/24  0513   RBC 4.66* 4.35* 4.19*   HEMOGLOBIN 14.2 13.5* 12.8*   HEMATOCRIT 44.7 38.9* 38.6*   PLATELETCT 117* 148* 118*   PROTHROMBTM 20.5*  --   --    INR 1.75*  --   --        Imaging  X-Ray:  I have personally reviewed the images and compared with prior images. and My impression is: Improving left lower lobe opacities    Assessment/Plan  * Acute-on-chronic kidney injury (HCC)- (present on admission)  Assessment & Plan  Underlying stage 3b CKD  No hydronephrosis on imaging  Monitor renal function and urine output  Avoid nephrotoxins and renal dose medications  Improved    Acute respiratory failure with hypoxia and hypercapnia (HCC)  Assessment & Plan  Intubated 12/18-12/19  Markedly improved    Acute encephalopathy  Assessment & Plan  Ammonia normal  Acute metabolic encephalopathy  Limit sedatives and mind altering medications as much as possible  Follow neuro exam  Resolved    Acute systolic heart failure (HCC)  Assessment & Plan  LVEF 45%  Reduced RV systolic function  Resume losartan when clinically appropriate  Change metoprolol tartrate to metoprolol succinate, 50 mg daily    Alcohol dependence (HCC)  Assessment & Plan  He completed a course of high-dose IV thiamine  Continue multivitamins and folic acid  Observe for evidence of withdrawal - [facility] protocol  Cessation counseling    Elevated LFTs  Assessment & Plan  History of alcohol dependence with ongoing alcohol use  Hepatitis panel negative  Trend liver enzymes and synthetic function  Avoid hepatotoxins    Acute exacerbation of chronic obstructive pulmonary disease (COPD) (HCC)- (present on admission)  Assessment & Plan  PFTs on 4/20/2022: FEV1 1.37 L (47%), FEV1/FVC  53%, DLCO 88%  Stop methylprednisolone  Begin prednisone, 40 mg daily  Bronchodilators  Continue Dulera, 100/5, 2 puffs twice daily  Resume Spiriva, 1 puff daily  RT protocols    Type 2 diabetes mellitus (HCC)- (present on admission)  Assessment & Plan  Glycohemoglobin 6.7  Sliding scale insulin    Primary hypertension- (present on admission)  Assessment & Plan  Goal SBP less than 160  Change metoprolol tartrate to metoprolol succinate, 50 mg daily  Hold losartan with ROBERT    CAD (coronary artery disease)- (present on admission)  Assessment & Plan  History of CAD with prior MI and PCI  Continue aspirin, 81 mg daily  Change metoprolol tartrate to metoprolol succinate, 50 mg daily  Hold statin with elevated aminotransferases    Dyslipidemia- (present on admission)  Assessment & Plan  Hold statin with markedly elevated LFTs         VTE:  Heparin  Ulcer: Not Indicated  Lines: None    I have performed a physical exam and reviewed and updated ROS and Plan today (12/20/2024). In review of yesterday's note (12/19/2024), there are no changes except as documented above.     OK to transfer out of ICU.  [facility] will sign off.  Please call if you have any questions.    Discussed patient condition and risk of morbidity and/or mortality with RN, RT, and Pharmacy    John Galvez MD  Pulmonary and Critical Care Medicine

## 2024-12-19 NOTE — FLOWSHEET NOTE
12/19/24 0622   Spontaneous Breathing Trial (SBT)   Safety screen spontaneous breathing trial (SBT) Proceed with SBT - no exclusion criteria met     SPont 5/10   room air

## 2024-12-19 NOTE — DISCHARGE PLANNING
Care Transition Team Assessment    LMSW spoke with pt's wife Carie to complete DC assessment pt is sedated and intubated. LMSW Introduced self and department roles. Pt lives with his wife and son in a mobile that has two steps to enter.  Prior to this hospitalization pt was independent at home with ADLs and all IADLs. Pt does not use any DME at baseline. PCP is Dmitry Ritter MD, preferred pharmacy is [facility] on [address].   Pt's wife reports a strong family support system that includes their adult son and other family members but she is concerned about his after care when discharged. This writer ensured pt's wife CM and medical team will assist and inform her of possible treatment options and time goes on.   Pt is retired and receives SSI monthly deposits wife is not sure of the amount. Wife does report pt has a history of alcohol consumption and denies any MH concerns.     Information Source  Orientation Level: Unable to assess  Information Given By: Spouse  Informant's Name: Carie Berrios  Who is responsible for making decisions for patient? : Spouse  Name(s) of Primary Decision Maker: Carie Berrios    Readmission Evaluation  Is this a readmission?: No    Elopement Risk  Legal Hold: No  Ambulatory or Self Mobile in Wheelchair: No-Not an Elopement Risk  Elopement Risk: Not at Risk for Elopement    Discharge Preparedness  What is your plan after discharge?: Uncertain - pending medical team collaboration  What are your discharge supports?: Spouse, Child (adult child)  Prior Functional Level: Ambulatory  Difficulity with ADLs: None  Difficulity with IADLs: None    Functional Assesment  Prior Functional Level: Ambulatory     Vision / Hearing Impairment  Right Eye Vision: Impaired, Wears Glasses  Left Eye Vision: Impaired, Wears Glasses    Advance Directive  Advance Directive?: None    Domestic Abuse  Have you ever been the victim of abuse or violence?: No    Psychological Assessment  History of Substance Abuse: Alcohol  Date  "Last Used - Alcohol: \" a few days before he got sick\"  Substance Abuse Comments: per chart review hx of heavy drinking  History of Psychiatric Problems: No    Discharge Risks or Barriers  Discharge risks or barriers?: Complex medical needs, Substance abuse  Patient risk factors: Complex medical needs, Substance abuse    Anticipated Discharge Information  Discharge Disposition: Disch to IP rehab facility or distinct part unit (62)    "

## 2024-12-19 NOTE — CARE PLAN
The patient is Watcher - Medium risk of patient condition declining or worsening    Shift Goals  Clinical Goals: SBP <180, monitor labs  Patient Goals: Rest  Family Goals: Updates    Progress made toward(s) clinical / shift goals:    Problem: Knowledge Deficit - Standard  Goal: Patient and family/care givers will demonstrate understanding of plan of care, disease process/condition, diagnostic tests and medications  Outcome: Progressing     Problem: Pain - Standard  Goal: Alleviation of pain or a reduction in pain to the patient’s comfort goal  Outcome: Progressing     Problem: Impaired Gas Exchange  Goal: Patient will demonstrate improved ventilation and adequate oxygenation and participate in treatment regimen within the level of ability/situation.  Outcome: Progressing     Problem: Skin Integrity  Goal: Skin integrity is maintained or improved  Outcome: Progressing     Problem: Safety - Medical Restraint  Goal: Remains free of injury from restraints (Restraint for Interference with Medical Device)  Outcome: Progressing

## 2024-12-19 NOTE — CARE PLAN
The patient is Watcher - Medium risk of patient condition declining or worsening    Shift Goals  Clinical Goals: Q6 BMP  Patient Goals: LLUVIA  Family Goals: Updates    Progress made toward(s) clinical / shift goals:    Problem: Pain - Standard  Goal: Alleviation of pain or a reduction in pain to the patient’s comfort goal  Outcome: Progressing     Problem: Risk for Infection - COPD  Goal: Patient will remain free from signs and symptoms of infection  Outcome: Progressing     Problem: Skin Integrity  Goal: Skin integrity is maintained or improved  Outcome: Progressing     Problem: Fall Risk  Goal: Patient will remain free from falls  Outcome: Progressing     Problem: Safety - Medical Restraint  Goal: Remains free of injury from restraints (Restraint for Interference with Medical Device)  Outcome: Progressing

## 2024-12-19 NOTE — FLOWSHEET NOTE
12/19/24 1347   Spontaneous Breathing Trial (SBT)   Spontaneous breathing trial (SBT) outcome Pt weaned for 1 hour and returned to rest settings per protocol - SBT Pass   Length of Weaning Trial (Hours) 1   Weaning Parameters   RR (bpm) 15   $ FVC / Vital Capacity (liters)  0.95   NIF (cm H2O)  -32   Rapid Shallow Breathing Index (RR/VT) 34   Spontaneous VE 5.6   Spontaneous

## 2024-12-19 NOTE — PROGRESS NOTES
Critical Care Progress Note    Date of admission  12/17/2024    Chief Complaint  64 y.o. male admitted 12/17/2024 with acute encephalopathy, acute on chronic kidney disease, acute hepatitis.  He has a history of primary hypertension, CKD, CAD with prior PCI, DM type II, dyslipidemia, asthma/COPD overlap syndrome.    Hospital Course      12/18 -    Vent day 1.  Start IV methylprednisolone, 62.5 mg every 6 hours.  Continue bronchodilators.  Begin metoprolol tartrate, 25 mg twice daily.  12/19 -    Vent day 2.  Taper methylprednisolone, 30 mg IV every 6 hours.  Continue bronchodilators.  Continue metoprolol tartrate.  SAT/SBT.  Transition propofol to dexmedetomidine.      Interval Problem Update  Reviewed last 24 hour events:      SR  Prop  Arouses  99.5  -4732 mL in the last 24  -5015 mL since admit      Review of Systems  Review of Systems   Unable to perform ROS: Acuity of condition        Vital Signs for last 24 hours   Pulse:  [77-97] 88  Resp:  [19-28] 20  BP: (115-144)/(56-73) 133/65  SpO2:  [93 %-100 %] 96 %    Hemodynamic parameters for last 24 hours       Respiratory Information for the last 24 hours  Vent Mode: ASV  Rate (breaths/min): 20  Vt Target (mL): 370  PEEP/CPAP: 8  MAP: 14  Length of Weaning Trial (Hours): 5 min  Control VTE (exp VT): 439    Physical Exam   Physical Exam  HENT:      Head: Normocephalic.   Eyes:      Pupils: Pupils are equal, round, and reactive to light.   Cardiovascular:      Comments: Sinus rhythm  Pulmonary:      Breath sounds: Rales (Very few crackles) present. No wheezing.      Comments: Poor air movement  Abdominal:      General: There is no distension.      Tenderness: There is no abdominal tenderness.   Musculoskeletal:      Cervical back: Neck supple. No rigidity.      Right lower leg: No edema.      Left lower leg: No edema.   Skin:     General: Skin is warm.   Neurological:      Comments: Sedated.  Arouses.         Medications  Current Facility-Administered Medications    Medication Dose Route Frequency Provider Last Rate Last Admin    methylPREDNISolone (SOLU-MEDROL) 40 MG injection 30 mg  30 mg Intravenous Q6HRS John Galvez M.D.        dexmedetomidine (Precedex) 400 mcg/100mL infusion  0.1-1.5 mcg/kg/hr (Ideal) Intravenous Continuous John Galvez M.D.        K+ Scale: Goal of 4.5  1 Each Intravenous Q6HRS John Galvez M.D.        famotidine (Pepcid) tablet 20 mg  20 mg Enteral Tube DAILY John Galvez M.D.   20 mg at 12/19/24 0540    Or    famotidine (Pepcid) injection 20 mg  20 mg Intravenous DAILY John Galvez M.D.   20 mg at 12/18/24 0930    senna-docusate (Pericolace Or Senokot S) 8.6-50 MG per tablet 2 Tablet  2 Tablet Enteral Tube BID John Galvez M.D.   2 Tablet at 12/19/24 0540    And    polyethylene glycol/lytes (Miralax) Packet 1 Packet  1 Packet Enteral Tube QDAY PRN John Galvez M.D.        And    magnesium hydroxide (Milk Of Magnesia) suspension 30 mL  30 mL Enteral Tube QDAY PRN John Galvez M.D.        And    bisacodyl (Dulcolax) suppository 10 mg  10 mg Rectal QDAY PRN John Galvez M.D. MD Alert...ICU Electrolyte Replacement per Pharmacy   Other PHARMACY TO DOSE John Galvez M.D.        lidocaine (Xylocaine) 1 % injection 2 mL  2 mL Tracheal Tube Q30 MIN PRN John Galvez M.D.        ipratropium-albuterol (DUONEB) nebulizer solution  3 mL Nebulization Q2HRS PRN (RT) John Galvez M.D.   3 mL at 12/18/24 1124    ipratropium-albuterol (DUONEB) nebulizer solution  3 mL Nebulization Q4HRS (RT) John Galvez M.D.   3 mL at 12/19/24 0613    HYDROmorphone (Dilaudid) injection 0.5-2 mg  0.5-2 mg Intravenous Q HOUR PRN John Galvez M.D.   2 mg at 12/18/24 0883    HYDROmorphone (DILAUDID) 0.2 mg/mL in 50mL NS (Continuous Infusion)   Intravenous Continuous John Galvez M.D.   Dose not Required at 12/18/24 0900     acetaminophen (Tylenol) tablet 650 mg  650 mg Enteral Tube Q6HRS PRN John Galvez M.D.        promethazine (Phenergan) tablet 12.5-25 mg  12.5-25 mg Enteral Tube Q4HRS PRN John Galvez M.D.        ondansetron (Zofran ODT) dispertab 4 mg  4 mg Enteral Tube Q4HRS PRN John Galvez M.D.        multivitamin tablet 1 Tablet  1 Tablet Enteral Tube DAILY John Galvez M.D.   1 Tablet at 12/19/24 0540    And    folic acid (Folvite) tablet 1 mg  1 mg Enteral Tube DAILY John Galvez M.D.   1 mg at 12/19/24 0540    thiamine (B-1) 500 mg in dextrose 5% 100 mL IVPB  500 mg Intravenous DAILY John Galvez M.D. 200 mL/hr at 12/19/24 0533 500 mg at 12/19/24 0533    metoprolol tartrate (Lopressor) tablet 25 mg  25 mg Enteral Tube BID John Galvez M.D.   25 mg at 12/19/24 0540    aspirin (Asa) chewable tab 81 mg  81 mg Enteral Tube DAILY John Galvez M.D.   81 mg at 12/19/24 0540    insulin lispro (HumaLOG,AdmeLOG) subcutaneous injection  2-9 Units Subcutaneous Q6HRS John Galvez M.D.   2 Units at 12/18/24 2347    And    dextrose 50% (D50W) injection 25 g  25 g Intravenous Q15 MIN PRN John Galvez M.D.        Respiratory Therapy Consult   Nebulization Continuous RT John Galvez M.D.        lactated ringers infusion   Intravenous Continuous John Galvez M.D. 125 mL/hr at 12/19/24 0608 New Bag at 12/19/24 0608    heparin injection 5,000 Units  5,000 Units Subcutaneous Q8HRS Lizeth Preciado M.D.   5,000 Units at 12/19/24 0540    hydrALAZINE (Apresoline) injection 10 mg  10 mg Intravenous Q4HRS PRN Lizeth Preciado M.D.        ondansetron (Zofran) syringe/vial injection 4 mg  4 mg Intravenous Q4HRS PRN Lizeth Preciado M.D.        promethazine (Phenergan) suppository 12.5-25 mg  12.5-25 mg Rectal Q4HRS PRN Lizeth Preciado M.D.        prochlorperazine (Compazine) injection 5-10 mg  5-10 mg Intravenous Q4HRS PRCHRISTIAN Stanley  ESTEPHANIA Preciado M.D.        [Held by provider] mometasone-formoterol (Dulera) 100-5 MCG/ACT inhaler 2 Puff  2 Puff Inhalation BID (RT) Lizeth Preciado M.D.           Fluids    Intake/Output Summary (Last 24 hours) at 12/19/2024 0841  Last data filed at 12/19/2024 0800  Gross per 24 hour   Intake 2242.86 ml   Output 6900 ml   Net -4657.14 ml       Laboratory  Recent Labs     12/18/24  0551 12/18/24  1020 12/19/24  0420   ISTATAPH 7.135* 7.432 7.525*   ISTATAPCO2 90.9* 38.4 42.5   ISTATAPO2 68* 49* 70*   ISTATATCO2 33 27* 36   [id number] 85* 86* 95   ISTATARTHCO3 30.6* 25.6 35.1*   ISTATARTBE -2 1 11*   ISTATTEMP 36.8 C 36.8 C 36.8 C   ISTATFIO2 50 40 60   ISTATSPEC Arterial Arterial Arterial   ISTATAPHTC 7.138* 7.435 7.528*   [id number] 67 49* 69     Recent Labs     12/17/24  1600 12/18/24  0400   CPKTOTAL 168* 134     Recent Labs     12/18/24  0400 12/18/24  1442 12/18/24  2344 12/19/24  0530   SODIUM 133* 137 142 142   POTASSIUM 6.2* 3.8 3.2* 3.1*   CHLORIDE 96 96 96 97   CO2 26 27 32 32   BUN 73* 64* 57* 57*   CREATININE 2.94* 2.49* 2.23* 2.33*   MAGNESIUM 2.5  --   --  2.0   PHOSPHORUS 6.7*  --   --  2.3*   CALCIUM 9.2 9.8 9.3 9.5     Recent Labs     12/17/24  1600 12/18/24  0400 12/18/24  1442 12/18/24  2344 12/19/24  0530   ALTSGPT 3143* 2315*  --   --  1057*   ASTSGOT 4272* 2263*  --   --  511*   ALKPHOSPHAT 223* 195*  --   --  131*   TBILIRUBIN 1.0 1.1  --   --  2.0*   GLUCOSE 111* 113* 107* 145* 150*     Recent Labs     12/17/24  1020 12/17/24  1600 12/18/24  0400 12/19/24  0530   WBC 13.8*  --  12.5* 10.3   NEUTSPOLYS 87.00*  --  87.10* 94.90*   LYMPHOCYTES 3.00*  --  4.00* 0.90*   MONOCYTES 9.10  --  7.40 3.50   EOSINOPHILS 0.10  --  1.10 0.00   BASOPHILS 0.40  --  0.10 0.10   ASTSGOT 4600* 4272* 2263* 511*   ALTSGPT 3241* 3143* 2315* 1057*   ALKPHOSPHAT 234* 223* 195* 131*   TBILIRUBIN 1.3 1.0 1.1 2.0*     Recent Labs     12/17/24  1020 12/18/24  0400 12/19/24  0530   RBC 4.86 4.66* 4.35*   HEMOGLOBIN 15.0  14.2 13.5*   HEMATOCRIT 47.1 44.7 38.9*   PLATELETCT 163* 117* 148*   PROTHROMBTM  --  20.5*  --    INR  --  1.75*  --        Imaging  X-Ray:  I have personally reviewed the images and compared with prior images. and My impression is: Faint bibasilar opacities    Assessment/Plan  * Acute-on-chronic kidney injury (HCC)- (present on admission)  Assessment & Plan  Underlying stage 3b CKD  No hydronephrosis on imaging  Monitor renal function and urine output  Avoid nephrotoxins and renal dose medications  Improving with excellent urine output    Acute respiratory failure with hypoxia and hypercapnia (HCC)  Assessment & Plan  Intubated 12/18  ABCDEF bundle  SAT/SBT as appropriate  Mobility level 2    Acute encephalopathy  Assessment & Plan  Ammonia normal  Acute metabolic encephalopathy  Limit sedatives and mind altering medications as much as possible  Follow neuro exam  Improved    Alcohol dependence (HCC)  Assessment & Plan  High-dose IV thiamine and supplemental vitamins  Observe for evidence of withdrawal  Cessation counseling when clinically appropriate    Elevated LFTs  Assessment & Plan  History of alcohol dependence with ongoing alcohol use  Hepatitis panel negative  Trend liver enzymes and synthetic function  Avoid hepatotoxins    Acute exacerbation of chronic obstructive pulmonary disease (COPD) (HCC)- (present on admission)  Assessment & Plan  PFTs on 4/20/2022: FEV1 1.37 L (47%), FEV1/FVC 53%, DLCO 88%  Taper methylprednisolone, 30 mg IV every 6 hours  Bronchodilators  Resume Dulera, 100/5, 2 puffs twice daily when he is liberated from the ventilator  RT protocols    Type 2 diabetes mellitus (HCC)- (present on admission)  Assessment & Plan  Glycohemoglobin 6.7  Sliding scale insulin    Primary hypertension- (present on admission)  Assessment & Plan  Goal SBP less than 160  Continue metoprolol tartrate, 25 mg twice daily  Hold losartan with ROBERT    CAD (coronary artery disease)- (present on  admission)  Assessment & Plan  History of CAD with prior MI and PCI  Continue aspirin, 81 mg daily  Continue metoprolol tartrate, 25 mg twice daily  Hold statin with elevated aminotransferases    Dyslipidemia- (present on admission)  Assessment & Plan  Hold statin with markedly elevated LFTs         VTE:  Heparin  Ulcer: H2 Antagonist  Lines: Central Line  Ongoing indication addressed and Garibay Catheter  Ongoing indication addressed    I have performed a physical exam and reviewed and updated ROS and Plan today (12/19/2024). In review of yesterday's note (12/18/2024), there are no changes except as documented above.     I have assessed and reassessed his respiratory status with ventilator adjustments and spontaneous breathing trials, airway mechanics, ventilator waveforms, blood pressure, hemodynamics, cardiovascular status as well as his neurologic status.  He is at increased risk for worsening respiratory, cardiovascular and renal system dysfunction.    Discussed patient condition and risk of morbidity and/or mortality with RN, RT, and Pharmacy    The patient remains critically ill.  Critical care time = 105 minutes in directly providing and coordinating critical care and extensive data review.  No time overlap and excludes procedures.    John Galvez MD  Pulmonary and Critical Care Medicine

## 2024-12-19 NOTE — FLOWSHEET NOTE
12/19/24 1253   Spontaneous Breathing Trial (SBT)   Safety screen spontaneous breathing trial (SBT) Proceed with SBT - no exclusion criteria met     Spont 5/8

## 2024-12-19 NOTE — CARE PLAN
Problem: Bronchoconstriction  Goal: Improve in air movement and diminished wheezing  Description: Target End Date:  2 to 3 days    1.  Implement inhaled treatments  2.  Evaluate and manage medication effects  Outcome: Not Met     Ventilator Daily Summary    Vent Day #2  Airway: 8@26    Ventilator settings: 20/370+10@60%  Weaning trials: none  Respiratory Procedures: none    Plan: Continue current ventilator settings and wean mechanical ventilation as tolerated per physician orders.  Problem: Bronchoconstriction  Goal: Improve in air movement and diminished wheezing  Description: Target End Date:  2 to 3 days    1.  Implement inhaled treatments  2.  Evaluate and manage medication effects  Outcome: Not Met       Respiratory Update    Treatment modality: duo  Frequency: q4    Pt tolerating current treatments well with no adverse reactions.

## 2024-12-20 ENCOUNTER — APPOINTMENT (OUTPATIENT)
Dept: RADIOLOGY | Facility: MEDICAL CENTER | Age: 64
DRG: 682 | End: 2024-12-20
Attending: INTERNAL MEDICINE
Payer: COMMERCIAL

## 2024-12-20 PROBLEM — I50.21 ACUTE SYSTOLIC HEART FAILURE (HCC): Status: ACTIVE | Noted: 2024-12-20

## 2024-12-20 LAB
ALBUMIN SERPL BCP-MCNC: 4.4 G/DL (ref 3.2–4.9)
ALBUMIN/GLOB SERPL: 1.9 G/DL
ALP SERPL-CCNC: 147 U/L (ref 30–99)
ALT SERPL-CCNC: 847 U/L (ref 2–50)
ANION GAP SERPL CALC-SCNC: 11 MMOL/L (ref 7–16)
ANION GAP SERPL CALC-SCNC: 16 MMOL/L (ref 7–16)
AST SERPL-CCNC: 226 U/L (ref 12–45)
BACTERIA BRONCH AEROBE CULT: NORMAL
BASOPHILS # BLD AUTO: 0.1 % (ref 0–1.8)
BASOPHILS # BLD: 0.01 K/UL (ref 0–0.12)
BILIRUB SERPL-MCNC: 1.6 MG/DL (ref 0.1–1.5)
BUN SERPL-MCNC: 54 MG/DL (ref 8–22)
BUN SERPL-MCNC: 58 MG/DL (ref 8–22)
CALCIUM ALBUM COR SERPL-MCNC: 8.9 MG/DL (ref 8.5–10.5)
CALCIUM SERPL-MCNC: 8.8 MG/DL (ref 8.5–10.5)
CALCIUM SERPL-MCNC: 9.2 MG/DL (ref 8.5–10.5)
CHLORIDE SERPL-SCNC: 94 MMOL/L (ref 96–112)
CHLORIDE SERPL-SCNC: 95 MMOL/L (ref 96–112)
CO2 SERPL-SCNC: 28 MMOL/L (ref 20–33)
CO2 SERPL-SCNC: 34 MMOL/L (ref 20–33)
CREAT SERPL-MCNC: 2.13 MG/DL (ref 0.5–1.4)
CREAT SERPL-MCNC: 2.15 MG/DL (ref 0.5–1.4)
EOSINOPHIL # BLD AUTO: 0 K/UL (ref 0–0.51)
EOSINOPHIL NFR BLD: 0 % (ref 0–6.9)
ERYTHROCYTE [DISTWIDTH] IN BLOOD BY AUTOMATED COUNT: 55.3 FL (ref 35.9–50)
GFR SERPLBLD CREATININE-BSD FMLA CKD-EPI: 33 ML/MIN/1.73 M 2
GFR SERPLBLD CREATININE-BSD FMLA CKD-EPI: 34 ML/MIN/1.73 M 2
GLOBULIN SER CALC-MCNC: 2.3 G/DL (ref 1.9–3.5)
GLUCOSE BLD STRIP.AUTO-MCNC: 116 MG/DL (ref 65–99)
GLUCOSE BLD STRIP.AUTO-MCNC: 118 MG/DL (ref 65–99)
GLUCOSE BLD STRIP.AUTO-MCNC: 123 MG/DL (ref 65–99)
GLUCOSE BLD STRIP.AUTO-MCNC: 131 MG/DL (ref 65–99)
GLUCOSE BLD STRIP.AUTO-MCNC: 132 MG/DL (ref 65–99)
GLUCOSE SERPL-MCNC: 145 MG/DL (ref 65–99)
GLUCOSE SERPL-MCNC: 152 MG/DL (ref 65–99)
GRAM STN SPEC: NORMAL
HCT VFR BLD AUTO: 38.6 % (ref 42–52)
HGB BLD-MCNC: 12.8 G/DL (ref 14–18)
IMM GRANULOCYTES # BLD AUTO: 0.08 K/UL (ref 0–0.11)
IMM GRANULOCYTES NFR BLD AUTO: 0.5 % (ref 0–0.9)
LYMPHOCYTES # BLD AUTO: 0.17 K/UL (ref 1–4.8)
LYMPHOCYTES NFR BLD: 1.1 % (ref 22–41)
MAGNESIUM SERPL-MCNC: 2.1 MG/DL (ref 1.5–2.5)
MCH RBC QN AUTO: 30.5 PG (ref 27–33)
MCHC RBC AUTO-ENTMCNC: 33.2 G/DL (ref 32.3–36.5)
MCV RBC AUTO: 92.1 FL (ref 81.4–97.8)
MONOCYTES # BLD AUTO: 0.95 K/UL (ref 0–0.85)
MONOCYTES NFR BLD AUTO: 6.1 % (ref 0–13.4)
NEUTROPHILS # BLD AUTO: 14.28 K/UL (ref 1.82–7.42)
NEUTROPHILS NFR BLD: 92.2 % (ref 44–72)
NRBC # BLD AUTO: 0.04 K/UL
NRBC BLD-RTO: 0.3 /100 WBC (ref 0–0.2)
PHOSPHATE SERPL-MCNC: 4.6 MG/DL (ref 2.5–4.5)
PLATELET # BLD AUTO: 118 K/UL (ref 164–446)
PMV BLD AUTO: 10.8 FL (ref 9–12.9)
POTASSIUM SERPL-SCNC: 4.1 MMOL/L (ref 3.6–5.5)
POTASSIUM SERPL-SCNC: 4.6 MMOL/L (ref 3.6–5.5)
PROT SERPL-MCNC: 6.7 G/DL (ref 6–8.2)
RBC # BLD AUTO: 4.19 M/UL (ref 4.7–6.1)
SIGNIFICANT IND 70042: NORMAL
SITE SITE: NORMAL
SODIUM SERPL-SCNC: 139 MMOL/L (ref 135–145)
SODIUM SERPL-SCNC: 139 MMOL/L (ref 135–145)
SOURCE SOURCE: NORMAL
WBC # BLD AUTO: 15.5 K/UL (ref 4.8–10.8)

## 2024-12-20 PROCEDURE — 94669 MECHANICAL CHEST WALL OSCILL: CPT

## 2024-12-20 PROCEDURE — 770006 HCHG ROOM/CARE - MED/SURG/GYN SEMI*

## 2024-12-20 PROCEDURE — A9270 NON-COVERED ITEM OR SERVICE: HCPCS | Performed by: INTERNAL MEDICINE

## 2024-12-20 PROCEDURE — 80053 COMPREHEN METABOLIC PANEL: CPT

## 2024-12-20 PROCEDURE — 71045 X-RAY EXAM CHEST 1 VIEW: CPT

## 2024-12-20 PROCEDURE — 700101 HCHG RX REV CODE 250: Performed by: INTERNAL MEDICINE

## 2024-12-20 PROCEDURE — 51798 US URINE CAPACITY MEASURE: CPT

## 2024-12-20 PROCEDURE — 94640 AIRWAY INHALATION TREATMENT: CPT

## 2024-12-20 PROCEDURE — 84100 ASSAY OF PHOSPHORUS: CPT

## 2024-12-20 PROCEDURE — 82962 GLUCOSE BLOOD TEST: CPT

## 2024-12-20 PROCEDURE — 99233 SBSQ HOSP IP/OBS HIGH 50: CPT | Performed by: INTERNAL MEDICINE

## 2024-12-20 PROCEDURE — 700111 HCHG RX REV CODE 636 W/ 250 OVERRIDE (IP): Performed by: INTERNAL MEDICINE

## 2024-12-20 PROCEDURE — 700102 HCHG RX REV CODE 250 W/ 637 OVERRIDE(OP): Performed by: INTERNAL MEDICINE

## 2024-12-20 PROCEDURE — 85025 COMPLETE CBC W/AUTO DIFF WBC: CPT

## 2024-12-20 PROCEDURE — 99406 BEHAV CHNG SMOKING 3-10 MIN: CPT

## 2024-12-20 PROCEDURE — 99255 IP/OBS CONSLTJ NEW/EST HI 80: CPT | Performed by: INTERNAL MEDICINE

## 2024-12-20 PROCEDURE — 94664 DEMO&/EVAL PT USE INHALER: CPT

## 2024-12-20 PROCEDURE — 83735 ASSAY OF MAGNESIUM: CPT

## 2024-12-20 PROCEDURE — 700105 HCHG RX REV CODE 258: Performed by: INTERNAL MEDICINE

## 2024-12-20 RX ORDER — POLYETHYLENE GLYCOL 3350 17 G/17G
1 POWDER, FOR SOLUTION ORAL
Status: DISCONTINUED | OUTPATIENT
Start: 2024-12-20 | End: 2024-12-20

## 2024-12-20 RX ORDER — LORAZEPAM 1 MG/1
1 TABLET ORAL EVERY 4 HOURS PRN
Status: DISCONTINUED | OUTPATIENT
Start: 2024-12-20 | End: 2024-12-23 | Stop reason: HOSPADM

## 2024-12-20 RX ORDER — LORAZEPAM 2 MG/ML
2 INJECTION INTRAMUSCULAR
Status: DISCONTINUED | OUTPATIENT
Start: 2024-12-20 | End: 2024-12-23 | Stop reason: HOSPADM

## 2024-12-20 RX ORDER — BISACODYL 10 MG
10 SUPPOSITORY, RECTAL RECTAL
Status: DISCONTINUED | OUTPATIENT
Start: 2024-12-20 | End: 2024-12-23 | Stop reason: HOSPADM

## 2024-12-20 RX ORDER — INSULIN LISPRO 100 [IU]/ML
2-9 INJECTION, SOLUTION INTRAVENOUS; SUBCUTANEOUS
Status: DISCONTINUED | OUTPATIENT
Start: 2024-12-20 | End: 2024-12-23 | Stop reason: HOSPADM

## 2024-12-20 RX ORDER — LORAZEPAM 2 MG/ML
1.5 INJECTION INTRAMUSCULAR
Status: DISCONTINUED | OUTPATIENT
Start: 2024-12-20 | End: 2024-12-23 | Stop reason: HOSPADM

## 2024-12-20 RX ORDER — AMOXICILLIN 250 MG
2 CAPSULE ORAL 2 TIMES DAILY
Status: DISCONTINUED | OUTPATIENT
Start: 2024-12-20 | End: 2024-12-20

## 2024-12-20 RX ORDER — PREDNISONE 20 MG/1
40 TABLET ORAL DAILY
Status: DISCONTINUED | OUTPATIENT
Start: 2024-12-20 | End: 2024-12-23 | Stop reason: HOSPADM

## 2024-12-20 RX ORDER — BISACODYL 10 MG
10 SUPPOSITORY, RECTAL RECTAL
Status: DISCONTINUED | OUTPATIENT
Start: 2024-12-20 | End: 2024-12-20

## 2024-12-20 RX ORDER — FOLIC ACID 1 MG/1
1 TABLET ORAL DAILY
Status: DISCONTINUED | OUTPATIENT
Start: 2024-12-20 | End: 2024-12-23 | Stop reason: HOSPADM

## 2024-12-20 RX ORDER — GAUZE BANDAGE 2" X 2"
100 BANDAGE TOPICAL DAILY
Status: DISCONTINUED | OUTPATIENT
Start: 2024-12-20 | End: 2024-12-23 | Stop reason: HOSPADM

## 2024-12-20 RX ORDER — AMOXICILLIN 250 MG
2 CAPSULE ORAL 2 TIMES DAILY
Status: DISCONTINUED | OUTPATIENT
Start: 2024-12-20 | End: 2024-12-23 | Stop reason: HOSPADM

## 2024-12-20 RX ORDER — LORAZEPAM 0.5 MG/1
0.5 TABLET ORAL EVERY 4 HOURS PRN
Status: DISCONTINUED | OUTPATIENT
Start: 2024-12-20 | End: 2024-12-23 | Stop reason: HOSPADM

## 2024-12-20 RX ORDER — LORAZEPAM 2 MG/ML
0.5 INJECTION INTRAMUSCULAR EVERY 4 HOURS PRN
Status: DISCONTINUED | OUTPATIENT
Start: 2024-12-20 | End: 2024-12-23 | Stop reason: HOSPADM

## 2024-12-20 RX ORDER — LORAZEPAM 1 MG/1
3 TABLET ORAL
Status: DISCONTINUED | OUTPATIENT
Start: 2024-12-20 | End: 2024-12-23 | Stop reason: HOSPADM

## 2024-12-20 RX ORDER — POLYETHYLENE GLYCOL 3350 17 G/17G
1 POWDER, FOR SOLUTION ORAL
Status: DISCONTINUED | OUTPATIENT
Start: 2024-12-20 | End: 2024-12-23 | Stop reason: HOSPADM

## 2024-12-20 RX ORDER — LORAZEPAM 2 MG/ML
1 INJECTION INTRAMUSCULAR
Status: DISCONTINUED | OUTPATIENT
Start: 2024-12-20 | End: 2024-12-23 | Stop reason: HOSPADM

## 2024-12-20 RX ORDER — LORAZEPAM 1 MG/1
2 TABLET ORAL
Status: DISCONTINUED | OUTPATIENT
Start: 2024-12-20 | End: 2024-12-23 | Stop reason: HOSPADM

## 2024-12-20 RX ORDER — DEXTROSE MONOHYDRATE 25 G/50ML
25 INJECTION, SOLUTION INTRAVENOUS
Status: DISCONTINUED | OUTPATIENT
Start: 2024-12-20 | End: 2024-12-23 | Stop reason: HOSPADM

## 2024-12-20 RX ORDER — LORAZEPAM 1 MG/1
4 TABLET ORAL
Status: DISCONTINUED | OUTPATIENT
Start: 2024-12-20 | End: 2024-12-23 | Stop reason: HOSPADM

## 2024-12-20 RX ORDER — METOPROLOL SUCCINATE 25 MG/1
50 TABLET, EXTENDED RELEASE ORAL
Status: DISCONTINUED | OUTPATIENT
Start: 2024-12-21 | End: 2024-12-23 | Stop reason: HOSPADM

## 2024-12-20 RX ADMIN — THERA TABS 1 TABLET: TAB at 12:15

## 2024-12-20 RX ADMIN — MOMETASONE FUROATE AND FORMOTEROL FUMARATE DIHYDRATE 2 PUFF: 100; 5 AEROSOL RESPIRATORY (INHALATION) at 19:33

## 2024-12-20 RX ADMIN — THIAMINE HYDROCHLORIDE 500 MG: 100 INJECTION, SOLUTION INTRAMUSCULAR; INTRAVENOUS at 05:24

## 2024-12-20 RX ADMIN — IPRATROPIUM BROMIDE AND ALBUTEROL SULFATE 3 ML: .5; 2.5 SOLUTION RESPIRATORY (INHALATION) at 14:59

## 2024-12-20 RX ADMIN — THERA TABS 1 TABLET: TAB at 05:18

## 2024-12-20 RX ADMIN — HEPARIN SODIUM 5000 UNITS: 5000 INJECTION, SOLUTION INTRAVENOUS; SUBCUTANEOUS at 05:18

## 2024-12-20 RX ADMIN — FOLIC ACID 1 MG: 1 TABLET ORAL at 05:18

## 2024-12-20 RX ADMIN — HEPARIN SODIUM 5000 UNITS: 5000 INJECTION, SOLUTION INTRAVENOUS; SUBCUTANEOUS at 14:16

## 2024-12-20 RX ADMIN — IPRATROPIUM BROMIDE AND ALBUTEROL SULFATE 3 ML: .5; 2.5 SOLUTION RESPIRATORY (INHALATION) at 11:14

## 2024-12-20 RX ADMIN — IPRATROPIUM BROMIDE AND ALBUTEROL SULFATE 3 ML: .5; 2.5 SOLUTION RESPIRATORY (INHALATION) at 02:05

## 2024-12-20 RX ADMIN — IPRATROPIUM BROMIDE AND ALBUTEROL SULFATE 3 ML: .5; 2.5 SOLUTION RESPIRATORY (INHALATION) at 07:53

## 2024-12-20 RX ADMIN — Medication 100 MG: at 12:15

## 2024-12-20 RX ADMIN — HEPARIN SODIUM 5000 UNITS: 5000 INJECTION, SOLUTION INTRAVENOUS; SUBCUTANEOUS at 21:33

## 2024-12-20 RX ADMIN — IPRATROPIUM BROMIDE AND ALBUTEROL SULFATE 3 ML: .5; 2.5 SOLUTION RESPIRATORY (INHALATION) at 19:33

## 2024-12-20 RX ADMIN — MOMETASONE FUROATE AND FORMOTEROL FUMARATE DIHYDRATE 2 PUFF: 100; 5 AEROSOL RESPIRATORY (INHALATION) at 07:53

## 2024-12-20 RX ADMIN — IPRATROPIUM BROMIDE AND ALBUTEROL SULFATE 3 ML: .5; 2.5 SOLUTION RESPIRATORY (INHALATION) at 23:26

## 2024-12-20 RX ADMIN — SENNOSIDES AND DOCUSATE SODIUM 2 TABLET: 50; 8.6 TABLET ORAL at 17:53

## 2024-12-20 RX ADMIN — PREDNISONE 40 MG: 20 TABLET ORAL at 12:15

## 2024-12-20 RX ADMIN — FOLIC ACID 1 MG: 1 TABLET ORAL at 12:14

## 2024-12-20 RX ADMIN — ASPIRIN 81 MG: 81 TABLET, CHEWABLE ORAL at 05:18

## 2024-12-20 RX ADMIN — METHYLPREDNISOLONE SODIUM SUCCINATE 30 MG: 40 INJECTION, POWDER, FOR SOLUTION INTRAMUSCULAR; INTRAVENOUS at 05:16

## 2024-12-20 RX ADMIN — METOPROLOL TARTRATE 25 MG: 25 TABLET, FILM COATED ORAL at 05:18

## 2024-12-20 RX ADMIN — FAMOTIDINE 20 MG: 20 TABLET, FILM COATED ORAL at 05:18

## 2024-12-20 RX ADMIN — SENNOSIDES AND DOCUSATE SODIUM 2 TABLET: 50; 8.6 TABLET ORAL at 05:18

## 2024-12-20 ASSESSMENT — PAIN DESCRIPTION - PAIN TYPE
TYPE: ACUTE PAIN
TYPE: ACUTE PAIN

## 2024-12-20 ASSESSMENT — COGNITIVE AND FUNCTIONAL STATUS - GENERAL
SUGGESTED CMS G CODE MODIFIER MOBILITY: CI
SUGGESTED CMS G CODE MODIFIER DAILY ACTIVITY: CH
MOBILITY SCORE: 23
CLIMB 3 TO 5 STEPS WITH RAILING: A LITTLE
DAILY ACTIVITIY SCORE: 24

## 2024-12-20 ASSESSMENT — LIFESTYLE VARIABLES
HOW OFTEN DO YOU HAVE A DRINK CONTAINING ALCOHOL: 2-3 TIMES A WEEK
TOTAL SCORE: 1
NAUSEA AND VOMITING: NO NAUSEA AND NO VOMITING
AUDIT-C TOTAL SCORE: -1
ORIENTATION AND CLOUDING OF SENSORIUM: ORIENTED AND CAN DO SERIAL ADDITIONS
PAROXYSMAL SWEATS: NO SWEAT VISIBLE
HOW OFTEN DO YOU HAVE SIX OR MORE DRINKS ON ONE OCCASION: PATIENT DECLINED
AUDITORY DISTURBANCES: NOT PRESENT
AGITATION: NORMAL ACTIVITY
ANXIETY: NO ANXIETY (AT EASE)
HEADACHE, FULLNESS IN HEAD: NOT PRESENT
VISUAL DISTURBANCES: NOT PRESENT
TREMOR: TREMOR NOT VISIBLE BUT CAN BE FELT, FINGERTIP TO FINGERTIP
SKIP TO QUESTIONS 9-10: 0
SUBSTANCE_ABUSE: 1
HOW MANY STANDARD DRINKS CONTAINING ALCOHOL DO YOU HAVE ON A TYPICAL DAY: 3 OR 4

## 2024-12-20 ASSESSMENT — ENCOUNTER SYMPTOMS
SINUS PAIN: 0
BACK PAIN: 0
COUGH: 0
TINGLING: 0
FLANK PAIN: 0
SHORTNESS OF BREATH: 1
WEIGHT LOSS: 0
EYE REDNESS: 0
SORE THROAT: 0
ORTHOPNEA: 0
EYE PAIN: 0
EYE DISCHARGE: 0
MYALGIAS: 0
HEADACHES: 0
NAUSEA: 0
CHILLS: 0
VOMITING: 0
NECK PAIN: 0
FEVER: 0
DIAPHORESIS: 0
ABDOMINAL PAIN: 0
BRUISES/BLEEDS EASILY: 0
BLOOD IN STOOL: 0
FOCAL WEAKNESS: 0
SPUTUM PRODUCTION: 1
STRIDOR: 0
SPEECH CHANGE: 0
DOUBLE VISION: 0
CONSTIPATION: 0
BLURRED VISION: 0
PALPITATIONS: 0
COUGH: 1
SENSORY CHANGE: 0
PHOTOPHOBIA: 0
SHORTNESS OF BREATH: 0
HALLUCINATIONS: 0
DIZZINESS: 0
SEIZURES: 0
HEMOPTYSIS: 0
PND: 0
NERVOUS/ANXIOUS: 0
DIARRHEA: 0
SPUTUM PRODUCTION: 0
TREMORS: 0

## 2024-12-20 ASSESSMENT — FIBROSIS 4 INDEX: FIB4 SCORE: 4.21

## 2024-12-20 NOTE — DISCHARGE PLANNING
Case Management Discharge Planning    Admission Date: 12/17/2024  GMLOS: 4.4  ALOS: 3    6-Clicks ADL Score:    6-Clicks Mobility Score:      Anticipated Discharge Dispo: Discharge Disposition: Discharged to home/self care (01)    DME Needed: No    Action(s) Taken: OTHER    LMSW spoke with pt's son Antemlo. Pt's wife wants this hospital to discharge pt to a rehab facility. This writer explained [facility] cannot discharge pt to a rehab facility pt would have to be in agreement for treatment and self admit after discharging from this hospital. Pts son will explain situation to his mother, ETOH resources will be provided.    Escalations Completed: None    Medically Clear: No    Barriers to Discharge: Medical clearance

## 2024-12-20 NOTE — ASSESSMENT & PLAN NOTE
He completed a course of high-dose IV thiamine  Continue multivitamins and folic acid  No clinical signs of withdrawal  Patient counseled on cessation

## 2024-12-20 NOTE — ASSESSMENT & PLAN NOTE
History of CAD with prior MI and PCI  Continue aspirin, 81 mg daily  Continue metoprolol  LFTs improved resume statin

## 2024-12-20 NOTE — ASSESSMENT & PLAN NOTE
Goal SBP less than 160  Change metoprolol tartrate to metoprolol succinate, 50 mg daily  Continue losartan and monitor blood pressure

## 2024-12-20 NOTE — CONSULTS
Hospital Medicine Consultation    Date of Service  12/20/2024    Referring Physician  John Galvez M.D.     Consulting Physician  Tiny Mills M.D.    Reason for Consultation  Transfer of care    History of Presenting Illness  64 y.o. male with possible history of coronary disease status post PCI, COPD asthma, diabetes, hyperlipidemia chronic any disease and alcohol dependence who presented to the hospital on 12/17/2024 with altered mental status.  On presentation patient family reported that he consumed alcohol in excessive amounts and he drinks 3-4 large beers daily.  He found to have acute kidney injury elevated liver enzymes and he admitted to ICU.  He was intubated on December 18, 2024 for respiratory failure secondary to COPD/asthma exacerbation and he also underwent bronchoscopy.    He was extubated on December 19, 2024 and placed on nasal cannula.    On December 20, 2024 intensivist Dr. Galvez requested to transfer care to hospital service.    I evaluated and examined him at the bedside.  He reported that he is feeling better.  I discussed plan of care with patient and his family at the bedside.      Review of Systems  Review of Systems   Constitutional:  Negative for chills, fever and weight loss.   HENT:  Negative for hearing loss and tinnitus.    Eyes:  Negative for blurred vision, double vision, photophobia and pain.   Respiratory:  Positive for shortness of breath (Improving). Negative for cough and sputum production.    Cardiovascular:  Negative for chest pain, palpitations, orthopnea and leg swelling.   Gastrointestinal:  Negative for abdominal pain, constipation, diarrhea, nausea and vomiting.   Genitourinary:  Negative for dysuria, frequency and urgency.   Musculoskeletal:  Negative for back pain, joint pain, myalgias and neck pain.   Skin:  Negative for rash.   Neurological:  Negative for dizziness, tingling, tremors, sensory change, speech change, focal weakness and  headaches.   Psychiatric/Behavioral:  Positive for substance abuse (Tobacco). Negative for hallucinations.    All other systems reviewed and are negative.      Past Medical History   has a past medical history of Acute myocardial infarction of other inferior wall, episode of care unspecified (06/25/2012), Acute myocardial infarction of other lateral wall, episode of care unspecified (06/25/2012), Asthma, Breath shortness, Bronchitis, Chest pain, unspecified (06/25/2012), Coronary atherosclerosis of native coronary artery (06/25/2012), Dental disorder, Diabetes ([facility]), Dizziness and giddiness (06/25/2012), High cholesterol, Hypertension, Mixed hyperlipidemia (06/25/2012), Pure hyperglyceridemia (06/25/2012), Renal disorder, S/P coronary artery stent placement (06/25/2012), and Tobacco use disorder (06/25/2012).    He has no past medical history of Acute nasopharyngitis, Anesthesia, Anginal syndrome ([facility]), Arrhythmia, Arthritis, Blood clotting disorder ([facility]), Bowel habit changes, Cancer ([facility]), Carcinoma in situ of respiratory system, Cataract, Congestive heart failure ([facility]), Continuous ambulatory peritoneal dialysis status ([facility]), Coughing blood, Dialysis patient ([facility]), Disorder of thyroid, Glaucoma, Gynecological disorder, Heart burn, Heart murmur, Heart valve disease, Hemorrhagic disorder ([facility]), Hepatitis A, Hepatitis B, Hepatitis C, Hiatus hernia syndrome, Indigestion, Infectious disease, Jaundice, Pacemaker, Pneumonia, Pregnant, Psychiatric problem, Rheumatic fever, Seizure ([facility]), Sleep apnea, Snoring, Stroke ([facility]), Tuberculosis, Urinary bladder disorder, or Urinary incontinence.    Surgical History   has a past surgical history that includes stent placement; orif, fracture, femur (1994); other cardiac surgery; and other.    Family History  family history includes Cancer in his sister; Stroke in his mother.    Social History   reports that he has been smoking cigarettes. He started smoking about 42 years ago. He has  a 10.7 pack-year smoking history. He has never used smokeless tobacco. He reports that he does not currently use alcohol after a past usage of about 2.4 - 3.6 oz of alcohol per week. He reports that he does not use drugs.    Medications  Prior to Admission Medications   Prescriptions Last Dose Informant Patient Reported? Taking?   VITAMIN D PO 12/16/2024 Family Member Yes Yes   Sig: Take 1 Tablet by mouth every day.   albuterol 108 (90 Base) MCG/ACT Aero Soln inhalation aerosol 12/17/2024 Morning Family Member No Yes   Sig: Inhale 2 Puffs every four hours as needed for Shortness of Breath.   allopurinol (ZYLOPRIM) 100 MG Tab Not Taking Family Member No No   Sig: Take 0.5 Tablets by mouth every day.   Patient not taking: Reported on 12/17/2024   aspirin EC (ECOTRIN) 81 MG Tablet Delayed Response 12/16/2024 Family Member No Yes   Sig: Take 1 Tablet by mouth every day.   atorvastatin (LIPITOR) 80 MG tablet 12/16/2024 Family Member No Yes   Sig: Take 1 Tablet by mouth at bedtime.   dapagliflozin propanediol (FARXIGA) 10 MG Tab Not Taking Family Member No No   Sig: Take 1 Tablet by mouth every day.   Patient not taking: Reported on 12/17/2024   fluticasone furoate-vilanterol (BREO ELLIPTA) 100-25 MCG/ACT AEROSOL POWDER, BREATH ACTIVATED 12/16/2024 Family Member No Yes   Sig: Inhale 1 Puff every day.   losartan (COZAAR) 100 MG Tab 12/16/2024 Family Member No Yes   Sig: Take 1 Tablet by mouth every day.   metoprolol SR (TOPROL XL) 50 MG TABLET SR 24 HR 12/16/2024 Family Member No Yes   Sig: Take 1 Tablet by mouth every day.   tiotropium (SPIRIVA HANDIHALER) 18 MCG Cap 12/16/2024 Family Member No Yes   Sig: Place 1 Capsule into inhaler and inhale every day.      Facility-Administered Medications: None       Allergies  Allergies   Allergen Reactions    Lisinopril Cough       Physical Exam  Temp:  [36.5 °C (97.7 °F)-37.1 °C (98.8 °F)] 37.1 °C (98.8 °F)  Pulse:  [] 89  Resp:  [13-47] 17  BP: (102-157)/(57-94)  143/83  SpO2:  [91 %-98 %] 92 %    Physical Exam    Fluids  Date 12/20/24 0700 - 12/21/24 0659   Shift 9194-5898 1804-4253 7203-7724 24 Hour Total   INTAKE   P.O. 360   360   Shift Total 360   360   OUTPUT   Urine 725   725   Shift Total 725   725   Weight (kg) 73.4 73.4 73.4 73.4       Laboratory  Recent Labs     12/18/24  0400 12/19/24  0530 12/20/24  0513   WBC 12.5* 10.3 15.5*   RBC 4.66* 4.35* 4.19*   HEMOGLOBIN 14.2 13.5* 12.8*   HEMATOCRIT 44.7 38.9* 38.6*   MCV 95.9 89.4 92.1   MCH 30.5 31.0 30.5   MCHC 31.8* 34.7 33.2   RDW 60.6* 53.9* 55.3*   PLATELETCT 117* 148* 118*   MPV 9.9 10.8 10.8     Recent Labs     12/19/24  1730 12/19/24  2345 12/20/24  0513   SODIUM 142 139 139   POTASSIUM 4.5 4.6 4.1   CHLORIDE 98 95* 94*   CO2 31 28 34*   GLUCOSE 138* 152* 145*   BUN 53* 54* 58*   CREATININE 2.01* 2.15* 2.13*   CALCIUM 9.2 8.8 9.2     Recent Labs     12/18/24  0400   INR 1.75*                 Imaging  DX-CHEST-PORTABLE (1 VIEW)   Final Result         1.  Left basilar atelectasis and/or subtle infiltrates.   2.  Atherosclerosis      EC-ECHOCARDIOGRAM COMPLETE W/ CONT   Final Result      DX-CHEST-PORTABLE (1 VIEW)   Final Result         1.  No acute cardiopulmonary disease.   2.  Atherosclerosis      DX-ABDOMEN FOR TUBE PLACEMENT   Final Result      Enteric tube tip projects over the stomach      DX-CHEST-PORTABLE (1 VIEW)   Final Result      1.  Line and tube position as described above   2.  No visible pneumothorax   3.  Increased bibasilar opacities, atelectasis or pneumonia      US-RENAL   Final Result      No hydronephrosis   Ascites      CT-ABDOMEN-PELVIS W/O   Final Result      1.  No evidence of bowel obstruction or focal inflammatory change.      2.  Small of ascites within the abdomen and pelvis.      3.  Lobulated appearance of the surface of the liver likely representing presence of hepatocellular dysfunction.      4.  Bibasilar atelectasis with small bilateral pleural effusions.       DX-CHEST-PORTABLE (1 VIEW)   Final Result      No evidence of acute cardiopulmonary process.          Assessment/Plan  * Acute-on-chronic kidney injury (HCC)- (present on admission)  Assessment & Plan  Underlying stage 3b CKD  No hydronephrosis on imaging  Monitor renal function and urine output  Avoid nephrotoxins and renal dose medications  Improved  Ordered lab work for tomorrow    Acute systolic heart failure (HCC)  Assessment & Plan  LVEF 45%  Reduced RV systolic function  Resume losartan when clinically appropriate  Change metoprolol tartrate to metoprolol succinate, 50 mg daily  Continue to monitor    Acute respiratory failure with hypoxia and hypercapnia (HCC)  Assessment & Plan  Intubated 12/18-12/19  Markedly improved  Now currently he is requiring 4 L of oxygen and maintaining oxygen saturation       Alcohol dependence (HCC)  Assessment & Plan  He completed a course of high-dose IV thiamine  Continue multivitamins and folic acid  Observe for evidence of withdrawal - [facility] protocol  Cessation counseling  No signs of alcohol withdrawal on my evaluation.  Optimize electrolytes  Thiamine      Acute encephalopathy  Assessment & Plan  Now resolved    Elevated LFTs  Assessment & Plan  History of alcohol dependence with ongoing alcohol use  Hepatitis panel negative  Trend liver enzymes and synthetic function  Avoid hepatotoxins  Ordered CMP for tomorrow.    Acute exacerbation of chronic obstructive pulmonary disease (COPD) (HCC)- (present on admission)  Assessment & Plan  PFTs on 4/20/2022: FEV1 1.37 L (47%), FEV1/FVC 53%, DLCO 88%  Stop methylprednisolone  Begin prednisone, 40 mg daily  Bronchodilators  Continue Dulera, 100/5, 2 puffs twice daily  Resume Spiriva, 1 puff daily  Respiratory therapy per protocol per  I provided him counseling regarding complete smoking cessation and he expressed understanding.  I placed outpatient referral for pulmonology.  Ordered COPD order set    Type 2 diabetes mellitus (HCC)-  (present on admission)  Assessment & Plan  Glycohemoglobin 6.7  I am continuing insulin sliding scale with hypoglycemia protocol.    Primary hypertension- (present on admission)  Assessment & Plan  Goal SBP less than 160  Change metoprolol tartrate to metoprolol succinate, 50 mg daily  Hold losartan with ROBERT  Continue to monitor closely    Dyslipidemia- (present on admission)  Assessment & Plan  Hold statin with markedly elevated LFTs.  Resume once liver functions improved.    CAD (coronary artery disease)- (present on admission)  Assessment & Plan  History of CAD with prior MI and PCI  Continue aspirin, 81 mg daily  Change metoprolol tartrate to metoprolol succinate, 50 mg daily  Hold statin with elevated aminotransferases.  Continue to monitor closely.        Thank you for allow me to participate in patient care.  Hospitalist service will continue to follow this patient.    I reviewed and summarized patient hospitalization in this document.    I discussed plan of care with bedside RN in ICU.    I discussed plan of care with intensivist Dr. Fry    High complexity medical care due to multiorgan involvement.

## 2024-12-20 NOTE — RESPIRATORY CARE
"COPD EDUCATION by COPD CLINICAL EDUCATOR  12/20/2024  at  12:16 PM by Gigi Stafford, RRT     Patient interviewed by education team.  Patient declined or is unable to participate in the full program.  Therefore, a short intervention has been conducted.  A comprehensive packet including information about COPD, types of treatments to manage their disease and safe home Oxygen usage was provided and reviewed with patient at the bedside.  Smoking Cessation Intervention and education completed, 6 minutes spent on smoking cessation education with patient.  Provided smoking cessation packet with \"Tips to Quit\" and brochure for \"Free Smoking Cessation Classes\".     Request for pulmonary referral initiated with attending. I reviewed his medications and gave him a spacer with demonstration. He has COPD/Asthma overlap. His is quitting smoking but did accept smoking cessation information.      COPD Screen  COPD Risk Screening  Do you have a history of COPD?: Yes  Do you have a Pulmonologist?: Yes    COPD Assessment  COPD Clinical Specialists ONLY  COPD Education Initiated: Yes--Short Intervention (met with patient and family member, he speaks english as well as Lithuanian.)  Is this a COPD exacerbation patient?: Yes (per hnp)  DME Company: none  DME Equipment Type: none  Physician Follow Up Appointment: 01/23/25  Physician Name: Dmitry emanuel  Pulmonologist Name: none , requesting referal  Referrals Initiated: Yes  Pulmonary Rehab: Yes (gave him pamplet to review)  Smoking Cessation: Yes  $ Smoking Cessation 3-10 Minutes: Symptomatic  Hospice: No  Home Health Care: No  Mobile Urgent Care Services: No  Geriatric Specialty Group: No  Private In-Home Care Agency: No  $ Demo/Eval of SVN's, MDI's and Aerosols: Yes (gave spacer with demonstration)  (OP) Pulmonary Function Testing: Yes (04/20/2022 FEV1=47 FEV1/FVC=53)  Interdisciplinary Rounds: Attendance at Rounds (30 Min)    PFT Results    04/20/2022 FEV1=47 FEV1/FVC=53    Meds to " "[facility] provides bedside medication delivery for all eligible patients at discharge and you have been automatically enrolled in the Meds to Beds Program. Would you like to opt out of this program for any reason?: No - Stay Opted In     MY COPD ACTION PLAN     It is recommended that patients and physicians /healthcare providers complete this action plan together. This plan should be discussed at each physician visit and updated as needed.    The green, yellow and red zones show groups of symptoms of COPD. This list of symptoms is not comprehensive, and you may experience other symptoms. In the \"Actions\" column, your healthcare provider has recommended actions for you to take based on your symptoms.    Patient Name: Hugo Villela   YOB: 1960   Last Updated on: 12/20/2024 12:15 PM   Green Zone:  I am doing well today Actions     Usual activitiy and exercise level   Take daily medications     Usual amounts of cough and phlegm/mucus   Use oxygen as prescribed     Sleep well at night   Continue regular exercise/diet plan     Appetite is good   At all times avoid cigarette smoke, inhaled irritants     Daily Medications (these medications are taken every day):   Fluticasone Furoate and Vilanterol trifenatate (Breo)  Tiotropium Bromide Monohydrate (Spiriva)           Additional Information:  Take medication as prescribed. Rinse mouth after using breo ellipta    Yellow Zone:  I am having a bad day or a COPD flare Actions     More breathless than usual   Continue daily medications     I have less energy for my daily activities   Use quick relief inhaler as ordered     Increased or thicker phlegm/mucus   Use oxygen as prescribed     Using quick relief inhaler/nebulizer more often   Get plenty of rest     Swelling of ankles more than usual   Use pursed lip breathing     More coughing than usual   At all times avoid cigarette smoke, inhaled irritants     I feel like I have a \"chest cold\"     Poor sleep and " my symptoms woke me up     My appetite is not good     My medicine is not helping      Call provider immediately if symptoms don’t improve     Continue daily medications, add rescue medications:   Albuterol 2 Puffs Every 6 hours PRN       Medications to be used during a flare up, (as Discussed with Provider):           Additional Information:  Use with provided spacer    Red Zone:  I need urgent medical care Actions     Severe shortness of breath even at rest   Call 911 or seek medical care immediately     Not able to do any activity because of breathing      Fever or shaking chills      Feeling confused or very drowsy       Chest pains      Coughing up blood

## 2024-12-20 NOTE — ASSESSMENT & PLAN NOTE
PFTs on 4/20/2022: FEV1 1.37 L (47%), FEV1/FVC 53%, DLCO 88%  Continue prednisone complete 5-day course  Bronchodilators  Continue Dulera, 100/5, 2 puffs twice daily  Continue Spiriva, 1 puff daily  Respiratory therapy per protocol per  Counseled patient on smoking cessation and need for outpatient follow-up

## 2024-12-20 NOTE — ASSESSMENT & PLAN NOTE
LVEF 45%  Reduced RV systolic function  Continue Toprol-XL  Start low-dose losartan  I ordered 1 dose of Lasix and monitor volume status and chemistry panel  We will hold off on spironolactone given ROBERT and CKD

## 2024-12-20 NOTE — CARE PLAN
The patient is Stable - Low risk of patient condition declining or worsening    Shift Goals  Clinical Goals: Q6 BMP  Patient Goals: Rest  Family Goals: updates    Progress made toward(s) clinical / shift goals:    Problem: Knowledge Deficit - Standard  Goal: Patient and family/care givers will demonstrate understanding of plan of care, disease process/condition, diagnostic tests and medications  Outcome: Progressing     Problem: Psychosocial  Goal: Patient's level of anxiety will decrease  Outcome: Progressing     Problem: Pain - Standard  Goal: Alleviation of pain or a reduction in pain to the patient’s comfort goal  Outcome: Progressing     Problem: Skin Integrity  Goal: Skin integrity is maintained or improved  Outcome: Progressing     Problem: Fall Risk  Goal: Patient will remain free from falls  Outcome: Progressing     Problem: Safety - Medical Restraint  Goal: Remains free of injury from restraints (Restraint for Interference with Medical Device)  Outcome: Met  Goal: Free from restraint(s) (Restraint for Interference with Medical Device)  Outcome: Met

## 2024-12-20 NOTE — ASSESSMENT & PLAN NOTE
History of alcohol dependence with ongoing alcohol use  Hepatitis panel negative    LFTs trending down abdominal exam is benign

## 2024-12-20 NOTE — ASSESSMENT & PLAN NOTE
LVEF 45%  Reduced RV systolic function  Resume losartan when clinically appropriate  Change metoprolol tartrate to metoprolol succinate, 50 mg daily

## 2024-12-20 NOTE — CARE PLAN
Problem: Bronchoconstriction  Goal: Improve in air movement and diminished wheezing  Description: Target End Date:  2 to 3 days    1.  Implement inhaled treatments  2.  Evaluate and manage medication effects  Outcome: Progressing     Problem: Ventilation  Goal: Ability to achieve and maintain unassisted ventilation or tolerate decreased levels of ventilator support  Description: Target End Date:  4 days     Document on Vent flowsheet    1.  Support and monitor invasive and noninvasive mechanical ventilation  2.  Monitor ventilator weaning response  3.  Perform ventilator associated pneumonia prevention interventions  4.  Manage ventilation therapy by monitoring diagnostic test results  Outcome: Met

## 2024-12-21 PROBLEM — G93.40 ACUTE ENCEPHALOPATHY: Status: RESOLVED | Noted: 2024-12-18 | Resolved: 2024-12-21

## 2024-12-21 LAB
ALBUMIN SERPL BCP-MCNC: 4.2 G/DL (ref 3.2–4.9)
ALBUMIN/GLOB SERPL: 1.8 G/DL
ALP SERPL-CCNC: 119 U/L (ref 30–99)
ALT SERPL-CCNC: 635 U/L (ref 2–50)
ANION GAP SERPL CALC-SCNC: 11 MMOL/L (ref 7–16)
AST SERPL-CCNC: 118 U/L (ref 12–45)
BASOPHILS # BLD AUTO: 0.1 % (ref 0–1.8)
BASOPHILS # BLD: 0.01 K/UL (ref 0–0.12)
BILIRUB SERPL-MCNC: 1 MG/DL (ref 0.1–1.5)
BUN SERPL-MCNC: 57 MG/DL (ref 8–22)
CALCIUM ALBUM COR SERPL-MCNC: 8.8 MG/DL (ref 8.5–10.5)
CALCIUM SERPL-MCNC: 9 MG/DL (ref 8.5–10.5)
CHLORIDE SERPL-SCNC: 97 MMOL/L (ref 96–112)
CO2 SERPL-SCNC: 31 MMOL/L (ref 20–33)
CREAT SERPL-MCNC: 1.98 MG/DL (ref 0.5–1.4)
EOSINOPHIL # BLD AUTO: 0 K/UL (ref 0–0.51)
EOSINOPHIL NFR BLD: 0 % (ref 0–6.9)
ERYTHROCYTE [DISTWIDTH] IN BLOOD BY AUTOMATED COUNT: 57.3 FL (ref 35.9–50)
GFR SERPLBLD CREATININE-BSD FMLA CKD-EPI: 37 ML/MIN/1.73 M 2
GLOBULIN SER CALC-MCNC: 2.3 G/DL (ref 1.9–3.5)
GLUCOSE BLD STRIP.AUTO-MCNC: 122 MG/DL (ref 65–99)
GLUCOSE BLD STRIP.AUTO-MCNC: 132 MG/DL (ref 65–99)
GLUCOSE BLD STRIP.AUTO-MCNC: 133 MG/DL (ref 65–99)
GLUCOSE BLD STRIP.AUTO-MCNC: 142 MG/DL (ref 65–99)
GLUCOSE SERPL-MCNC: 126 MG/DL (ref 65–99)
HCT VFR BLD AUTO: 41.7 % (ref 42–52)
HGB BLD-MCNC: 13.5 G/DL (ref 14–18)
IMM GRANULOCYTES # BLD AUTO: 0.07 K/UL (ref 0–0.11)
IMM GRANULOCYTES NFR BLD AUTO: 0.5 % (ref 0–0.9)
LYMPHOCYTES # BLD AUTO: 0.35 K/UL (ref 1–4.8)
LYMPHOCYTES NFR BLD: 2.3 % (ref 22–41)
MAGNESIUM SERPL-MCNC: 2.1 MG/DL (ref 1.5–2.5)
MCH RBC QN AUTO: 30.2 PG (ref 27–33)
MCHC RBC AUTO-ENTMCNC: 32.4 G/DL (ref 32.3–36.5)
MCV RBC AUTO: 93.3 FL (ref 81.4–97.8)
MONOCYTES # BLD AUTO: 1.72 K/UL (ref 0–0.85)
MONOCYTES NFR BLD AUTO: 11.5 % (ref 0–13.4)
NEUTROPHILS # BLD AUTO: 12.85 K/UL (ref 1.82–7.42)
NEUTROPHILS NFR BLD: 85.6 % (ref 44–72)
NRBC # BLD AUTO: 0.07 K/UL
NRBC BLD-RTO: 0.5 /100 WBC (ref 0–0.2)
PHOSPHATE SERPL-MCNC: 3.9 MG/DL (ref 2.5–4.5)
PLATELET # BLD AUTO: 114 K/UL (ref 164–446)
PMV BLD AUTO: 9.6 FL (ref 9–12.9)
POTASSIUM SERPL-SCNC: 3.8 MMOL/L (ref 3.6–5.5)
PROT SERPL-MCNC: 6.5 G/DL (ref 6–8.2)
RBC # BLD AUTO: 4.47 M/UL (ref 4.7–6.1)
SODIUM SERPL-SCNC: 139 MMOL/L (ref 135–145)
WBC # BLD AUTO: 15 K/UL (ref 4.8–10.8)

## 2024-12-21 PROCEDURE — 700111 HCHG RX REV CODE 636 W/ 250 OVERRIDE (IP): Performed by: INTERNAL MEDICINE

## 2024-12-21 PROCEDURE — 700101 HCHG RX REV CODE 250: Performed by: INTERNAL MEDICINE

## 2024-12-21 PROCEDURE — 770006 HCHG ROOM/CARE - MED/SURG/GYN SEMI*

## 2024-12-21 PROCEDURE — A9270 NON-COVERED ITEM OR SERVICE: HCPCS | Performed by: INTERNAL MEDICINE

## 2024-12-21 PROCEDURE — 51798 US URINE CAPACITY MEASURE: CPT

## 2024-12-21 PROCEDURE — 700102 HCHG RX REV CODE 250 W/ 637 OVERRIDE(OP): Performed by: HOSPITALIST

## 2024-12-21 PROCEDURE — 700102 HCHG RX REV CODE 250 W/ 637 OVERRIDE(OP): Performed by: INTERNAL MEDICINE

## 2024-12-21 PROCEDURE — 85025 COMPLETE CBC W/AUTO DIFF WBC: CPT

## 2024-12-21 PROCEDURE — 83735 ASSAY OF MAGNESIUM: CPT

## 2024-12-21 PROCEDURE — 80053 COMPREHEN METABOLIC PANEL: CPT

## 2024-12-21 PROCEDURE — 94669 MECHANICAL CHEST WALL OSCILL: CPT

## 2024-12-21 PROCEDURE — 94640 AIRWAY INHALATION TREATMENT: CPT

## 2024-12-21 PROCEDURE — 99232 SBSQ HOSP IP/OBS MODERATE 35: CPT | Performed by: HOSPITALIST

## 2024-12-21 PROCEDURE — 36415 COLL VENOUS BLD VENIPUNCTURE: CPT

## 2024-12-21 PROCEDURE — 82962 GLUCOSE BLOOD TEST: CPT | Mod: 91

## 2024-12-21 PROCEDURE — 84100 ASSAY OF PHOSPHORUS: CPT

## 2024-12-21 PROCEDURE — A9270 NON-COVERED ITEM OR SERVICE: HCPCS | Performed by: HOSPITALIST

## 2024-12-21 RX ORDER — ONDANSETRON 4 MG/1
4 TABLET, ORALLY DISINTEGRATING ORAL EVERY 4 HOURS PRN
Status: DISCONTINUED | OUTPATIENT
Start: 2024-12-21 | End: 2024-12-23 | Stop reason: HOSPADM

## 2024-12-21 RX ORDER — ASPIRIN 81 MG/1
81 TABLET, CHEWABLE ORAL DAILY
Status: DISCONTINUED | OUTPATIENT
Start: 2024-12-21 | End: 2024-12-23 | Stop reason: HOSPADM

## 2024-12-21 RX ORDER — LOSARTAN POTASSIUM 25 MG/1
25 TABLET ORAL
Status: DISCONTINUED | OUTPATIENT
Start: 2024-12-21 | End: 2024-12-23 | Stop reason: HOSPADM

## 2024-12-21 RX ORDER — PROMETHAZINE HYDROCHLORIDE 25 MG/1
12.5-25 TABLET ORAL EVERY 4 HOURS PRN
Status: DISCONTINUED | OUTPATIENT
Start: 2024-12-21 | End: 2024-12-23 | Stop reason: HOSPADM

## 2024-12-21 RX ORDER — IPRATROPIUM BROMIDE AND ALBUTEROL SULFATE 2.5; .5 MG/3ML; MG/3ML
3 SOLUTION RESPIRATORY (INHALATION)
Status: DISCONTINUED | OUTPATIENT
Start: 2024-12-22 | End: 2024-12-23 | Stop reason: HOSPADM

## 2024-12-21 RX ORDER — ACETAMINOPHEN 325 MG/1
650 TABLET ORAL EVERY 6 HOURS PRN
Status: DISCONTINUED | OUTPATIENT
Start: 2024-12-21 | End: 2024-12-23 | Stop reason: HOSPADM

## 2024-12-21 RX ADMIN — MOMETASONE FUROATE AND FORMOTEROL FUMARATE DIHYDRATE 2 PUFF: 100; 5 AEROSOL RESPIRATORY (INHALATION) at 19:59

## 2024-12-21 RX ADMIN — FOLIC ACID 1 MG: 1 TABLET ORAL at 05:40

## 2024-12-21 RX ADMIN — MOMETASONE FUROATE AND FORMOTEROL FUMARATE DIHYDRATE 2 PUFF: 100; 5 AEROSOL RESPIRATORY (INHALATION) at 08:36

## 2024-12-21 RX ADMIN — THERA TABS 1 TABLET: TAB at 05:41

## 2024-12-21 RX ADMIN — HEPARIN SODIUM 5000 UNITS: 5000 INJECTION, SOLUTION INTRAVENOUS; SUBCUTANEOUS at 22:07

## 2024-12-21 RX ADMIN — HEPARIN SODIUM 5000 UNITS: 5000 INJECTION, SOLUTION INTRAVENOUS; SUBCUTANEOUS at 14:46

## 2024-12-21 RX ADMIN — IPRATROPIUM BROMIDE AND ALBUTEROL SULFATE 3 ML: .5; 2.5 SOLUTION RESPIRATORY (INHALATION) at 10:47

## 2024-12-21 RX ADMIN — Medication 100 MG: at 05:40

## 2024-12-21 RX ADMIN — METOPROLOL SUCCINATE 50 MG: 25 TABLET, EXTENDED RELEASE ORAL at 05:41

## 2024-12-21 RX ADMIN — SENNOSIDES AND DOCUSATE SODIUM 2 TABLET: 50; 8.6 TABLET ORAL at 17:20

## 2024-12-21 RX ADMIN — PREDNISONE 40 MG: 20 TABLET ORAL at 05:41

## 2024-12-21 RX ADMIN — IPRATROPIUM BROMIDE AND ALBUTEROL SULFATE 3 ML: .5; 2.5 SOLUTION RESPIRATORY (INHALATION) at 01:45

## 2024-12-21 RX ADMIN — IPRATROPIUM BROMIDE AND ALBUTEROL SULFATE 3 ML: .5; 2.5 SOLUTION RESPIRATORY (INHALATION) at 07:15

## 2024-12-21 RX ADMIN — HEPARIN SODIUM 5000 UNITS: 5000 INJECTION, SOLUTION INTRAVENOUS; SUBCUTANEOUS at 05:42

## 2024-12-21 RX ADMIN — LOSARTAN POTASSIUM 25 MG: 25 TABLET, FILM COATED ORAL at 14:46

## 2024-12-21 RX ADMIN — SENNOSIDES AND DOCUSATE SODIUM 2 TABLET: 50; 8.6 TABLET ORAL at 05:40

## 2024-12-21 RX ADMIN — IPRATROPIUM BROMIDE AND ALBUTEROL SULFATE 3 ML: .5; 2.5 SOLUTION RESPIRATORY (INHALATION) at 19:59

## 2024-12-21 RX ADMIN — IPRATROPIUM BROMIDE AND ALBUTEROL SULFATE 3 ML: .5; 2.5 SOLUTION RESPIRATORY (INHALATION) at 14:35

## 2024-12-21 RX ADMIN — ASPIRIN 81 MG: 81 TABLET, CHEWABLE ORAL at 05:41

## 2024-12-21 ASSESSMENT — LIFESTYLE VARIABLES
ORIENTATION AND CLOUDING OF SENSORIUM: ORIENTED AND CAN DO SERIAL ADDITIONS
PAROXYSMAL SWEATS: NO SWEAT VISIBLE
TOTAL SCORE: 1
NAUSEA AND VOMITING: NO NAUSEA AND NO VOMITING
AUDITORY DISTURBANCES: NOT PRESENT
VISUAL DISTURBANCES: NOT PRESENT
HEADACHE, FULLNESS IN HEAD: NOT PRESENT
HEADACHE, FULLNESS IN HEAD: NOT PRESENT
TOTAL SCORE: 0
VISUAL DISTURBANCES: NOT PRESENT
PAROXYSMAL SWEATS: NO SWEAT VISIBLE
NAUSEA AND VOMITING: NO NAUSEA AND NO VOMITING
NAUSEA AND VOMITING: NO NAUSEA AND NO VOMITING
ORIENTATION AND CLOUDING OF SENSORIUM: ORIENTED AND CAN DO SERIAL ADDITIONS
ANXIETY: NO ANXIETY (AT EASE)
ORIENTATION AND CLOUDING OF SENSORIUM: ORIENTED AND CAN DO SERIAL ADDITIONS
HEADACHE, FULLNESS IN HEAD: NOT PRESENT
ANXIETY: NO ANXIETY (AT EASE)
ANXIETY: NO ANXIETY (AT EASE)
VISUAL DISTURBANCES: NOT PRESENT
TREMOR: TREMOR NOT VISIBLE BUT CAN BE FELT, FINGERTIP TO FINGERTIP
TREMOR: NO TREMOR
NAUSEA AND VOMITING: NO NAUSEA AND NO VOMITING
TOTAL SCORE: 1
VISUAL DISTURBANCES: NOT PRESENT
AUDITORY DISTURBANCES: NOT PRESENT
ANXIETY: NO ANXIETY (AT EASE)
HEADACHE, FULLNESS IN HEAD: NOT PRESENT
AGITATION: NORMAL ACTIVITY
AGITATION: NORMAL ACTIVITY
AUDITORY DISTURBANCES: NOT PRESENT
AGITATION: NORMAL ACTIVITY
PAROXYSMAL SWEATS: NO SWEAT VISIBLE
TREMOR: TREMOR NOT VISIBLE BUT CAN BE FELT, FINGERTIP TO FINGERTIP
TREMOR: TREMOR NOT VISIBLE BUT CAN BE FELT, FINGERTIP TO FINGERTIP
PAROXYSMAL SWEATS: NO SWEAT VISIBLE
AGITATION: NORMAL ACTIVITY
ORIENTATION AND CLOUDING OF SENSORIUM: ORIENTED AND CAN DO SERIAL ADDITIONS
AUDITORY DISTURBANCES: NOT PRESENT
TOTAL SCORE: 1

## 2024-12-21 ASSESSMENT — ENCOUNTER SYMPTOMS
FEVER: 0
SHORTNESS OF BREATH: 1
ABDOMINAL PAIN: 0
COUGH: 1
VOMITING: 0
NAUSEA: 0

## 2024-12-21 ASSESSMENT — PATIENT HEALTH QUESTIONNAIRE - PHQ9
SUM OF ALL RESPONSES TO PHQ9 QUESTIONS 1 AND 2: 0
1. LITTLE INTEREST OR PLEASURE IN DOING THINGS: NOT AT ALL
2. FEELING DOWN, DEPRESSED, IRRITABLE, OR HOPELESS: NOT AT ALL

## 2024-12-21 ASSESSMENT — PAIN DESCRIPTION - PAIN TYPE
TYPE: ACUTE PAIN
TYPE: ACUTE PAIN

## 2024-12-21 NOTE — HOSPITAL COURSE
64 y.o. male with possible history of coronary disease status post PCI, COPD asthma, diabetes, hyperlipidemia chronic any disease and alcohol dependence who presented to the hospital on 12/17/2024 with altered mental status.  On presentation patient family reported that he consumed alcohol in excessive amounts and he drinks 3-4 large beers daily.  He found to have acute kidney injury elevated liver enzymes and he admitted to ICU.  He was intubated on December 18, 2024 for respiratory failure secondary to COPD/asthma exacerbation and he also underwent bronchoscopy.     He was extubated on December 19, 2024 and placed on nasal cannula.     On December 20, 2024 intensivist Dr. Galvez requested to transfer care to hospital service.

## 2024-12-21 NOTE — CARE PLAN
Problem: Bronchoconstriction  Goal: Improve in air movement and diminished wheezing  Description: Target End Date:  2 to 3 days    1.  Implement inhaled treatments  2.  Evaluate and manage medication effects  Outcome: Progressing     Stable on 4L NC

## 2024-12-21 NOTE — CARE PLAN
The patient is Stable - Low risk of patient condition declining or worsening    Shift Goals  Clinical Goals: Patient will achieve SPO2 greater than 90% throughout shift  Patient Goals: Rest  Family Goals: Updates    Progress made toward(s) clinical / shift goals:  Pain modalities offered such as medication, ambulation, ice pack, heat pack, repositioning, and distraction. IS teaching provided, demonstration returned. TCDB teaching provided. Bed locked and in lowest position, with bed alarm on. Non skid socks provided, fall precautions in place per Fall screening flowsheet. Mobilization importance education provided, with fall education. Diet followed per order.  +Void, +BM. Teaching provided per POC, teachback to RN encouraged. Encouraged and reminded of turning q2 hours throughout shift.     Problem: Knowledge Deficit - Standard  Goal: Patient and family/care givers will demonstrate understanding of plan of care, disease process/condition, diagnostic tests and medications  Outcome: Progressing     Problem: Pain - Standard  Goal: Alleviation of pain or a reduction in pain to the patient’s comfort goal  Outcome: Progressing     Problem: Risk for Infection - COPD  Goal: Patient will remain free from signs and symptoms of infection  Outcome: Progressing     Problem: Self Care  Goal: Patient will have the ability to perform ADLs independently or with assistance (bathe, groom, dress, toilet and feed)  Outcome: Progressing     Problem: Skin Integrity  Goal: Skin integrity is maintained or improved  Outcome: Progressing     Problem: Fall Risk  Goal: Patient will remain free from falls  Outcome: Progressing     Problem: Ineffective Airway Clearance  Goal: Patient will maintain patent airway with clear/clearing breath sounds  Outcome: Progressing     Problem: Knowledge Deficit - COPD  Goal: Patient/significant other demonstrates understanding of disease process, utilization of the Action Plan, medications and discharge  instruction  Outcome: Progressing       Patient is not progressing towards the following goals:

## 2024-12-21 NOTE — PROGRESS NOTES
4 Eyes Skin Assessment Completed by Ashley JONES RN and Camille THOMPSON RN.    Head WDL  Ears WDL  Nose WDL  Mouth WDL  Neck WDL  Breast/Chest WDL  Shoulder Blades WDL  Spine WDL  (R) Arm/Elbow/Hand Redness and Blanching  (L) Arm/Elbow/Hand Redness and Blanching, bruising left forearem  Abdomen Bruising  Groin WDL  Scrotum/Coccyx/Buttocks WDL  (R) Leg WDL  (L) Leg WDL  (R) Heel/Foot/Toe Redness and Blanching  (L) Heel/Foot/Toe Redness and Blanching          Devices In Places Nasal Cannula      Interventions In Place Gray Ear Foams    Possible Skin Injury No    Pictures Uploaded Into Epic N/A  Wound Consult Placed N/A  RN Wound Prevention Protocol Ordered No

## 2024-12-21 NOTE — PROGRESS NOTES
Pt A&O x4. Plan of care discussed, all concerns addressed. No complaints of pain and declines interventions. No complaints of chest pain, SOB, or N/V. Call light and belongings within reach. Pt educated on fall precautions with verbalized understanding. Bed locked and in lowest position. VSS. All needs met at this time. Family at bedside.

## 2024-12-21 NOTE — CARE PLAN
Problem: Bronchoconstriction  Goal: Improve in air movement and diminished wheezing  Description: Target End Date:  2 to 3 days    1.  Implement inhaled treatments  2.  Evaluate and manage medication effects  Outcome: Progressing     Problem: Bronchopulmonary Hygiene:  Goal: Increase mobilization of retained secretions  Outcome: Progressing   Duoneb q4  Flutter qid

## 2024-12-21 NOTE — PROGRESS NOTES
Hospital Medicine Daily Progress Note    Date of Service  12/21/2024    Chief Complaint  Hugo Villela is a 64 y.o. male admitted 12/17/2024 with altered mental status    Hospital Course  64 y.o. male with possible history of coronary disease status post PCI, COPD asthma, diabetes, hyperlipidemia chronic any disease and alcohol dependence who presented to the hospital on 12/17/2024 with altered mental status.  On presentation patient family reported that he consumed alcohol in excessive amounts and he drinks 3-4 large beers daily.  He found to have acute kidney injury elevated liver enzymes and he admitted to ICU.  He was intubated on December 18, 2024 for respiratory failure secondary to COPD/asthma exacerbation and he also underwent bronchoscopy.     He was extubated on December 19, 2024 and placed on nasal cannula.     On December 20, 2024 intensivist Dr. Galvez requested to transfer care to hospital service.    Interval Problem Update    Patient is afebrile 3 L nasal cannula  I reviewed hospitalization records  I reviewed chemistry panel electrolytes stable BUN 57 creatinine 1.98  Reviewed CBC WBC 15 hemoglobin 13.5 platelets 114  I reviewed chest x-ray with a left lower lobe atelectasis and possible infiltrate  I reviewed echocardiogram EF 45% with global hypokinesis    I have discussed this patient's plan of care and discharge plan at IDT rounds today with Case Management, Nursing, Nursing leadership, and other members of the IDT team.    Consultants/Specialty       Code Status  Full Code    Disposition  The patient is not medically cleared for discharge to home or a post-acute facility.      I have placed the appropriate orders for post-discharge needs.    Review of Systems  Review of Systems   Constitutional:  Negative for fever.   Respiratory:  Positive for cough and shortness of breath (Improved).    Cardiovascular:  Negative for chest pain.   Gastrointestinal:  Negative for abdominal pain,  nausea and vomiting.        Physical Exam  Temp:  [36.9 °C (98.4 °F)-37.3 °C (99.1 °F)] 36.9 °C (98.4 °F)  Pulse:  [] 83  Resp:  [16-18] 16  BP: (109-143)/(64-83) 121/64  SpO2:  [90 %-96 %] 94 %    Physical Exam  Constitutional:       Appearance: Normal appearance.   Cardiovascular:      Rate and Rhythm: Normal rate and regular rhythm.      Heart sounds: No murmur heard.  Pulmonary:      Effort: Pulmonary effort is normal.      Breath sounds: Rhonchi present.   Abdominal:      Palpations: Abdomen is soft.      Tenderness: There is no abdominal tenderness. There is no guarding.   Musculoskeletal:         General: No swelling.   Skin:     General: Skin is warm and dry.   Neurological:      General: No focal deficit present.      Mental Status: He is alert.         Fluids  No intake or output data in the 24 hours ending 12/21/24 1317     Laboratory  Recent Labs     12/19/24  0530 12/20/24  0513 12/21/24  0425   WBC 10.3 15.5* 15.0*   RBC 4.35* 4.19* 4.47*   HEMOGLOBIN 13.5* 12.8* 13.5*   HEMATOCRIT 38.9* 38.6* 41.7*   MCV 89.4 92.1 93.3   MCH 31.0 30.5 30.2   MCHC 34.7 33.2 32.4   RDW 53.9* 55.3* 57.3*   PLATELETCT 148* 118* 114*   MPV 10.8 10.8 9.6     Recent Labs     12/19/24  2345 12/20/24  0513 12/21/24  0425   SODIUM 139 139 139   POTASSIUM 4.6 4.1 3.8   CHLORIDE 95* 94* 97   CO2 28 34* 31   GLUCOSE 152* 145* 126*   BUN 54* 58* 57*   CREATININE 2.15* 2.13* 1.98*   CALCIUM 8.8 9.2 9.0                   Imaging  DX-CHEST-PORTABLE (1 VIEW)   Final Result         1.  Left basilar atelectasis and/or subtle infiltrates.   2.  Atherosclerosis      EC-ECHOCARDIOGRAM COMPLETE W/ CONT   Final Result      DX-CHEST-PORTABLE (1 VIEW)   Final Result         1.  No acute cardiopulmonary disease.   2.  Atherosclerosis      DX-ABDOMEN FOR TUBE PLACEMENT   Final Result      Enteric tube tip projects over the stomach      DX-CHEST-PORTABLE (1 VIEW)   Final Result      1.  Line and tube position as described above   2.  No  visible pneumothorax   3.  Increased bibasilar opacities, atelectasis or pneumonia      US-RENAL   Final Result      No hydronephrosis   Ascites      CT-ABDOMEN-PELVIS W/O   Final Result      1.  No evidence of bowel obstruction or focal inflammatory change.      2.  Small of ascites within the abdomen and pelvis.      3.  Lobulated appearance of the surface of the liver likely representing presence of hepatocellular dysfunction.      4.  Bibasilar atelectasis with small bilateral pleural effusions.      DX-CHEST-PORTABLE (1 VIEW)   Final Result      No evidence of acute cardiopulmonary process.           Assessment/Plan  * Acute-on-chronic kidney injury (HCC)- (present on admission)  Assessment & Plan  Underlying stage 3b CKD  No hydronephrosis on imaging  Renal function improved close to baseline    Acute systolic heart failure (HCC)  Assessment & Plan  LVEF 45%  Reduced RV systolic function  Continue Toprol-XL  Start low-dose losartan  Clinically appears euvolemic  We will hold off on spironolactone given ROBERT and CKD    Acute respiratory failure with hypoxia and hypercapnia (HCC)  Assessment & Plan  Intubated 12/18-12/19  Markedly improved  Wean off oxygen as tolerated      Alcohol dependence (HCC)  Assessment & Plan  He completed a course of high-dose IV thiamine  Continue multivitamins and folic acid  No clinical signs of withdrawal  Patient counseled on cessation      Elevated LFTs  Assessment & Plan  History of alcohol dependence with ongoing alcohol use  Hepatitis panel negative  Trend liver enzymes and synthetic function  LFTs trending down abdominal exam is benign    Acute exacerbation of chronic obstructive pulmonary disease (COPD) (HCC)- (present on admission)  Assessment & Plan  PFTs on 4/20/2022: FEV1 1.37 L (47%), FEV1/FVC 53%, DLCO 88%  Continue prednisone for 3 more days  Bronchodilators  Continue Dulera, 100/5, 2 puffs twice daily  Resume Spiriva, 1 puff daily  Respiratory therapy per protocol  per  Counseled patient on smoking cessation and need for outpatient follow-up    Type 2 diabetes mellitus (HCC)- (present on admission)  Assessment & Plan  Glycohemoglobin 6.7  I am continuing insulin sliding scale with hypoglycemia protocol.    Primary hypertension- (present on admission)  Assessment & Plan  Goal SBP less than 160  Change metoprolol tartrate to metoprolol succinate, 50 mg daily  ROBERT resolved will resume low-dose losartan and monitor    Dyslipidemia- (present on admission)  Assessment & Plan  LFTs trending down consider resuming statin tomorrow    CAD (coronary artery disease)- (present on admission)  Assessment & Plan  History of CAD with prior MI and PCI  Continue aspirin, 81 mg daily  Change metoprolol tartrate to metoprolol succinate, 50 mg daily  Once LFTs trended down  Monitor volume status           VTE prophylaxis:    heparin ppx      I have performed a physical exam and reviewed and updated ROS and Plan today (12/21/2024). In review of yesterday's note (12/20/2024), there are no changes except as documented above.

## 2024-12-21 NOTE — CARE PLAN
The patient is Stable - Low risk of patient condition declining or worsening    Shift Goals  Clinical Goals: Q6 BMP  Patient Goals: Rest  Family Goals: updates    Progress made toward(s) clinical / shift goals:  Patient understands plan of care, PRN medications available for withdrawal symptoms, patient declines at this time. Family at bedside for emotional support. No complaints of pain or discomfort at this time.    Patient is not progressing towards the following goals:

## 2024-12-22 LAB
ALBUMIN SERPL BCP-MCNC: 4.1 G/DL (ref 3.2–4.9)
ALBUMIN/GLOB SERPL: 2.1 G/DL
ALP SERPL-CCNC: 98 U/L (ref 30–99)
ALT SERPL-CCNC: 416 U/L (ref 2–50)
ANION GAP SERPL CALC-SCNC: 8 MMOL/L (ref 7–16)
AST SERPL-CCNC: 64 U/L (ref 12–45)
BILIRUB SERPL-MCNC: 0.7 MG/DL (ref 0.1–1.5)
BUN SERPL-MCNC: 45 MG/DL (ref 8–22)
CALCIUM ALBUM COR SERPL-MCNC: 8.4 MG/DL (ref 8.5–10.5)
CALCIUM SERPL-MCNC: 8.5 MG/DL (ref 8.5–10.5)
CHLORIDE SERPL-SCNC: 98 MMOL/L (ref 96–112)
CO2 SERPL-SCNC: 33 MMOL/L (ref 20–33)
CREAT SERPL-MCNC: 1.71 MG/DL (ref 0.5–1.4)
ERYTHROCYTE [DISTWIDTH] IN BLOOD BY AUTOMATED COUNT: 59 FL (ref 35.9–50)
GFR SERPLBLD CREATININE-BSD FMLA CKD-EPI: 44 ML/MIN/1.73 M 2
GLOBULIN SER CALC-MCNC: 2 G/DL (ref 1.9–3.5)
GLUCOSE BLD STRIP.AUTO-MCNC: 139 MG/DL (ref 65–99)
GLUCOSE BLD STRIP.AUTO-MCNC: 147 MG/DL (ref 65–99)
GLUCOSE BLD STRIP.AUTO-MCNC: 233 MG/DL (ref 65–99)
GLUCOSE BLD STRIP.AUTO-MCNC: 94 MG/DL (ref 65–99)
GLUCOSE SERPL-MCNC: 119 MG/DL (ref 65–99)
HCT VFR BLD AUTO: 40 % (ref 42–52)
HGB BLD-MCNC: 13.1 G/DL (ref 14–18)
MCH RBC QN AUTO: 30.4 PG (ref 27–33)
MCHC RBC AUTO-ENTMCNC: 32.8 G/DL (ref 32.3–36.5)
MCV RBC AUTO: 92.8 FL (ref 81.4–97.8)
PLATELET # BLD AUTO: 107 K/UL (ref 164–446)
PMV BLD AUTO: 10.1 FL (ref 9–12.9)
POTASSIUM SERPL-SCNC: 4.2 MMOL/L (ref 3.6–5.5)
PROT SERPL-MCNC: 6.1 G/DL (ref 6–8.2)
RBC # BLD AUTO: 4.31 M/UL (ref 4.7–6.1)
SODIUM SERPL-SCNC: 139 MMOL/L (ref 135–145)
WBC # BLD AUTO: 14.5 K/UL (ref 4.8–10.8)

## 2024-12-22 PROCEDURE — 700111 HCHG RX REV CODE 636 W/ 250 OVERRIDE (IP): Performed by: INTERNAL MEDICINE

## 2024-12-22 PROCEDURE — 82962 GLUCOSE BLOOD TEST: CPT | Mod: 91

## 2024-12-22 PROCEDURE — 700111 HCHG RX REV CODE 636 W/ 250 OVERRIDE (IP): Performed by: HOSPITALIST

## 2024-12-22 PROCEDURE — 80053 COMPREHEN METABOLIC PANEL: CPT

## 2024-12-22 PROCEDURE — 85027 COMPLETE CBC AUTOMATED: CPT

## 2024-12-22 PROCEDURE — 700101 HCHG RX REV CODE 250: Performed by: HOSPITALIST

## 2024-12-22 PROCEDURE — 700102 HCHG RX REV CODE 250 W/ 637 OVERRIDE(OP): Performed by: INTERNAL MEDICINE

## 2024-12-22 PROCEDURE — 94760 N-INVAS EAR/PLS OXIMETRY 1: CPT

## 2024-12-22 PROCEDURE — 94640 AIRWAY INHALATION TREATMENT: CPT

## 2024-12-22 PROCEDURE — 770006 HCHG ROOM/CARE - MED/SURG/GYN SEMI*

## 2024-12-22 PROCEDURE — 94669 MECHANICAL CHEST WALL OSCILL: CPT

## 2024-12-22 PROCEDURE — A9270 NON-COVERED ITEM OR SERVICE: HCPCS | Performed by: HOSPITALIST

## 2024-12-22 PROCEDURE — A9270 NON-COVERED ITEM OR SERVICE: HCPCS | Performed by: INTERNAL MEDICINE

## 2024-12-22 PROCEDURE — 99232 SBSQ HOSP IP/OBS MODERATE 35: CPT | Performed by: HOSPITALIST

## 2024-12-22 PROCEDURE — 700102 HCHG RX REV CODE 250 W/ 637 OVERRIDE(OP): Performed by: HOSPITALIST

## 2024-12-22 RX ORDER — ATORVASTATIN CALCIUM 40 MG/1
80 TABLET, FILM COATED ORAL
Status: DISCONTINUED | OUTPATIENT
Start: 2024-12-22 | End: 2024-12-23 | Stop reason: HOSPADM

## 2024-12-22 RX ORDER — FUROSEMIDE 10 MG/ML
20 INJECTION INTRAMUSCULAR; INTRAVENOUS ONCE
Status: COMPLETED | OUTPATIENT
Start: 2024-12-22 | End: 2024-12-22

## 2024-12-22 RX ADMIN — THERA TABS 1 TABLET: TAB at 06:21

## 2024-12-22 RX ADMIN — LOSARTAN POTASSIUM 25 MG: 25 TABLET, FILM COATED ORAL at 06:22

## 2024-12-22 RX ADMIN — FOLIC ACID 1 MG: 1 TABLET ORAL at 06:22

## 2024-12-22 RX ADMIN — IPRATROPIUM BROMIDE AND ALBUTEROL SULFATE 3 ML: .5; 2.5 SOLUTION RESPIRATORY (INHALATION) at 20:37

## 2024-12-22 RX ADMIN — IPRATROPIUM BROMIDE AND ALBUTEROL SULFATE 3 ML: .5; 2.5 SOLUTION RESPIRATORY (INHALATION) at 13:36

## 2024-12-22 RX ADMIN — ASPIRIN 81 MG: 81 TABLET, CHEWABLE ORAL at 06:20

## 2024-12-22 RX ADMIN — HEPARIN SODIUM 5000 UNITS: 5000 INJECTION, SOLUTION INTRAVENOUS; SUBCUTANEOUS at 06:20

## 2024-12-22 RX ADMIN — ATORVASTATIN CALCIUM 80 MG: 40 TABLET, FILM COATED ORAL at 21:35

## 2024-12-22 RX ADMIN — HEPARIN SODIUM 5000 UNITS: 5000 INJECTION, SOLUTION INTRAVENOUS; SUBCUTANEOUS at 21:35

## 2024-12-22 RX ADMIN — SENNOSIDES AND DOCUSATE SODIUM 2 TABLET: 50; 8.6 TABLET ORAL at 17:43

## 2024-12-22 RX ADMIN — FUROSEMIDE 20 MG: 10 INJECTION INTRAMUSCULAR; INTRAVENOUS at 07:50

## 2024-12-22 RX ADMIN — MOMETASONE FUROATE AND FORMOTEROL FUMARATE DIHYDRATE 2 PUFF: 100; 5 AEROSOL RESPIRATORY (INHALATION) at 20:54

## 2024-12-22 RX ADMIN — Medication 100 MG: at 06:21

## 2024-12-22 RX ADMIN — TIOTROPIUM BROMIDE INHALATION SPRAY 5 MCG: 3.12 SPRAY, METERED RESPIRATORY (INHALATION) at 08:43

## 2024-12-22 RX ADMIN — MOMETASONE FUROATE AND FORMOTEROL FUMARATE DIHYDRATE 2 PUFF: 100; 5 AEROSOL RESPIRATORY (INHALATION) at 08:44

## 2024-12-22 RX ADMIN — PREDNISONE 40 MG: 20 TABLET ORAL at 06:20

## 2024-12-22 RX ADMIN — SENNOSIDES AND DOCUSATE SODIUM 2 TABLET: 50; 8.6 TABLET ORAL at 06:20

## 2024-12-22 RX ADMIN — METOPROLOL SUCCINATE 50 MG: 25 TABLET, EXTENDED RELEASE ORAL at 06:21

## 2024-12-22 RX ADMIN — HEPARIN SODIUM 5000 UNITS: 5000 INJECTION, SOLUTION INTRAVENOUS; SUBCUTANEOUS at 14:19

## 2024-12-22 RX ADMIN — IPRATROPIUM BROMIDE AND ALBUTEROL SULFATE 3 ML: .5; 2.5 SOLUTION RESPIRATORY (INHALATION) at 06:49

## 2024-12-22 ASSESSMENT — LIFESTYLE VARIABLES
VISUAL DISTURBANCES: NOT PRESENT
PAROXYSMAL SWEATS: NO SWEAT VISIBLE
ORIENTATION AND CLOUDING OF SENSORIUM: ORIENTED AND CAN DO SERIAL ADDITIONS
HEADACHE, FULLNESS IN HEAD: NOT PRESENT
TREMOR: NO TREMOR
NAUSEA AND VOMITING: NO NAUSEA AND NO VOMITING
ANXIETY: NO ANXIETY (AT EASE)
AUDITORY DISTURBANCES: NOT PRESENT
TOTAL SCORE: 0
AGITATION: NORMAL ACTIVITY

## 2024-12-22 ASSESSMENT — ENCOUNTER SYMPTOMS
VOMITING: 0
COUGH: 1
FEVER: 0
NAUSEA: 0
ABDOMINAL PAIN: 0

## 2024-12-22 ASSESSMENT — PATIENT HEALTH QUESTIONNAIRE - PHQ9
2. FEELING DOWN, DEPRESSED, IRRITABLE, OR HOPELESS: NOT AT ALL
1. LITTLE INTEREST OR PLEASURE IN DOING THINGS: NOT AT ALL
SUM OF ALL RESPONSES TO PHQ9 QUESTIONS 1 AND 2: 0

## 2024-12-22 ASSESSMENT — PAIN DESCRIPTION - PAIN TYPE
TYPE: ACUTE PAIN
TYPE: ACUTE PAIN

## 2024-12-22 NOTE — CARE PLAN
Problem: Bronchoconstriction  Goal: Improve in air movement and diminished wheezing  Description: Target End Date:  2 to 3 days    1.  Implement inhaled treatments  2.  Evaluate and manage medication effects  Outcome: Progressing     Problem: Bronchopulmonary Hygiene:  Goal: Increase mobilization of retained secretions  Outcome: Progressing   Duoneb qid  Dulera bid  Flutter qid

## 2024-12-22 NOTE — CARE PLAN
The patient is Stable - Low risk of patient condition declining or worsening    Shift Goals  Clinical Goals: wean O2, maintain O2 saturation >90% throughout shift  Patient Goals: rest, go home  Family Goals: discharge    Progress made toward(s) clinical / shift goals:  Pt alert and oriented, makes needs known. Denies pain this shift. No insulin required before bed, other medications administered per MAR. Currently weaned to 1L O2 via NC. Remains free of falls and injury this shift. Other needs met at this time. Bed locked and in lowest position, call light and belongings within reach.    Patient is not progressing towards the following goals:      Problem: Ineffective Airway Clearance  Goal: Patient will maintain patent airway with clear/clearing breath sounds  Outcome: Not Progressing   Ronchi noted upon assessment. Coughing and incentive spirometer explained and encouraged.

## 2024-12-22 NOTE — CARE PLAN
The patient is Watcher - Medium risk of patient condition declining or worsening    Shift Goals  Clinical Goals: discontinue supplemental oxygen by end of shift  Patient Goals: go home  Family Goals: LLUVIA    Progress made toward(s) clinical / shift goals:  Hugo was evaluated for supplemental home oxygen.     Patient is not progressing towards the following goals: Despite inhalers, lasix, and incentive spirometer usage, Hugo has been unable to achieve adequate oxygenation on room air. He has been educated on smoking cessation.      Problem: Knowledge Deficit - Standard  Goal: Patient and family/care givers will demonstrate understanding of plan of care, disease process/condition, diagnostic tests and medications  Outcome: Not Progressing     Problem: Optimal Care for Alcohol Withdrawal  Goal: Optimal Care for the alcohol withdrawal patient  Outcome: Not Progressing     Problem: Impaired Gas Exchange  Goal: Patient will demonstrate improved ventilation and adequate oxygenation and participate in treatment regimen within the level of ability/situation.  Outcome: Not Progressing

## 2024-12-22 NOTE — CARE PLAN
Problem: Bronchoconstriction  Goal: Improve in air movement and diminished wheezing  Description: Target End Date:  2 to 3 days    1.  Implement inhaled treatments  2.  Evaluate and manage medication effects  Outcome: Progressing     Stable on 1L NC

## 2024-12-22 NOTE — FACE TO FACE
"Face to Face Note  -  Durable Medical Equipment    Abelardo Sarmiento M.D. - NPI: [id number]  I certify that this patient is under my care and that they had a durable medical equipment(DME)face to face encounter by myself that meets the physician DME face-to-face encounter requirements with this patient on:    Date of encounter:   Patient:                    MRN:                       YOB: 2024  Hugo Villela  [id number]  1960     The encounter with the patient was in whole, or in part, for the following medical condition, which is the primary reason for durable medical equipment:  COPD    I certify that, based on my findings, the following durable medical equipment is medically necessary:    Oxygen   HOME O2 Saturation Measurements:(Values must be present for Home Oxygen orders)  Room air sat at rest: 79  Room air sat with amb: 74  With liters of O2: 2, O2 sat at rest with O2: 94  With Liters of O2: 4, O2 sat with amb with O2 : 89  Is the patient mobile?: Yes  If patient feels more short of breath, they can go up to 6 liters per minute and contact healthcare provider.    Supporting Symptoms: The patient requires supplemental oxygen, as the following interventions have been tried with limited or no improvement: \"Bronchodilators and/or steroid inhalers, \"Ambulation with oximetry, and \"Incentive spirometry.    My Clinical findings support the need for the above equipment due to:  Hypoxia  "

## 2024-12-23 VITALS
HEIGHT: 65 IN | HEART RATE: 78 BPM | TEMPERATURE: 98.3 F | WEIGHT: 151.01 LBS | RESPIRATION RATE: 18 BRPM | OXYGEN SATURATION: 92 % | BODY MASS INDEX: 25.16 KG/M2 | SYSTOLIC BLOOD PRESSURE: 107 MMHG | DIASTOLIC BLOOD PRESSURE: 63 MMHG

## 2024-12-23 LAB
ANION GAP SERPL CALC-SCNC: 9 MMOL/L (ref 7–16)
BUN SERPL-MCNC: 31 MG/DL (ref 8–22)
CALCIUM SERPL-MCNC: 8.5 MG/DL (ref 8.5–10.5)
CHLORIDE SERPL-SCNC: 99 MMOL/L (ref 96–112)
CO2 SERPL-SCNC: 34 MMOL/L (ref 20–33)
CREAT SERPL-MCNC: 1.32 MG/DL (ref 0.5–1.4)
ERYTHROCYTE [DISTWIDTH] IN BLOOD BY AUTOMATED COUNT: 56.7 FL (ref 35.9–50)
GFR SERPLBLD CREATININE-BSD FMLA CKD-EPI: 60 ML/MIN/1.73 M 2
GLUCOSE BLD STRIP.AUTO-MCNC: 107 MG/DL (ref 65–99)
GLUCOSE BLD STRIP.AUTO-MCNC: 172 MG/DL (ref 65–99)
GLUCOSE SERPL-MCNC: 101 MG/DL (ref 65–99)
HCT VFR BLD AUTO: 43.5 % (ref 42–52)
HGB BLD-MCNC: 14 G/DL (ref 14–18)
MCH RBC QN AUTO: 29.7 PG (ref 27–33)
MCHC RBC AUTO-ENTMCNC: 32.2 G/DL (ref 32.3–36.5)
MCV RBC AUTO: 92.4 FL (ref 81.4–97.8)
PLATELET # BLD AUTO: 114 K/UL (ref 164–446)
PMV BLD AUTO: 10.5 FL (ref 9–12.9)
POTASSIUM SERPL-SCNC: 3.9 MMOL/L (ref 3.6–5.5)
RBC # BLD AUTO: 4.71 M/UL (ref 4.7–6.1)
SODIUM SERPL-SCNC: 142 MMOL/L (ref 135–145)
WBC # BLD AUTO: 14.5 K/UL (ref 4.8–10.8)

## 2024-12-23 PROCEDURE — 85027 COMPLETE CBC AUTOMATED: CPT

## 2024-12-23 PROCEDURE — 700102 HCHG RX REV CODE 250 W/ 637 OVERRIDE(OP): Performed by: HOSPITALIST

## 2024-12-23 PROCEDURE — 700102 HCHG RX REV CODE 250 W/ 637 OVERRIDE(OP): Performed by: INTERNAL MEDICINE

## 2024-12-23 PROCEDURE — A9270 NON-COVERED ITEM OR SERVICE: HCPCS | Performed by: INTERNAL MEDICINE

## 2024-12-23 PROCEDURE — 700111 HCHG RX REV CODE 636 W/ 250 OVERRIDE (IP): Performed by: INTERNAL MEDICINE

## 2024-12-23 PROCEDURE — 99239 HOSP IP/OBS DSCHRG MGMT >30: CPT | Performed by: HOSPITALIST

## 2024-12-23 PROCEDURE — 700111 HCHG RX REV CODE 636 W/ 250 OVERRIDE (IP): Performed by: HOSPITALIST

## 2024-12-23 PROCEDURE — 80048 BASIC METABOLIC PNL TOTAL CA: CPT

## 2024-12-23 PROCEDURE — A9270 NON-COVERED ITEM OR SERVICE: HCPCS | Performed by: HOSPITALIST

## 2024-12-23 PROCEDURE — 82962 GLUCOSE BLOOD TEST: CPT

## 2024-12-23 RX ORDER — LOSARTAN POTASSIUM 25 MG/1
25 TABLET ORAL DAILY
Qty: 30 TABLET | Refills: 0 | Status: SHIPPED | OUTPATIENT
Start: 2024-12-24

## 2024-12-23 RX ORDER — PREDNISONE 20 MG/1
40 TABLET ORAL DAILY
Qty: 4 TABLET | Refills: 0 | Status: SHIPPED | OUTPATIENT
Start: 2024-12-24 | End: 2024-12-26

## 2024-12-23 RX ADMIN — METOPROLOL SUCCINATE 50 MG: 25 TABLET, EXTENDED RELEASE ORAL at 04:50

## 2024-12-23 RX ADMIN — TIOTROPIUM BROMIDE INHALATION SPRAY 5 MCG: 3.12 SPRAY, METERED RESPIRATORY (INHALATION) at 08:45

## 2024-12-23 RX ADMIN — ASPIRIN 81 MG: 81 TABLET, CHEWABLE ORAL at 04:50

## 2024-12-23 RX ADMIN — SENNOSIDES AND DOCUSATE SODIUM 2 TABLET: 50; 8.6 TABLET ORAL at 04:51

## 2024-12-23 RX ADMIN — FOLIC ACID 1 MG: 1 TABLET ORAL at 04:50

## 2024-12-23 RX ADMIN — LOSARTAN POTASSIUM 25 MG: 25 TABLET, FILM COATED ORAL at 04:50

## 2024-12-23 RX ADMIN — PREDNISONE 40 MG: 20 TABLET ORAL at 04:51

## 2024-12-23 RX ADMIN — HEPARIN SODIUM 5000 UNITS: 5000 INJECTION, SOLUTION INTRAVENOUS; SUBCUTANEOUS at 04:50

## 2024-12-23 RX ADMIN — MOMETASONE FUROATE AND FORMOTEROL FUMARATE DIHYDRATE 2 PUFF: 100; 5 AEROSOL RESPIRATORY (INHALATION) at 08:46

## 2024-12-23 RX ADMIN — Medication 100 MG: at 04:51

## 2024-12-23 RX ADMIN — THERA TABS 1 TABLET: TAB at 04:50

## 2024-12-23 ASSESSMENT — PAIN DESCRIPTION - PAIN TYPE: TYPE: ACUTE PAIN

## 2024-12-23 NOTE — DISCHARGE SUMMARY
Discharge Summary    CHIEF COMPLAINT ON ADMISSION  Chief Complaint   Patient presents with    Flu Like Symptoms     Cough, shortness of breath for past few days. No fevers or chest pain. Unable to obtain accurate O2 at in triage - most accurate 88-90% on room air. Pt appears mildly dyspneic. Placed on 2L O2.     Hallucinations     Pt's wife reports he was having hallucinations yesterday. No mental health hx, but hx of same with low sodium in the past. Pt alert & oriented now, not attending to internal stimuli at this time.        Reason for Admission  Shortness Of Breath     Admission Date  12/17/2024    CODE STATUS  Full Code    HPI & HOSPITAL COURSE    64 y.o. male with possible history of coronary disease status post PCI, COPD asthma, diabetes, hyperlipidemia chronic any disease and alcohol dependence who presented to the hospital on 12/17/2024 with altered mental status.  On presentation patient family reported that he consumed alcohol in excessive amounts and he drinks 3-4 large beers daily.  He found to have acute kidney injury elevated liver enzymes and he admitted to ICU.  He was intubated on December 18, 2024 for respiratory failure secondary to COPD/asthma exacerbation and he also underwent bronchoscopy.     He was extubated on December 19, 2024 and placed on nasal cannula.     On December 20, 2024 intensivist Dr. Galvez requested to transfer care to hospital service.    Patient is clinically improved renal function is back to baseline.  He is currently on 2 L of oxygen and will be set up for home O2  He will be completing 5-day course of steroids with prednisone  He was restarted on losartan and currently his blood pressure is stable on lower dose than his previous so we will continue 25 mg with outpatient follow-up    Therefore, he is discharged in good and stable condition to home with close outpatient follow-up.    The patient met 2-midnight criteria for an inpatient stay at the time of  discharge.    Discharge Date  12/23/2024    FOLLOW UP ITEMS POST DISCHARGE  Follow-up with PCP with repeat chemistry panel and CBC at follow-up  Referral to pulmonary medicine    DISCHARGE DIAGNOSES  Principal Problem:    Acute-on-chronic kidney injury (HCC) (POA: Yes)  Active Problems:    CAD (coronary artery disease) (POA: Yes)      Overview: Had Anteolateral wall MI s/p stent      On asa/ atorvastatin 80mg daily     Dyslipidemia (POA: Yes)    Primary hypertension (POA: Yes)      Overview: On losartan 100mg      In setting of whitecoat hypertension      His wife works in the laboratory and report does checks patient's blood       pressure annually and no blood pressure between 130s at home    Type 2 diabetes mellitus (HCC) (POA: Yes)      Overview: Well-controlled    Acute exacerbation of chronic obstructive pulmonary disease (COPD) (HCC) (POA: Yes)      Overview:       Prior PFT: FEV1/FVC ratio 57 and FEV1 of 0.93 L or 32% predicted      postbronchodilator FEV1 of 1.37 L or 47% predicted.      Total lung capacity is within normal limits at 106%     Elevated LFTs (POA: Unknown)    Alcohol dependence (HCC) (POA: Unknown)    Atelectasis (POA: Unknown)    Acute respiratory failure with hypoxia and hypercapnia (HCC) (POA: Unknown)    Acute systolic heart failure (HCC) (POA: Unknown)  Resolved Problems:    Acute encephalopathy (POA: Unknown)      FOLLOW UP  Future Appointments   Date Time Provider Department Center   1/23/2025  1:00 PM Dmitry Ritter M.D. [address]   3/11/2025  1:15 PM Fadi Najjar, M.D. [facility]     Dmitry Ritter M.D.  [address] [facility] [address]  [facility] [id number]  [phone]    Follow up        MEDICATIONS ON DISCHARGE     Medication List        START taking these medications        Instructions   allopurinol 100 MG Tabs  Commonly known as: Zyloprim   Take 0.5 Tablets by mouth every day.  Dose: 50 mg     dapagliflozin propanediol 10 MG Tabs  Commonly known as: Farxiga   Take 1  Tablet by mouth every day.  Dose: 10 mg     predniSONE 20 MG Tabs  Start taking on: December 24, 2024  Commonly known as: Deltasone   Take 2 Tablets by mouth every day for 2 days.  Dose: 40 mg            CHANGE how you take these medications        Instructions   losartan 25 MG Tabs  Start taking on: December 24, 2024  What changed:   medication strength  how much to take  Commonly known as: Cozaar   Take 1 Tablet by mouth every day.  Dose: 25 mg            CONTINUE taking these medications        Instructions   albuterol 108 (90 Base) MCG/ACT Aers inhalation aerosol   Doctor's comments: Give albuterol that is patient or insurance preference please  Inhale 2 Puffs every four hours as needed for Shortness of Breath.  Dose: 2 Puff     aspirin EC 81 MG Tbec  Commonly known as: Ecotrin   Take 1 Tablet by mouth every day.  Dose: 81 mg     atorvastatin 80 MG tablet  Commonly known as: Lipitor   Take 1 Tablet by mouth at bedtime.  Dose: 80 mg     fluticasone furoate-vilanterol 100-25 MCG/ACT Aepb  Commonly known as: Breo Ellipta   Inhale 1 Puff every day.  Dose: 1 Puff     metoprolol SR 50 MG Tb24  Commonly known as: Toprol XL   Take 1 Tablet by mouth every day.  Dose: 50 mg     tiotropium 18 MCG Caps  Commonly known as: Spiriva HandiHaler   Place 1 Capsule into inhaler and inhale every day.  Dose: 18 mcg     VITAMIN D PO   Take 1 Tablet by mouth every day.  Dose: 1 Tablet              Allergies  Allergies   Allergen Reactions    Lisinopril Cough       DIET  Orders Placed This Encounter   Procedures    Diet Order Diet: Cardiac     Standing Status:   Standing     Number of Occurrences:   1     Order Specific Question:   Diet:     Answer:   Cardiac [6]       ACTIVITY  As tolerated.  Weight bearing as tolerated    CONSULTATIONS  Critical care    PROCEDURES    Intubation  Central venous catheter insertion, bronchoscopy    LABORATORY  Lab Results   Component Value Date    SODIUM 142 12/23/2024    POTASSIUM 3.9 12/23/2024     CHLORIDE 99 12/23/2024    CO2 34 (H) 12/23/2024    GLUCOSE 101 (H) 12/23/2024    BUN 31 (H) 12/23/2024    CREATININE 1.32 12/23/2024        Lab Results   Component Value Date    WBC 14.5 (H) 12/23/2024    HEMOGLOBIN 14.0 12/23/2024    HEMATOCRIT 43.5 12/23/2024    PLATELETCT 114 (L) 12/23/2024        Total time of the discharge process exceeds 38 minutes.

## 2024-12-23 NOTE — CARE PLAN
The patient is Stable - Low risk of patient condition declining or worsening    Shift Goals  Clinical Goals: maintain O2 sat >90% throughout shift  Patient Goals: go home  Family Goals: go home    Progress made toward(s) clinical / shift goals:  Pt alert and oriented, makes needs known. Denies pain this shift. Medications administered per MAR. Coughing, raising head of bed, and incentive spirometer encouraged. Remains on 2L O2 at this time. Bed locked and in lowest position, call light and personal belongings in reach.    Patient is not progressing towards the following goals:

## 2024-12-23 NOTE — DISCHARGE INSTRUCTIONS
Special Equipment    You are being discharged with the following special equipment:  Oxygen      HF Patient Discharge Instructions  Monitor your weight daily, and maintain a weight chart, to track your weight changes.   Activity as tolerated, unless your Doctor has ordered otherwise. Other activity order: Previous activity.  Follow a low fat, low cholesterol, low salt diet unless instructed otherwise by your Doctor. Read the labels on the back of food products and track your intake of fat, cholesterol and salt.   Fluid Restriction No. If a Fluid Restriction has been ordered by your Doctor, measure fluids with a measuring cup to ensure that you are not exceeding the restriction.   No smoking.  Oxygen Yes. If your Doctor has ordered that you wear Oxygen at home, it is important to wear it as ordered.  Did you receive an explanation from staff on the importance of taking each of your medications and why it is necessary to keep taking them unless your doctor says to stop? Yes  Were all of your questions answered about how to manage your heart failure and what to do if you have increased signs and symptoms after you go home? Yes  Do you feel like your heart failure care team involved you in the care treatment plan and allowed you to make decisions regarding your care while in the hospital and addressed any discharge needs you might have? Yes    See the educational handout provided at discharge for more information on monitoring your daily weight, activity and diet. This also explains more about Heart Failure, symptoms of a flare-up and some of the tests that you have undergone.     Warning Signs of a Flare-Up include:  Swelling in the ankles or lower legs.  Shortness of breath, while at rest, or while doing normal activities.   Shortness of breath at night when in bed, or coughing in bed.   Requiring more pillows to sleep at night, or needing to sit up at night to sleep.  Feeling weak, dizzy or fatigued.     When to call  your Doctor:  Call your Primary Care Physician or Cardiologist if:   You experience any pain radiating to your jaw or neck.  You have any difficulty breathing.  You experience weight gain of 3 lbs in a day or 5 lbs in a week.   You feel any palpitations or irregular heartbeats.  You become dizzy or lose consciousness.   If you have had an angiogram or had a pacemaker or AICD placed, and experience:  Bleeding, drainage or swelling at the surgical / puncture site.  Fever greater than 100.0 F  Shock from internal defibrillator.  Cool and / or numb extremities.     Please access the [facility] My HF Guide/Heart Failure Interactive Workbook:   [email or web address]

## 2024-12-23 NOTE — PROGRESS NOTES
Home oxygen delivered at bedside, pt to call prior to exiting hospital so concentrator can be delivered- Bernadette Reaves [phone]

## 2024-12-23 NOTE — CARE PLAN
The patient is Stable - Low risk of patient condition declining or worsening    Shift Goals  Clinical Goals: Increase cough force to eliminate sputum throughout shift  Patient Goals: Go home  Family Goals: LLUVIA    Progress made toward(s) clinical / shift goals:  Hugo has not had any sputum to expectorate. However, he is continuing to cough with force.     Patient is not progressing towards the following goals:

## 2024-12-23 NOTE — PROGRESS NOTES
Pt discharged home in stable condition. IV removed. Discharge paperwork reviewed with pt verbalizing understanding. Pt transported to son's car in wheelchair. 2L oxygen on with tanks.  All belongings left with pt.

## 2024-12-23 NOTE — CARE PLAN
Problem: Bronchoconstriction  Goal: Improve in air movement and diminished wheezing  Description: Target End Date:  2 to 3 days    1.  Implement inhaled treatments  2.  Evaluate and manage medication effects  Outcome: Not Met     Problem: Bronchopulmonary Hygiene:  Goal: Increase mobilization of retained secretions  Outcome: Not Met

## 2024-12-24 LAB
FUNGUS SPEC CULT: NORMAL
FUNGUS SPEC FUNGUS STN: NORMAL
SIGNIFICANT IND 70042: NORMAL
SITE SITE: NORMAL
SOURCE SOURCE: NORMAL

## 2025-01-19 ASSESSMENT — ENCOUNTER SYMPTOMS
DYSPNEA AT REST: 0
RESPIRATORY SYMPTOMS COMMENTS: NO
CHEST TIGHTNESS: 0
HEMOPTYSIS: 0
WHEEZING: 1
SHORTNESS OF BREATH: 1

## 2025-01-21 ENCOUNTER — OFFICE VISIT (OUTPATIENT)
Dept: SLEEP MEDICINE | Facility: MEDICAL CENTER | Age: 65
End: 2025-01-21
Attending: INTERNAL MEDICINE
Payer: COMMERCIAL

## 2025-01-21 VITALS
HEART RATE: 76 BPM | BODY MASS INDEX: 25.16 KG/M2 | HEIGHT: 65 IN | OXYGEN SATURATION: 92 % | DIASTOLIC BLOOD PRESSURE: 62 MMHG | WEIGHT: 151 LBS | SYSTOLIC BLOOD PRESSURE: 112 MMHG

## 2025-01-21 DIAGNOSIS — J96.11 CHRONIC HYPOXEMIC RESPIRATORY FAILURE (HCC): ICD-10-CM

## 2025-01-21 DIAGNOSIS — J44.89 ASTHMA-COPD OVERLAP SYNDROME (HCC): ICD-10-CM

## 2025-01-21 DIAGNOSIS — F17.200 TOBACCO USE DISORDER: ICD-10-CM

## 2025-01-21 DIAGNOSIS — I50.21 ACUTE SYSTOLIC HEART FAILURE (HCC): ICD-10-CM

## 2025-01-21 PROCEDURE — 99214 OFFICE O/P EST MOD 30 MIN: CPT | Performed by: STUDENT IN AN ORGANIZED HEALTH CARE EDUCATION/TRAINING PROGRAM

## 2025-01-21 PROCEDURE — 99212 OFFICE O/P EST SF 10 MIN: CPT | Performed by: STUDENT IN AN ORGANIZED HEALTH CARE EDUCATION/TRAINING PROGRAM

## 2025-01-21 RX ORDER — FLUTICASONE FUROATE, UMECLIDINIUM BROMIDE AND VILANTEROL TRIFENATATE 100; 62.5; 25 UG/1; UG/1; UG/1
1 POWDER RESPIRATORY (INHALATION) DAILY
Qty: 2 EACH | Refills: 2 | Status: SHIPPED | OUTPATIENT
Start: 2025-01-21 | End: 2025-01-21

## 2025-01-21 RX ORDER — FLUTICASONE FUROATE, UMECLIDINIUM BROMIDE AND VILANTEROL TRIFENATATE 100; 62.5; 25 UG/1; UG/1; UG/1
1 POWDER RESPIRATORY (INHALATION) DAILY
Qty: 2 EACH | Refills: 2 | Status: SHIPPED | OUTPATIENT
Start: 2025-01-21

## 2025-01-21 RX ORDER — BUPROPION HYDROCHLORIDE 150 MG/1
150 TABLET, EXTENDED RELEASE ORAL 2 TIMES DAILY
Qty: 60 TABLET | Refills: 0 | Status: SHIPPED | OUTPATIENT
Start: 2025-01-21

## 2025-01-21 RX ORDER — BUPROPION HYDROCHLORIDE 150 MG/1
150 TABLET ORAL EVERY MORNING
Qty: 3 TABLET | Refills: 0 | Status: SHIPPED | OUTPATIENT
Start: 2025-01-21

## 2025-01-21 RX ORDER — BUPROPION HYDROCHLORIDE 150 MG/1
150 TABLET ORAL EVERY MORNING
Qty: 30 TABLET | Refills: 1 | Status: SHIPPED | OUTPATIENT
Start: 2025-01-21 | End: 2025-01-21

## 2025-01-21 ASSESSMENT — FIBROSIS 4 INDEX: FIB4 SCORE: 1.76

## 2025-01-21 NOTE — ASSESSMENT & PLAN NOTE
40-45 years , still smoking but 2-3 cigarettes per day   Tried NRT in the past   Start Wellbutrin to help quit smoking   Orders:    CT-CHEST (THORAX) W/O; Future    buPROPion (WELLBUTRIN XL) 150 MG XL tablet; Take 1 Tablet by mouth every morning. Start taking 150 mg once daily for 3 days    buPROPion SR (WELLBUTRIN-SR) 150 MG TABLET SR 12 HR sustained-release tablet; Take 1 Tablet by mouth 2 times a day. Start taking on Day 4 after starting once daily dose

## 2025-01-21 NOTE — ASSESSMENT & PLAN NOTE
GOLD class 3 E, CAT score 11   With last exacerbation in 12/2024, requiring intubation  PFT in 2022 showed ratio of 57, FEV1 32% with positive BD and air trapping  He only had CXR , but never had CT chest   Current regimen includes breo ellipta and albuterol prn     - will start trelegy daily and continue albuterol as needed  - counseled to quit smoking  - will need a CT scan to evaluate lung parenchyma   -recommended to stay ambulatory and active     Orders:    Referral to Pulmonary Rehab    fluticasone-umeclidinium-vilanterol (TRELEGY ELLIPTA) 100-62.5-25 mcg/act inhaler; Inhale 1 Puff every day.

## 2025-01-21 NOTE — PROGRESS NOTES
Pulmonary Clinic Consult Note     Date of Service: 1/21/2025    Reason for follow up:  Establish Care (Referred by Tiny Mills M.D/Acute exacerbation of chronic obstructive pulmonary disease, Hospital discharge follow-up) and Results (DX-CHEST 12/19/24, ECHO 12/19/24)        History of Present Illness: Hugo Villela is a 64 y.o. male with a  medical history of COPD/asthma , CAD s/p PCI , HTN, HLP , CKD, T2DM  referred to establish care with pulmonary for COPD. He was recently admitted to hospital 12/2024 for acute COPD exacerbation during which he required intubation , also had liver injury suspected to be from alcohol use , CHF new onset , ROBERT on CKD and was discharged on home oxygen 4 L /min. Since discharge he has been using only advair once daily, and albuterol as needed. He is able to walk less than half a block , report early morning cough with some phlegm stuck in the back of his throat. He denies any chest pain., fevers , night sweats, weight loss, edema, hemoptysis. He has to use once /twice a day albuterol once in a while. He at rest is no longer requiring any oxygen, but on ambulation needs it. He is trying to cut back on smoking, currently still smoking , has tried NRT with no use. He is interested in quitting smoking.  He     Review of Systems   Constitutional:  Negative for chills and fever.   Respiratory: positive for SOB on exertion, cough with sputum production early in the morning .  Cardiovascular:  Negative for chest pain and palpitations.   Gastrointestinal:  Negative for heartburn and nausea.   Genitourinary:  Negative for dysuria and urgency.   Neurological:  Negative for dizziness, sensory change, focal weakness and loss of consciousness.   All other systems reviewed and are negative.       Current Outpatient Medications:     fluticasone-umeclidinium-vilanterol (TRELEGY ELLIPTA) 100-62.5-25 mcg/act inhaler, Inhale 1 Puff every day., Disp: 2 Each, Rfl: 2    buPROPion (WELLBUTRIN  XL) 150 MG XL tablet, Take 1 Tablet by mouth every morning. Start taking 150 mg once daily for 3 days, Disp: 3 Tablet, Rfl: 0    buPROPion SR (WELLBUTRIN-SR) 150 MG TABLET SR 12 HR sustained-release tablet, Take 1 Tablet by mouth 2 times a day. Start taking on Day 4 after starting once daily dose, Disp: 60 Tablet, Rfl: 0    losartan (COZAAR) 25 MG Tab, Take 1 Tablet by mouth every day., Disp: 30 Tablet, Rfl: 0    metoprolol SR (TOPROL XL) 50 MG TABLET SR 24 HR, Take 1 Tablet by mouth every day., Disp: 90 Tablet, Rfl: 3    albuterol 108 (90 Base) MCG/ACT Aero Soln inhalation aerosol, Inhale 2 Puffs every four hours as needed for Shortness of Breath., Disp: 1 Each, Rfl: 11    VITAMIN D PO, Take 1 Tablet by mouth every day., Disp: , Rfl:     allopurinol (ZYLOPRIM) 100 MG Tab, Take 0.5 Tablets by mouth every day., Disp: 45 Tablet, Rfl: 3    atorvastatin (LIPITOR) 80 MG tablet, Take 1 Tablet by mouth at bedtime., Disp: 90 Tablet, Rfl: 3    aspirin EC (ECOTRIN) 81 MG Tablet Delayed Response, Take 1 Tablet by mouth every day., Disp: 30 Tablet, Rfl: 3    dapagliflozin propanediol (FARXIGA) 10 MG Tab, Take 1 Tablet by mouth every day., Disp: 90 Tablet, Rfl: 3     reports that he has been smoking cigarettes. He started smoking about 42 years ago. He has a 10.7 pack-year smoking history. He has never used smokeless tobacco. He reports that he does not currently use alcohol after a past usage of about 2.4 - 3.6 oz of alcohol per week. He reports that he does not use drugs.     Past Medical History:   Diagnosis Date    Acute myocardial infarction of other inferior wall, episode of care unspecified 06/25/2012    Acute myocardial infarction of other lateral wall, episode of care unspecified 06/25/2012    Asthma     Breath shortness     Bronchitis     Chest pain, unspecified 06/25/2012    Coronary atherosclerosis of native coronary artery 06/25/2012    Dental disorder     Diabetes (HCC)     Dizziness and giddiness 06/25/2012     "High cholesterol     Hypertension     Mixed hyperlipidemia 06/25/2012    Pure hyperglyceridemia 06/25/2012    Renal disorder     S/P coronary artery stent placement 06/25/2012    Tobacco use disorder 06/25/2012       Past Surgical History:   Procedure Laterality Date    ORIF, FRACTURE, FEMUR  1994    OTHER      Right femur    OTHER CARDIAC SURGERY      STENT PLACEMENT         Allergies: Lisinopril    family history includes Cancer in his sister; Stroke in his mother.    /62 (BP Location: Left arm, Patient Position: Sitting, BP Cuff Size: Adult)   Pulse 76   Ht 1.651 m (5' 5\")   Wt 68.5 kg (151 lb)   SpO2 92% Comment: 4L  BMI 25.13 kg/m²     Physical Examination  Vitals reviewed.   Constitutional:       Appearance: Normal appearance.   HENT:      Head: Normocephalic and atraumatic.      Mouth/Throat:      Mouth: Mucous membranes are moist.   Eyes:      Conjunctiva/sclera: Conjunctivae normal.      Pupils: Pupils are equal, round, and reactive to light.   Cardiovascular:      Rate and Rhythm: Normal rate and regular rhythm.   Pulmonary:      Effort: Pulmonary effort is normal. No respiratory distress.      Breath sounds: Normal breath sounds.   Abdominal:      General: There is no distension.      Palpations: Abdomen is soft. There is no mass.   Musculoskeletal:         General: No swelling.      Right lower leg: No edema.      Left lower leg: No edema.   Skin:     General: Skin is warm and dry.   Neurological:      General: No focal deficit present.      Mental Status: She is alert and oriented to person, place, and time.      Cranial Nerves: No cranial nerve deficit.      Motor: No weakness.   Psychiatric:         Mood and Affect: Mood normal.         Behavior: Behavior normal.         Results:    No results found for this or any previous visit from the past 1500 days.    EC-ECHOCARDIOGRAM COMP W/ CONTRAST W/O MYOCARDIAL STRAIN [facility] 12/19/2024    Narrative  Transthoracic  Echo Report      Echocardiography " Laboratory    CONCLUSIONS  Mildly reduced left ventricular systolic function.  Global hypokinesis with regional variation - most pronounced toward the  apex.  Reduced right ventricular systolic function.  Mild mitral regurgitation.  Estimated RVSP of 51 mmHg.      NICOLE DUDLEY  Exam Date:         2024  14:49  Exam Location:     Inpatient  Priority:          Routine    Ordering Physician:        SADIE DIAZ  Referring Physician:       SADIE DIAZ  Sonographer:               Kelly POOLE    Age:    64     Gender:    M  MRN:    [id number]  :    1960  BSA:    1.8    Ht (in):    65     Wt (lb):    161  Exam Type:     Complete, Contrast    Indications:     Shortness of breath  ICD Codes:       R06.02    CPT Codes:       57861    BP:   139    /   75     HR:   92  Technical Quality:       Poor    MEASUREMENTS  (Male / Female) Normal Values  2D ECHO  LV Diastolic Diameter PLAX        5.6 cm                4.2 - 5.9 / 3.9 - 5.3  cm  LV Systolic Diameter PLAX         4.4 cm                2.1 - 4.0 cm  IVS Diastolic Thickness           1 cm  LVPW Diastolic Thickness          1 cm  LVOT Diameter                     2 cm  Estimated LV Ejection Fraction    45 %  LV Ejection Fraction MOD BP       52.3 %                >= 55  %  LV Ejection Fraction MOD 4C       54.1 %  LV Ejection Fraction MOD 2C       49.7 %  LV Ejection Fraction 4C AL        53.8 %  LV Ejection Fraction 2C AL        50.1 %  LA Volume Index                   28.6 cm3/m2           16 - 28 cm3/m2    DOPPLER  AV Peak Velocity                  1.4 m/s  AV Peak Gradient                  7.6 mmHg  AV Mean Gradient                  4 mmHg  LVOT Peak Velocity                1.1 m/s  AV Area Cont Eq vti               2.3 cm2  MV Velocity Time Integral         25.2 cm  Mitral E Point Velocity           0.85 m/s  Mitral E to A Ratio               0.88  MV Pressure Half Time             41 ms  MV Area PHT                        5.4 cm2  MV Deceleration Time              138 ms  TR Peak Velocity                  304 cm/s  LV E' Lateral Velocity            13.5 cm/s  Mitral E to LV E' Lateral Ratio   6.3  LV E' Septal Velocity             9 cm/s  Mitral E to LV E' Septal Ratio    9.4    * Indicates values subject to auto-interpretation  LV EF:  45    %    FINDINGS  Left Ventricle  3mL of contrast was used to enhance visualization of the endocardial  border. No apical thrombus seen. Normal left ventricular wall  thickness. Mildly reduced left ventricular systolic function. The left  ventricular ejection fraction is visually estimated to be 45%.  Global  hypokinesis with regional variation - most pronounced toward the apex.    Right Ventricle  Normal right ventricular size. Reduced right ventricular systolic  function.    Right Atrium  Normal right atrial size. Normal inferior vena cava size without  inspiratory collapse.    Left Atrium  Normal left atrial size. Left atrial volume index is 28 mL/sq m. Intact  atrial septum without Doppler evidence of interatrial shunt (e.g. PFO  or ASD).    Mitral Valve  Structurally normal mitral valve without significant stenosis. Mild  mitral regurgitation.    Aortic Valve  The aortic valve is not well visualized. No aortic valve stenosis. No  aortic insufficiency.    Tricuspid Valve  Structurally normal tricuspid valve without significant stenosis. Mild  tricuspid regurgitation. Right atrial pressure is estimated to be 8  mmHg. Estimated RVSP of 51 mmHg.    Pulmonic Valve  Structurally normal pulmonic valve without significant stenosis. Trace  pulmonic insufficiency.    Pericardium  Normal pericardium without effusion.    Aorta  Normal aortic root for body surface area. The ascending aorta diameter  is 3.0 cm. Plaque noted in the ascending aorta.                      Junior Bahena MD  (Electronically Signed)  Final Date:     19 December 2024  17:34      EC-ECHOCARDIOGRAM COMP W/O CONTRAST W/O  MYOCARDIAL STRAIN IMG 2022    Narrative  Transthoracic  Echo Report      Echocardiography Laboratory    CONCLUSIONS  No prior study is available for comparison.  Left ventricle appears mildly dilated.  Low normal left ventricular systolic function with estimated ejection  fraction 50-55%.  Abnormal septal motion consistent with bundle branch block.  Mild mitral regurgitation.  Normal left atrial size.  Normal inferior vena cava size and inspiratory collapse.    NICOLE DUDLEY  Exam Date:         2022  10:13  Exam Location:     Out Patient  Priority:          Routine    Ordering Physician:        SAUD YEN  ([id number])  Referring Physician:       FARSHAD Patterson  Sonographer:               Laura Chacko [facility]    Age:    61     Gender:    M  MRN:    [id number]  :    1960  BSA:    1.78   Ht (in):    65     Wt (lb):    156  Exam Type:     Complete    Indications:     CAD, Unspecified right bundle-branch block  ICD Codes:       414  I45.10    CPT Codes:       53834    BP:   140    /   85     HR:   63  Technical Quality:       Fair    MEASUREMENTS  (Male / Female) Normal Values  2D ECHO  LV Diastolic Diameter PLAX        5.5 cm                4.2 - 5.9 / 3.9 - 5.3  cm  LV Systolic Diameter PLAX         3.9 cm                2.1 - 4.0 cm  IVS Diastolic Thickness           0.95 cm  LVPW Diastolic Thickness          1 cm  LVOT Diameter                     2.1 cm  Estimated LV Ejection Fraction    55 %  LV Ejection Fraction MOD BP       54.8 %                >= 55  %  LV Ejection Fraction MOD 4C       59.1 %  LV Ejection Fraction MOD 2C       50.9 %  IVC Diameter                      1.4 cm    DOPPLER  AV Peak Velocity                  1.4 m/s  AV Peak Gradient                  8 mmHg  AV Mean Gradient                  4.3 mmHg  LVOT Peak Velocity                0.71 m/s  AV Area Cont Eq vti               1.8 cm2  Mitral E Point Velocity           0.72 m/s  Mitral E to A Ratio               0.89  MV Pressure  Half Time             65.8 ms  MV Area PHT                       3.3 cm2  MV Deceleration Time              227 ms  PV Peak Velocity                  1 m/s  PV Peak Gradient                  4.1 mmHg  RVOT Peak Velocity                0.8 m/s    * Indicates values subject to auto-interpretation  LV EF:  55    %    FINDINGS  Left Ventricle  The left ventricle is normal in thickness.  Low normal left ventricular  systolic function. The left ventricular ejection fraction is visually  estimated to be 50-55%. Abnormal septal motion consistent with bundle  branch block. Normal diastolic function.    Right Ventricle  The right ventricle is normal in size and systolic function.    Right Atrium  The right atrium is normal in size. Normal inferior vena cava size and  inspiratory collapse.    Left Atrium  The left atrium is normal in size. Left atrial volume index is 27 mL/sq  m.    Mitral Valve  Structurally normal mitral valve without significant stenosis. Mild  mitral regurgitation.    Aortic Valve  Structurally normal aortic valve without significant stenosis or  regurgitation.    Tricuspid Valve  Structurally normal tricuspid valve without significant stenosis or  regurgitation.unable to estimate right ventricular systolic pressure  due to an inadequate tricuspid regurgitant jet.    Pulmonic Valve  Structurally normal pulmonic valve without significant stenosis or  regurgitation.    Pericardium  Normal pericardium without effusion.    Aorta  Normal aortic root for body surface area. The ascending aorta diameter  is 2.8 cm.                      Prince Gonzales MD  (Electronically Signed)  Final Date:     03 June 2022  14:29     Lab Results   Component Value Date/Time    WBC 14.5 (H) 12/23/2024 01:56 AM    RBC 4.71 12/23/2024 01:56 AM    HEMOGLOBIN 14.0 12/23/2024 01:56 AM    PLATELETCT 114 (L) 12/23/2024 01:56 AM    EOSINOPHILS 0.00 12/21/2024 04:25 AM    NEUTS 12.85 (H) 12/21/2024 04:25 AM    NEUTS 5.1 04/08/2022 07:45 AM     LYMPHS 0.35 (L) 12/21/2024 04:25 AM    LYMPHS 2.2 04/08/2022 07:45 AM    MONOS 1.72 (H) 12/21/2024 04:25 AM    MONOS 0.5 04/08/2022 07:45 AM    EOS 0.00 12/21/2024 04:25 AM    EOS 0.2 04/08/2022 07:45 AM    BASO 0.01 12/21/2024 04:25 AM    BASO 0.1 04/08/2022 07:45 AM    IMMGRANAB 0.07 12/21/2024 04:25 AM    IMMGRANAB 0.0 04/08/2022 07:45 AM    NRBCAB 0.07 12/21/2024 04:25 AM       Pulmonary function tests      Assessment & Plan  Asthma-COPD overlap syndrome (HCC)    GOLD class 3 E, CAT score 11   With last exacerbation in 12/2024, requiring intubation  PFT in 2022 showed ratio of 57, FEV1 32% with positive BD and air trapping  He only had CXR , but never had CT chest   Current regimen includes breo ellipta and albuterol prn     - will start trelegy daily and continue albuterol as needed  - counseled to quit smoking  - will need a CT scan to evaluate lung parenchyma   -recommended to stay ambulatory and active     Orders:    Referral to Pulmonary Rehab    fluticasone-umeclidinium-vilanterol (TRELEGY ELLIPTA) 100-62.5-25 mcg/act inhaler; Inhale 1 Puff every day.    Chronic hypoxemic respiratory failure (HCC)  Currently uses 4 L   Will need pulmonary rehab , patient interested in program        Tobacco use disorder  40-45 years , still smoking but 2-3 cigarettes per day   Tried NRT in the past   Start Wellbutrin to help quit smoking   Orders:    CT-CHEST (THORAX) W/O; Future    buPROPion (WELLBUTRIN XL) 150 MG XL tablet; Take 1 Tablet by mouth every morning. Start taking 150 mg once daily for 3 days    buPROPion SR (WELLBUTRIN-SR) 150 MG TABLET SR 12 HR sustained-release tablet; Take 1 Tablet by mouth 2 times a day. Start taking on Day 4 after starting once daily dose    Acute systolic heart failure (HCC)  Will order repeat TTE  3 months from last abnormal TTE   Continue GDMT     Orders:    EC-ECHOCARDIOGRAM COMPLETE W/O CONT; Future    Will reevaluate response to therapy at his next visit in 3 months      Tanya  MD Nick Mullen Pulmonary/Critical Care      Answers submitted by the patient for this visit:  Pulmonary History Questionnaire (Submitted on 1/19/2025)  What is the reason for your visit today?: Referred by hospital doctor  Do you cough first thing in the morning or at other times during the day?: Yes, only when is too cold  Do you have a cough on most days? If so, how long have you had this cough?: No  Bring up phlegm (mucus, sputum) in the morning or other times during the day?: Yes  If so, how long have you produced phlegm (Months, Years), and what is the usual color of your phlegm?: 3 months  and phlegm color is clear  Do you cough up blood from your chest?: No  Do you experience wheezing?: Yes  How often?: intermittently  Do you experience any chest tightness?: No  Experience shortness of breath?: Yes  Experience shortness of breath at rest?: No  How far can you walk before becoming short of breath or need to rest? : Walking slow, may be 1 mile . After getting out of the hospital can't walk no more than 200 feet  Please list what causes you to become short of breath:: Lifting something heavy  Please list all your occupations from your first job to your current job. Include Industry/Company, location, year, and your specific job.: [employer]. 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 [address] Munir henry.  Birds?: Yes  Dogs?: Yes  Cats?: No  Mice and/or Deer Mice?: No  Reptiles?: No  Cardiac Ultrasound: ?  Chest X-ray: ?  Pulmonary Function Test: Yes  CT Scan, Sinus: ?  Electrocardiogram: Yes  Sleep Study: No  Coffee: 1    Conflicting answers have been found for some questions. Please document the patient's answers manually.

## 2025-01-21 NOTE — ASSESSMENT & PLAN NOTE
Will order repeat TTE  3 months from last abnormal TTE   Continue GDMT     Orders:    EC-ECHOCARDIOGRAM COMPLETE W/O CONT; Future

## 2025-01-23 ENCOUNTER — OFFICE VISIT (OUTPATIENT)
Dept: MEDICAL GROUP | Facility: MEDICAL CENTER | Age: 65
End: 2025-01-23
Payer: COMMERCIAL

## 2025-01-23 VITALS
TEMPERATURE: 99 F | HEART RATE: 93 BPM | BODY MASS INDEX: 23.93 KG/M2 | SYSTOLIC BLOOD PRESSURE: 108 MMHG | RESPIRATION RATE: 14 BRPM | OXYGEN SATURATION: 95 % | DIASTOLIC BLOOD PRESSURE: 60 MMHG | HEIGHT: 65 IN | WEIGHT: 143.6 LBS

## 2025-01-23 DIAGNOSIS — J44.89 ASTHMA-COPD OVERLAP SYNDROME (HCC): ICD-10-CM

## 2025-01-23 DIAGNOSIS — E11.9 TYPE 2 DIABETES MELLITUS WITHOUT COMPLICATION, WITHOUT LONG-TERM CURRENT USE OF INSULIN (HCC): ICD-10-CM

## 2025-01-23 DIAGNOSIS — N18.32 STAGE 3B CHRONIC KIDNEY DISEASE: ICD-10-CM

## 2025-01-23 PROCEDURE — 92250 FUNDUS PHOTOGRAPHY W/I&R: CPT | Mod: 26 | Performed by: STUDENT IN AN ORGANIZED HEALTH CARE EDUCATION/TRAINING PROGRAM

## 2025-01-23 PROCEDURE — 99214 OFFICE O/P EST MOD 30 MIN: CPT | Performed by: STUDENT IN AN ORGANIZED HEALTH CARE EDUCATION/TRAINING PROGRAM

## 2025-01-23 PROCEDURE — 3074F SYST BP LT 130 MM HG: CPT | Performed by: STUDENT IN AN ORGANIZED HEALTH CARE EDUCATION/TRAINING PROGRAM

## 2025-01-23 PROCEDURE — 3078F DIAST BP <80 MM HG: CPT | Performed by: STUDENT IN AN ORGANIZED HEALTH CARE EDUCATION/TRAINING PROGRAM

## 2025-01-23 RX ORDER — DAPAGLIFLOZIN 10 MG/1
10 TABLET, FILM COATED ORAL DAILY
COMMUNITY

## 2025-01-23 ASSESSMENT — ENCOUNTER SYMPTOMS
WHEEZING: 0
SHORTNESS OF BREATH: 0
NAUSEA: 0
HEADACHES: 0
FEVER: 0
WEIGHT LOSS: 0
CHILLS: 0
PALPITATIONS: 0
DIZZINESS: 0
VOMITING: 0

## 2025-01-23 ASSESSMENT — PATIENT HEALTH QUESTIONNAIRE - PHQ9
CLINICAL INTERPRETATION OF PHQ2 SCORE: 2
5. POOR APPETITE OR OVEREATING: 0 - NOT AT ALL
SUM OF ALL RESPONSES TO PHQ QUESTIONS 1-9: 2

## 2025-01-23 ASSESSMENT — FIBROSIS 4 INDEX: FIB4 SCORE: 1.76

## 2025-01-23 NOTE — PROGRESS NOTES
"Subjective:     CC:     HPI:   Hugo presents today with    Past medical history of CAD status post PCI, CKD 3B, DLD, T2DM, asthma/ COPD, alcohol use disorder, tobacco use  Specialist  - nephrology- [facility]  - cardiology- [facility]  - pulmonology- asthma/ copd    Patient has been a 12/19 and 12/23 for flu like symptoms/hallucinations/ROBERT.  Was discharged with allopurinol 50 mg daily, Farxiga 10 mg daily, prednisone x 2 days    He report he has been sober of alcohol since 10/2024  He reported he has cut down tobacco use currently 2-3 daily  He has since followed with pulmonology report is using new inhalers  He report he has Farxiga at home does not need a refill    Health Maintenance:     ROS:  Review of Systems   Constitutional:  Negative for chills, fever and weight loss.   HENT:  Negative for hearing loss.    Respiratory:  Negative for shortness of breath and wheezing.    Cardiovascular:  Negative for chest pain and palpitations.   Gastrointestinal:  Negative for nausea and vomiting.   Genitourinary:  Negative for frequency and urgency.   Skin:  Negative for rash.   Neurological:  Negative for dizziness and headaches.       Objective:     Exam:  /60   Pulse 93   Temp 37.2 °C (99 °F) (Temporal)   Resp 14   Ht 1.651 m (5' 5\")   Wt 65.1 kg (143 lb 9.6 oz)   SpO2 95%   BMI 23.90 kg/m²  Body mass index is 23.9 kg/m².    Physical Exam  Constitutional:       Appearance: Normal appearance.   Cardiovascular:      Rate and Rhythm: Normal rate and regular rhythm.   Pulmonary:      Effort: Pulmonary effort is normal.      Breath sounds: Normal breath sounds.   Musculoskeletal:      Cervical back: Normal range of motion and neck supple.   Lymphadenopathy:      Cervical: No cervical adenopathy.   Neurological:      Mental Status: He is alert.       Diabetic foot exam: No lesions or calluses noted. 2+ pedal pulses. Sensation intact with 10 out of 10 on monofilament test.      Labs:     Assessment & " Plan:     64 y.o. male with the following -     1. Type 2 diabetes mellitus without complication, without long-term current use of insulin (HCC)  Chronic, stable A1c well-controlled continue current medication.  Sensation intact, good pulses pedal bilaterally  Last A1c 6.7    2. Stage 3b chronic kidney disease  Chronic, stable  Follows with nephrology  On Farxiga and allopurinol    3. Asthma-COPD overlap syndrome (HCC)  Chronic, stable follows with pulmonology currently on Trelegy and on 4 L oxygen.      Return in about 3 months (around 4/23/2025) for Lab review, Med check.    Please note that this dictation was created using voice recognition software. I have made every reasonable attempt to correct obvious errors, but I expect that there are errors of grammar and possibly content that I did not discover before finalizing the note.

## 2025-01-24 LAB — RETINAL SCREEN: NEGATIVE

## 2025-01-29 LAB
MYCOBACTERIUM SPEC CULT: NORMAL
RHODAMINE-AURAMINE STN SPEC: NORMAL
SIGNIFICANT IND 70042: NORMAL
SITE SITE: NORMAL
SOURCE SOURCE: NORMAL

## 2025-02-12 NOTE — Clinical Note
Member Name: Hugo Villela   Member Number: [id number]   Reference Number: [id number]   Approved Services: MRI/CAT Scan   Approved Service Dates: 02/21/2025 - 06/12/2025   Requesting Provider: Paz Martínez   Requested Provider: [facility]     Dear Hugo Villela:     The following medical service(s) requested by Paz Martínez have been approved:    Number of Services: 1  Description: MRI/CAT Scan  CT scan of chest without contrast     The services should be provided by [facility] no later than 06/12/2025. Please contact the provider listed below with any questions.     Provider Information:  [facility]  [phone]    Your plan benefit may require a deductible, co-payment or coinsurance for these services. This authorization does not guarantee [facility] will pay the claim for services that you receive. Payment by [facility] for these services is subject to the terms of your Evidence of Coverage or Summary Plan Description, your eligibility at the time of service, and confirmation of benefit coverage.    For any questions or additional information, please contact Customer Service:    [facility]  Customer Service: [phone] or toll free [phone]  [employer] users dial: [phone]   Call Center Hours: Mon - Fri 7 AM to 8 PM PST   Office Hours: Mon - Fri 8 AM to 5 PM [facility]   E-mail: [email or web address]   Website: [email or web address]       This information is available for free in other languages. Please contact Customer Service at the phone number above for more information. [facility] complies with applicable Federal civil rights laws and does not discriminate on the basis of race, color, national origin, age, disability or sex.      Sincerely,     Healthcare Utilization Management Department     Cc: [facility]   Paz Martínez

## 2025-02-21 ENCOUNTER — HOSPITAL ENCOUNTER (OUTPATIENT)
Dept: RADIOLOGY | Facility: MEDICAL CENTER | Age: 65
End: 2025-02-21
Attending: STUDENT IN AN ORGANIZED HEALTH CARE EDUCATION/TRAINING PROGRAM
Payer: COMMERCIAL

## 2025-02-21 DIAGNOSIS — F17.200 TOBACCO USE DISORDER: ICD-10-CM

## 2025-02-21 PROCEDURE — 71250 CT THORAX DX C-: CPT

## 2025-02-25 ENCOUNTER — HOSPITAL ENCOUNTER (OUTPATIENT)
Dept: LAB | Facility: MEDICAL CENTER | Age: 65
End: 2025-02-25
Attending: STUDENT IN AN ORGANIZED HEALTH CARE EDUCATION/TRAINING PROGRAM
Payer: COMMERCIAL

## 2025-02-25 DIAGNOSIS — E11.9 TYPE 2 DIABETES MELLITUS WITHOUT COMPLICATION, WITHOUT LONG-TERM CURRENT USE OF INSULIN (HCC): ICD-10-CM

## 2025-02-25 LAB
CHOLEST SERPL-MCNC: 157 MG/DL (ref 100–199)
FASTING STATUS PATIENT QL REPORTED: NORMAL
HDLC SERPL-MCNC: 57 MG/DL
LDLC SERPL CALC-MCNC: 80 MG/DL
TRIGL SERPL-MCNC: 101 MG/DL (ref 0–149)

## 2025-02-25 PROCEDURE — 36415 COLL VENOUS BLD VENIPUNCTURE: CPT

## 2025-02-25 PROCEDURE — 80061 LIPID PANEL: CPT

## 2025-02-26 ENCOUNTER — RESULTS FOLLOW-UP (OUTPATIENT)
Dept: MEDICAL GROUP | Facility: MEDICAL CENTER | Age: 65
End: 2025-02-26

## 2025-03-11 ENCOUNTER — OFFICE VISIT (OUTPATIENT)
Dept: NEPHROLOGY | Facility: MEDICAL CENTER | Age: 65
End: 2025-03-11
Payer: COMMERCIAL

## 2025-03-11 VITALS
BODY MASS INDEX: 23.32 KG/M2 | HEIGHT: 65 IN | WEIGHT: 140 LBS | OXYGEN SATURATION: 96 % | DIASTOLIC BLOOD PRESSURE: 62 MMHG | HEART RATE: 73 BPM | TEMPERATURE: 99.5 F | SYSTOLIC BLOOD PRESSURE: 114 MMHG

## 2025-03-11 DIAGNOSIS — R80.9 PROTEINURIA, UNSPECIFIED TYPE: ICD-10-CM

## 2025-03-11 DIAGNOSIS — N18.9 CHRONIC KIDNEY DISEASE, UNSPECIFIED CKD STAGE: ICD-10-CM

## 2025-03-11 DIAGNOSIS — I10 PRIMARY HYPERTENSION: ICD-10-CM

## 2025-03-11 PROCEDURE — G2211 COMPLEX E/M VISIT ADD ON: HCPCS | Performed by: INTERNAL MEDICINE

## 2025-03-11 PROCEDURE — 3078F DIAST BP <80 MM HG: CPT | Performed by: INTERNAL MEDICINE

## 2025-03-11 PROCEDURE — 3074F SYST BP LT 130 MM HG: CPT | Performed by: INTERNAL MEDICINE

## 2025-03-11 PROCEDURE — 99214 OFFICE O/P EST MOD 30 MIN: CPT | Performed by: INTERNAL MEDICINE

## 2025-03-11 ASSESSMENT — FIBROSIS 4 INDEX: FIB4 SCORE: 1.76

## 2025-03-11 ASSESSMENT — ENCOUNTER SYMPTOMS
HYPERTENSION: 1
VOMITING: 0
COUGH: 0
FEVER: 0
SHORTNESS OF BREATH: 0
NAUSEA: 0
CHILLS: 0

## 2025-03-11 NOTE — PROGRESS NOTES
"Subjective     Hugo Villela is a 64 y.o. male who presents with Chronic Kidney Disease and Hypertension            Chronic Kidney Disease  This is a chronic problem. The current episode started more than 1 year ago. The problem occurs constantly. The problem has been gradually improving. Pertinent negatives include no chest pain, chills, coughing, fever, nausea, urinary symptoms or vomiting.   Hypertension  This is a chronic problem. The current episode started more than 1 year ago. The problem is unchanged. The problem is controlled. Pertinent negatives include no chest pain, malaise/fatigue, peripheral edema or shortness of breath. Risk factors for coronary artery disease include male gender and diabetes mellitus. Past treatments include angiotensin blockers. The current treatment provides significant improvement. There are no compliance problems.  Hypertensive end-organ damage includes kidney disease. Identifiable causes of hypertension include chronic renal disease.       Review of Systems   Constitutional:  Negative for chills, fever and malaise/fatigue.   Respiratory:  Negative for cough and shortness of breath.    Cardiovascular:  Negative for chest pain and leg swelling.   Gastrointestinal:  Negative for nausea and vomiting.   Genitourinary:  Negative for dysuria, frequency and urgency.              Objective     /62 (BP Location: Right arm, Patient Position: Sitting, BP Cuff Size: Adult)   Pulse 73   Temp 37.5 °C (99.5 °F) (Temporal)   Ht 1.651 m (5' 5\")   Wt 63.5 kg (140 lb)   SpO2 96%   BMI 23.30 kg/m²      Physical Exam  Vitals and nursing note reviewed.   Constitutional:       General: He is not in acute distress.     Appearance: He is not ill-appearing.   HENT:      Head: Normocephalic and atraumatic.      Right Ear: External ear normal.      Left Ear: External ear normal.      Nose: Nose normal.   Eyes:      General:         Right eye: No discharge.         Left eye: No discharge.      " Conjunctiva/sclera: Conjunctivae normal.   Cardiovascular:      Rate and Rhythm: Normal rate and regular rhythm.      Heart sounds: No murmur heard.  Pulmonary:      Effort: Pulmonary effort is normal. No respiratory distress.      Breath sounds: Normal breath sounds. No wheezing.   Musculoskeletal:         General: No tenderness or deformity.      Right lower leg: No edema.      Left lower leg: No edema.   Skin:     General: Skin is warm.   Neurological:      General: No focal deficit present.      Mental Status: He is alert and oriented to person, place, and time.   Psychiatric:         Mood and Affect: Mood normal.         Behavior: Behavior normal.       Past Medical History:   Diagnosis Date    Acute myocardial infarction of other inferior wall, episode of care unspecified 06/25/2012    Acute myocardial infarction of other lateral wall, episode of care unspecified 06/25/2012    Asthma     Breath shortness     Bronchitis     Chest pain, unspecified 06/25/2012    Coronary atherosclerosis of native coronary artery 06/25/2012    Dental disorder     Diabetes (HCC)     Dizziness and giddiness 06/25/2012    High cholesterol     Hypertension     Mixed hyperlipidemia 06/25/2012    Pure hyperglyceridemia 06/25/2012    Renal disorder     S/P coronary artery stent placement 06/25/2012    Tobacco use disorder 06/25/2012     Social History     Socioeconomic History    Marital status:      Spouse name: Not on file    Number of children: Not on file    Years of education: Not on file    Highest education level: 9th grade   Occupational History    Not on file   Tobacco Use    Smoking status: Every Day     Current packs/day: 0.25     Average packs/day: 0.2 packs/day for 42.9 years (10.7 ttl pk-yrs)     Types: Cigarettes     Start date: 4/5/1982    Smokeless tobacco: Never    Tobacco comments:     Only 6 cigarettes daily.  Patient declines smoking cessation at this time.    Vaping Use    Vaping status: Never Used    Substance and Sexual Activity    Alcohol use: Not Currently     Alcohol/week: 2.4 - 3.6 oz     Types: 4 - 6 Cans of beer per week     Comment: every week, 2x a week    Drug use: Never    Sexual activity: Yes     Partners: Female     Birth control/protection: None   Other Topics Concern    Not on file   Social History Narrative    Not on file     Social Drivers of Health     Financial Resource Strain: Not on file   Food Insecurity: No Food Insecurity (9/8/2023)    Hunger Vital Sign     Worried About Running Out of Food in the Last Year: Never true     Ran Out of Food in the Last Year: Never true   Transportation Needs: No Transportation Needs (9/8/2023)    PRAPARE - Transportation     Lack of Transportation (Medical): No     Lack of Transportation (Non-Medical): No   Physical Activity: Insufficiently Active (9/8/2023)    Exercise Vital Sign     Days of Exercise per Week: 5 days     Minutes of Exercise per Session: 20 min   Stress: Stress Concern Present (9/8/2023)    [employer] - Occupational Stress Questionnaire     Feeling of Stress : Rather much   Social Connections: Unknown (9/8/2023)    Social Connection and Isolation Panel [NHANES]     Frequency of Communication with Friends and Family: Not on file     Frequency of Social Gatherings with Friends and Family: Never     Attends Jainism Services: Never     Active Member of Clubs or Organizations: Not on file     Attends Club or Organization Meetings: Not on file     Marital Status:    Intimate Partner Violence: Not on file   Housing Stability: Low Risk  (9/8/2023)    Housing Stability Vital Sign     Unable to Pay for Housing in the Last Year: No     Number of Places Lived in the Last Year: 1     Unstable Housing in the Last Year: No     Family History   Problem Relation Age of Onset    Stroke Mother     Cancer Sister      Recent Labs     12/19/24  0530 12/19/24  1404 12/20/24  0513 12/21/24  0425 12/22/24  0110  12/23/24  0156 02/25/25  1402   ALBUMIN 4.2  --  4.4 4.2 4.1  --   --    HDL  --   --   --   --   --   --  57   TRIGLYCERIDE  --   --   --   --   --   --  101   SODIUM 142   < > 139 139 139 142  --    POTASSIUM 3.1*   < > 4.1 3.8 4.2 3.9  --    CHLORIDE 97   < > 94* 97 98 99  --    CO2 32   < > 34* 31 33 34*  --    BUN 57*   < > 58* 57* 45* 31*  --    CREATININE 2.33*   < > 2.13* 1.98* 1.71* 1.32  --    PHOSPHORUS 2.3*  --  4.6* 3.9  --   --   --     < > = values in this interval not displayed.                                Assessment & Plan  Primary hypertension  Controlled  Continue same medication regimen  Continue low-sodium diet    Orders:    Basic Metabolic Panel; Future    CBC WITHOUT DIFFERENTIAL; Future    MICROALB/CREAT RATIO RAND. UR    Chronic kidney disease, unspecified CKD stage  Creatinine improved  No uremic symptoms  Renal dose of medication  Avoid nephrotoxins  Continue same medication regimen  Patient was advised to call us if symptoms worsen  Continue SGLT2 inhibitor  Orders:    Basic Metabolic Panel; Future    CBC WITHOUT DIFFERENTIAL; Future    MICROALB/CREAT RATIO RAND. UR    Proteinuria, unspecified type  Continue ARB  Continue SGLT2 inhibitor  Orders:    Basic Metabolic Panel; Future    CBC WITHOUT DIFFERENTIAL; Future    MICROALB/CREAT RATIO RAND. UR

## 2025-03-19 NOTE — Clinical Note
Member Name: Hugo Villela   Member Number: [id number]   Reference Number: [id number]   Approved Services: Echos and EKG   Approved Service Dates: 03/21/2025 - 06/19/2025   Requesting Provider: Paz Martínez   Requested Provider: [facility]     Dear Hugo Villela:     The following medical service(s) requested by Paz Martínez have been approved:    Procedure Code Procedure Code Name Requested Quantity Approved Quantity Status   70613 (CPT®) PA ECHO HEART XTHORACIC,COMPLETE W DOPPLER 1 1 Authorized       Approved Quantity means the number of visits approved for medication treatments and/or medical services.    The services should be provided by [facility] no later than 06/19/2025. Please contact the provider listed below with any questions.     Provider Information:  [facility]  [phone]    Your plan benefit may require a deductible, co-payment or coinsurance for these services. This authorization does not guarantee [facility] will pay the claim for services that you receive. Payment by [facility] for these services is subject to the terms of your Evidence of Coverage or Summary Plan Description, your eligibility at the time of service, and confirmation of benefit coverage.    For any questions or additional information, please contact Customer Service:    [facility]  Customer Service: [phone] or toll free [phone]  TTY users dial: [phone]   Call Center Hours: Mon - Fri 7 AM to 8 PM PST   Office Hours: Mon - Fri 8 AM to 5 PM [facility]   E-mail: [email or web address]   Website: [email or web address]       This information is available for free in other languages. Please contact Customer Service at the phone number above for more information. [facility] complies with applicable Federal civil rights laws and does not discriminate on the basis of race, color, national origin, age, disability or  sex.      Sincerely,     Healthcare Utilization Management Department     Cc: [facility]   Pazkelly Martínez

## 2025-03-27 DIAGNOSIS — J44.9 CHRONIC OBSTRUCTIVE PULMONARY DISEASE, UNSPECIFIED COPD TYPE (HCC): ICD-10-CM

## 2025-03-27 RX ORDER — ALBUTEROL SULFATE 90 UG/1
2 INHALANT RESPIRATORY (INHALATION) EVERY 4 HOURS PRN
Qty: 1 EACH | Refills: 11 | Status: SHIPPED | OUTPATIENT
Start: 2025-03-27

## 2025-03-31 ENCOUNTER — HOSPITAL ENCOUNTER (OUTPATIENT)
Dept: CARDIOLOGY | Facility: MEDICAL CENTER | Age: 65
End: 2025-03-31
Attending: STUDENT IN AN ORGANIZED HEALTH CARE EDUCATION/TRAINING PROGRAM
Payer: COMMERCIAL

## 2025-03-31 DIAGNOSIS — I50.21 ACUTE SYSTOLIC HEART FAILURE (HCC): ICD-10-CM

## 2025-03-31 LAB
LV EJECT FRACT  99904: 45
LV EJECT FRACT MOD 2C 99903: 46.91
LV EJECT FRACT MOD 4C 99902: 43.24
LV EJECT FRACT MOD BP 99901: 46.36

## 2025-03-31 PROCEDURE — 93306 TTE W/DOPPLER COMPLETE: CPT

## 2025-03-31 PROCEDURE — 93306 TTE W/DOPPLER COMPLETE: CPT | Mod: 26 | Performed by: INTERNAL MEDICINE

## 2025-04-10 ENCOUNTER — OFFICE VISIT (OUTPATIENT)
Dept: SLEEP MEDICINE | Facility: MEDICAL CENTER | Age: 65
End: 2025-04-10
Payer: COMMERCIAL

## 2025-04-10 VITALS
HEIGHT: 65 IN | HEART RATE: 68 BPM | DIASTOLIC BLOOD PRESSURE: 60 MMHG | WEIGHT: 149 LBS | SYSTOLIC BLOOD PRESSURE: 112 MMHG | BODY MASS INDEX: 24.83 KG/M2 | OXYGEN SATURATION: 92 %

## 2025-04-10 DIAGNOSIS — J96.11 CHRONIC HYPOXEMIC RESPIRATORY FAILURE (HCC): ICD-10-CM

## 2025-04-10 DIAGNOSIS — F17.200 TOBACCO USE DISORDER: ICD-10-CM

## 2025-04-10 DIAGNOSIS — J44.89 ASTHMA-COPD OVERLAP SYNDROME (HCC): ICD-10-CM

## 2025-04-10 PROCEDURE — 94761 N-INVAS EAR/PLS OXIMETRY MLT: CPT

## 2025-04-10 PROCEDURE — 99213 OFFICE O/P EST LOW 20 MIN: CPT

## 2025-04-10 PROCEDURE — 99214 OFFICE O/P EST MOD 30 MIN: CPT

## 2025-04-10 RX ORDER — TIOTROPIUM BROMIDE INHALATION SPRAY 3.12 UG/1
5 SPRAY, METERED RESPIRATORY (INHALATION) DAILY
Qty: 12 G | Refills: 3 | Status: SHIPPED | OUTPATIENT
Start: 2025-04-10 | End: 2025-04-16

## 2025-04-10 RX ORDER — FLUTICASONE FUROATE AND VILANTEROL 200; 25 UG/1; UG/1
1 POWDER RESPIRATORY (INHALATION) DAILY
Qty: 90 EACH | Refills: 3 | Status: SHIPPED | OUTPATIENT
Start: 2025-04-10

## 2025-04-10 ASSESSMENT — ENCOUNTER SYMPTOMS
WEAKNESS: 0
NAUSEA: 0
VOMITING: 0
HEMOPTYSIS: 0
HEARTBURN: 0
DIAPHORESIS: 0
HEADACHES: 0
WHEEZING: 0
DIZZINESS: 0
COUGH: 0
FALLS: 0
CHILLS: 0
DIARRHEA: 0
MYALGIAS: 0
SHORTNESS OF BREATH: 0
SINUS PAIN: 0
SPUTUM PRODUCTION: 0
FEVER: 0
PALPITATIONS: 0

## 2025-04-10 ASSESSMENT — FIBROSIS 4 INDEX: FIB4 SCORE: 1.76

## 2025-04-10 NOTE — ASSESSMENT & PLAN NOTE
PFTs in 2022 shows obstruction with a FEV1 0.93 L or 32%, FEV1/FVC 57%, %, DLCO 88% predicted, with positive bronchodilator response.  Patient is currently on Breo and albuterol as needed.  Symptoms have remained stable.  --Continue Breo 200  --Added Spiriva so patient is now on triple therapy  --Continue albuterol as needed  --Advised patient increase exercise as tolerated  --Advised patient to remain up-to-date on all vaccines  --Advised patient on smoking cessation.

## 2025-04-10 NOTE — PROGRESS NOTES
Pulmonary Clinic Note    Date of Visit: 4/10/2025     Chief Complaint:  Chief Complaint   Patient presents with    Follow-Up     Dx/Last seen: 1/21/25 Tanya Asthma-COPD overlap syndrome    Results     Tests completed: CT 2/21/25, Echo 3/31/25     HPI:   Hugo Villela is a very pleasant 64 y.o. year old male current smoker (11+ pack-years, smokes 1-2 cigarettes per day), with a PMHx of COPD-asthma overlap syndrome, CAD s/p PCI, HTN, HLP, CKD, T2DM who presented to the Pulmonary Clinic for a regular follow up. Last seen in the office on 1/21/2025 with Dr Martínez.     Patient is followed by the pulmonary office for asthma-COPD overlap syndrome.  PFTs in 2022 shows obstruction with a FEV1 0.93 L or 32%, FEV1/FVC 57%, %, DLCO 88% predicted, with positive bronchodilator response.  CT chest in February 2025 shows a 3.4 mm RLL nodule, 8.5 mm GGO RLL, and 4.3 mm GGO RLL.  Showed 8.1 mm GGO LLL and 5.1 mm GGO.  Patient is currently on Breo 200 and albuterol as needed.  Patient was also started on Wellbutrin and his last appointment for tobacco cessation.  Patient has also been referred to pulmonary rehab.  Patient continues to use and benefit from 4 LPM of oxygen 24/7.  Echocardiogram in March 2025 shows a mildly reduced LV systolic function of 45-50%, grade 1 LV diastolic dysfunction, mild MR, mild TR.    Interval events:  4/10/2025-  Patient states his breathing has remained stable.  He has only been on Breo, as the Trelegy is too expensive for him.  He does state Breo has been effective.  He rarely uses albuterol.  He denies any significant shortness of breath, cough, sputum production, or wheezing.  He is currently using 4 LPM of oxygen and is questioning how much oxygen he needs to be using.  He continues to smoke 1-2 cigarettes a day.  He continues on Wellbutrin.    Exacerbations this year:  0    Current medication regimen:  4 LPM    Oxygen use:  4 LPM 24/7    MMRC Grade: 0- Breathless only during  strenuous exercise      Past Medical History:   Diagnosis Date    Acute myocardial infarction of other inferior wall, episode of care unspecified 06/25/2012    Acute myocardial infarction of other lateral wall, episode of care unspecified 06/25/2012    Asthma     Breath shortness     Bronchitis     Chest pain, unspecified 06/25/2012    Coronary atherosclerosis of native coronary artery 06/25/2012    Dental disorder     Diabetes (HCC)     Dizziness and giddiness 06/25/2012    High cholesterol     Hypertension     Mixed hyperlipidemia 06/25/2012    Pure hyperglyceridemia 06/25/2012    Renal disorder     S/P coronary artery stent placement 06/25/2012    Tobacco use disorder 06/25/2012     Past Surgical History:   Procedure Laterality Date    ORIF, FRACTURE, FEMUR  1994    OTHER      Right femur    OTHER CARDIAC SURGERY      STENT PLACEMENT       Social History     Socioeconomic History    Marital status:      Spouse name: Not on file    Number of children: Not on file    Years of education: Not on file    Highest education level: 9th grade   Occupational History    Not on file   Tobacco Use    Smoking status: Every Day     Current packs/day: 0.25     Average packs/day: 0.3 packs/day for 43.0 years (10.8 ttl pk-yrs)     Types: Cigarettes     Start date: 4/5/1982    Smokeless tobacco: Never    Tobacco comments:     Only 6 cigarettes daily.  Patient declines smoking cessation at this time.    Vaping Use    Vaping status: Never Used   Substance and Sexual Activity    Alcohol use: Not Currently     Alcohol/week: 2.4 - 3.6 oz     Types: 4 - 6 Cans of beer per week     Comment: every week, 2x a week    Drug use: Never    Sexual activity: Yes     Partners: Female     Birth control/protection: None   Other Topics Concern    Not on file   Social History Narrative    Not on file     Social Drivers of Health     Financial Resource Strain: Not on file   Food Insecurity: No Food Insecurity (9/8/2023)    Hunger Vital Sign      Worried About Running Out of Food in the Last Year: Never true     Ran Out of Food in the Last Year: Never true   Transportation Needs: No Transportation Needs (9/8/2023)    PRAPARE - Transportation     Lack of Transportation (Medical): No     Lack of Transportation (Non-Medical): No   Physical Activity: Insufficiently Active (9/8/2023)    Exercise Vital Sign     Days of Exercise per Week: 5 days     Minutes of Exercise per Session: 20 min   Stress: Stress Concern Present (9/8/2023)    [employer] - Occupational Stress Questionnaire     Feeling of Stress : Rather much   Social Connections: Unknown (9/8/2023)    Social Connection and Isolation Panel [NHANES]     Frequency of Communication with Friends and Family: Not on file     Frequency of Social Gatherings with Friends and Family: Never     Attends Protestant Services: Never     Active Member of Clubs or Organizations: Not on file     Attends Club or Organization Meetings: Not on file     Marital Status:    Intimate Partner Violence: Not on file   Housing Stability: Low Risk  (9/8/2023)    Housing Stability Vital Sign     Unable to Pay for Housing in the Last Year: No     Number of Places Lived in the Last Year: 1     Unstable Housing in the Last Year: No        Family History   Problem Relation Age of Onset    Stroke Mother     Cancer Sister      Current Outpatient Medications on File Prior to Visit   Medication Sig Dispense Refill    albuterol 108 (90 Base) MCG/ACT Aero Soln inhalation aerosol Inhale 2 Puffs every four hours as needed for Shortness of Breath. 1 Each 11    FARXIGA 10 MG Tab Take 10 mg by mouth every day.      buPROPion (WELLBUTRIN XL) 150 MG XL tablet Take 1 Tablet by mouth every morning. Start taking 150 mg once daily for 3 days 3 Tablet 0    buPROPion SR (WELLBUTRIN-SR) 150 MG TABLET SR 12 HR sustained-release tablet Take 1 Tablet by mouth 2 times a day. Start taking on Day 4 after starting once daily dose 60  "Tablet 0    losartan (COZAAR) 25 MG Tab Take 1 Tablet by mouth every day. 30 Tablet 0    metoprolol SR (TOPROL XL) 50 MG TABLET SR 24 HR Take 1 Tablet by mouth every day. 90 Tablet 3    VITAMIN D PO Take 1 Tablet by mouth every day.      allopurinol (ZYLOPRIM) 100 MG Tab Take 0.5 Tablets by mouth every day. 45 Tablet 3    atorvastatin (LIPITOR) 80 MG tablet Take 1 Tablet by mouth at bedtime. 90 Tablet 3    aspirin EC (ECOTRIN) 81 MG Tablet Delayed Response Take 1 Tablet by mouth every day. 30 Tablet 3     No current facility-administered medications on file prior to visit.     Allergies: Lisinopril    ROS:   Review of Systems   Constitutional:  Negative for chills, diaphoresis, fever and malaise/fatigue.   HENT:  Negative for congestion and sinus pain.    Respiratory:  Negative for cough, hemoptysis, sputum production, shortness of breath and wheezing.    Cardiovascular:  Negative for chest pain, palpitations and leg swelling.   Gastrointestinal:  Negative for diarrhea, heartburn, nausea and vomiting.   Musculoskeletal:  Negative for falls and myalgias.   Neurological:  Negative for dizziness, weakness and headaches.     Vitals:  /60 (BP Location: Left arm, Patient Position: Sitting, BP Cuff Size: Adult)   Pulse 68   Ht 1.651 m (5' 5\")   Wt 67.6 kg (149 lb)   SpO2 92%     Physical Exam  Constitutional:       General: He is not in acute distress.     Appearance: Normal appearance. He is not ill-appearing, toxic-appearing or diaphoretic.   Cardiovascular:      Rate and Rhythm: Normal rate and regular rhythm.      Pulses: Normal pulses.      Heart sounds: Normal heart sounds. No murmur heard.     No friction rub. No gallop.   Pulmonary:      Effort: Pulmonary effort is normal. No respiratory distress.      Breath sounds: Normal breath sounds. No stridor. No wheezing, rhonchi or rales.   Musculoskeletal:         General: No swelling.      Right lower leg: No edema.      Left lower leg: No edema.   Skin:     " General: Skin is warm.   Neurological:      General: No focal deficit present.      Mental Status: He is alert and oriented to person, place, and time.   Psychiatric:         Mood and Affect: Mood normal.         Behavior: Behavior normal.         Thought Content: Thought content normal.         Judgment: Judgment normal.         Laboratory Data:  Multioximetry (Date: 4/10/2025)-       PFTs: (Date: 4/20/2022)-      Impression:  Baseline spirometry shows airflow obstruction with FEV1/FVC ratio 57 and FEV1 of 0.93 L or 32% predicted.  There is robust bronchodilator response with postbronchodilator FEV1 of 1.37 L or 47% predicted.  Total lung capacity is within normal limits at 106% predicted however there is significant air trapping with residual volume of 194% predicted.  Diffusion capacity is preserved at 88% predicted.  Pulmonary function testing shows airflow obstruction with significant bronchodilator responsiveness and air trapping with preserved DLCO.  Constellation of findings may be related to COPD, alternatively bronchiectasis or chronic obstructive asthma may present similarly.  Correlate clinically.    CT Chest: (Date: 2/21/2025)-  Impression:  Lungs:  3.4 mm nodule right lower lobe image 74.  There are scattered smaller nodules within both lungs.  .  8.5 mm groundglass opacity right lower lobe image 75.  4.3 mm groundglass opacity right lower lobe image 76.     8.1 mm groundglass opacity left lower lobe image 55.  5.1 mm groundglass opacity left lower lobe image 64.     Previously demonstrated lung base atelectasis has almost completely cleared.  Scattered linear atelectasis.     Mediastinum/Tarah: No significant adenopathy.     Pleura: No pleural effusion.    ECHO: (Date: 3/31/2025)-  Impression:  Compared to the prior study images on 12/19/24: No significant changes  Mildly reduced LV systolic function with estimated LVEF 45-50%  Grade I LV diastolic dysfunction  Mild MR  Mild TR  No pericardial  effusion  Normal IVC size  Unable to estimate RVSP      Assessment and Plan:    Problem List Items Addressed This Visit          Pulmonary/Sleep Medicine Problems    Asthma-COPD overlap syndrome (HCC)    PFTs in 2022 shows obstruction with a FEV1 0.93 L or 32%, FEV1/FVC 57%, %, DLCO 88% predicted, with positive bronchodilator response.  Patient is currently on Breo and albuterol as needed.  Symptoms have remained stable.  --Continue Breo 200  --Added Spiriva so patient is now on triple therapy  --Continue albuterol as needed  --Advised patient increase exercise as tolerated  --Advised patient to remain up-to-date on all vaccines  --Advised patient on smoking cessation.         Relevant Medications    tiotropium (SPIRIVA RESPIMAT) 2.5 mcg/Act Aero Soln    fluticasone furoate-vilanterol (BREO ELLIPTA) 200-25 MCG/ACT AEROSOL POWDER, BREATH ACTIVATED       Other    Chronic hypoxemic respiratory failure (HCC)    Patient is currently on 4 LPM of oxygen 24/7.  Multiaxis in the office shows need for 3 LPM on exertion.  --Advised patient to monitor saturations to keep between 88-92%         Relevant Orders    Multiple Oximetry    BMI 24.0-24.9, adult    Tobacco use disorder    Patient has a 11+ pack year history and continues to smoke 1-2 cigarettes a day.  He is currently on Wellbutrin.  --Advised smoking cessation  --Patient does not qualify for lung cancer screening.          Diagnostic studies have been reviewed with the patient.    Return in about 6 months (around 10/10/2025), or if symptoms worsen or fail to improve, for COPD, with Mariya.     This note was generated using voice recognition software which has a chance of producing errors of grammar and possibly content.  I have made every reasonable attempt to find and correct any obvious errors, but it should be expected that some may not be found prior to finalization of this note.    Time spent in record review prior to patient arrival, reviewing results, and  in face-to-face encounter totaled 25 min.  __________  IVÁN Helton  Pulmonary Medicine  [facility]

## 2025-04-10 NOTE — PROCEDURES
Multi-Ox Readings  Multi Ox #1 Room air   O2 sat % at rest 78   O2 sat % on exertion 72   O2 sat average on exertion     Multi Ox #2 2 LPM   O2 sat % at rest 94   O2 sat % on exertion 86   O2 sat average on exertion       Multi Ox #3 3 LPM   O2 sat % at rest 95   O2 sat % on exertion 91   O2 sat average on exertion

## 2025-04-10 NOTE — ASSESSMENT & PLAN NOTE
Patient is currently on 4 LPM of oxygen 24/7.  Multiaxis in the office shows need for 3 LPM on exertion.  --Advised patient to monitor saturations to keep between 88-92%

## 2025-04-10 NOTE — ASSESSMENT & PLAN NOTE
Patient has a 11+ pack year history and continues to smoke 1-2 cigarettes a day.  He is currently on Wellbutrin.  --Advised smoking cessation  --Patient does not qualify for lung cancer screening.

## 2025-04-16 ENCOUNTER — APPOINTMENT (OUTPATIENT)
Dept: RADIOLOGY | Facility: MEDICAL CENTER | Age: 65
DRG: 291 | End: 2025-04-16
Attending: EMERGENCY MEDICINE
Payer: COMMERCIAL

## 2025-04-16 ENCOUNTER — APPOINTMENT (OUTPATIENT)
Dept: RADIOLOGY | Facility: MEDICAL CENTER | Age: 65
DRG: 291 | End: 2025-04-16
Attending: INTERNAL MEDICINE
Payer: COMMERCIAL

## 2025-04-16 ENCOUNTER — HOSPITAL ENCOUNTER (INPATIENT)
Facility: MEDICAL CENTER | Age: 65
LOS: 7 days | DRG: 291 | End: 2025-04-23
Attending: EMERGENCY MEDICINE | Admitting: STUDENT IN AN ORGANIZED HEALTH CARE EDUCATION/TRAINING PROGRAM
Payer: COMMERCIAL

## 2025-04-16 ENCOUNTER — APPOINTMENT (OUTPATIENT)
Dept: RADIOLOGY | Facility: MEDICAL CENTER | Age: 65
DRG: 291 | End: 2025-04-16
Attending: STUDENT IN AN ORGANIZED HEALTH CARE EDUCATION/TRAINING PROGRAM
Payer: COMMERCIAL

## 2025-04-16 DIAGNOSIS — I63.9 ACUTE CVA (CEREBROVASCULAR ACCIDENT) (HCC): ICD-10-CM

## 2025-04-16 DIAGNOSIS — J96.21 ACUTE ON CHRONIC RESPIRATORY FAILURE WITH HYPOXIA AND HYPERCAPNIA (HCC): ICD-10-CM

## 2025-04-16 DIAGNOSIS — K74.60 HEPATIC CIRRHOSIS, UNSPECIFIED HEPATIC CIRRHOSIS TYPE, UNSPECIFIED WHETHER ASCITES PRESENT (HCC): ICD-10-CM

## 2025-04-16 DIAGNOSIS — J96.22 ACUTE ON CHRONIC RESPIRATORY FAILURE WITH HYPOXIA AND HYPERCAPNIA (HCC): ICD-10-CM

## 2025-04-16 DIAGNOSIS — R79.89 INCREASED AMMONIA LEVEL: ICD-10-CM

## 2025-04-16 PROBLEM — J96.20 ACUTE ON CHRONIC RESPIRATORY FAILURE (HCC): Status: ACTIVE | Noted: 2025-04-16

## 2025-04-16 PROBLEM — D69.6 THROMBOCYTOPENIA (HCC): Status: ACTIVE | Noted: 2025-04-16

## 2025-04-16 LAB
ABO + RH BLD: NORMAL
ALBUMIN SERPL BCP-MCNC: 4.1 G/DL (ref 3.2–4.9)
ALBUMIN SERPL BCP-MCNC: 4.4 G/DL (ref 3.2–4.9)
ALBUMIN/GLOB SERPL: 1.2 G/DL
ALBUMIN/GLOB SERPL: 1.2 G/DL
ALP SERPL-CCNC: 113 U/L (ref 30–99)
ALP SERPL-CCNC: 93 U/L (ref 30–99)
ALT SERPL-CCNC: 13 U/L (ref 2–50)
ALT SERPL-CCNC: 9 U/L (ref 2–50)
AMMONIA PLAS-SCNC: 69 UMOL/L (ref 11–45)
AMPHET UR QL SCN: NEGATIVE
ANION GAP SERPL CALC-SCNC: 6 MMOL/L (ref 7–16)
ANION GAP SERPL CALC-SCNC: 7 MMOL/L (ref 7–16)
ANISOCYTOSIS BLD QL SMEAR: ABNORMAL
APTT PPP: 28.2 SEC (ref 24.7–36)
AST SERPL-CCNC: 29 U/L (ref 12–45)
AST SERPL-CCNC: 30 U/L (ref 12–45)
BARBITURATES UR QL SCN: NEGATIVE
BASE EXCESS BLDA CALC-SCNC: 13 MMOL/L (ref -4–3)
BASOPHILS # BLD AUTO: 0 % (ref 0–1.8)
BASOPHILS # BLD: 0 K/UL (ref 0–0.12)
BENZODIAZ UR QL SCN: NEGATIVE
BILIRUB SERPL-MCNC: 0.3 MG/DL (ref 0.1–1.5)
BILIRUB SERPL-MCNC: 0.4 MG/DL (ref 0.1–1.5)
BODY TEMPERATURE: ABNORMAL DEGREES
BREATHS SETTING VENT: 24
BUN SERPL-MCNC: 20 MG/DL (ref 8–22)
BUN SERPL-MCNC: 22 MG/DL (ref 8–22)
BZE UR QL SCN: NEGATIVE
CALCIUM ALBUM COR SERPL-MCNC: 9.7 MG/DL (ref 8.5–10.5)
CALCIUM ALBUM COR SERPL-MCNC: 9.8 MG/DL (ref 8.5–10.5)
CALCIUM SERPL-MCNC: 10 MG/DL (ref 8.5–10.5)
CALCIUM SERPL-MCNC: 9.9 MG/DL (ref 8.5–10.5)
CANNABINOIDS UR QL SCN: NEGATIVE
CHLORIDE SERPL-SCNC: 98 MMOL/L (ref 96–112)
CHLORIDE SERPL-SCNC: 99 MMOL/L (ref 96–112)
CO2 BLDA-SCNC: 40 MMOL/L (ref 32–48)
CO2 SERPL-SCNC: 39 MMOL/L (ref 20–33)
CO2 SERPL-SCNC: 40 MMOL/L (ref 20–33)
CREAT SERPL-MCNC: 1.06 MG/DL (ref 0.5–1.4)
CREAT SERPL-MCNC: 1.15 MG/DL (ref 0.5–1.4)
DELSYS IDSYS: ABNORMAL
EKG IMPRESSION: NORMAL
EKG IMPRESSION: NORMAL
END TIDAL CARBON DIOXIDE IECO2: 31 MMHG
EOSINOPHIL # BLD AUTO: 0.11 K/UL (ref 0–0.51)
EOSINOPHIL NFR BLD: 1.7 % (ref 0–6.9)
ERYTHROCYTE [DISTWIDTH] IN BLOOD BY AUTOMATED COUNT: 55.7 FL (ref 35.9–50)
ERYTHROCYTE [DISTWIDTH] IN BLOOD BY AUTOMATED COUNT: 56.7 FL (ref 35.9–50)
EST. AVERAGE GLUCOSE BLD GHB EST-MCNC: 131 MG/DL
ETHANOL BLD-MCNC: <10.1 MG/DL
FENTANYL UR QL: POSITIVE
GFR SERPLBLD CREATININE-BSD FMLA CKD-EPI: 71 ML/MIN/1.73 M 2
GFR SERPLBLD CREATININE-BSD FMLA CKD-EPI: 78 ML/MIN/1.73 M 2
GLOBULIN SER CALC-MCNC: 3.3 G/DL (ref 1.9–3.5)
GLOBULIN SER CALC-MCNC: 3.7 G/DL (ref 1.9–3.5)
GLUCOSE BLD STRIP.AUTO-MCNC: 136 MG/DL (ref 65–99)
GLUCOSE SERPL-MCNC: 122 MG/DL (ref 65–99)
GLUCOSE SERPL-MCNC: 128 MG/DL (ref 65–99)
HBA1C MFR BLD: 6.2 % (ref 4–5.6)
HCO3 BLDA-SCNC: 38.1 MMOL/L (ref 21–28)
HCT VFR BLD AUTO: 44.1 % (ref 42–52)
HCT VFR BLD AUTO: 47.3 % (ref 42–52)
HGB BLD-MCNC: 12.7 G/DL (ref 14–18)
HGB BLD-MCNC: 13.2 G/DL (ref 14–18)
HOROWITZ INDEX BLDA+IHG-RTO: 330 MM[HG]
INR PPP: 0.92 (ref 0.87–1.13)
LACTATE BLD-SCNC: 3.1 MMOL/L (ref 0.5–2)
LACTATE SERPL-SCNC: 0.4 MMOL/L (ref 0.5–2)
LYMPHOCYTES # BLD AUTO: 0.77 K/UL (ref 1–4.8)
LYMPHOCYTES NFR BLD: 12.2 % (ref 22–41)
MACROCYTES BLD QL SMEAR: ABNORMAL
MANUAL DIFF BLD: NORMAL
MCH RBC QN AUTO: 30.8 PG (ref 27–33)
MCH RBC QN AUTO: 31.6 PG (ref 27–33)
MCHC RBC AUTO-ENTMCNC: 27.9 G/DL (ref 32.3–36.5)
MCHC RBC AUTO-ENTMCNC: 28.8 G/DL (ref 32.3–36.5)
MCV RBC AUTO: 109.7 FL (ref 81.4–97.8)
MCV RBC AUTO: 110.3 FL (ref 81.4–97.8)
METHADONE UR QL SCN: NEGATIVE
MODE IMODE: ABNORMAL
MONOCYTES # BLD AUTO: 0.71 K/UL (ref 0–0.85)
MONOCYTES NFR BLD AUTO: 11.3 % (ref 0–13.4)
MORPHOLOGY BLD-IMP: NORMAL
MYELOCYTES NFR BLD MANUAL: 0.9 %
NEUTROPHILS # BLD AUTO: 4.66 K/UL (ref 1.82–7.42)
NEUTROPHILS NFR BLD: 72.2 % (ref 44–72)
NEUTS BAND NFR BLD MANUAL: 1.7 % (ref 0–10)
NRBC # BLD AUTO: 0 K/UL
NRBC BLD-RTO: 0 /100 WBC (ref 0–0.2)
NT-PROBNP SERPL IA-MCNC: 1628 PG/ML (ref 0–125)
O2/TOTAL GAS SETTING VFR VENT: 70 %
OPIATES UR QL SCN: NEGATIVE
OVALOCYTES BLD QL SMEAR: NORMAL
OXYCODONE UR QL SCN: NEGATIVE
PCO2 BLDA: 49.6 MMHG (ref 32–48)
PCO2 TEMP ADJ BLDA: 47.5 MMHG (ref 32–48)
PCP UR QL SCN: NEGATIVE
PEEP END EXPIRATORY PRESSURE IPEEP: 8 CMH20
PH BLDA: 7.49 [PH] (ref 7.35–7.45)
PH TEMP ADJ BLDA: 7.51 [PH] (ref 7.35–7.45)
PLATELET # BLD AUTO: 120 K/UL (ref 164–446)
PLATELET # BLD AUTO: 129 K/UL (ref 164–446)
PLATELET BLD QL SMEAR: NORMAL
PMV BLD AUTO: 10.5 FL (ref 9–12.9)
PMV BLD AUTO: 10.6 FL (ref 9–12.9)
PO2 BLDA: 231 MMHG (ref 83–108)
PO2 TEMP ADJ BLDA: 226 MMHG (ref 83–108)
POIKILOCYTOSIS BLD QL SMEAR: NORMAL
POTASSIUM SERPL-SCNC: 4.9 MMOL/L (ref 3.6–5.5)
POTASSIUM SERPL-SCNC: 5.3 MMOL/L (ref 3.6–5.5)
PROPOXYPH UR QL SCN: NEGATIVE
PROT SERPL-MCNC: 7.4 G/DL (ref 6–8.2)
PROT SERPL-MCNC: 8.1 G/DL (ref 6–8.2)
PROTHROMBIN TIME: 12.3 SEC (ref 12–14.6)
RBC # BLD AUTO: 4.02 M/UL (ref 4.7–6.1)
RBC # BLD AUTO: 4.29 M/UL (ref 4.7–6.1)
RBC BLD AUTO: PRESENT
SAO2 % BLDA: 100 % (ref 93–99)
SODIUM SERPL-SCNC: 144 MMOL/L (ref 135–145)
SODIUM SERPL-SCNC: 145 MMOL/L (ref 135–145)
SPECIMEN DRAWN FROM PATIENT: ABNORMAL
STOMATOCYTES BLD QL SMEAR: NORMAL
TIDAL VOLUME IVT: 520 ML
TROPONIN T SERPL-MCNC: 12 NG/L (ref 6–19)
TROPONIN T SERPL-MCNC: 12 NG/L (ref 6–19)
WBC # BLD AUTO: 6.3 K/UL (ref 4.8–10.8)
WBC # BLD AUTO: 7.6 K/UL (ref 4.8–10.8)

## 2025-04-16 PROCEDURE — 94640 AIRWAY INHALATION TREATMENT: CPT

## 2025-04-16 PROCEDURE — 700101 HCHG RX REV CODE 250: Performed by: STUDENT IN AN ORGANIZED HEALTH CARE EDUCATION/TRAINING PROGRAM

## 2025-04-16 PROCEDURE — 36600 WITHDRAWAL OF ARTERIAL BLOOD: CPT

## 2025-04-16 PROCEDURE — 71045 X-RAY EXAM CHEST 1 VIEW: CPT

## 2025-04-16 PROCEDURE — 84484 ASSAY OF TROPONIN QUANT: CPT

## 2025-04-16 PROCEDURE — 0241U HCHG SARS-COV-2 COVID-19 NFCT DS RESP RNA 4 TRGT MIC: CPT

## 2025-04-16 PROCEDURE — 99222 1ST HOSP IP/OBS MODERATE 55: CPT | Performed by: STUDENT IN AN ORGANIZED HEALTH CARE EDUCATION/TRAINING PROGRAM

## 2025-04-16 PROCEDURE — 80061 LIPID PANEL: CPT

## 2025-04-16 PROCEDURE — 86900 BLOOD TYPING SEROLOGIC ABO: CPT

## 2025-04-16 PROCEDURE — 82077 ASSAY SPEC XCP UR&BREATH IA: CPT

## 2025-04-16 PROCEDURE — 93005 ELECTROCARDIOGRAM TRACING: CPT | Mod: TC

## 2025-04-16 PROCEDURE — 36415 COLL VENOUS BLD VENIPUNCTURE: CPT

## 2025-04-16 PROCEDURE — 0BH17EZ INSERTION OF ENDOTRACHEAL AIRWAY INTO TRACHEA, VIA NATURAL OR ARTIFICIAL OPENING: ICD-10-PCS | Performed by: INTERNAL MEDICINE

## 2025-04-16 PROCEDURE — 95819 EEG AWAKE AND ASLEEP: CPT | Mod: 26 | Performed by: STUDENT IN AN ORGANIZED HEALTH CARE EDUCATION/TRAINING PROGRAM

## 2025-04-16 PROCEDURE — 70496 CT ANGIOGRAPHY HEAD: CPT

## 2025-04-16 PROCEDURE — 87040 BLOOD CULTURE FOR BACTERIA: CPT

## 2025-04-16 PROCEDURE — 70498 CT ANGIOGRAPHY NECK: CPT

## 2025-04-16 PROCEDURE — 85610 PROTHROMBIN TIME: CPT

## 2025-04-16 PROCEDURE — 70450 CT HEAD/BRAIN W/O DYE: CPT

## 2025-04-16 PROCEDURE — 99255 IP/OBS CONSLTJ NEW/EST HI 80: CPT | Mod: 25 | Performed by: NURSE PRACTITIONER

## 2025-04-16 PROCEDURE — 85730 THROMBOPLASTIN TIME PARTIAL: CPT

## 2025-04-16 PROCEDURE — 86140 C-REACTIVE PROTEIN: CPT

## 2025-04-16 PROCEDURE — 700101 HCHG RX REV CODE 250: Performed by: INTERNAL MEDICINE

## 2025-04-16 PROCEDURE — 82962 GLUCOSE BLOOD TEST: CPT

## 2025-04-16 PROCEDURE — 94645 CONT INHLJ TX EACH ADDL HOUR: CPT

## 2025-04-16 PROCEDURE — 700117 HCHG RX CONTRAST REV CODE 255: Performed by: EMERGENCY MEDICINE

## 2025-04-16 PROCEDURE — 700105 HCHG RX REV CODE 258: Performed by: INTERNAL MEDICINE

## 2025-04-16 PROCEDURE — 700102 HCHG RX REV CODE 250 W/ 637 OVERRIDE(OP): Performed by: INTERNAL MEDICINE

## 2025-04-16 PROCEDURE — 99291 CRITICAL CARE FIRST HOUR: CPT | Performed by: INTERNAL MEDICINE

## 2025-04-16 PROCEDURE — 94644 CONT INHLJ TX 1ST HOUR: CPT

## 2025-04-16 PROCEDURE — 0042T CT-CEREBRAL PERFUSION ANALYSIS: CPT

## 2025-04-16 PROCEDURE — A9270 NON-COVERED ITEM OR SERVICE: HCPCS | Performed by: INTERNAL MEDICINE

## 2025-04-16 PROCEDURE — 770022 HCHG ROOM/CARE - ICU (200)

## 2025-04-16 PROCEDURE — 93005 ELECTROCARDIOGRAM TRACING: CPT | Mod: TC | Performed by: EMERGENCY MEDICINE

## 2025-04-16 PROCEDURE — 83605 ASSAY OF LACTIC ACID: CPT

## 2025-04-16 PROCEDURE — 83880 ASSAY OF NATRIURETIC PEPTIDE: CPT

## 2025-04-16 PROCEDURE — 95819 EEG AWAKE AND ASLEEP: CPT | Performed by: STUDENT IN AN ORGANIZED HEALTH CARE EDUCATION/TRAINING PROGRAM

## 2025-04-16 PROCEDURE — 86901 BLOOD TYPING SEROLOGIC RH(D): CPT

## 2025-04-16 PROCEDURE — 99285 EMERGENCY DEPT VISIT HI MDM: CPT

## 2025-04-16 PROCEDURE — 4A00X4Z MEASUREMENT OF CENTRAL NERVOUS ELECTRICAL ACTIVITY, EXTERNAL APPROACH: ICD-10-PCS | Performed by: STUDENT IN AN ORGANIZED HEALTH CARE EDUCATION/TRAINING PROGRAM

## 2025-04-16 PROCEDURE — 80307 DRUG TEST PRSMV CHEM ANLYZR: CPT

## 2025-04-16 PROCEDURE — 83036 HEMOGLOBIN GLYCOSYLATED A1C: CPT

## 2025-04-16 PROCEDURE — 5A1945Z RESPIRATORY VENTILATION, 24-96 CONSECUTIVE HOURS: ICD-10-PCS | Performed by: INTERNAL MEDICINE

## 2025-04-16 PROCEDURE — 31500 INSERT EMERGENCY AIRWAY: CPT

## 2025-04-16 PROCEDURE — 93010 ELECTROCARDIOGRAM REPORT: CPT | Performed by: INTERNAL MEDICINE

## 2025-04-16 PROCEDURE — 85027 COMPLETE CBC AUTOMATED: CPT | Mod: 91

## 2025-04-16 PROCEDURE — 81001 URINALYSIS AUTO W/SCOPE: CPT

## 2025-04-16 PROCEDURE — 94002 VENT MGMT INPAT INIT DAY: CPT

## 2025-04-16 PROCEDURE — 700111 HCHG RX REV CODE 636 W/ 250 OVERRIDE (IP): Performed by: INTERNAL MEDICINE

## 2025-04-16 PROCEDURE — 82140 ASSAY OF AMMONIA: CPT

## 2025-04-16 PROCEDURE — 700111 HCHG RX REV CODE 636 W/ 250 OVERRIDE (IP): Performed by: STUDENT IN AN ORGANIZED HEALTH CARE EDUCATION/TRAINING PROGRAM

## 2025-04-16 PROCEDURE — 80053 COMPREHEN METABOLIC PANEL: CPT | Mod: 91

## 2025-04-16 PROCEDURE — 82803 BLOOD GASES ANY COMBINATION: CPT

## 2025-04-16 PROCEDURE — 84145 PROCALCITONIN (PCT): CPT

## 2025-04-16 PROCEDURE — 85007 BL SMEAR W/DIFF WBC COUNT: CPT

## 2025-04-16 RX ORDER — HEPARIN SODIUM 5000 [USP'U]/ML
5000 INJECTION, SOLUTION INTRAVENOUS; SUBCUTANEOUS EVERY 8 HOURS
Status: DISCONTINUED | OUTPATIENT
Start: 2025-04-16 | End: 2025-04-23 | Stop reason: HOSPADM

## 2025-04-16 RX ORDER — ALLOPURINOL 100 MG/1
50 TABLET ORAL DAILY
Status: DISCONTINUED | OUTPATIENT
Start: 2025-04-17 | End: 2025-04-16

## 2025-04-16 RX ORDER — METHYLPREDNISOLONE SODIUM SUCCINATE 40 MG/ML
40 INJECTION, POWDER, LYOPHILIZED, FOR SOLUTION INTRAMUSCULAR; INTRAVENOUS EVERY 8 HOURS
Status: DISCONTINUED | OUTPATIENT
Start: 2025-04-16 | End: 2025-04-16

## 2025-04-16 RX ORDER — BISACODYL 10 MG
10 SUPPOSITORY, RECTAL RECTAL
Status: DISCONTINUED | OUTPATIENT
Start: 2025-04-16 | End: 2025-04-20

## 2025-04-16 RX ORDER — METHYLPREDNISOLONE SODIUM SUCCINATE 125 MG/2ML
62.5 INJECTION, POWDER, LYOPHILIZED, FOR SOLUTION INTRAMUSCULAR; INTRAVENOUS EVERY 8 HOURS
Status: DISCONTINUED | OUTPATIENT
Start: 2025-04-16 | End: 2025-04-18

## 2025-04-16 RX ORDER — SODIUM CHLORIDE, SODIUM LACTATE, POTASSIUM CHLORIDE, AND CALCIUM CHLORIDE .6; .31; .03; .02 G/100ML; G/100ML; G/100ML; G/100ML
1000 INJECTION, SOLUTION INTRAVENOUS ONCE
Status: COMPLETED | OUTPATIENT
Start: 2025-04-16 | End: 2025-04-16

## 2025-04-16 RX ORDER — ACETAMINOPHEN 325 MG/1
650 TABLET ORAL EVERY 4 HOURS PRN
Status: DISCONTINUED | OUTPATIENT
Start: 2025-04-16 | End: 2025-04-19

## 2025-04-16 RX ORDER — NOREPINEPHRINE BITARTRATE 0.03 MG/ML
INJECTION, SOLUTION INTRAVENOUS
Status: ACTIVE
Start: 2025-04-16 | End: 2025-04-17

## 2025-04-16 RX ORDER — SODIUM CHLORIDE 9 MG/ML
INJECTION, SOLUTION INTRAVENOUS CONTINUOUS
Status: DISCONTINUED | OUTPATIENT
Start: 2025-04-17 | End: 2025-04-23 | Stop reason: HOSPADM

## 2025-04-16 RX ORDER — PANTOPRAZOLE SODIUM 40 MG/10ML
40 INJECTION, POWDER, LYOPHILIZED, FOR SOLUTION INTRAVENOUS DAILY
Status: DISCONTINUED | OUTPATIENT
Start: 2025-04-16 | End: 2025-04-16

## 2025-04-16 RX ORDER — ATORVASTATIN CALCIUM 40 MG/1
80 TABLET, FILM COATED ORAL EVERY EVENING
Status: DISCONTINUED | OUTPATIENT
Start: 2025-04-17 | End: 2025-04-20

## 2025-04-16 RX ORDER — ATORVASTATIN CALCIUM 40 MG/1
40 TABLET, FILM COATED ORAL EVERY EVENING
Status: DISCONTINUED | OUTPATIENT
Start: 2025-04-16 | End: 2025-04-16

## 2025-04-16 RX ORDER — ETOMIDATE 2 MG/ML
20 INJECTION INTRAVENOUS ONCE
Status: COMPLETED | OUTPATIENT
Start: 2025-04-16 | End: 2025-04-16

## 2025-04-16 RX ORDER — ASPIRIN 81 MG/1
81 TABLET, CHEWABLE ORAL DAILY
Status: DISCONTINUED | OUTPATIENT
Start: 2025-04-17 | End: 2025-04-20

## 2025-04-16 RX ORDER — LABETALOL HYDROCHLORIDE 5 MG/ML
10 INJECTION, SOLUTION INTRAVENOUS EVERY 4 HOURS PRN
Status: DISCONTINUED | OUTPATIENT
Start: 2025-04-16 | End: 2025-04-19

## 2025-04-16 RX ORDER — LEVOCARNITINE 1 G/5ML
50 INJECTION INTRAVENOUS EVERY 6 HOURS
Status: DISCONTINUED | OUTPATIENT
Start: 2025-04-17 | End: 2025-04-17

## 2025-04-16 RX ORDER — POLYETHYLENE GLYCOL 3350 17 G/17G
1 POWDER, FOR SOLUTION ORAL
Status: DISCONTINUED | OUTPATIENT
Start: 2025-04-16 | End: 2025-04-20

## 2025-04-16 RX ORDER — LACTULOSE 10 G/15ML
30 SOLUTION ORAL 3 TIMES DAILY
Status: DISCONTINUED | OUTPATIENT
Start: 2025-04-16 | End: 2025-04-20

## 2025-04-16 RX ORDER — IPRATROPIUM BROMIDE AND ALBUTEROL SULFATE 2.5; .5 MG/3ML; MG/3ML
3 SOLUTION RESPIRATORY (INHALATION) ONCE
Status: COMPLETED | OUTPATIENT
Start: 2025-04-16 | End: 2025-04-16

## 2025-04-16 RX ORDER — ASPIRIN 81 MG/1
324 TABLET, CHEWABLE ORAL ONCE
Status: DISCONTINUED | OUTPATIENT
Start: 2025-04-16 | End: 2025-04-16

## 2025-04-16 RX ORDER — NICOTINE 21 MG/24HR
14 PATCH, TRANSDERMAL 24 HOURS TRANSDERMAL
Status: DISCONTINUED | OUTPATIENT
Start: 2025-04-16 | End: 2025-04-16

## 2025-04-16 RX ORDER — PHENYLEPHRINE HCL IN 0.9% NACL 1 MG/10 ML
400 SYRINGE (ML) INTRAVENOUS ONCE
Status: COMPLETED | OUTPATIENT
Start: 2025-04-16 | End: 2025-04-16

## 2025-04-16 RX ORDER — IPRATROPIUM BROMIDE AND ALBUTEROL SULFATE 2.5; .5 MG/3ML; MG/3ML
3 SOLUTION RESPIRATORY (INHALATION)
Status: DISCONTINUED | OUTPATIENT
Start: 2025-04-16 | End: 2025-04-19

## 2025-04-16 RX ORDER — AZITHROMYCIN 500 MG/5ML
500 INJECTION, POWDER, LYOPHILIZED, FOR SOLUTION INTRAVENOUS EVERY 24 HOURS
Status: COMPLETED | OUTPATIENT
Start: 2025-04-17 | End: 2025-04-19

## 2025-04-16 RX ORDER — FAMOTIDINE 20 MG/1
20 TABLET, FILM COATED ORAL EVERY 12 HOURS
Status: DISCONTINUED | OUTPATIENT
Start: 2025-04-16 | End: 2025-04-17

## 2025-04-16 RX ORDER — AMOXICILLIN 250 MG
2 CAPSULE ORAL 2 TIMES DAILY
Status: DISCONTINUED | OUTPATIENT
Start: 2025-04-16 | End: 2025-04-20

## 2025-04-16 RX ORDER — NOREPINEPHRINE BITARTRATE 0.03 MG/ML
0-1 INJECTION, SOLUTION INTRAVENOUS CONTINUOUS
Status: DISCONTINUED | OUTPATIENT
Start: 2025-04-16 | End: 2025-04-20

## 2025-04-16 RX ORDER — ROCURONIUM BROMIDE 10 MG/ML
100 INJECTION, SOLUTION INTRAVENOUS ONCE
Status: COMPLETED | OUTPATIENT
Start: 2025-04-16 | End: 2025-04-16

## 2025-04-16 RX ORDER — ALLOPURINOL 100 MG/1
50 TABLET ORAL DAILY
Status: DISCONTINUED | OUTPATIENT
Start: 2025-04-17 | End: 2025-04-20

## 2025-04-16 RX ORDER — INSULIN LISPRO 100 [IU]/ML
1-6 INJECTION, SOLUTION INTRAVENOUS; SUBCUTANEOUS EVERY 6 HOURS
Status: DISCONTINUED | OUTPATIENT
Start: 2025-04-17 | End: 2025-04-20

## 2025-04-16 RX ORDER — ASPIRIN 300 MG/1
300 SUPPOSITORY RECTAL DAILY
Status: DISCONTINUED | OUTPATIENT
Start: 2025-04-17 | End: 2025-04-16

## 2025-04-16 RX ORDER — ATORVASTATIN CALCIUM 40 MG/1
80 TABLET, FILM COATED ORAL EVERY EVENING
Status: DISCONTINUED | OUTPATIENT
Start: 2025-04-16 | End: 2025-04-16

## 2025-04-16 RX ORDER — PHENYLEPHRINE HCL IN 0.9% NACL 1 MG/10 ML
300 SYRINGE (ML) INTRAVENOUS
Status: ACTIVE | OUTPATIENT
Start: 2025-04-16 | End: 2025-04-16

## 2025-04-16 RX ORDER — ASPIRIN 81 MG/1
81 TABLET, CHEWABLE ORAL DAILY
Status: DISCONTINUED | OUTPATIENT
Start: 2025-04-17 | End: 2025-04-16

## 2025-04-16 RX ORDER — DEXTROSE MONOHYDRATE 25 G/50ML
25 INJECTION, SOLUTION INTRAVENOUS
Status: DISCONTINUED | OUTPATIENT
Start: 2025-04-16 | End: 2025-04-20

## 2025-04-16 RX ORDER — THIAMINE HYDROCHLORIDE 100 MG/ML
200 INJECTION, SOLUTION INTRAMUSCULAR; INTRAVENOUS DAILY
Status: COMPLETED | OUTPATIENT
Start: 2025-04-17 | End: 2025-04-19

## 2025-04-16 RX ORDER — ASPIRIN 300 MG/1
300 SUPPOSITORY RECTAL EVERY 6 HOURS PRN
Status: DISCONTINUED | OUTPATIENT
Start: 2025-04-16 | End: 2025-04-16

## 2025-04-16 RX ORDER — ASPIRIN 300 MG/1
300 SUPPOSITORY RECTAL ONCE
Status: DISCONTINUED | OUTPATIENT
Start: 2025-04-16 | End: 2025-04-16

## 2025-04-16 RX ORDER — ASPIRIN 300 MG/1
300 SUPPOSITORY RECTAL DAILY
Status: DISCONTINUED | OUTPATIENT
Start: 2025-04-17 | End: 2025-04-19

## 2025-04-16 RX ADMIN — KETAMINE HYDROCHLORIDE 0.3 MG/KG/HR: 50 INJECTION, SOLUTION INTRAMUSCULAR; INTRAVENOUS at 23:18

## 2025-04-16 RX ADMIN — IPRATROPIUM BROMIDE AND ALBUTEROL SULFATE 3 ML: .5; 2.5 SOLUTION RESPIRATORY (INHALATION) at 21:40

## 2025-04-16 RX ADMIN — IOHEXOL 40 ML: 350 INJECTION, SOLUTION INTRAVENOUS at 16:00

## 2025-04-16 RX ADMIN — Medication 400 MCG: at 21:33

## 2025-04-16 RX ADMIN — FAMOTIDINE 20 MG: 10 INJECTION, SOLUTION INTRAVENOUS at 22:32

## 2025-04-16 RX ADMIN — Medication 15 MG/HR: at 22:22

## 2025-04-16 RX ADMIN — HEPARIN SODIUM 5000 UNITS: 5000 INJECTION, SOLUTION INTRAVENOUS; SUBCUTANEOUS at 22:31

## 2025-04-16 RX ADMIN — IOHEXOL 80 ML: 350 INJECTION, SOLUTION INTRAVENOUS at 16:00

## 2025-04-16 RX ADMIN — FENTANYL CITRATE 100 MCG: 50 INJECTION, SOLUTION INTRAMUSCULAR; INTRAVENOUS at 21:53

## 2025-04-16 RX ADMIN — LEVOCARNITINE 800 MG: 1 INJECTION INTRAVENOUS at 23:19

## 2025-04-16 RX ADMIN — IPRATROPIUM BROMIDE AND ALBUTEROL SULFATE 3 ML: .5; 2.5 SOLUTION RESPIRATORY (INHALATION) at 20:35

## 2025-04-16 RX ADMIN — IPRATROPIUM BROMIDE AND ALBUTEROL SULFATE 3 ML: .5; 2.5 SOLUTION RESPIRATORY (INHALATION) at 16:38

## 2025-04-16 RX ADMIN — FENTANYL CITRATE 100 MCG: 50 INJECTION, SOLUTION INTRAMUSCULAR; INTRAVENOUS at 23:18

## 2025-04-16 RX ADMIN — SODIUM CHLORIDE, POTASSIUM CHLORIDE, SODIUM LACTATE AND CALCIUM CHLORIDE 1000 ML: 600; 310; 30; 20 INJECTION, SOLUTION INTRAVENOUS at 21:34

## 2025-04-16 RX ADMIN — ETOMIDATE 20 MG: 2 INJECTION, SOLUTION INTRAVENOUS at 21:26

## 2025-04-16 RX ADMIN — LACTULOSE 30 ML: 10 SOLUTION ORAL at 23:18

## 2025-04-16 RX ADMIN — FENTANYL CITRATE 100 MCG: 50 INJECTION, SOLUTION INTRAMUSCULAR; INTRAVENOUS at 23:44

## 2025-04-16 RX ADMIN — AMPICILLIN SODIUM, SULBACTAM SODIUM 3 G: 2; 1 INJECTION, POWDER, FOR SOLUTION INTRAMUSCULAR; INTRAVENOUS at 23:33

## 2025-04-16 RX ADMIN — ROCURONIUM BROMIDE 100 MG: 10 INJECTION INTRAVENOUS at 21:26

## 2025-04-16 RX ADMIN — Medication 300 MCG: at 21:36

## 2025-04-16 RX ADMIN — METHYLPREDNISOLONE SODIUM SUCCINATE 62.5 MG: 125 INJECTION, POWDER, FOR SOLUTION INTRAMUSCULAR; INTRAVENOUS at 22:31

## 2025-04-16 RX ADMIN — IPRATROPIUM BROMIDE AND ALBUTEROL SULFATE 3 ML: .5; 2.5 SOLUTION RESPIRATORY (INHALATION) at 23:58

## 2025-04-16 RX ADMIN — SODIUM CHLORIDE: 9 INJECTION, SOLUTION INTRAVENOUS at 23:32

## 2025-04-16 ASSESSMENT — PAIN DESCRIPTION - PAIN TYPE
TYPE: ACUTE PAIN

## 2025-04-16 ASSESSMENT — FIBROSIS 4 INDEX
FIB4 SCORE: 1.76
FIB4 SCORE: 5.16

## 2025-04-16 ASSESSMENT — LIFESTYLE VARIABLES: DOES PATIENT WANT TO STOP DRINKING: NO

## 2025-04-16 NOTE — ASSESSMENT & PLAN NOTE
Likely metabolic encephalopathy   Clinically improved   .  4/17 MRI brain w/o acute finding  EEGs with no seizure activity

## 2025-04-16 NOTE — ED NOTES
Pharmacy Medication Reconciliation      ~Medication reconciliation updated and complete per patient family at bedside   ~Allergies have been verified   ~No oral ABX within the last 30 days  ~Is dispense history available in EPIC: yes   ~Patient home pharmacy :  [facility]      ~Anticoagulants (rivaroxaban, apixaban, edoxaban, dabigatran, warfarin, enoxaparin) taken in the last 14 days? No

## 2025-04-16 NOTE — DISCHARGE PLANNING
TCN following. HTH/SCP chart review completed. Note pt currently in ED secondary to dizziness and SOB.  Per chart review patient last admitted to hospital on 12/17/24 to 12/23/24 for Flu like symptoms and hallucinations.  Patient discharged with DME (O2) on 4L with ambulation, 2L at rest. Per kardex during that admission, 2 x 448 feet with no AD or assist.     Per review, has not ambulated in ED and is currently utilizing 4L supplemental O2; patient's baseline is 4L.. At this time anticipating that pt will dc to home with OP f/u as needed; however discharge plan dependent on patient's ambulation status (either directly from ED or after admission to [facility] if warranted). Per chart patient has [facility] PCP and no PCP f/u scheduled.     If pt admits to [facility], [facility] will not follow as patient is [facility].     *Update*  Noted patient now inpatient status.

## 2025-04-16 NOTE — ED PROVIDER NOTES
ER Provider Note    Scribed for Santiago Monte D.O. by Bryan Chambers. 4/16/2025  3:01 PM    Primary Care Provider: Dmitry Ritter M.D.    CHIEF COMPLAINT  Chief Complaint   Patient presents with    Shortness of Breath     Hx of COPD. Audible wheezing noted.    ALOC     Patient presents confused and disoriented per family that happened 24 hours ago. Family states it is cyclical and happens every week where he is confused.        HPI/ROS  LIMITATION TO HISTORY   Altered level of consciousness    OUTSIDE HISTORIAN(S):  Wife was present at bedside to provide additional context to history    Hugo Villela is a 64 y.o. male who presents to the Emergency Department as a code stroke. The patient's wife explains that the patient woke up yesterday morning with an altered level of consciousness which has progressively worsened prompting presentation to the ED today. Furthermore the wife notes that the patient was speaking slowly and seemed confused/disoriented yesterday. She adds that the patient was still able to recognize her and could talk to her. Today the patient was unable to get out of bed and could not ambulate, which prompted visitation to the ED. On arrival to the ED the patient was unsure why he is here and nursing staff noticed that his confusion worsening so an overhead page for an ERP was called. Patient's wife adds that the patient has some associated shortness of breath which began this morning. He reportedly has no history of seizures, dementia, or strokes. Wife reports the patient having a history of COPD and MI .    ROS as per HPI.    PAST MEDICAL HISTORY  Past Medical History:   Diagnosis Date    Acute CVA (cerebrovascular accident) (HCC) 4/16/2025    Acute myocardial infarction of other inferior wall, episode of care unspecified 06/25/2012    Acute myocardial infarction of other lateral wall, episode of care unspecified 06/25/2012    Asthma     Breath shortness     Bronchitis     Chest pain,  unspecified 06/25/2012    Coronary atherosclerosis of native coronary artery 06/25/2012    Dental disorder     Diabetes (HCC)     Dizziness and giddiness 06/25/2012    High cholesterol     Hypertension     Mixed hyperlipidemia 06/25/2012    Pure hyperglyceridemia 06/25/2012    Renal disorder     S/P coronary artery stent placement 06/25/2012    Tobacco use disorder 06/25/2012     SURGICAL HISTORY  Past Surgical History:   Procedure Laterality Date    ORIF, FRACTURE, FEMUR  1994    OTHER      Right femur    OTHER CARDIAC SURGERY      STENT PLACEMENT       FAMILY HISTORY  Family History   Problem Relation Age of Onset    Stroke Mother     Cancer Sister      SOCIAL HISTORY   reports that he has been smoking cigarettes. He started smoking about 43 years ago. He has a 10.8 pack-year smoking history. He has never used smokeless tobacco. He reports that he does not currently use alcohol after a past usage of about 2.4 - 3.6 oz of alcohol per week. He reports that he does not use drugs.    CURRENT MEDICATIONS  Current Discharge Medication List        CONTINUE these medications which have NOT CHANGED    Details   fluticasone furoate-vilanterol (BREO ELLIPTA) 200-25 MCG/ACT AEROSOL POWDER, BREATH ACTIVATED Inhale 1 Puff every day. Rinse mouth after use.  Qty: 90 Each, Refills: 3    Associated Diagnoses: Asthma-COPD overlap syndrome (HCC)      losartan (COZAAR) 25 MG Tab Take 1 Tablet by mouth every day.  Qty: 30 Tablet, Refills: 0    Associated Diagnoses: Primary hypertension; Acute systolic heart failure (HCC)      metoprolol SR (TOPROL XL) 50 MG TABLET SR 24 HR Take 1 Tablet by mouth every day.  Qty: 90 Tablet, Refills: 3    Associated Diagnoses: Coronary artery disease involving native coronary artery of native heart without angina pectoris      allopurinol (ZYLOPRIM) 100 MG Tab Take 0.5 Tablets by mouth every day.  Qty: 45 Tablet, Refills: 3    Associated Diagnoses: Stage 3b chronic kidney disease (CKD); Gout, unspecified  "cause, unspecified chronicity, unspecified site      atorvastatin (LIPITOR) 80 MG tablet Take 1 Tablet by mouth at bedtime.  Qty: 90 Tablet, Refills: 3    Associated Diagnoses: Coronary artery disease involving native coronary artery of native heart without angina pectoris      aspirin EC (ECOTRIN) 81 MG Tablet Delayed Response Take 1 Tablet by mouth every day.  Qty: 30 Tablet, Refills: 3      albuterol 108 (90 Base) MCG/ACT Aero Soln inhalation aerosol Inhale 2 Puffs every four hours as needed for Shortness of Breath.  Qty: 1 Each, Refills: 11    Comments: Give albuterol that is patient or insurance preference please  Associated Diagnoses: Chronic obstructive pulmonary disease, unspecified COPD type (HCC)           ALLERGIES  Lisinopril    PHYSICAL EXAM  /58   Pulse 70   Temp 36.7 °C (98.1 °F) (Temporal)   Resp 18   Ht 1.651 m (5' 5\")   Wt 68 kg (150 lb)   SpO2 92%   BMI 24.96 kg/m²     General: No acute distress.  HENT: Right facial droop, Mucus membranes are moist.   Chest: Lungs have even and unlabored respirations, Clear to auscultation.   Cardiovascular: Regular rate and regular rhythm, No peripheral cyanosis.  Abdomen: Non distended.  Neuro:   Orientation:Oriented to name only   Facial droop: Right facial droop  Upper extremities: Right upper extremity weakness  Lower extremities: Right lower extremity weakness  Visual changes: Zero  Speech: Moderate dysarthria   Psychiatric: Calm and cooperative.     INITIAL ASSESSMENT  Called into room STAT for acute worsening mental status change. On evaluation the patient is able to say his name and speak but there is moderate dysarthria. There is obvious right sided weakness concerning for acute stroke. Time frame for onset is unclear. Stroke protocol is initiated and he is being brought STAT to CT for CTA and CT head/neck.     Neurology consult: Spoke with physicians assistant and there is no obvious vascular occlusion. Will admit for further stroke " evaluation.     DIAGNOSTIC STUDIES    Labs:   Results for orders placed or performed during the hospital encounter of 04/16/25   ABO Rh Confirm    Collection Time: 04/16/25  1:26 PM   Result Value Ref Range    ABO Rh Confirm A POS    CBC with Differential    Collection Time: 04/16/25  1:27 PM   Result Value Ref Range    WBC 6.3 4.8 - 10.8 K/uL    RBC 4.02 (L) 4.70 - 6.10 M/uL    Hemoglobin 12.7 (L) 14.0 - 18.0 g/dL    Hematocrit 44.1 42.0 - 52.0 %    .7 (H) 81.4 - 97.8 fL    MCH 31.6 27.0 - 33.0 pg    MCHC 28.8 (L) 32.3 - 36.5 g/dL    RDW 55.7 (H) 35.9 - 50.0 fL    Platelet Count 129 (L) 164 - 446 K/uL    MPV 10.6 9.0 - 12.9 fL    Neutrophils-Polys 72.20 (H) 44.00 - 72.00 %    Lymphocytes 12.20 (L) 22.00 - 41.00 %    Monocytes 11.30 0.00 - 13.40 %    Eosinophils 1.70 0.00 - 6.90 %    Basophils 0.00 0.00 - 1.80 %    Nucleated RBC 0.00 0.00 - 0.20 /100 WBC    Neutrophils (Absolute) 4.66 1.82 - 7.42 K/uL    Lymphs (Absolute) 0.77 (L) 1.00 - 4.80 K/uL    Monos (Absolute) 0.71 0.00 - 0.85 K/uL    Eos (Absolute) 0.11 0.00 - 0.51 K/uL    Baso (Absolute) 0.00 0.00 - 0.12 K/uL    NRBC (Absolute) 0.00 K/uL    Anisocytosis 1+     Macrocytosis 1+    Comp Metabolic Panel    Collection Time: 04/16/25  1:27 PM   Result Value Ref Range    Sodium 145 135 - 145 mmol/L    Potassium 4.9 3.6 - 5.5 mmol/L    Chloride 99 96 - 112 mmol/L    Co2 39 (H) 20 - 33 mmol/L    Anion Gap 7.0 7.0 - 16.0    Glucose 128 (H) 65 - 99 mg/dL    Bun 20 8 - 22 mg/dL    Creatinine 1.15 0.50 - 1.40 mg/dL    Calcium 9.9 8.5 - 10.5 mg/dL    Correct Calcium 9.8 8.5 - 10.5 mg/dL    AST(SGOT) 29 12 - 45 U/L    ALT(SGPT) 9 2 - 50 U/L    Alkaline Phosphatase 93 30 - 99 U/L    Total Bilirubin 0.3 0.1 - 1.5 mg/dL    Albumin 4.1 3.2 - 4.9 g/dL    Total Protein 7.4 6.0 - 8.2 g/dL    Globulin 3.3 1.9 - 3.5 g/dL    A-G Ratio 1.2 g/dL   proBrain Natriuretic Peptide, NT    Collection Time: 04/16/25  1:27 PM   Result Value Ref Range    NT-proBNP 1628 (H) 0 - 125 pg/mL    Troponin    Collection Time: 25  1:27 PM   Result Value Ref Range    Troponin T 12 6 - 19 ng/L   ESTIMATED GFR    Collection Time: 25  1:27 PM   Result Value Ref Range    GFR (CKD-EPI) 71 >60 mL/min/1.73 m 2   DIFFERENTIAL MANUAL    Collection Time: 25  1:27 PM   Result Value Ref Range    Bands-Stabs 1.70 0.00 - 10.00 %    Myelocytes 0.90 %    Manual Diff Status PERFORMED    PERIPHERAL SMEAR REVIEW    Collection Time: 25  1:27 PM   Result Value Ref Range    Peripheral Smear Review see below    PLATELET ESTIMATE    Collection Time: 25  1:27 PM   Result Value Ref Range    Plt Estimation Decreased    MORPHOLOGY    Collection Time: 25  1:27 PM   Result Value Ref Range    RBC Morphology Present     Poikilocytosis 1+     Ovalocytes 1+     Stomatocytes 1+    PROTHROMBIN TIME    Collection Time: 25  3:05 PM   Result Value Ref Range    PT 12.3 12.0 - 14.6 sec    INR 0.92 0.87 - 1.13   APTT    Collection Time: 25  3:05 PM   Result Value Ref Range    APTT 28.2 24.7 - 36.0 sec   TROPONIN    Collection Time: 25  3:05 PM   Result Value Ref Range    Troponin T 12 6 - 19 ng/L   Hemoglobin A1C    Collection Time: 25  3:05 PM   Result Value Ref Range    Glycohemoglobin 6.2 (H) 4.0 - 5.6 %    Est Avg Glucose 131 mg/dL   EKG (NOW)    Collection Time: 25  3:33 PM   Result Value Ref Range    Report       [facility]    Test Date:  2025  Pt Name:    NICOLE DUDLEY                  Department: ER  MRN:        [id number]                      Room:  Gender:     Male                         Technician: [id number]  :        1960                   Requested By:ER TRIAGE PROTOCOL  Order #:    [id number]                    Reading MD: EDVIN DOYLE D.O.    Measurements  Intervals                                Axis  Rate:       64                           P:          80  ME:         150                          QRS:        -105  QRSD:        143                          T:          0  QT:         429  QTc:        443    Interpretive Statements  Sinus rhythm  Atrial premature complex  RBBB and LAFB  Baseline wander in lead(s) V3  Compared to ECG 2024 10:00:56  Atrial premature complex(es) now present  Left anterior fascicular block now present  Myocardial infarct finding no longer present  Electronically Signed On 2025 15:33:50 PDT by GLADYS DOYLE D.O.     EKG    Collection Time: 25  6:46 PM   Result Value Ref Range    Report       [facility]    Test Date:  2025  Pt Name:    NICOLE DUDLEY                  Department: ER  MRN:        [id number]                      Room:       [facility]  Gender:     Male                         Technician: EM  :        1960                   Requested By:ER TRIAGE PROTOCOL  Order #:    [id number]                    Reading MD: Rashard Brown MD    Measurements  Intervals                                Axis  Rate:       98                           P:          80  PA:         120                          QRS:        -97  QRSD:       144                          T:          52  QT:         378  QTc:        483    Interpretive Statements  Sinus rhythm  Atrial premature complexes  RBBB and LAFB  Electronically Signed On 2025 18:46:43 PDT by Rashard Brown MD       EKG:   I have independently interpreted the above EKG.    Radiology:   The attending emergency physician has independently interpreted the diagnostic imaging associated with this visit and am waiting the final reading from the radiologist.   Preliminary interpretation is as follows: No bleed  Radiologist interpretation:   CT-CTA NECK WITH & W/O-POST PROCESSING   Final Result         1. There is atherosclerosis with less than 50% diameter stenosis of the internal carotid arteries.   2. There is calcific plaque and severe stenosis at the origin of the right vertebral artery.   3. COPD.      CT-CEREBRAL PERFUSION ANALYSIS  "  Final Result      1. Cerebral blood flow less than 30% possibly representing completed infarct = 0 mL. Based on distribution of this finding, this is unlikely to represent artifact.      2. T Max more than 6 seconds possibly representing combination of completed infarct and ischemia = 0 mL. Based on the distribution of this finding, this is unlikely to represent artifact.      3. Mismatched volume possibly representing ischemic brain/penumbra= 0 mL      4.  Please note that this cerebral perfusion study and report is Quantitative and targets supratentorial (cerebral) perfusion for evaluation of large vessel territory acute ischemia/infarction. For example, lacunar infarcts, and brainstem/posterior fossa    ischemia/infarction are not evaluated on this study.  Data acquisition is subject to artifacts which can yield non-anatomically plausible perfusion maps which may be due to motion, bolus timing, signal to noise ratio, or other technical factors.    Perfusion map abnormalities which show non-anatomic distributions are likely artifact.   This study is not \"stand-alone\" and should only be utilized for diagnosis, management/treatment in correlation with CT, CTA, and/or MRI and clinical factors.         CT-CTA HEAD WITH & W/O-POST PROCESS   Final Result      Atherosclerosis without significant stenosis, occlusion, or aneurysm.      CT-HEAD W/O   Final Result      1. No acute intracranial abnormality.   2. Age-related central and cortical atrophy and diffuse chronic microangiopathic white matter changes versus demyelination or gliosis.   3. Left frontoparietal scalp lesions. Correlate clinically.   4. Left maxillary sinus disease.               DX-CHEST-PORTABLE (1 VIEW)   Final Result      No acute cardiac or pulmonary abnormalities are identified.      MR-BRAIN-W/O    (Results Pending)   DX-CHEST-PORTABLE (1 VIEW)    (Results Pending)     COURSE & MEDICAL DECISION MAKING     COURSE AND PLAN  1:23 PM - DX-Chest, EKG " x2, Troponin, BNP, CMP, CBC w/Diff, Morphology, Platelet Estimate, Peripheral Smear, and Diff Manual was ordered in triage by nursing staff as per protocol.     3:01 PM - Patient seen and examined at the charge desk as a code stoke. Spoke with the neurology physicians assistant which is outlined above. Discussed plan of care, including labs and imaging to evaluate for a potential stroke. Patient agrees to the plan of care. Ordered for CT-Head w/Out, CT-Cerebral Perfusion Analysis, CT-CTA Head w/ and w/Out Post Processing, Troponin, COD Adult, APTT, and Prothrombin Time to evaluate his symptoms. The patient will be medicated with Aspirin 324 mg PO.     3:47 PM - I discussed the patient's case and the above findings with Dr. Chowdhury (Hospitalist) who agrees to evaluate the patient for hospitalization.    ED Summary: Patient presented with altered mental status and shortness of breath his mental status change significantly while he is in department and I was called to the room by the nurse for rapid evaluation.  On my evaluation the patient has right-sided weakness with facial droop he has a speech dysarthria there is concerns for an acute CVA.  The time onset for this is unclear but the patient had CT done shows no acute occlusion, but will require further evaluation and treatment.  The patient was consulted with neurology and he will be admitted for further evaluation and treatment.    Decision tools and prescription drugs considered including, but not limited to: NIH score of 8    CRITICAL CARE  The very real possibility of a deterioration of this patient's condition required the highest level of my preparedness for sudden, emergent intervention. I provided critical care services, which included medication orders, frequent reevaluations of the patient's condition and response to treatment, ordering and reviewing test results, and discussing the case with various consultants. The critical care time associated with  the care of the patient was 35 minutes. Review chart for interventions. This time is exclusive of any other billable procedures.      DISPOSITION AND DISCUSSIONS  I have discussed management of the patient with the following physicians and ANDREA's: Dr. Chowdhury (Hospitalist)     Discussion of management with other [facility] or appropriate source(s): None    Barriers to care at this time, including but not limited to: None     DISPOSITION:  Patient will be hospitalized by Dr. Chowdhury (Hospitalist) in critical condition.    FINAL DIAGNOSIS  1. Acute CVA (cerebrovascular accident) (HCC)      Total Critical Care Time was 35 minutes, as outlined above.     Bryan PALMER (Scribe), am scribing for, and in the presence of, Santiago Monte D.O..    Electronically signed by: Bryan Chambers (Scribe), 4/16/2025    ISantiago D.O. personally performed the services described in this documentation, as scribed by Bryan Chambers in my presence, and it is both accurate and complete.     The note accurately reflects work and decisions made by me.  Santiago Monte D.O.  4/16/2025  8:54 PM

## 2025-04-16 NOTE — ED NOTES
Assist RN: Pt failed dysphasia screening. NPO ASA held and ERP notified.    Surgery ICU to Floor Transfer Note    SUBJECTIVE: Pt seen and examined at bedside after transfer from SICU to floor.  POD 1 lap assisted extended left hemicolectomy with end transverse colostomy.  Reports feeling well, pain controlled. Has a perla in place, urine output low in SICU, increased to 125cc 1 hour after bolus before coming to the floor. Tolerating clear liquid diet. Yet to pass gas or stool through ostomy appliance.     Vital Signs Last 24 Hrs  T(C): 37.3 (19 Oct 2021 20:00), Max: 37.3 (19 Oct 2021 16:00)  T(F): 99.1 (19 Oct 2021 20:00), Max: 99.2 (19 Oct 2021 16:00)  HR: 87 (19 Oct 2021 20:00) (71 - 94)  BP: 131/57 (19 Oct 2021 20:00) (101/85 - 183/80)  BP(mean): 75 (19 Oct 2021 20:00) (71 - 99)  RR: 15 (19 Oct 2021 20:00) (6 - 20)  SpO2: 96% (19 Oct 2021 20:00) (96% - 100%)    Physical Exam:  General Appearance: Appears well, NAD  Respiratory: No labored breathing  CV: Pulse regularly present  Abdomen: Soft, nontender, non-distended. End transverse colostomy pink and viable, bowel sweat in bag. Midline incision with ss strikethrough, otherwise c/d/i, lap port sites c/d/i  : Perla clear urine in cannister     LABS:                        8.3    15.64 )-----------( 165      ( 19 Oct 2021 10:03 )             25.0     10-19    138  |  109<H>  |  9   ----------------------------<  239<H>  4.3   |  18<L>  |  1.18    Ca    7.7<L>      19 Oct 2021 01:00  Phos  4.0     10-19  Mg     1.80     10-19      PT/INR - ( 18 Oct 2021 02:52 )   PT: 13.2 sec;   INR: 1.16 ratio             PTT - ( 18 Oct 2021 02:52 )  PTT:22.7 sec      INs and OUTs:    10-18-21 @ 07:01  -  10-19-21 @ 07:00  --------------------------------------------------------  IN: 1350 mL / OUT: 1655 mL / NET: -305 mL    10-19-21 @ 07:01  -  10-19-21 @ 22:04  --------------------------------------------------------  IN: 2150 mL / OUT: 515 mL / NET: 1635 mL    Assessment:  67 year old F with recurrent GI bleed, now POD #1 laparoscopic assisted extended left hemicolectomy with end transverse colostomy, recovering now on floor from SICU.     Plan:  - Diet, c/w CLD as tolerated  - Resume all home meds  - Maintain perla and IVF, monitor UOP  - ISS for DM  - Pain control PRN  - Monitor for return of bowel function  - VTE ppx HSQ  - f/u labs and vital signs

## 2025-04-16 NOTE — ED TRIAGE NOTES
Chief Complaint   Patient presents with    Dizziness     Started around 3 days ago. Patient presents confused and disoriented per family that happened 24 hours ago. Family states it is cyclical and happens every week where he is confused.     Shortness of Breath     Hx of COPD. Audible wheezing noted.     Patient  in wheelchair to triage for above complaint.   Patient is alert and oriented, speaking in full sentences, follows commands and responds appropriately to questions. Not in any apparent distress.   Respirations are even and unlabored.  Patient placed in line for blood draw. Patient educated on triage process. Patient encouraged to alert staff for any changes.

## 2025-04-16 NOTE — CONSULTS
Neurology STROKE CODE H&P  Vascular Neurology Service, [facility]    Referring Physician: Santiago Monte D.O.    STROKE CODE:   Chief Complaint   Patient presents with    Shortness of Breath     Hx of COPD. Audible wheezing noted.    ALOC     Patient presents confused and disoriented per family that happened 24 hours ago. Family states it is cyclical and happens every week where he is confused.          To obtain the most accurate data regarding the time called, and time patient seen, refer to the stroke run-sheet and chart.  For time of CT, refer to the radiology report. See A&P below for TPA Decision and door to needle time if and when applicable.    HPI: Hugo Villela is a 64 y.o. male with a past medical history of COPD (Asthma overlap syndrome), HTN, HLD, DM II, CAD s/p PCI (x2 2017), CKD 3b, and EtOH dependence who presented on 04/16/2025 with altered mental status that started yesterday per family. This has progressed to more confusion throughout today, in addition to increased work of breathing. Reports of right sided weakness, although on exam he is moving all 4 extremities equally. Some paucity of speech but able to answer name and year correctly. Noncontrast CT head was negative for acute findings. CTA head and neck were negative for large and medium vessel occlusions, dissection, and critical flow limiting stenoses. CT perfusion was negative for ischemic penumbra. Neurology has been consulted for further evaluation of the above.     Review of systems: In addition to what is detailed in the HPI above, all other systems reviewed and are negative.    Past Medical History:    has a past medical history of Acute myocardial infarction of other inferior wall, episode of care unspecified (06/25/2012), Acute myocardial infarction of other lateral wall, episode of care unspecified (06/25/2012), Asthma, Breath shortness, Bronchitis, Chest pain, unspecified (06/25/2012), Coronary  atherosclerosis of native coronary artery (06/25/2012), Dental disorder, Diabetes (HCC), Dizziness and giddiness (06/25/2012), High cholesterol, Hypertension, Mixed hyperlipidemia (06/25/2012), Pure hyperglyceridemia (06/25/2012), Renal disorder, S/P coronary artery stent placement (06/25/2012), and Tobacco use disorder (06/25/2012).    He has no past medical history of Acute nasopharyngitis, Anesthesia, Anginal syndrome ([facility]), Arrhythmia, Arthritis, Blood clotting disorder ([facility]), Bowel habit changes, Cancer ([facility]), Carcinoma in situ of respiratory system, Cataract, Congestive heart failure (HCC), Continuous ambulatory peritoneal dialysis status ([facility]), Coughing blood, Dialysis patient ([facility]), Disorder of thyroid, Glaucoma, Gynecological disorder, Heart burn, Heart murmur, Heart valve disease, Hemorrhagic disorder ([facility]), Hepatitis A, Hepatitis B, Hepatitis C, Hiatus hernia syndrome, Indigestion, Infectious disease, Jaundice, Pacemaker, Pneumonia, Pregnant, Psychiatric problem, Rheumatic fever, Seizure ([facility]), Sleep apnea, Snoring, Stroke ([facility]), Tuberculosis, Urinary bladder disorder, or Urinary incontinence.    FHx:  family history includes Cancer in his sister; Stroke in his mother.    SHx:   reports that he has been smoking cigarettes. He started smoking about 43 years ago. He has a 10.8 pack-year smoking history. He has never used smokeless tobacco. He reports that he does not currently use alcohol after a past usage of about 2.4 - 3.6 oz of alcohol per week. He reports that he does not use drugs.    Allergies:  Allergies   Allergen Reactions    Lisinopril Cough       Medications:    Current Facility-Administered Medications:     aspirin (Asa) chewable tab 324 mg, 324 mg, Oral, Once, Santiago Monte D.O.    Current Outpatient Medications:     tiotropium (SPIRIVA RESPIMAT) 2.5 mcg/Act Aero Soln, Inhale 2 Inhalations every day. Assemble and prime., Disp: 12 g, Rfl: 3    fluticasone furoate-vilanterol (BREO ELLIPTA)  "200-25 MCG/ACT AEROSOL POWDER, BREATH ACTIVATED, Inhale 1 Puff every day. Rinse mouth after use., Disp: 90 Each, Rfl: 3    albuterol 108 (90 Base) MCG/ACT Aero Soln inhalation aerosol, Inhale 2 Puffs every four hours as needed for Shortness of Breath., Disp: 1 Each, Rfl: 11    FARXIGA 10 MG Tab, Take 10 mg by mouth every day., Disp: , Rfl:     buPROPion (WELLBUTRIN XL) 150 MG XL tablet, Take 1 Tablet by mouth every morning. Start taking 150 mg once daily for 3 days, Disp: 3 Tablet, Rfl: 0    buPROPion SR (WELLBUTRIN-SR) 150 MG TABLET SR 12 HR sustained-release tablet, Take 1 Tablet by mouth 2 times a day. Start taking on Day 4 after starting once daily dose, Disp: 60 Tablet, Rfl: 0    losartan (COZAAR) 25 MG Tab, Take 1 Tablet by mouth every day., Disp: 30 Tablet, Rfl: 0    metoprolol SR (TOPROL XL) 50 MG TABLET SR 24 HR, Take 1 Tablet by mouth every day., Disp: 90 Tablet, Rfl: 3    VITAMIN D PO, Take 1 Tablet by mouth every day., Disp: , Rfl:     allopurinol (ZYLOPRIM) 100 MG Tab, Take 0.5 Tablets by mouth every day., Disp: 45 Tablet, Rfl: 3    atorvastatin (LIPITOR) 80 MG tablet, Take 1 Tablet by mouth at bedtime., Disp: 90 Tablet, Rfl: 3    aspirin EC (ECOTRIN) 81 MG Tablet Delayed Response, Take 1 Tablet by mouth every day., Disp: 30 Tablet, Rfl: 3    Physical Examination:    Vitals:    04/16/25 1313 04/16/25 1317   BP: 121/58    Pulse: 70    Resp: 18    Temp: 36.7 °C (98.1 °F)    TempSrc: Temporal    SpO2: 92%    Weight:  68 kg (150 lb)   Height:  1.651 m (5' 5\")       General: Patient is awake, ill appearing  Eye: Examination of optic disks not indicated at this time given acuity of consult  Neck: There is normal range of motion  CV: Regular rate   Extremities:  Clear, dry, intact, without peripheral edema    NEUROLOGICAL EXAM:     Mental status: Awake, oriented to self and year  Speech and language: Paucity of speech. The patient is able to name and repeat, and follow commands  Cranial nerve exam: Pupils are " equal, round and reactive to light bilaterally. Visual fields are full. There is no nystagmus. Extraocular muscles are intact. Face is symmetric. Sensation in the face is intact to light touch. Palate elevates symmetrically. Tongue is midline.  Motor exam: There is sustained antigravity with no downward drift in bilateral arms and legs.    Sensory exam:  Reacts to tactile in all 4 distal extremities, there is no neglect to double stim.  Deep tendon reflexes:  2+ throughout. Toes down-going bilaterally.  Coordination: BUE dysmetria  Gait: Deferred due to patient preference.    NIHSS: National Institutes of Health Stroke Scale    [0] 1a:Level of Consciousness    0-alert 1-drowsy   2-stupor   3-coma  [1] 1b:LOC Questions                  0-both  1-one      2-neither  [0] 1c:LOC Commands                   0-both  1-one      2-neither  [0] 2: Best Gaze                     0-nl    1-partial  2-forced  [0] 3: Visual Fields                   0-nl    1-partial  2-complete 3-bilat  [0] 4: Facial Paresis                0-nl    1-minor    2-partial  3-full  MOTOR                       0-nl  [0] 5: Right Arm           1-drift  [0] 6: Left Arm             2-some effort vs gravity  [0] 7: Right Leg           3-no effort vs gravity  [0] 8: Left Leg             4-no movement                             x-untestable  [2] 9: Limb Ataxia                    0-abs   1-1_limb   2-2+_limbs       x-untestable  [0] 10:Sensory                        0-nl    1-partial  2-dense  [0] 11:Best Language/Aphasia         0-nl    1-mild/mod 2-severe   3-mute  [1] 12:Dysarthria                     0-nl    1-mild/mod 2-severe       x-untestable  [0] 13:Neglect/Inattention            0-none  1-partial  2-complete  [4] TOTAL    NIHSS Date/Time: 04/16/2025 @1521    Baseline Modified Harbeson Scale (MRS): 0 = No symptoms    Objective Data:    Labs:  Lab Results   Component Value Date/Time    PROTHROMBTM 20.5 (H) 12/18/2024 04:00 AM    INR 1.75 (H) 12/18/2024  04:00 AM      Lab Results   Component Value Date/Time    WBC 6.3 04/16/2025 01:27 PM    RBC 4.02 (L) 04/16/2025 01:27 PM    HEMOGLOBIN 12.7 (L) 04/16/2025 01:27 PM    HEMATOCRIT 44.1 04/16/2025 01:27 PM    .7 (H) 04/16/2025 01:27 PM    MCH 31.6 04/16/2025 01:27 PM    MCHC 28.8 (L) 04/16/2025 01:27 PM    MPV 10.6 04/16/2025 01:27 PM    NEUTSPOLYS 72.20 (H) 04/16/2025 01:27 PM    LYMPHOCYTES 12.20 (L) 04/16/2025 01:27 PM    MONOCYTES 11.30 04/16/2025 01:27 PM    EOSINOPHILS 1.70 04/16/2025 01:27 PM    BASOPHILS 0.00 04/16/2025 01:27 PM    ANISOCYTOSIS 1+ 04/16/2025 01:27 PM      Lab Results   Component Value Date/Time    SODIUM 145 04/16/2025 01:27 PM    POTASSIUM 4.9 04/16/2025 01:27 PM    CHLORIDE 99 04/16/2025 01:27 PM    CO2 39 (H) 04/16/2025 01:27 PM    GLUCOSE 128 (H) 04/16/2025 01:27 PM    BUN 20 04/16/2025 01:27 PM    CREATININE 1.15 04/16/2025 01:27 PM    BUNCREATRAT 11 06/29/2023 06:07 AM      Lab Results   Component Value Date/Time    CHOLSTRLTOT 157 02/25/2025 02:02 PM    LDL 80 02/25/2025 02:02 PM    HDL 57 02/25/2025 02:02 PM    TRIGLYCERIDE 101 02/25/2025 02:02 PM       Lab Results   Component Value Date/Time    ALKPHOSPHAT 93 04/16/2025 01:27 PM    ASTSGOT 29 04/16/2025 01:27 PM    ALTSGPT 9 04/16/2025 01:27 PM    TBILIRUBIN 0.3 04/16/2025 01:27 PM        Imaging/Testing:    I interpreted and/or reviewed the patient's neuroimaging    DX-CHEST-PORTABLE (1 VIEW)   Final Result      No acute cardiac or pulmonary abnormalities are identified.      DX-CHEST-PORTABLE (1 VIEW)    (Results Pending)   CT-HEAD W/O    (Results Pending)   CT-CEREBRAL PERFUSION ANALYSIS    (Results Pending)   CT-CTA HEAD WITH & W/O-POST PROCESS    (Results Pending)   CT-CTA NECK WITH & W/O-POST PROCESSING    (Results Pending)       Assessment and Plan:    64 y.o. M with considerable vascular risk factors presenting with 1 day history of altered mentation that reportedly has worsened throughout today. Per report the patient  has not gotten out of bed since yesterday. Initial reports of right sided weakness, but this was not replicated upon exam. Generalized weakness throughout with paucity of speech. Of note, patient has increased work of breathing since presenting to the ED, as well as elevated BNP, and several other lab derangements. Low suspicion for an acute ischemic process at this time, but recommend MRI brain without contrast due to considerable vascular risk factors. Also recommend obtaining UDS and EtOH level. Will obtain spot EEG. Continue home ASA and statin. Normotensive blood pressure goal.     Problem list:  Altered mental status  HTN  CAD  DM II  CKD 3b  EtOH dependence     Recommendations:  -q4h and PRN neuro assessment. VS per nursing/unit protocol.   -BP goal is normotension, 100-130/60-80. Antihypertensives per primary team  -Obtain MRI Brain wo contrast.   -Obtain spot EEG  -Check UDS and EtOH level  -Telemetry- defer TTE at this time pending MRI brain results  -Check Stroke Labs: Lipid panel and HgbA1c  -Continue home dose ASA 81 mg PO q day   -Continue home dose atorvastatin 80 mg PO q HS. Titrate to long term LDL goal < 70  -BG management per primary team. BG goal . A1C goal <7  -Hold PT/OT/SLP unless acute infarct found on MRI.  -DVT PPX: SCDs, chemoppx okay from neurology perspective     Case reviewed and plan created with Dr. Anthony Godoy, Vascular Neurology, and Dr. Santiago Monte, Emergency Medicine. Please call with any questions.      Jose F MINOR  Vascular Neurology, [facility]  [phone]

## 2025-04-16 NOTE — ED NOTES
Pt noted to be increasingly altered and abnormal WOB noted. ERP called to bedside. Stroke protocol ordered.

## 2025-04-16 NOTE — ED NOTES
Pt to room - pt is confused, unable to states why he's in the ER. Per family, pt has been confused for the last day. Pt exhibiting increased WOB.

## 2025-04-16 NOTE — H&P
Hospital Medicine History & Physical Note    Date of Service  4/16/2025    Primary Care Physician  Dmitry Ritter M.D.    Consultants  Neuro      Code Status  Prior    Chief Complaint  Chief Complaint   Patient presents with    Shortness of Breath     Hx of COPD. Audible wheezing noted.    ALOC     Patient presents confused and disoriented per family that happened 24 hours ago. Family states it is cyclical and happens every week where he is confused.          History of Presenting Illness  Hugo Villela is a 64 y.o. male w/ PMH of CAD s/p PCI, HFmrEF 45%, CKD IIIa, DM2, asthma/COPD on 4lpm home o2, ethanol abuse, tobacco abuse who presented 4/16/2025 with SOB and AMS. Family state pt had mild aphasia on 4/15 that seemed to worsen as the day went on, AM of 4/16 he was not responsive to commands. Per family he had no complaints the day prior.    In the ED vitals showed hypoxia. Labs showed elevated BNP. Trop wnl x2. CT head w/o showed chronic microvasc changes, CTA HN w/o acute dz. CXR wnl. EKG w/ RBBB and LAFB no acute ischemia. Neuro was consultedi n the ED and recommended admission for CVA workup.        I discussed the plan of care with family.    Review of Systems  Review of Systems   Unable to perform ROS: Acuity of condition       Past Medical History   has a past medical history of Acute myocardial infarction of other inferior wall, episode of care unspecified (06/25/2012), Acute myocardial infarction of other lateral wall, episode of care unspecified (06/25/2012), Asthma, Breath shortness, Bronchitis, Chest pain, unspecified (06/25/2012), Coronary atherosclerosis of native coronary artery (06/25/2012), Dental disorder, Diabetes (HCC), Dizziness and giddiness (06/25/2012), High cholesterol, Hypertension, Mixed hyperlipidemia (06/25/2012), Pure hyperglyceridemia (06/25/2012), Renal disorder, S/P coronary artery stent placement (06/25/2012), and Tobacco use disorder (06/25/2012).    Surgical History   has  a past surgical history that includes stent placement; orif, fracture, femur (1994); other cardiac surgery; and other.     Family History  family history includes Cancer in his sister; Stroke in his mother.   Family history reviewed with patient. There is family history that is pertinent to the chief complaint.     Social History   reports that he has been smoking cigarettes. He started smoking about 43 years ago. He has a 10.8 pack-year smoking history. He has never used smokeless tobacco. He reports that he does not currently use alcohol after a past usage of about 2.4 - 3.6 oz of alcohol per week. He reports that he does not use drugs.    Allergies  Allergies   Allergen Reactions    Lisinopril Cough       Medications  Prior to Admission Medications   Prescriptions Last Dose Informant Patient Reported? Taking?   FARXIGA 10 MG Tab   Yes No   Sig: Take 10 mg by mouth every day.   VITAMIN D PO  Family Member Yes No   Sig: Take 1 Tablet by mouth every day.   albuterol 108 (90 Base) MCG/ACT Aero Soln inhalation aerosol   No No   Sig: Inhale 2 Puffs every four hours as needed for Shortness of Breath.   allopurinol (ZYLOPRIM) 100 MG Tab  Family Member No No   Sig: Take 0.5 Tablets by mouth every day.   aspirin EC (ECOTRIN) 81 MG Tablet Delayed Response  Family Member No No   Sig: Take 1 Tablet by mouth every day.   atorvastatin (LIPITOR) 80 MG tablet  Family Member No No   Sig: Take 1 Tablet by mouth at bedtime.   buPROPion (WELLBUTRIN XL) 150 MG XL tablet   No No   Sig: Take 1 Tablet by mouth every morning. Start taking 150 mg once daily for 3 days   buPROPion SR (WELLBUTRIN-SR) 150 MG TABLET SR 12 HR sustained-release tablet   No No   Sig: Take 1 Tablet by mouth 2 times a day. Start taking on Day 4 after starting once daily dose   fluticasone furoate-vilanterol (BREO ELLIPTA) 200-25 MCG/ACT AEROSOL POWDER, BREATH ACTIVATED   No No   Sig: Inhale 1 Puff every day. Rinse mouth after use.   losartan (COZAAR) 25 MG Tab   No  No   Sig: Take 1 Tablet by mouth every day.   metoprolol SR (TOPROL XL) 50 MG TABLET SR 24 HR  Family Member No No   Sig: Take 1 Tablet by mouth every day.   tiotropium (SPIRIVA RESPIMAT) 2.5 mcg/Act Aero Soln   No No   Sig: Inhale 2 Inhalations every day. Assemble and prime.      Facility-Administered Medications: None       Physical Exam  Temp:  [36.7 °C (98.1 °F)] 36.7 °C (98.1 °F)  Pulse:  [70-72] 72  Resp:  [18-27] 27  BP: (121-125)/(58-59) 125/59  SpO2:  [92 %-97 %] 97 %  Blood Pressure: 125/59   Temperature: 36.7 °C (98.1 °F)   Pulse: 72   Respiration: (!) 27   Pulse Oximetry: 97 %       Physical Exam  Constitutional:       Appearance: He is ill-appearing.   HENT:      Head: Normocephalic and atraumatic.      Nose: Nose normal.      Mouth/Throat:      Mouth: Mucous membranes are dry.      Pharynx: Oropharynx is clear.   Eyes:      Conjunctiva/sclera: Conjunctivae normal.      Pupils: Pupils are equal, round, and reactive to light.   Cardiovascular:      Rate and Rhythm: Normal rate and regular rhythm.      Heart sounds: No murmur heard.  Pulmonary:      Effort: Pulmonary effort is normal.      Breath sounds: Wheezing present. No rhonchi or rales.   Abdominal:      General: There is no distension.      Palpations: Abdomen is soft.      Tenderness: There is no abdominal tenderness. There is no guarding or rebound.   Musculoskeletal:         General: No swelling or tenderness.      Cervical back: Normal range of motion and neck supple.   Skin:     General: Skin is warm and dry.   Neurological:      Mental Status: He is unresponsive.      GCS: GCS eye subscore is 4. GCS verbal subscore is 1. GCS motor subscore is 4.      Cranial Nerves: No facial asymmetry.      Motor: Weakness present.      Comments: 0/5 strength b/l LE  1/5 strength b/l UE    Psychiatric:         Speech: He is noncommunicative.         Laboratory:  Recent Labs     04/16/25  1327   WBC 6.3   RBC 4.02*   HEMOGLOBIN 12.7*   HEMATOCRIT 44.1   MCV  "109.7*   MCH 31.6   MCHC 28.8*   RDW 55.7*   PLATELETCT 129*   MPV 10.6     Recent Labs     04/16/25  1327   SODIUM 145   POTASSIUM 4.9   CHLORIDE 99   CO2 39*   GLUCOSE 128*   BUN 20   CREATININE 1.15   CALCIUM 9.9     Recent Labs     04/16/25  1327   ALTSGPT 9   ASTSGOT 29   ALKPHOSPHAT 93   TBILIRUBIN 0.3   GLUCOSE 128*         Recent Labs     04/16/25  1327   NTPROBNP 1628*         Recent Labs     04/16/25  1327   TROPONINT 12       Imaging:  CT-CEREBRAL PERFUSION ANALYSIS   Final Result      1. Cerebral blood flow less than 30% possibly representing completed infarct = 0 mL. Based on distribution of this finding, this is unlikely to represent artifact.      2. T Max more than 6 seconds possibly representing combination of completed infarct and ischemia = 0 mL. Based on the distribution of this finding, this is unlikely to represent artifact.      3. Mismatched volume possibly representing ischemic brain/penumbra= 0 mL      4.  Please note that this cerebral perfusion study and report is Quantitative and targets supratentorial (cerebral) perfusion for evaluation of large vessel territory acute ischemia/infarction. For example, lacunar infarcts, and brainstem/posterior fossa    ischemia/infarction are not evaluated on this study.  Data acquisition is subject to artifacts which can yield non-anatomically plausible perfusion maps which may be due to motion, bolus timing, signal to noise ratio, or other technical factors.    Perfusion map abnormalities which show non-anatomic distributions are likely artifact.   This study is not \"stand-alone\" and should only be utilized for diagnosis, management/treatment in correlation with CT, CTA, and/or MRI and clinical factors.         CT-HEAD W/O   Final Result      1. No acute intracranial abnormality.   2. Age-related central and cortical atrophy and diffuse chronic microangiopathic white matter changes versus demyelination or gliosis.   3. Left frontoparietal scalp " lesions. Correlate clinically.   4. Left maxillary sinus disease.               DX-CHEST-PORTABLE (1 VIEW)   Final Result      No acute cardiac or pulmonary abnormalities are identified.      CT-CTA HEAD WITH & W/O-POST PROCESS    (Results Pending)   CT-CTA NECK WITH & W/O-POST PROCESSING    (Results Pending)       X-Ray:  I have personally reviewed the images and compared with prior images.  EKG:  I have personally reviewed the images and compared with prior images.    Assessment/Plan:  Justification for Admission Status  I anticipate this patient will require at least two midnights for appropriate medical management, necessitating inpatient admission because cva    Patient will need a Telemetry bed on EMERGENCY service .  The need is secondary to cva.    * Acute CVA (cerebrovascular accident) (HCC)- (present on admission)  Assessment & Plan  Progressively worsening aphasia over past 48 hrs, last known normal morning of 4/15  -MRI brain  -ASA and statin  -Defer echo just had one in March, EF 45%  -Neurochecks  -Normotensive BP goal  -Monitor on tele  -Neuro consulted, will appreciate recs    Alcohol dependence (HCC)- (present on admission)  Assessment & Plan  Recent ovn says stopped 10/2024  Monitor for signs of withdrawal    Acute exacerbation of chronic obstructive pulmonary disease (COPD) (HCC)- (present on admission)  Assessment & Plan  Requires 4 lpm O2 at baseline, family mentions issues w/ home O2 machine, requiring 6 lpm at admission  Wheezy on exam, BNP up but CXR clear, likely COPD exacerbation  -Solumedrol  -Duonebs  -Wean O2 as tolerated      Stage 3 chronic kidney disease- (present on admission)  Assessment & Plan  Stable at baseline    Type 2 diabetes mellitus (HCC)- (present on admission)  Assessment & Plan  NPO  BG near goal at time of admission  POC BG checks, hold off on SSI given NPO     Primary hypertension- (present on admission)  Assessment & Plan  Hold home BP meds since NPO  PRN for  normotension     S/P coronary artery stent placement- (present on admission)  Assessment & Plan  BNP elevated but CXR clear, trop wnl, no signs of acute ischemia on EKG  Continue home meds once tolerating PO    Tobacco use disorder- (present on admission)  Assessment & Plan  NRT        VTE prophylaxis: heparin ppx

## 2025-04-17 ENCOUNTER — APPOINTMENT (OUTPATIENT)
Dept: RADIOLOGY | Facility: MEDICAL CENTER | Age: 65
DRG: 291 | End: 2025-04-17
Attending: INTERNAL MEDICINE
Payer: COMMERCIAL

## 2025-04-17 ENCOUNTER — APPOINTMENT (OUTPATIENT)
Dept: RADIOLOGY | Facility: MEDICAL CENTER | Age: 65
DRG: 291 | End: 2025-04-17
Attending: STUDENT IN AN ORGANIZED HEALTH CARE EDUCATION/TRAINING PROGRAM
Payer: COMMERCIAL

## 2025-04-17 ENCOUNTER — APPOINTMENT (OUTPATIENT)
Dept: CARDIOLOGY | Facility: MEDICAL CENTER | Age: 65
DRG: 291 | End: 2025-04-17
Attending: INTERNAL MEDICINE
Payer: COMMERCIAL

## 2025-04-17 LAB
ABO GROUP BLD: NORMAL
ALBUMIN SERPL BCP-MCNC: 3.9 G/DL (ref 3.2–4.9)
ALBUMIN/GLOB SERPL: 1.3 G/DL
ALP SERPL-CCNC: 87 U/L (ref 30–99)
ALT SERPL-CCNC: 11 U/L (ref 2–50)
ANION GAP SERPL CALC-SCNC: 12 MMOL/L (ref 7–16)
ANION GAP SERPL CALC-SCNC: 16 MMOL/L (ref 7–16)
APPEARANCE UR: CLEAR
AST SERPL-CCNC: 31 U/L (ref 12–45)
BACTERIA #/AREA URNS HPF: NORMAL /HPF
BASE EXCESS BLDA CALC-SCNC: 13 MMOL/L (ref -4–3)
BASE EXCESS BLDA CALC-SCNC: 9 MMOL/L (ref -4–3)
BILIRUB SERPL-MCNC: 0.5 MG/DL (ref 0.1–1.5)
BILIRUB UR QL STRIP.AUTO: NEGATIVE
BLD GP AB SCN SERPL QL: NORMAL
BODY TEMPERATURE: ABNORMAL DEGREES
BODY TEMPERATURE: ABNORMAL DEGREES
BREATHS SETTING VENT: 20
BREATHS SETTING VENT: 24
BUN SERPL-MCNC: 27 MG/DL (ref 8–22)
BUN SERPL-MCNC: 33 MG/DL (ref 8–22)
CALCIUM ALBUM COR SERPL-MCNC: 10.3 MG/DL (ref 8.5–10.5)
CALCIUM SERPL-MCNC: 10.2 MG/DL (ref 8.5–10.5)
CALCIUM SERPL-MCNC: 9.8 MG/DL (ref 8.5–10.5)
CASTS URNS QL MICRO: NORMAL /LPF (ref 0–2)
CHLORIDE SERPL-SCNC: 97 MMOL/L (ref 96–112)
CHLORIDE SERPL-SCNC: 98 MMOL/L (ref 96–112)
CHOLEST SERPL-MCNC: 153 MG/DL (ref 100–199)
CO2 BLDA-SCNC: 38 MMOL/L (ref 32–48)
CO2 BLDA-SCNC: 43 MMOL/L (ref 32–48)
CO2 SERPL-SCNC: 32 MMOL/L (ref 20–33)
CO2 SERPL-SCNC: 34 MMOL/L (ref 20–33)
COLOR UR: YELLOW
CREAT SERPL-MCNC: 1.48 MG/DL (ref 0.5–1.4)
CREAT SERPL-MCNC: 1.57 MG/DL (ref 0.5–1.4)
CRP SERPL HS-MCNC: 1.87 MG/DL (ref 0–0.75)
DELSYS IDSYS: ABNORMAL
DELSYS IDSYS: ABNORMAL
EKG IMPRESSION: NORMAL
END TIDAL CARBON DIOXIDE IECO2: 41 MMHG
EPITHELIAL CELLS 1715: NORMAL /HPF (ref 0–5)
ERYTHROCYTE [DISTWIDTH] IN BLOOD BY AUTOMATED COUNT: 53.8 FL (ref 35.9–50)
FLUAV RNA SPEC QL NAA+PROBE: NEGATIVE
FLUBV RNA SPEC QL NAA+PROBE: NEGATIVE
GFR SERPLBLD CREATININE-BSD FMLA CKD-EPI: 49 ML/MIN/1.73 M 2
GFR SERPLBLD CREATININE-BSD FMLA CKD-EPI: 52 ML/MIN/1.73 M 2
GLOBULIN SER CALC-MCNC: 3.1 G/DL (ref 1.9–3.5)
GLUCOSE BLD STRIP.AUTO-MCNC: 120 MG/DL (ref 65–99)
GLUCOSE BLD STRIP.AUTO-MCNC: 133 MG/DL (ref 65–99)
GLUCOSE BLD STRIP.AUTO-MCNC: 242 MG/DL (ref 65–99)
GLUCOSE SERPL-MCNC: 202 MG/DL (ref 65–99)
GLUCOSE SERPL-MCNC: 228 MG/DL (ref 65–99)
GLUCOSE UR STRIP.AUTO-MCNC: >=1000 MG/DL
HCO3 BLDA-SCNC: 35.9 MMOL/L (ref 21–28)
HCO3 BLDA-SCNC: 40.7 MMOL/L (ref 21–28)
HCT VFR BLD AUTO: 41.4 % (ref 42–52)
HDLC SERPL-MCNC: 64 MG/DL
HGB BLD-MCNC: 12.1 G/DL (ref 14–18)
HOROWITZ INDEX BLDA+IHG-RTO: 122 MM[HG]
HOROWITZ INDEX BLDA+IHG-RTO: 163 MM[HG]
KETONES UR STRIP.AUTO-MCNC: 40 MG/DL
LACTATE BLD-SCNC: 1.7 MMOL/L (ref 0.5–2)
LACTATE BLD-SCNC: 7.3 MMOL/L (ref 0.5–2)
LACTATE SERPL-SCNC: 1.9 MMOL/L (ref 0.5–2)
LACTATE SERPL-SCNC: 1.9 MMOL/L (ref 0.5–2)
LACTATE SERPL-SCNC: 2.1 MMOL/L (ref 0.5–2)
LACTATE SERPL-SCNC: 4.9 MMOL/L (ref 0.5–2)
LACTATE SERPL-SCNC: 7.9 MMOL/L (ref 0.5–2)
LDLC SERPL CALC-MCNC: 63 MG/DL
LEUKOCYTE ESTERASE UR QL STRIP.AUTO: NEGATIVE
MAGNESIUM SERPL-MCNC: 2 MG/DL (ref 1.5–2.5)
MCH RBC QN AUTO: 31.1 PG (ref 27–33)
MCHC RBC AUTO-ENTMCNC: 29.2 G/DL (ref 32.3–36.5)
MCV RBC AUTO: 106.4 FL (ref 81.4–97.8)
MICRO URNS: ABNORMAL
MODE IMODE: ABNORMAL
MODE IMODE: ABNORMAL
NITRITE UR QL STRIP.AUTO: NEGATIVE
O2/TOTAL GAS SETTING VFR VENT: 40 %
O2/TOTAL GAS SETTING VFR VENT: 50 %
PCO2 BLDA: 56.5 MMHG (ref 32–48)
PCO2 BLDA: 65.3 MMHG (ref 32–48)
PCO2 TEMP ADJ BLDA: 54.7 MMHG (ref 32–48)
PCO2 TEMP ADJ BLDA: 65.3 MMHG (ref 32–48)
PEEP END EXPIRATORY PRESSURE IPEEP: 8 CMH20
PEEP END EXPIRATORY PRESSURE IPEEP: 8 CMH20
PH BLDA: 7.4 [PH] (ref 7.35–7.45)
PH BLDA: 7.41 [PH] (ref 7.35–7.45)
PH TEMP ADJ BLDA: 7.4 [PH] (ref 7.35–7.45)
PH TEMP ADJ BLDA: 7.42 [PH] (ref 7.35–7.45)
PH UR STRIP.AUTO: 7.5 [PH] (ref 5–8)
PHOSPHATE SERPL-MCNC: 1.6 MG/DL (ref 2.5–4.5)
PHOSPHATE SERPL-MCNC: 1.9 MG/DL (ref 2.5–4.5)
PHOSPHATE SERPL-MCNC: <0.4 MG/DL (ref 2.5–4.5)
PLATELET # BLD AUTO: 126 K/UL (ref 164–446)
PMV BLD AUTO: 10.9 FL (ref 9–12.9)
PO2 BLDA: 61 MMHG (ref 83–108)
PO2 BLDA: 65 MMHG (ref 83–108)
PO2 TEMP ADJ BLDA: 61 MMHG (ref 83–108)
PO2 TEMP ADJ BLDA: 62 MMHG (ref 83–108)
POTASSIUM SERPL-SCNC: 3.3 MMOL/L (ref 3.6–5.5)
POTASSIUM SERPL-SCNC: 3.4 MMOL/L (ref 3.6–5.5)
PREALB SERPL-MCNC: 13.1 MG/DL (ref 18–38)
PROCALCITONIN SERPL-MCNC: 0.13 NG/ML
PROT SERPL-MCNC: 7 G/DL (ref 6–8.2)
PROT UR QL STRIP: 30 MG/DL
RBC # BLD AUTO: 3.89 M/UL (ref 4.7–6.1)
RBC # URNS HPF: NORMAL /HPF (ref 0–2)
RBC UR QL AUTO: ABNORMAL
RH BLD: NORMAL
RSV RNA SPEC QL NAA+PROBE: NEGATIVE
SAO2 % BLDA: 90 % (ref 93–99)
SAO2 % BLDA: 92 % (ref 93–99)
SARS-COV-2 RNA RESP QL NAA+PROBE: NOTDETECTED
SODIUM SERPL-SCNC: 144 MMOL/L (ref 135–145)
SODIUM SERPL-SCNC: 145 MMOL/L (ref 135–145)
SP GR UR STRIP.AUTO: 1.04
SPECIMEN DRAWN FROM PATIENT: ABNORMAL
SPECIMEN DRAWN FROM PATIENT: ABNORMAL
SPECIMEN SOURCE: NORMAL
TIDAL VOLUME IVT: 430 ML
TIDAL VOLUME IVT: 430 ML
TRIGL SERPL-MCNC: 128 MG/DL (ref 0–149)
UROBILINOGEN UR STRIP.AUTO-MCNC: 1 EU/DL
WBC # BLD AUTO: 6.1 K/UL (ref 4.8–10.8)
WBC #/AREA URNS HPF: NORMAL /HPF

## 2025-04-17 PROCEDURE — 84134 ASSAY OF PREALBUMIN: CPT

## 2025-04-17 PROCEDURE — 82803 BLOOD GASES ANY COMBINATION: CPT

## 2025-04-17 PROCEDURE — 80048 BASIC METABOLIC PNL TOTAL CA: CPT

## 2025-04-17 PROCEDURE — 86900 BLOOD TYPING SEROLOGIC ABO: CPT

## 2025-04-17 PROCEDURE — 93306 TTE W/DOPPLER COMPLETE: CPT

## 2025-04-17 PROCEDURE — 80053 COMPREHEN METABOLIC PANEL: CPT

## 2025-04-17 PROCEDURE — 94640 AIRWAY INHALATION TREATMENT: CPT

## 2025-04-17 PROCEDURE — 71045 X-RAY EXAM CHEST 1 VIEW: CPT

## 2025-04-17 PROCEDURE — 700102 HCHG RX REV CODE 250 W/ 637 OVERRIDE(OP): Performed by: INTERNAL MEDICINE

## 2025-04-17 PROCEDURE — 700105 HCHG RX REV CODE 258: Performed by: INTERNAL MEDICINE

## 2025-04-17 PROCEDURE — A9270 NON-COVERED ITEM OR SERVICE: HCPCS | Performed by: INTERNAL MEDICINE

## 2025-04-17 PROCEDURE — 700111 HCHG RX REV CODE 636 W/ 250 OVERRIDE (IP): Performed by: STUDENT IN AN ORGANIZED HEALTH CARE EDUCATION/TRAINING PROGRAM

## 2025-04-17 PROCEDURE — 85027 COMPLETE CBC AUTOMATED: CPT

## 2025-04-17 PROCEDURE — 93010 ELECTROCARDIOGRAM REPORT: CPT | Performed by: INTERNAL MEDICINE

## 2025-04-17 PROCEDURE — 86901 BLOOD TYPING SEROLOGIC RH(D): CPT

## 2025-04-17 PROCEDURE — 36600 WITHDRAWAL OF ARTERIAL BLOOD: CPT

## 2025-04-17 PROCEDURE — 86850 RBC ANTIBODY SCREEN: CPT

## 2025-04-17 PROCEDURE — 302136 NUTRITION PUMP: Performed by: INTERNAL MEDICINE

## 2025-04-17 PROCEDURE — 770022 HCHG ROOM/CARE - ICU (200)

## 2025-04-17 PROCEDURE — 700101 HCHG RX REV CODE 250: Performed by: INTERNAL MEDICINE

## 2025-04-17 PROCEDURE — 83735 ASSAY OF MAGNESIUM: CPT

## 2025-04-17 PROCEDURE — 94003 VENT MGMT INPAT SUBQ DAY: CPT

## 2025-04-17 PROCEDURE — 83605 ASSAY OF LACTIC ACID: CPT

## 2025-04-17 PROCEDURE — 700101 HCHG RX REV CODE 250: Performed by: STUDENT IN AN ORGANIZED HEALTH CARE EDUCATION/TRAINING PROGRAM

## 2025-04-17 PROCEDURE — 700111 HCHG RX REV CODE 636 W/ 250 OVERRIDE (IP): Mod: JZ | Performed by: INTERNAL MEDICINE

## 2025-04-17 PROCEDURE — 93306 TTE W/DOPPLER COMPLETE: CPT | Mod: 26 | Performed by: INTERNAL MEDICINE

## 2025-04-17 PROCEDURE — 99291 CRITICAL CARE FIRST HOUR: CPT | Performed by: INTERNAL MEDICINE

## 2025-04-17 PROCEDURE — 84100 ASSAY OF PHOSPHORUS: CPT

## 2025-04-17 PROCEDURE — 700105 HCHG RX REV CODE 258: Performed by: STUDENT IN AN ORGANIZED HEALTH CARE EDUCATION/TRAINING PROGRAM

## 2025-04-17 PROCEDURE — 70551 MRI BRAIN STEM W/O DYE: CPT

## 2025-04-17 PROCEDURE — 82962 GLUCOSE BLOOD TEST: CPT | Mod: 91

## 2025-04-17 RX ORDER — FAMOTIDINE 20 MG/1
20 TABLET, FILM COATED ORAL DAILY
Status: DISCONTINUED | OUTPATIENT
Start: 2025-04-18 | End: 2025-04-20

## 2025-04-17 RX ORDER — FOLIC ACID 1 MG/1
1 TABLET ORAL DAILY
Status: DISCONTINUED | OUTPATIENT
Start: 2025-04-17 | End: 2025-04-19

## 2025-04-17 RX ADMIN — NOREPINEPHRINE BITARTRATE 0.05 MCG/KG/MIN: 1 INJECTION, SOLUTION, CONCENTRATE INTRAVENOUS at 02:05

## 2025-04-17 RX ADMIN — THIAMINE HYDROCHLORIDE 200 MG: 100 INJECTION, SOLUTION INTRAMUSCULAR; INTRAVENOUS at 05:25

## 2025-04-17 RX ADMIN — LACTULOSE 30 ML: 10 SOLUTION ORAL at 18:02

## 2025-04-17 RX ADMIN — POTASSIUM PHOSPHATE, MONOBASIC AND POTASSIUM PHOSPHATE, DIBASIC 15 MMOL: 224; 236 INJECTION, SOLUTION, CONCENTRATE INTRAVENOUS at 06:58

## 2025-04-17 RX ADMIN — IPRATROPIUM BROMIDE AND ALBUTEROL SULFATE 3 ML: .5; 2.5 SOLUTION RESPIRATORY (INHALATION) at 06:51

## 2025-04-17 RX ADMIN — AMPICILLIN SODIUM, SULBACTAM SODIUM 3 G: 2; 1 INJECTION, POWDER, FOR SOLUTION INTRAMUSCULAR; INTRAVENOUS at 13:26

## 2025-04-17 RX ADMIN — ALLOPURINOL 50 MG: 100 TABLET ORAL at 05:25

## 2025-04-17 RX ADMIN — INSULIN LISPRO 2 UNITS: 100 INJECTION, SOLUTION INTRAVENOUS; SUBCUTANEOUS at 05:49

## 2025-04-17 RX ADMIN — HEPARIN SODIUM 5000 UNITS: 5000 INJECTION, SOLUTION INTRAVENOUS; SUBCUTANEOUS at 13:21

## 2025-04-17 RX ADMIN — AMPICILLIN SODIUM, SULBACTAM SODIUM 3 G: 2; 1 INJECTION, POWDER, FOR SOLUTION INTRAMUSCULAR; INTRAVENOUS at 18:04

## 2025-04-17 RX ADMIN — ASPIRIN 81 MG: 81 TABLET, CHEWABLE ORAL at 05:25

## 2025-04-17 RX ADMIN — SODIUM CHLORIDE: 9 INJECTION, SOLUTION INTRAVENOUS at 04:11

## 2025-04-17 RX ADMIN — THERA TABS 1 TABLET: TAB at 10:10

## 2025-04-17 RX ADMIN — LACTULOSE 30 ML: 10 SOLUTION ORAL at 05:25

## 2025-04-17 RX ADMIN — IPRATROPIUM BROMIDE AND ALBUTEROL SULFATE 3 ML: .5; 2.5 SOLUTION RESPIRATORY (INHALATION) at 03:00

## 2025-04-17 RX ADMIN — METHYLPREDNISOLONE SODIUM SUCCINATE 62.5 MG: 125 INJECTION, POWDER, FOR SOLUTION INTRAMUSCULAR; INTRAVENOUS at 13:21

## 2025-04-17 RX ADMIN — FAMOTIDINE 20 MG: 20 TABLET, FILM COATED ORAL at 05:25

## 2025-04-17 RX ADMIN — Medication 300 MCG/HR: at 21:06

## 2025-04-17 RX ADMIN — METHYLPREDNISOLONE SODIUM SUCCINATE 62.5 MG: 125 INJECTION, POWDER, FOR SOLUTION INTRAMUSCULAR; INTRAVENOUS at 22:18

## 2025-04-17 RX ADMIN — METHYLPREDNISOLONE SODIUM SUCCINATE 62.5 MG: 125 INJECTION, POWDER, FOR SOLUTION INTRAMUSCULAR; INTRAVENOUS at 05:25

## 2025-04-17 RX ADMIN — SENNOSIDES AND DOCUSATE SODIUM 2 TABLET: 50; 8.6 TABLET ORAL at 18:02

## 2025-04-17 RX ADMIN — FENTANYL CITRATE 100 MCG: 50 INJECTION, SOLUTION INTRAMUSCULAR; INTRAVENOUS at 12:20

## 2025-04-17 RX ADMIN — Medication 100 MCG/HR: at 03:58

## 2025-04-17 RX ADMIN — Medication 300 MCG/HR: at 15:36

## 2025-04-17 RX ADMIN — FENTANYL CITRATE 100 MCG: 50 INJECTION, SOLUTION INTRAMUSCULAR; INTRAVENOUS at 00:46

## 2025-04-17 RX ADMIN — SODIUM PHOSPHATE, MONOBASIC, MONOHYDRATE AND SODIUM PHOSPHATE, DIBASIC, ANHYDROUS 15 MMOL: 276; 142 INJECTION, SOLUTION INTRAVENOUS at 08:06

## 2025-04-17 RX ADMIN — INSULIN LISPRO 2 UNITS: 100 INJECTION, SOLUTION INTRAVENOUS; SUBCUTANEOUS at 11:53

## 2025-04-17 RX ADMIN — IPRATROPIUM BROMIDE AND ALBUTEROL SULFATE 3 ML: .5; 2.5 SOLUTION RESPIRATORY (INHALATION) at 11:12

## 2025-04-17 RX ADMIN — FOLIC ACID 1 MG: 1 TABLET ORAL at 10:10

## 2025-04-17 RX ADMIN — ATORVASTATIN CALCIUM 80 MG: 40 TABLET, FILM COATED ORAL at 18:02

## 2025-04-17 RX ADMIN — IPRATROPIUM BROMIDE AND ALBUTEROL SULFATE 3 ML: .5; 2.5 SOLUTION RESPIRATORY (INHALATION) at 14:12

## 2025-04-17 RX ADMIN — PROPOFOL 5 MCG/KG/MIN: 10 INJECTION, EMULSION INTRAVENOUS at 15:49

## 2025-04-17 RX ADMIN — IPRATROPIUM BROMIDE AND ALBUTEROL SULFATE 3 ML: .5; 2.5 SOLUTION RESPIRATORY (INHALATION) at 22:18

## 2025-04-17 RX ADMIN — HEPARIN SODIUM 5000 UNITS: 5000 INJECTION, SOLUTION INTRAVENOUS; SUBCUTANEOUS at 05:25

## 2025-04-17 RX ADMIN — LACTULOSE 30 ML: 10 SOLUTION ORAL at 13:21

## 2025-04-17 RX ADMIN — HEPARIN SODIUM 5000 UNITS: 5000 INJECTION, SOLUTION INTRAVENOUS; SUBCUTANEOUS at 22:18

## 2025-04-17 RX ADMIN — LEVOCARNITINE 800 MG: 1 INJECTION INTRAVENOUS at 05:26

## 2025-04-17 RX ADMIN — FENTANYL CITRATE 100 MCG: 50 INJECTION, SOLUTION INTRAMUSCULAR; INTRAVENOUS at 03:35

## 2025-04-17 RX ADMIN — AZITHROMYCIN DIHYDRATE 500 MG: 500 INJECTION, POWDER, LYOPHILIZED, FOR SOLUTION INTRAVENOUS at 05:10

## 2025-04-17 RX ADMIN — POTASSIUM PHOSPHATE, MONOBASIC AND POTASSIUM PHOSPHATE, DIBASIC 30 MMOL: 224; 236 INJECTION, SOLUTION, CONCENTRATE INTRAVENOUS at 18:42

## 2025-04-17 RX ADMIN — SENNOSIDES AND DOCUSATE SODIUM 2 TABLET: 50; 8.6 TABLET ORAL at 05:25

## 2025-04-17 RX ADMIN — IPRATROPIUM BROMIDE AND ALBUTEROL SULFATE 3 ML: .5; 2.5 SOLUTION RESPIRATORY (INHALATION) at 18:55

## 2025-04-17 RX ADMIN — AMPICILLIN SODIUM, SULBACTAM SODIUM 3 G: 2; 1 INJECTION, POWDER, FOR SOLUTION INTRAMUSCULAR; INTRAVENOUS at 06:24

## 2025-04-17 ASSESSMENT — PAIN DESCRIPTION - PAIN TYPE
TYPE: ACUTE PAIN

## 2025-04-17 ASSESSMENT — LIFESTYLE VARIABLES: REASON UNABLE TO ASSESS: VENTED

## 2025-04-17 NOTE — CARE PLAN
The patient is Watcher - Medium risk of patient condition declining or worsening    Shift Goals  Clinical Goals: hemodynamically stable, MRI  Patient Goals: LLUVIA  Family Goals: updates    Progress made toward(s) clinical / shift goals:    Problem: Pain - Standard  Goal: Alleviation of pain or a reduction in pain to the patient’s comfort goal  Outcome: Progressing     Problem: Safety - Medical Restraint  Goal: Remains free of injury from restraints (Restraint for Interference with Medical Device)  Outcome: Progressing       Patient is not progressing towards the following goals:      Problem: Neuro Status  Goal: Neuro status will remain stable or improve  Outcome: Not Progressing     Problem: Safety - Medical Restraint  Goal: Free from restraint(s) (Restraint for Interference with Medical Device)  Outcome: Not Progressing

## 2025-04-17 NOTE — ASSESSMENT & PLAN NOTE
Reported history of, quit in January   Monitor for withdrawal  MVI/Folate/Thiamine supplementation  Electrolyte replacement

## 2025-04-17 NOTE — DISCHARGE PLANNING
PM&R referral from Dr. Chowdhury. Stroke protocol. NIHSS 4. Now  with respiratory failure. Ongoing work up. Potential family support [facility] provider. Consult pended at this time.

## 2025-04-17 NOTE — CARE PLAN
Problem: Bronchoconstriction  Goal: Improve in air movement and diminished wheezing  Description: Target End Date:  2 to 3 days1.  Implement inhaled treatments2.  Evaluate and manage medication effects  Outcome: Not Met     Problem: Ventilation  Goal: Ability to achieve and maintain unassisted ventilation or tolerate decreased levels of ventilator support  Description: Target End Date:  4 days Document on Vent flowsheet1.  Support and monitor invasive and noninvasive mechanical ventilation2.  Monitor ventilator weaning response3.  Perform ventilator associated pneumonia prevention interventions4.  Manage ventilation therapy by monitoring diagnostic test results  Outcome: Not Met

## 2025-04-17 NOTE — HOSPITAL COURSE
"\"64 y.o. male w/ PMH of CAD s/p PCI, HFmrEF 45%, CKD IIIa, DM2, asthma/COPD on 4lpm home o2, ethanol abuse, tobacco abuse who presented 4/16/2025 with SOB and AMS. Family state pt had mild aphasia on 4/15 that seemed to worsen as the day went on, AM of 4/16 he was not responsive to commands. Per family he had no complaints the day prior.               In the ED vitals showed hypoxia. Labs showed elevated BNP. Trop wnl x2. CT head w/o showed chronic microvasc changes, CTA HN w/o acute dz. CXR wnl. EKG w/ RBBB and LAFB no acute ischemia. Neuro was consultedi n the ED and recommended admission for CVA workup\"     During the evening of admission he became less responsive, ABG showed respiratory acidosis. He was moved to the ICU for intubation. There is no further history available at this time.     4/17: VD #2, weaned off ketamine. Following commands. ABG improved  4/18: VD #3, seizure-like activity during SAT/SBT. Given keppra and versed, placed on cEEG, neuro consulted  4/19: VD #4, another seizure-like event while on SBT. EEG showed no ictal discharges. Cr slightly worse  "

## 2025-04-17 NOTE — THERAPY
04/17/25 1101   Interdisciplinary Plan of Care Collaboration   Collaboration Comments OT consult received. Pt intubated, sedated, and on multiple pressors. Will hold and follow for appropriate timing of eval.

## 2025-04-17 NOTE — ASSESSMENT & PLAN NOTE
Uncertain if focal deficits were related to elevated PCO2 upon admission  Not a candidate for endovascular intervention upon admission  MRI brain negative for CVA  Seizure on 4/18  ECHO reviewed  Tele monitoring  cEEG  Neuro checks  A1c/Lipid panel  Secondary risk prevention

## 2025-04-17 NOTE — PROGRESS NOTES
"Critical Care Progress Note    Date of admission  4/16/2025    Chief Complaint  64 y.o. male admitted 4/16/2025 with AMS    Hospital Course  \"64 y.o. male w/ PMH of CAD s/p PCI, HFmrEF 45%, CKD IIIa, DM2, asthma/COPD on 4lpm home o2, ethanol abuse, tobacco abuse who presented 4/16/2025 with SOB and AMS. Family state pt had mild aphasia on 4/15 that seemed to worsen as the day went on, AM of 4/16 he was not responsive to commands. Per family he had no complaints the day prior.               In the ED vitals showed hypoxia. Labs showed elevated BNP. Trop wnl x2. CT head w/o showed chronic microvasc changes, CTA HN w/o acute dz. CXR wnl. EKG w/ RBBB and LAFB no acute ischemia. Neuro was consultedi n the ED and recommended admission for CVA workup\"     During the evening of admission he became less responsive, ABG showed respiratory acidosis. He was moved to the ICU for intubation. There is no further history available at this time.     Interval Problem Update  Reviewed last 24 hour events:   - transferred overnight for severe hypercapnia, intubated   - Neuro: on ketamine for sedation/bronchodilation   - HR: 80s-110s   - SBP: 90s-140s, NE at 0.08   - GI: TF at 25 mL/hr, last BM PTA   - UOP: 420 mL since transfer   - Garibay: yes   - Tm: 38.4   - Lines: PIV   - PPx: GI pepcid, DVT heparin   - ABG: , compensated respiratory acidosis   - VD #2   - CXR (personally reviewed and compared to prior): clear, hyperinflated   - Mobility level 1, eligible for progression yes   - 30 mmol of phos    Review of Systems  Review of Systems   Unable to perform ROS: Critical illness        Vital Signs for last 24 hours   Temp:  [36.4 °C (97.5 °F)-37.2 °C (99 °F)] 36.6 °C (97.9 °F)  Pulse:  [] 106  Resp:  [4-28] 20  BP: ()/(36-95) 87/49  SpO2:  [81 %-100 %] 95 %    Hemodynamic parameters for last 24 hours       Respiratory Information for the last 24 hours  Vent Mode: APVCMV  Rate (breaths/min): 20  Vt Target (mL): " 430  PEEP/CPAP: 8  MAP: 11  Control VTE (exp VT): 402    Physical Exam   Physical Exam  Vitals and nursing note reviewed.   Constitutional:       General: He is in acute distress.      Appearance: He is well-developed. He is ill-appearing.      Interventions: He is intubated.   HENT:      Head: Normocephalic and atraumatic.      Right Ear: External ear normal.      Left Ear: External ear normal.      Mouth/Throat:      Comments: ETT in place  Eyes:      Conjunctiva/sclera: Conjunctivae normal.      Pupils: Pupils are equal, round, and reactive to light.   Neck:      Vascular: No JVD.      Trachea: No tracheal deviation.   Cardiovascular:      Rate and Rhythm: Normal rate and regular rhythm.      Pulses: Normal pulses.   Pulmonary:      Effort: He is intubated.      Breath sounds: No wheezing or rales.      Comments: Good air movement, normal vent mechanics  Abdominal:      General: Bowel sounds are normal. There is no distension.      Palpations: Abdomen is soft.   Musculoskeletal:         General: No tenderness.      Cervical back: Neck supple.   Skin:     General: Skin is warm and dry.      Capillary Refill: Capillary refill takes less than 2 seconds.      Findings: No rash.   Neurological:      Comments: sedated   Psychiatric:      Comments: Unable to assess         Medications  Current Facility-Administered Medications   Medication Dose Route Frequency Provider Last Rate Last Admin    sodium phosphate 15 mmol in dextrose 5% 250 mL ivpb  15 mmol Intravenous Once Tom Torres M.D.        potassium phosphate 15 mmol in dextrose 5% 250 mL ivpb  15 mmol Intravenous Once Tom Torres M.D. 62.5 mL/hr at 04/17/25 0658 15 mmol at 04/17/25 0658    [START ON 4/18/2025] famotidine (Pepcid) tablet 20 mg  20 mg Enteral Tube DAILY Tanner Balbuena Jr., D.O.        Or    [START ON 4/18/2025] famotidine (Pepcid) injection 20 mg  20 mg Intravenous DAILY Tanner Balbuena Jr., D.O.        heparin injection 5,000 Units  5,000  Units Subcutaneous Q8HRS Adelfo Chowdhury D.O.   5,000 Units at 04/17/25 0525    labetalol (Normodyne/Trandate) injection 10 mg  10 mg Intravenous Q4HRS PRN Adelfo Chowdhury D.O.        ipratropium-albuterol (DUONEB) nebulizer solution  3 mL Nebulization Q4HRS (RT) Adelfo Chowdhury D.O.   3 mL at 04/17/25 0651    Pharmacy Consult: Enteral tube insertion - review meds/change route/product selection  1 Each Other PHARMACY TO DOSE Jarrett Greene M.D.        Respiratory Therapy Consult   Nebulization Continuous RT Jarrett Greene M.D.        fentaNYL (Sublimaze) injection 50 mcg  50 mcg Intravenous Q15 MIN PRN Jarrett Greene M.D.        And    fentaNYL (Sublimaze) injection 100 mcg  100 mcg Intravenous Q15 MIN PRN Jarrett Greene M.D.   100 mcg at 04/17/25 0335    And    fentaNYL (SUBLIMAZE) 50 mcg/mL in 50mL (Continuous Infusion)   Intravenous Continuous Jarrett Greene M.D. 4 mL/hr at 04/17/25 0701 200 mcg/hr at 04/17/25 0701    And    propofol (DIPRIVAN) injection  0-40 mcg/kg/min (Ideal) Intravenous Continuous Jarrett Greene M.D.   Dose not Required at 04/16/25 2200    senna-docusate (Pericolace Or Senokot S) 8.6-50 MG per tablet 2 Tablet  2 Tablet Enteral Tube BID Jarrett Greene M.D.   2 Tablet at 04/17/25 0525    And    polyethylene glycol/lytes (Miralax) Packet 1 Packet  1 Packet Enteral Tube QDAY PRN Jarrett Greene M.D.        And    magnesium hydroxide (Milk Of Magnesia) suspension 30 mL  30 mL Enteral Tube QDAY PRN Jarrett Greene M.D.        And    bisacodyl (Dulcolax) suppository 10 mg  10 mg Rectal QDAY PRN Jarrett Greene M.D.        MD Alert...ICU Electrolyte Replacement per Pharmacy   Other PHARMACY TO DOSE Jarrett Greene M.D.        lidocaine (Xylocaine) 1 % injection 2 mL  2 mL Tracheal Tube Q30 MIN PRN Jarrett Greene M.D.        insulin lispro (HumaLOG,AdmeLOG) subcutaneous injection  1-6 Units Subcutaneous Q6HRS Jarrett Greene M.D.   2 Units at 04/17/25 0549     And    dextrose 50 % (D50W) injection 25 g  25 g Intravenous Q15 MIN PRN Jarrett Greene M.D.        norepinephrine (Levophed) 8 mg in 250 mL NS infusion (premix)  0-1 mcg/kg/min (Ideal) Intravenous Continuous Jarrett Greene M.D. 9.2 mL/hr at 04/17/25 0701 0.08 mcg/kg/min at 04/17/25 0701    aspirin (Asa) chewable tab 81 mg  81 mg Enteral Tube DAILY Jarrett Greene M.D.   81 mg at 04/17/25 0525    Or    aspirin (Asa) suppository 300 mg  300 mg Rectal DAILY Jarrett Greene M.D.        atorvastatin (Lipitor) tablet 80 mg  80 mg Enteral Tube Q EVENING Jarrett Greene M.D.        allopurinol (Zyloprim) tablet 50 mg  50 mg Enteral Tube DAILY Jarrett Greene M.D.   50 mg at 04/17/25 0525    ketamine (Ketalar) 500 mg in 250 mL NS infusion (critical care only)  0.3 mg/kg/hr (Ideal) Intravenous Continuous Jarrett Greene M.D. 9.2 mL/hr at 04/17/25 0701 0.3 mg/kg/hr at 04/17/25 0701    methylPREDNISolone sod succ (SOLU-MEDROL) 125 MG injection 62.5 mg  62.5 mg Intravenous Q8HRS Jarrett Greene M.D.   62.5 mg at 04/17/25 0525    thiamine (B-1) injection 200 mg  200 mg Intravenous DAILY Jarrett Greene M.D.   200 mg at 04/17/25 0525    lactulose 20 GM/30ML solution 30 mL  30 mL Enteral Tube TID Jarrett Greene M.D.   30 mL at 04/17/25 0525    levOCARNitine (Carnitor) injection 800 mg  50 mg/kg/day Intravenous Q6HRS Jarrett Greene M.D.   800 mg at 04/17/25 0526    ampicillin/sulbactam (Unasyn) 3 g in  mL IVPB  3 g Intravenous Q6HRS Jarrett Greene M.D.   Stopped at 04/17/25 0656    azithromycin (ZITHROMAX) 500 mg in D5W 250 mL IVPB premix  500 mg Intravenous Q24HRS Jarrett Greene M.D.   Stopped at 04/17/25 0617    acetaminophen (Tylenol) tablet 650 mg  650 mg Oral Q4HRS PRN Jarrett Greene M.D.        NS infusion   Intravenous Continuous Jarrett Greene M.D.   Paused at 04/17/25 0510       Fluids    Intake/Output Summary (Last 24 hours) at 4/17/2025 0728  Last data filed at 4/17/2025  0713  Gross per 24 hour   Intake 1814.16 ml   Output 1020 ml   Net 794.16 ml       Laboratory  Recent Labs     04/16/25  2310 04/17/25  0337   ISTATAPH 7.493* 7.412   ISTATAPCO2 49.6* 56.5*   ISTATAPO2 231* 65*   ISTATATCO2 40 38   [id number] 100* 92*   ISTATARTHCO3 38.1* 35.9*   ISTATARTBE 13* 9*   ISTATTEMP 96.8 F 97.3 F   ISTATFIO2 70 40   ISTATSPEC Arterial Arterial   ISTATAPHTC 7.509* 7.422   [id number] 226* 62*         Recent Labs     04/16/25  1327 04/16/25 2103 04/17/25  0445   SODIUM 145 144 145   POTASSIUM 4.9 5.3 3.4*   CHLORIDE 99 98 97   CO2 39* 40* 32   BUN 20 22 27*   CREATININE 1.15 1.06 1.48*   MAGNESIUM  --   --  2.0   PHOSPHORUS  --   --  <0.4*   CALCIUM 9.9 10.0 10.2     Recent Labs     04/16/25  1327 04/16/25 2103 04/17/25  0445   ALTSGPT 9 13 11   ASTSGOT 29 30 31   ALKPHOSPHAT 93 113* 87   TBILIRUBIN 0.3 0.4 0.5   PREALBUMIN  --   --  13.1*   GLUCOSE 128* 122* 228*     Recent Labs     04/16/25  1327 04/16/25 2103 04/17/25  0445   WBC 6.3 7.6 6.1   NEUTSPOLYS 72.20*  --   --    LYMPHOCYTES 12.20*  --   --    MONOCYTES 11.30  --   --    EOSINOPHILS 1.70  --   --    BASOPHILS 0.00  --   --    ASTSGOT 29 30 31   ALTSGPT 9 13 11   ALKPHOSPHAT 93 113* 87   TBILIRUBIN 0.3 0.4 0.5     Recent Labs     04/16/25  1327 04/16/25  1505 04/16/25 2103 04/17/25  0445   RBC 4.02*  --  4.29* 3.89*   HEMOGLOBIN 12.7*  --  13.2* 12.1*   HEMATOCRIT 44.1  --  47.3 41.4*   PLATELETCT 129*  --  120* 126*   PROTHROMBTM  --  12.3  --   --    APTT  --  28.2  --   --    INR  --  0.92  --   --        Imaging  X-Ray:  I have personally reviewed the images and compared with prior images.  Echo:   Reviewed  MRI:   Reviewed    Assessment/Plan  * Acute CVA (cerebrovascular accident) (HCC)- (present on admission)  Assessment & Plan  Uncertain if focal deficits were related to elevated PCO2 upon admission  Not a candidate for endovascular intervention upon admission  MRI brain negative for CVA  ECHO reviewed  Tele  monitoring  Neuro checks  A1c/Lipid panel  Secondary risk prevention    Increased ammonia level- (present on admission)  Assessment & Plan  Uncertain if he carries the diagnosis of cirrhosis  Lactulose   Trend     Thrombocytopenia (HCC)- (present on admission)  Assessment & Plan  Chronic in the setting of alcohol abuse  Monitor for bleeding  Conservative transfusion strategy     Acute on chronic respiratory failure (HCC)- (present on admission)  Assessment & Plan  Secondary to COPD exacerbation  04/16/25 intubated  RT/O2 protocols  Daily and PRN ABGs  Titration of ventilator therapy based on ABGs and patient's status  Sedation as tolerated/indicated  Daily CXR  HOB >30 degrees and peridex for VAP prevention  Pepcid for GI prophylaxis  SAT/SBT when able (ABCDEF Bundle)  Early mobility  Viral panel  ECHO  Treatment for COPD  Empiric unasyn + azithro - will stop if PCT is negative     Acute systolic heart failure (HCC)- (present on admission)  Assessment & Plan  Repeat ECHO with normal EF  GDMT when appropriate  Maintain euvolemia     Alcohol dependence (HCC)- (present on admission)  Assessment & Plan  Reported history of, quit in January   Monitor for withdrawal  MVI/Folate/Thiamine supplementation  Electrolyte replacement     Acute exacerbation of chronic obstructive pulmonary disease (COPD) (HCC)- (present on admission)  Assessment & Plan  PFT: FEV1/FVC ratio 57 and FEV1 of 0.93 L or 32% predicted. postbronchodilator FEV1 of 1.37 L or 47% predicted. Total lung capacity is within normal limits at 106%   04/16/25 intubated  Duonebs, s/p continuous x1 hr  Wean Ketamine  Steroids    Stage 3 chronic kidney disease- (present on admission)  Assessment & Plan  Strict I/Os  Renally dosed medications  Avoid nephrotoxic agents as able  Cr trending up, continue to monitor    Type 2 diabetes mellitus (HCC)- (present on admission)  Assessment & Plan  Goal blood glucose 140-180  sliding scale insulin, accuchecks  hypoglycemia  protocol  A1c 6.2      Primary hypertension- (present on admission)  Assessment & Plan  Reintroduce oral antihypertensives when appropriate      Dyslipidemia- (present on admission)  Assessment & Plan  Atorvastatin          VTE:  Heparin  Ulcer: H2 Antagonist  Lines: Garibay Catheter  Ongoing indication addressed    I have performed a physical exam and reviewed and updated ROS and Plan today (4/17/2025). In review of yesterday's note (4/16/2025), there are no changes except as documented above.     Discussed patient condition and risk of morbidity and/or mortality with Family, RN, RT, Pharmacy, Code status disscussed, Charge nurse / hot rounds, and Patient    The patient remains critically ill.  Critical care time = 44 minutes in directly providing and coordinating critical care and extensive data review.  No time overlap and excludes procedures.    Please note that this dictation was created using voice recognition software. The accuracy of the dictation is limited to the abilities of the software. I have made every reasonable attempt to correct obvious errors, but I expect that there are errors of grammar and possibly content that I did not discover before finalizing the note.

## 2025-04-17 NOTE — PROGRESS NOTES
Time of Arrival: 2108  HR: 73  BP: 165/84  SpO2: 97 on 15L Non-rebreather  RR: 17  Temp: 96.8f temporal  Weight: 64kg      Does patient consent to inventory of belongings:     No belongings at bedside    Belongings at bedside:  None    4 Eyes Skin Assessment Completed by Juan RN and Lalo JOSEPH RN.    Head scalp lesion to right side  Ears WDL  Nose WDL  Mouth WDL  Neck WDL  Breast/Chest WDL  Shoulder Blades WDL  Spine WDL  (R) Arm/Elbow/Hand elbow red and blanching  (L) Arm/Elbow/Hand elbow red and blanching  Abdomen WDL  Groin WDL  Scrotum/Coccyx/Buttocks sacrum and buttocks red and blanching  (R) Leg WDL  (L) Leg WDL  (R) Heel/Foot/Toe heel red and blanching  (L) Heel/Foot/Toe heel red and blanching          Devices In Places ECG, Blood Pressure Cuff, Pulse Ox, Garibay, and SCD's      Interventions In Place TAP System, Pillows, Q2 Turns, Low Air Loss Mattress, and Heels Loaded W/Pillows    Possible Skin Injury No    Pictures Uploaded Into Epic N/A  Wound Consult Placed N/A  RN Wound Prevention Protocol Ordered Yes

## 2025-04-17 NOTE — PROGRESS NOTES
4 Eyes Skin Assessment Completed by KRISHNA Alford and KRISHNA Aaron.    Head WDL  Ears Redness and Blanching Left side   Nose WDL  Mouth WDL  Neck WDL  Breast/Chest WDL  Shoulder Blades WDL  Spine WDL  (R) Arm/Elbow/Hand Redness and Blanching  (L) Arm/Elbow/Hand Redness and Blanching  Abdomen WDL  Groin WDL  Scrotum/Coccyx/Buttocks WDL  (R) Leg WDL  (L) Leg WDL  (R) Heel/Foot/Toe Redness and Blanching  (L) Heel/Foot/Toe Redness and Blanching          Devices In Places Tele Box, Blood Pressure Cuff, Pulse Ox, and Oxy Mask      Interventions In Place Pillows, Q2 Turns, and Pressure Redistribution Mattress    Possible Skin Injury No    Pictures Uploaded Into Epic N/A  Wound Consult Placed N/A  RN Wound Prevention Protocol Ordered No

## 2025-04-17 NOTE — ASSESSMENT & PLAN NOTE
PFT: FEV1/FVC ratio 57 and FEV1 of 0.93 L or 32% predicted. postbronchodilator FEV1 of 1.37 L or 47% predicted. Total lung capacity is within normal limits at 106%   04/16/25 intubated  PRN duonebs, s/p continuous x1 hr  Off Ketamine  Stop steroids

## 2025-04-17 NOTE — THERAPY
Physical Therapy Contact Note    Patient Name: Hugo Villela  Age:  64 y.o., Sex:  male  Medical Record #: [id number]  Today's Date: 4/17/2025 04/17/25 1120   Interdisciplinary Plan of Care Collaboration   Collaboration Comments PT orders received and chart review performed. Patient intubated, sedated and on multiple pressors, not appropriate at this time

## 2025-04-17 NOTE — PROGRESS NOTES
Brief Stroke Neurology Note    MRI brain completed and is negative for acute intracranial abnormalities to include infarcts and hemorrhage. Spot EEG is also negative for epileptiform discharges and seizures. The patient has clear respiratory and metabolic derangements that account for the patient's presenting altered mentation. There are no additional acute neurology recommendations at this time. Please reconsult or call with any acute questions or developments.    Case reviewed and plan created with Dr. Anthony Godoy, Vascular Neurology. Please call with any questions.     IVÁN Watson  Vascular Neurology, Inpatient Neurology  [phone]

## 2025-04-17 NOTE — PROCEDURES
INPATIENT ROUTINE VIDEO ELECTROENCEPHALOGRAM REPORT    REFERRING PROVIDER: Adelfo Ash D.o.  DOS: 4/16/2025  STUDY DURATION: 0 hours and 24 minutes of total recording time.     INDICATION:  Hugo Villela 64 y.o. male presenting with altered mental status.     RELEVANT MEDICATIONS/TREATMENTS:   Scheduled Medications   Medication Dose Frequency    heparin  5,000 Units Q8HRS    nicotine  14 mg Daily-0600    ipratropium-albuterol  3 mL Q4HRS (RT)    pantoprazole  40 mg DAILY    methylPREDNISolone  40 mg Q8HRS    [START ON 4/17/2025] aspirin  81 mg DAILY    Or    [START ON 4/17/2025] aspirin  300 mg DAILY    [Held by provider] atorvastatin  80 mg Q EVENING       TECHNIQUE:   Routine VEEG was set up by a Neurodiagnostic technologist who performed education to the patient and staff. A minimum of 23 electrodes and 23 channel recording was setup and performed by Neurodiagnostic technologist, in accordance with the international 10-20 system. The study was reviewed in bipolar and referential montages. The recording examined the patient in the  awake and drowsy state(s).     DESCRIPTION OF THE RECORD:  During wakefulness, the background was continuous and showed a 5-6 Hz poorly sustained posterior dominant rhythm. Continuous generalized theta slowing was present. Reactivity and variability were present.  During drowsiness, theta/delta frequencies were seen.    EEG Sleep: Occasional N2 sleep transients in the form of rudimentary and/or ill-defined sleep spindles fragments and vertex waves were seen in the leads over the central regions.     ICTAL AND INTERICTAL FINDINGS:   No focal or generalized epileptiform activity noted.     No regional slowing or persistent focal asymmetries were seen.    No seizures.     ACTIVATION PROCEDURES:   Intermittent Photic stimulation was performed in a stepwise fashion from 1 to 30 Hz and did not elicited any abnormalities on EEG.     EKG: Heart rate irregular.     EVENTS:   None    INTERPRETATION:   Abnormal video EEG recording in the awake, drowsy, and sleep state(s):  - No regional slowing or persistent focal asymmetries were seen.  - No epileptiform discharges were seen.  - No seizures.   - Single lead EKG showed an irregular heart rate. Please note that EKG lead on EEG is insufficient to diagnose underlying cardiac problems.         Mali De La Torre MD  Department of Neurology at [facility]  General Neurologist and Epileptologist   of Clinical Neurology, [facility].

## 2025-04-17 NOTE — PROGRESS NOTES
2122: Dr. Greene at bedside. Prepare to intubate pt  2124: Pre-oxygenating pt.  2125: /78, , SpO2 at 98%  2126: 20mg of etomidate given. 100mg of el given   2128: Procedure start. , 159/74, SpO2 97%.  +color change, 23@ teeth, +B/L breath sounds  2129: procedure end

## 2025-04-17 NOTE — DIETARY
"Nutrition Services: Initial Assessment     Day 1 of admit. Hugo Villela is 64 y.o., male with admitting DX of Acute CVA (cerebrovascular accident) (HCC) [I63.9].    Consult Received for: EN    Current Hospital Problems List:    Principal Problem:    Acute CVA (cerebrovascular accident) (HCC) (POA: Yes)  Active Problems:    Dyslipidemia (POA: Yes)    Tobacco use disorder (POA: Yes)    S/P coronary artery stent placement (POA: Yes)    Primary hypertension (POA: Yes)    Type 2 diabetes mellitus (HCC) (POA: Yes)    Stage 3 chronic kidney disease (POA: Yes)    Acute exacerbation of chronic obstructive pulmonary disease (COPD) (HCC) (POA: Yes)    Alcohol dependence (HCC) (POA: Yes)    Acute systolic heart failure (HCC) (POA: Yes)    Acute on chronic respiratory failure (HCC) (POA: Unknown)    Thrombocytopenia (HCC) (POA: Unknown)    Increased ammonia level (POA: Unknown)  Resolved Problems:    * No resolved hospital problems. *    Nutrition Assessment:      Height: 165.1 cm (5' 5\")  Weight: 64 kg (141 lb 1.5 oz)  Weight taken via: Bed Scale  BMI Calculated: 23.48  BMI Classification: WNL       Weight Readings from Last 5 encounters:   Wt Readings from Last 5 Encounters:   25 64 kg (141 lb 1.5 oz)   04/10/25 67.6 kg (149 lb)   25 63.5 kg (140 lb)   25 65.1 kg (143 lb 9.6 oz)   25 68.5 kg (151 lb)       Calculation Equation:  PSU (Vent: 10.8 L/min, Tmax x 24 hours: 38.4 C) = 1838 kcal, REE with MSJ 1358 x 1.2 = 1630 kcal  Total Calories / day: 1630 - 1850 Calories / k.4 - 28.9   Total Grams Protein / day:  64 - 77 Grams Protein / k - 1.2      Objective:   Indication for Nutrition Support: Intubation  Enteral Access:   Enteral Tube 25 Oral (Active)   OG in stomach per xray.  Pertinent Labs: : Na 145, K+ 3.4 (L), Glu 228 (H), BUN 27 (H), Creat 1.48 (H), Alb 3.9, Phos <0.4 (L), Mg 2, Pre-Alb 13.1 (L), CRP 1.87 (H). : A1C 6.2 (H), Ammonia 69 (H)  Pertinent Meds: Ketamine, " Levophed at 0.08 mcg/kg/min, Unasyn, Azithromycin, SSI, Lactulose, Solu-medrol, MVI, Thiamine, Folic acid. Patient received Sodium Phosphate and Potassium Phosphate today.  Skin/Wounds: No skin injury per RN skin assessment.  Food Allergies: No known food allergies per wife.  Last BM:  (prior to arrival)   Formula based on RD Eval: Osmolite 1.5 Robbi, fiber free formula appropriate for patient requiring pressor. Patient at risk for refeeding syndrome due to history of alcohol use. Advance tube feed slowly.    Current Diet Order/Intake:   NPO    Subjective:   Patient intubated. Unable to interview. Wife at bedside and aware patient now receiving tube feeding. No food allergies per wife. Observed Jevity 1.2 started at 25 ml/hour per protocol.    Nutrition Focused Physical Exam (NFPE)  Weight Loss: Weight has varied up and down per chart review.   Muscle Mass: Well Nourished  Subcutaneous Fat: Well Nourished  Fluid Accumulation: None noted  Reduced  Strength: N/A in acute care setting.    Nutrition Diagnosis:      Inadequate Oral Intake related to intubation as evidenced by patient NPO with need for nutrition support.      Based on RD assessment at this time, Patient does not meet criteria in congruence with ASPEN/[employer]y guidelines for malnutrition    Nutrition Interventions:      Initiate Osmolite 1.5 at 25 ml/hr continuous and advance slowly by 10 ml every 12 hours to goal rate of 50 ml/hr.  Goal tube feed volume provides 1800 kcals, 75 g protein, and 914 ml free water daily.   Monitor for refeeding: Order BMP with Mg and Phos x 7 days. Replete K, Phos and Mg prn. Supplement 100 mg Thiamine x 7 days to reduce risk of refeeding.  Additional fluids per Provider.  Patient aware of active plan of care as appropriate.       Nutrition Monitoring and Evaluation:      Monitor nutrition POC, goal for >85% nutrition needs met via nutrition support.  Monitor vital signs pertinent to nutrition.    RD following and will  provide updated recommendations as indicated.      Evelina Ellington R.D.                                         [facility] [employer]ITER[employer] FOR MALNUTRITION

## 2025-04-17 NOTE — ASSESSMENT & PLAN NOTE
Strict I/Os  Renally dosed medications  Avoid nephrotoxic agents as able  Cr continues to trend up, continue to monitor  No need for nephro consult at this point, may need one in the future but is improving

## 2025-04-17 NOTE — THERAPY
Speech Language Therapy Contact Note    Patient Name: Hugo Villela  Age:  64 y.o., Sex:  male  Medical Record #: [id number]  Today's Date: 4/17/2025    ST to hold pending bedside swallow eval- pt is intubated.

## 2025-04-17 NOTE — ASSESSMENT & PLAN NOTE
Secondary to COPD exacerbation  04/16/25 intubated  RT/O2 protocols  Daily and PRN ABGs  Titration of ventilator therapy based on ABGs and patient's status  Sedation as tolerated/indicated  Daily CXR  HOB >30 degrees and peridex for VAP prevention  Pepcid for GI prophylaxis  SAT/SBT when able (ABCDEF Bundle)  Early mobility  Viral panel negative  ECHO with normal  Treatment for COPD  Completed Abx

## 2025-04-17 NOTE — PROGRESS NOTES
NOC APRN CROSS COVER NOTE      Responded to rapid response for increased oxygen needs.  To bedside immediately.    Upon arrival to bedside, patient is obtunded, nonresponsive to painful stimuli.  He is maintaining his oxygen saturation at 96% on 15 L nonrebreather.  Pupils equal, round and reactive.  RT retrieving i-STAT for stat ABG.  Fingerstick with sugar 136.    Orders placed for stat EKG, CBC, CMP, lactic acid, ammonia and blood cultures x 2.  Additionally, order placed for Garibay and to retrieve urinalysis and UDS.    I have reached out to Dr. Greene, ICU MD, for transfer given patient's mental status and inability to protect airway.  During this time, ABG results with pH of 7.008 and CO2 of greater than 130.  Patient transferred with rapid response team, respiratory therapy and myself to [facility].  Dr. Greene to bedside for intubation.    I provided update to patient's family who are waiting in ICU lobby.    Sabra WEST-AG, NOC Hospitalist IV[facility]N

## 2025-04-17 NOTE — PROGRESS NOTES
2133: BP 62/40, . Dr. Greene at bedside. Give 400mg of gena per Dr. Greene.   2134: repeat BP 45/25, 1L LR started  2135: BP 72/37  2136: 62/34, additional 300mg gena given  2138: levo started, BP 83/50, per Dr. Greene increase levo to 0.1mcg/kg/min  2143: /82  2146: /90, levo dropped to 0.05mcg/kg/min

## 2025-04-17 NOTE — CONSULTS
"Critical Care Consultation    Date of consult: 4/16/2025    Referring Physician  Adelfo Chowdhury D.O.    Reason for Consultation  Respiratory failure     History of Presenting Illness  \"64 y.o. male w/ PMH of CAD s/p PCI, HFmrEF 45%, CKD IIIa, DM2, asthma/COPD on 4lpm home o2, ethanol abuse, tobacco abuse who presented 4/16/2025 with SOB and AMS. Family state pt had mild aphasia on 4/15 that seemed to worsen as the day went on, AM of 4/16 he was not responsive to commands. Per family he had no complaints the day prior.               In the ED vitals showed hypoxia. Labs showed elevated BNP. Trop wnl x2. CT head w/o showed chronic microvasc changes, CTA HN w/o acute dz. CXR wnl. EKG w/ RBBB and LAFB no acute ischemia. Neuro was consultedi n the ED and recommended admission for CVA workup\"    During the evening of admission he became less responsive, ABG showed respiratory acidosis. He was moved to the ICU for intubation. There is no further history available at this time.                   Code Status  Full Code    Review of Systems  Review of Systems   Unable to perform ROS: Critical illness       Past Medical History   has a past medical history of Acute CVA (cerebrovascular accident) (HCC) (4/16/2025), Acute myocardial infarction of other inferior wall, episode of care unspecified (06/25/2012), Acute myocardial infarction of other lateral wall, episode of care unspecified (06/25/2012), Asthma, Breath shortness, Bronchitis, Chest pain, unspecified (06/25/2012), Coronary atherosclerosis of native coronary artery (06/25/2012), Dental disorder, Diabetes ([facility]), Dizziness and giddiness (06/25/2012), High cholesterol, Hypertension, Mixed hyperlipidemia (06/25/2012), Pure hyperglyceridemia (06/25/2012), Renal disorder, S/P coronary artery stent placement (06/25/2012), and Tobacco use disorder (06/25/2012).    He has no past medical history of Acute nasopharyngitis, Anesthesia, Anginal syndrome ([facility]), Arrhythmia, " Arthritis, Blood clotting disorder (HCC), Bowel habit changes, Cancer (HCC), Carcinoma in situ of respiratory system, Cataract, Congestive heart failure (HCC), Continuous ambulatory peritoneal dialysis status (HCC), Coughing blood, Dialysis patient (HCC), Disorder of thyroid, Glaucoma, Gynecological disorder, Heart burn, Heart murmur, Heart valve disease, Hemorrhagic disorder (HCC), Hepatitis A, Hepatitis B, Hepatitis C, Hiatus hernia syndrome, Indigestion, Infectious disease, Jaundice, Pacemaker, Pneumonia, Pregnant, Psychiatric problem, Rheumatic fever, Seizure (HCC), Sleep apnea, Snoring, Tuberculosis, Urinary bladder disorder, or Urinary incontinence.    Surgical History   has a past surgical history that includes stent placement; orif, fracture, femur (1994); other cardiac surgery; and other.    Family History  family history includes Cancer in his sister; Stroke in his mother.    Social History   reports that he has been smoking cigarettes. He started smoking about 43 years ago. He has a 10.8 pack-year smoking history. He has never used smokeless tobacco. He reports that he does not currently use alcohol after a past usage of about 2.4 - 3.6 oz of alcohol per week. He reports that he does not use drugs.    Medications  Home Medications       Reviewed by Loco [facility] (Pharmacy Tech) on 04/16/25 at 1551  Med List Status: Complete     Medication Last Dose Status   albuterol 108 (90 Base) MCG/ACT Aero Soln inhalation aerosol Unknown Active   allopurinol (ZYLOPRIM) 100 MG Tab 4/15/2025 Active   aspirin EC (ECOTRIN) 81 MG Tablet Delayed Response 4/15/2025 Active   atorvastatin (LIPITOR) 80 MG tablet 4/15/2025 Active   fluticasone furoate-vilanterol (BREO ELLIPTA) 200-25 MCG/ACT AEROSOL POWDER, BREATH ACTIVATED 4/15/2025 Active   losartan (COZAAR) 25 MG Tab 4/15/2025 Active   metoprolol SR (TOPROL XL) 50 MG TABLET SR 24 HR 4/15/2025 Active                  Audit from Redirected Encounters    **Home medications  have not yet been reviewed for this encounter**       Current Facility-Administered Medications   Medication Dose Route Frequency Provider Last Rate Last Admin    heparin injection 5,000 Units  5,000 Units Subcutaneous Q8HRS Adelfo Chowdhury D.O.   5,000 Units at 04/16/25 2231    labetalol (Normodyne/Trandate) injection 10 mg  10 mg Intravenous Q4HRS PRN Adelfo Chowdhury D.O.        ipratropium-albuterol (DUONEB) nebulizer solution  3 mL Nebulization Q4HRS (RT) Adelfo Chowdhury D.O.   3 mL at 04/16/25 2035    Pharmacy Consult: Enteral tube insertion - review meds/change route/product selection  1 Each Other PHARMACY TO DOSE Jarrett Greene M.D.        Respiratory Therapy Consult   Nebulization Continuous RT Jarrett Greene M.D.        fentaNYL (Sublimaze) injection 50 mcg  50 mcg Intravenous Q15 MIN PRN Jarrett Greene M.D.        And    fentaNYL (Sublimaze) injection 100 mcg  100 mcg Intravenous Q15 MIN PRN Jarrett Greene M.D.   100 mcg at 04/16/25 2153    And    fentaNYL (SUBLIMAZE) 50 mcg/mL in 50mL (Continuous Infusion)   Intravenous Continuous Jarrett Greene M.D.   Dose not Required at 04/16/25 2200    And    propofol (DIPRIVAN) injection  0-40 mcg/kg/min (Ideal) Intravenous Continuous Jarrett Greene M.D.   Dose not Required at 04/16/25 2200    famotidine (Pepcid) tablet 20 mg  20 mg Enteral Tube Q12HRS Jarrett Greene M.D.        Or    famotidine (Pepcid) injection 20 mg  20 mg Intravenous Q12HRS Jarrett Greene M.D.   20 mg at 04/16/25 2232    senna-docusate (Pericolace Or Senokot S) 8.6-50 MG per tablet 2 Tablet  2 Tablet Enteral Tube BID Jarrett Greene M.D.        And    polyethylene glycol/lytes (Miralax) Packet 1 Packet  1 Packet Enteral Tube QDAY PRN Jarrett Greene M.D.        And    magnesium hydroxide (Milk Of Magnesia) suspension 30 mL  30 mL Enteral Tube QDAY PRN Jarrett Greene M.D.        And    bisacodyl (Dulcolax) suppository 10 mg  10 mg Rectal QDAY PRN  Jarrett Greene M.D.        MD Alert...ICU Electrolyte Replacement per Pharmacy   Other PHARMACY TO DOSE Jarrett Greene M.D.        lidocaine (Xylocaine) 1 % injection 2 mL  2 mL Tracheal Tube Q30 MIN PRN Jarrett Greene M.D.        [START ON 4/17/2025] insulin lispro (HumaLOG,AdmeLOG) subcutaneous injection  1-6 Units Subcutaneous Q6HRS Jarrett Greene M.D.        And    dextrose 50 % (D50W) injection 25 g  25 g Intravenous Q15 MIN PRN Jarrett Greene M.D.        norepinephrine (Levophed) 8 mg in 250 mL NS infusion (premix)  0-1 mcg/kg/min (Ideal) Intravenous Continuous Jarrett Greene M.D.        [START ON 4/17/2025] aspirin (Asa) chewable tab 81 mg  81 mg Enteral Tube DAILY Jrarett Greene M.D.        Or    [START ON 4/17/2025] aspirin (Asa) suppository 300 mg  300 mg Rectal DAILY Jarrett Greene M.D.        [START ON 4/17/2025] atorvastatin (Lipitor) tablet 80 mg  80 mg Enteral Tube Q EVENING Jarrett Greene M.D.        [START ON 4/17/2025] allopurinol (Zyloprim) tablet 50 mg  50 mg Enteral Tube DAILY Jarrett Greene M.D.        norepinephrine (Levophed) 8 mg in 250 mL NS infusion (premix)             albuterol 75 mg/45 mL syringe for continuous nebulization  15 mg/hr Nebulization Once Jarrett Greene M.D.   15 mg/hr at 04/16/25 2222    ketamine (Ketalar) 500 mg in 250 mL NS infusion (critical care only)  0.3 mg/kg/hr (Ideal) Intravenous Continuous Jarrett Greene M.D.        methylPREDNISolone sod succ (SOLU-MEDROL) 125 MG injection 62.5 mg  62.5 mg Intravenous Q8HRS Jarrett Greene M.D.   62.5 mg at 04/16/25 2231    [START ON 4/17/2025] thiamine (B-1) injection 200 mg  200 mg Intravenous DAILY Jarrett Greene M.D.        lactulose 20 GM/30ML solution 30 mL  30 mL Enteral Tube TID Jarrett Greene M.D.        [START ON 4/17/2025] levOCARNitine (Carnitor) injection 800 mg  50 mg/kg/day Intravenous Q6HRS Jarrett Greene M.D.        [START ON 4/17/2025] ampicillin/sulbactam (Unasyn)  3 g in  mL IVPB  3 g Intravenous Q6HRS Jarrett Greene M.D.        [START ON 4/17/2025] azithromycin (ZITHROMAX) 500 mg in D5W 250 mL IVPB premix  500 mg Intravenous Q24HRS Jarrett Greene M.D.           Allergies  Allergies   Allergen Reactions    Lisinopril Cough       Vital Signs last 24 hours  Temp:  [36.4 °C (97.5 °F)-37.2 °C (99 °F)] 36.6 °C (97.9 °F)  Pulse:  [] 108  Resp:  [18-28] 24  BP: (121-188)/(58-95) 177/81  SpO2:  [81 %-100 %] 100 %    Physical Exam  Physical Exam  Vitals and nursing note reviewed.   HENT:      Head: Normocephalic and atraumatic.      Nose: Nose normal.      Mouth/Throat:      Mouth: Mucous membranes are moist.   Eyes:      Pupils: Pupils are equal, round, and reactive to light.   Cardiovascular:      Rate and Rhythm: Normal rate.      Pulses: Normal pulses.   Pulmonary:      Comments: Shallow respirations, poor air movement, prolonged exp phase  Abdominal:      General: Abdomen is flat. There is no distension.      Palpations: Abdomen is soft.      Tenderness: There is no abdominal tenderness. There is no guarding or rebound.   Musculoskeletal:      Right lower leg: No edema.      Left lower leg: No edema.   Skin:     General: Skin is warm and dry.      Capillary Refill: Capillary refill takes less than 2 seconds.   Neurological:      Comments: Unresponsive to nox stimuli          Fluids  No intake or output data in the 24 hours ending 04/16/25 0288    Laboratory  Recent Results (from the past 48 hours)   ABO Rh Confirm    Collection Time: 04/16/25  1:26 PM   Result Value Ref Range    ABO Rh Confirm A POS    CBC with Differential    Collection Time: 04/16/25  1:27 PM   Result Value Ref Range    WBC 6.3 4.8 - 10.8 K/uL    RBC 4.02 (L) 4.70 - 6.10 M/uL    Hemoglobin 12.7 (L) 14.0 - 18.0 g/dL    Hematocrit 44.1 42.0 - 52.0 %    .7 (H) 81.4 - 97.8 fL    MCH 31.6 27.0 - 33.0 pg    MCHC 28.8 (L) 32.3 - 36.5 g/dL    RDW 55.7 (H) 35.9 - 50.0 fL    Platelet Count 129  (L) 164 - 446 K/uL    MPV 10.6 9.0 - 12.9 fL    Neutrophils-Polys 72.20 (H) 44.00 - 72.00 %    Lymphocytes 12.20 (L) 22.00 - 41.00 %    Monocytes 11.30 0.00 - 13.40 %    Eosinophils 1.70 0.00 - 6.90 %    Basophils 0.00 0.00 - 1.80 %    Nucleated RBC 0.00 0.00 - 0.20 /100 WBC    Neutrophils (Absolute) 4.66 1.82 - 7.42 K/uL    Lymphs (Absolute) 0.77 (L) 1.00 - 4.80 K/uL    Monos (Absolute) 0.71 0.00 - 0.85 K/uL    Eos (Absolute) 0.11 0.00 - 0.51 K/uL    Baso (Absolute) 0.00 0.00 - 0.12 K/uL    NRBC (Absolute) 0.00 K/uL    Anisocytosis 1+     Macrocytosis 1+    Comp Metabolic Panel    Collection Time: 04/16/25  1:27 PM   Result Value Ref Range    Sodium 145 135 - 145 mmol/L    Potassium 4.9 3.6 - 5.5 mmol/L    Chloride 99 96 - 112 mmol/L    Co2 39 (H) 20 - 33 mmol/L    Anion Gap 7.0 7.0 - 16.0    Glucose 128 (H) 65 - 99 mg/dL    Bun 20 8 - 22 mg/dL    Creatinine 1.15 0.50 - 1.40 mg/dL    Calcium 9.9 8.5 - 10.5 mg/dL    Correct Calcium 9.8 8.5 - 10.5 mg/dL    AST(SGOT) 29 12 - 45 U/L    ALT(SGPT) 9 2 - 50 U/L    Alkaline Phosphatase 93 30 - 99 U/L    Total Bilirubin 0.3 0.1 - 1.5 mg/dL    Albumin 4.1 3.2 - 4.9 g/dL    Total Protein 7.4 6.0 - 8.2 g/dL    Globulin 3.3 1.9 - 3.5 g/dL    A-G Ratio 1.2 g/dL   proBrain Natriuretic Peptide, NT    Collection Time: 04/16/25  1:27 PM   Result Value Ref Range    NT-proBNP 1628 (H) 0 - 125 pg/mL   Troponin    Collection Time: 04/16/25  1:27 PM   Result Value Ref Range    Troponin T 12 6 - 19 ng/L   ESTIMATED GFR    Collection Time: 04/16/25  1:27 PM   Result Value Ref Range    GFR (CKD-EPI) 71 >60 mL/min/1.73 m 2   DIFFERENTIAL MANUAL    Collection Time: 04/16/25  1:27 PM   Result Value Ref Range    Bands-Stabs 1.70 0.00 - 10.00 %    Myelocytes 0.90 %    Manual Diff Status PERFORMED    PERIPHERAL SMEAR REVIEW    Collection Time: 04/16/25  1:27 PM   Result Value Ref Range    Peripheral Smear Review see below    PLATELET ESTIMATE    Collection Time: 04/16/25  1:27 PM   Result Value  Ref Range    Plt Estimation Decreased    MORPHOLOGY    Collection Time: 25  1:27 PM   Result Value Ref Range    RBC Morphology Present     Poikilocytosis 1+     Ovalocytes 1+     Stomatocytes 1+    PROTHROMBIN TIME    Collection Time: 25  3:05 PM   Result Value Ref Range    PT 12.3 12.0 - 14.6 sec    INR 0.92 0.87 - 1.13   APTT    Collection Time: 25  3:05 PM   Result Value Ref Range    APTT 28.2 24.7 - 36.0 sec   TROPONIN    Collection Time: 25  3:05 PM   Result Value Ref Range    Troponin T 12 6 - 19 ng/L   Hemoglobin A1C    Collection Time: 25  3:05 PM   Result Value Ref Range    Glycohemoglobin 6.2 (H) 4.0 - 5.6 %    Est Avg Glucose 131 mg/dL   EKG (NOW)    Collection Time: 25  3:33 PM   Result Value Ref Range    Report       [facility]    Test Date:  2025  Pt Name:    NICOLE DUDLEY                  Department: ER  MRN:        [id number]                      Room:  Gender:     Male                         Technician: [id number]  :        1960                   Requested By:ER TRIAGE PROTOCOL  Order #:    [id number]                    Reading MD: EDVIN DOYLE D.O.    Measurements  Intervals                                Axis  Rate:       64                           P:          80  NJ:         150                          QRS:        -105  QRSD:       143                          T:          0  QT:         429  QTc:        443    Interpretive Statements  Sinus rhythm  Atrial premature complex  RBBB and LAFB  Baseline wander in lead(s) V3  Compared to ECG 2024 10:00:56  Atrial premature complex(es) now present  Left anterior fascicular block now present  Myocardial infarct finding no longer present  Electronically Signed On 2025 15:33:50 PDT by GLADYS DOYLE D.O.     EKG    Collection Time: 25  6:46 PM   Result Value Ref Range    Report       [facility]    Test Date:  2025  Pt Name:    NICOLE  OCTAVIA                  Department: ER  MRN:        [id number]                      Room:       S186  Gender:     Male                         Technician: EM  :        1960                   Requested By:ER TRIAGE PROTOCOL  Order #:    [id number]                    Reading MD: Rashard Brown MD    Measurements  Intervals                                Axis  Rate:       98                           P:          80  LA:         120                          QRS:        -97  QRSD:       144                          T:          52  QT:         378  QTc:        483    Interpretive Statements  Sinus rhythm  Atrial premature complexes  RBBB and LAFB  Electronically Signed On 2025 18:46:43 PDT by Rashard Brown MD     DIAGNOSTIC ALCOHOL    Collection Time: 25  8:18 PM   Result Value Ref Range    Diagnostic Alcohol <10.1 <10.1 mg/dL   POCT glucose device results    Collection Time: 25  8:29 PM   Result Value Ref Range    POC Glucose, Blood 136 (H) 65 - 99 mg/dL   EKG    Collection Time: 25  8:57 PM   Result Value Ref Range    Report       [facility]    Test Date:  2025  Pt Name:    NICOLE DUDLEY                  Department: [facility]  MRN:        [id number]                      Room:       86  Gender:     Male                         Technician: JEANNIE  :        1960                   Requested By:BHAVANA WHEELER  Order #:    [id number]                    Reading MD:    Measurements  Intervals                                Axis  Rate:       81                           P:          78  LA:         122                          QRS:        -95  QRSD:       149                          T:          25  QT:         428  QTc:        497    Interpretive Statements  Sinus rhythm  Atrial premature complex  Consider right atrial enlargement  RBBB and LAFB  Baseline wander in lead(s) I,III,aVR,aVL,V1,V3  Compared to ECG 2025 18:17:34  No significant changes     CBC WITHOUT DIFFERENTIAL     Collection Time: 04/16/25  9:03 PM   Result Value Ref Range    WBC 7.6 4.8 - 10.8 K/uL    RBC 4.29 (L) 4.70 - 6.10 M/uL    Hemoglobin 13.2 (L) 14.0 - 18.0 g/dL    Hematocrit 47.3 42.0 - 52.0 %    .3 (H) 81.4 - 97.8 fL    MCH 30.8 27.0 - 33.0 pg    MCHC 27.9 (L) 32.3 - 36.5 g/dL    RDW 56.7 (H) 35.9 - 50.0 fL    Platelet Count 120 (L) 164 - 446 K/uL    MPV 10.5 9.0 - 12.9 fL   Comp Metabolic Panel    Collection Time: 04/16/25  9:03 PM   Result Value Ref Range    Sodium 144 135 - 145 mmol/L    Potassium 5.3 3.6 - 5.5 mmol/L    Chloride 98 96 - 112 mmol/L    Co2 40 (H) 20 - 33 mmol/L    Anion Gap 6.0 (L) 7.0 - 16.0    Glucose 122 (H) 65 - 99 mg/dL    Bun 22 8 - 22 mg/dL    Creatinine 1.06 0.50 - 1.40 mg/dL    Calcium 10.0 8.5 - 10.5 mg/dL    Correct Calcium 9.7 8.5 - 10.5 mg/dL    AST(SGOT) 30 12 - 45 U/L    ALT(SGPT) 13 2 - 50 U/L    Alkaline Phosphatase 113 (H) 30 - 99 U/L    Total Bilirubin 0.4 0.1 - 1.5 mg/dL    Albumin 4.4 3.2 - 4.9 g/dL    Total Protein 8.1 6.0 - 8.2 g/dL    Globulin 3.7 (H) 1.9 - 3.5 g/dL    A-G Ratio 1.2 g/dL   LACTIC ACID    Collection Time: 04/16/25  9:03 PM   Result Value Ref Range    Lactic Acid 0.4 (L) 0.5 - 2.0 mmol/L   AMMONIA    Collection Time: 04/16/25  9:03 PM   Result Value Ref Range    Ammonia 69 (H) 11 - 45 umol/L   ESTIMATED GFR    Collection Time: 04/16/25  9:03 PM   Result Value Ref Range    GFR (CKD-EPI) 78 >60 mL/min/1.73 m 2       Imaging  DX-CHEST-PORTABLE (1 VIEW)   Final Result         1.  Slight hazy left lung base density, could represent subtle infiltrates   2.  Atherosclerosis      CT-CTA NECK WITH & W/O-POST PROCESSING   Final Result         1. There is atherosclerosis with less than 50% diameter stenosis of the internal carotid arteries.   2. There is calcific plaque and severe stenosis at the origin of the right vertebral artery.   3. COPD.      CT-CEREBRAL PERFUSION ANALYSIS   Final Result      1. Cerebral blood flow less than 30% possibly representing  "completed infarct = 0 mL. Based on distribution of this finding, this is unlikely to represent artifact.      2. T Max more than 6 seconds possibly representing combination of completed infarct and ischemia = 0 mL. Based on the distribution of this finding, this is unlikely to represent artifact.      3. Mismatched volume possibly representing ischemic brain/penumbra= 0 mL      4.  Please note that this cerebral perfusion study and report is Quantitative and targets supratentorial (cerebral) perfusion for evaluation of large vessel territory acute ischemia/infarction. For example, lacunar infarcts, and brainstem/posterior fossa    ischemia/infarction are not evaluated on this study.  Data acquisition is subject to artifacts which can yield non-anatomically plausible perfusion maps which may be due to motion, bolus timing, signal to noise ratio, or other technical factors.    Perfusion map abnormalities which show non-anatomic distributions are likely artifact.   This study is not \"stand-alone\" and should only be utilized for diagnosis, management/treatment in correlation with CT, CTA, and/or MRI and clinical factors.         CT-CTA HEAD WITH & W/O-POST PROCESS   Final Result      Atherosclerosis without significant stenosis, occlusion, or aneurysm.      CT-HEAD W/O   Final Result      1. No acute intracranial abnormality.   2. Age-related central and cortical atrophy and diffuse chronic microangiopathic white matter changes versus demyelination or gliosis.   3. Left frontoparietal scalp lesions. Correlate clinically.   4. Left maxillary sinus disease.               DX-CHEST-PORTABLE (1 VIEW)   Final Result      No acute cardiac or pulmonary abnormalities are identified.      MR-BRAIN-W/O    (Results Pending)   DX-CHEST-LIMITED (1 VIEW)    (Results Pending)   DX-CHEST-PORTABLE (1 VIEW)    (Results Pending)   EC-ECHOCARDIOGRAM COMPLETE W/O CONT    (Results Pending)   DX-ABDOMEN FOR TUBE PLACEMENT    (Results Pending) "       Assessment/Plan  * Acute CVA (cerebrovascular accident) (HCC)- (present on admission)  Assessment & Plan  Uncertain if focal deficits were related to elevated PCO2 upon admission  Not a candidate for intervention upon admission    MRI brain  ECHO  Tele monitoring  Neuro checks  A1c/Lipid panel  Secondary risk prevention    Increased ammonia level  Assessment & Plan  Uncertain if he carries the diagnosis of cirrhosis    Lactulose   L carnitine   Trend     Thrombocytopenia (HCC)  Assessment & Plan  Chronic in the setting of alcohol abuse    Monitor for bleeding  Conservative transfusion strategy     Acute on chronic respiratory failure (HCC)  Assessment & Plan  Secondary to COPD exacerbation  04/16/25 intubated    Lung protective ventilation strategies  Optimize oxygenation, ventilation, and acid base balance  ABCDEF Bundle   Viral panel  ECHO  Treatment for COPD  Empiric unasyn + azithro - will stop if PCT is negative     Acute systolic heart failure (HCC)- (present on admission)  Assessment & Plan  Repeat ECHO     GDMT when appropriate  Maintain euvolemia     Alcohol dependence (HCC)- (present on admission)  Assessment & Plan  Reported history of    Monitor for withdrawal  Thiamine supplementation  Electrolyte replacement       Acute exacerbation of chronic obstructive pulmonary disease (COPD) (HCC)- (present on admission)  Assessment & Plan  04/16/25 intubated    Duonebs  Albuterol x 1 hour  Ketamine for bronchodilation  Steroids    Stage 3 chronic kidney disease- (present on admission)  Assessment & Plan  Strict I/Os  Renally dosed medications  Avoid nephrotoxic agents as able  Trend     Type 2 diabetes mellitus (HCC)- (present on admission)  Assessment & Plan  Goal glucose 140-180  SQ insulin  Hypoglycemic protocols     Primary hypertension- (present on admission)  Assessment & Plan  Reintroduce oral antihypertensives when appropriate      Dyslipidemia- (present on admission)  Assessment &  Plan  Atorvastatin         Discussed patient condition and risk of morbidity and/or mortality with RN, RT, Pharmacy, Charge nurse / hot rounds, and Patient.    The patient remains critically ill.  Critical care time = 80 minutes in directly providing and coordinating critical care and extensive data review.  No time overlap and excludes procedures.

## 2025-04-17 NOTE — PROGRESS NOTES
Lactate climbing - possibly due to large volume of albuterol nebs, otherwise he is getting high dose thiamine and vasopressors for MAP > 65, will continue to monitor    Phos is critically low, but his K and Na are not significantly abnormal.  I'll give 15mmol of Na phos and K phos for a total of 30 mmol replacement in efforts not to drive K up too high    Tom Torres M.D.

## 2025-04-18 ENCOUNTER — APPOINTMENT (OUTPATIENT)
Dept: RADIOLOGY | Facility: MEDICAL CENTER | Age: 65
DRG: 291 | End: 2025-04-18
Attending: INTERNAL MEDICINE
Payer: COMMERCIAL

## 2025-04-18 PROBLEM — R56.9 SEIZURE (HCC): Status: ACTIVE | Noted: 2025-04-18

## 2025-04-18 PROBLEM — R41.82 ALTERED MENTAL STATUS: Status: ACTIVE | Noted: 2025-04-16

## 2025-04-18 LAB
ALBUMIN SERPL BCP-MCNC: 3.4 G/DL (ref 3.2–4.9)
ALBUMIN/GLOB SERPL: 1.1 G/DL
ALP SERPL-CCNC: 74 U/L (ref 30–99)
ALT SERPL-CCNC: 9 U/L (ref 2–50)
ANION GAP SERPL CALC-SCNC: 12 MMOL/L (ref 7–16)
AST SERPL-CCNC: 37 U/L (ref 12–45)
BASE EXCESS BLDA CALC-SCNC: 10 MMOL/L (ref -4–3)
BASE EXCESS BLDA CALC-SCNC: 11 MMOL/L (ref -4–3)
BILIRUB SERPL-MCNC: 0.3 MG/DL (ref 0.1–1.5)
BODY TEMPERATURE: ABNORMAL DEGREES
BODY TEMPERATURE: ABNORMAL DEGREES
BREATHS SETTING VENT: 20
BREATHS SETTING VENT: 28
BUN SERPL-MCNC: 39 MG/DL (ref 8–22)
CALCIUM ALBUM COR SERPL-MCNC: 9.4 MG/DL (ref 8.5–10.5)
CALCIUM SERPL-MCNC: 8.9 MG/DL (ref 8.5–10.5)
CHLORIDE SERPL-SCNC: 101 MMOL/L (ref 96–112)
CO2 BLDA-SCNC: 39 MMOL/L (ref 32–48)
CO2 BLDA-SCNC: 39 MMOL/L (ref 32–48)
CO2 SERPL-SCNC: 30 MMOL/L (ref 20–33)
CREAT SERPL-MCNC: 1.64 MG/DL (ref 0.5–1.4)
DELSYS IDSYS: ABNORMAL
DELSYS IDSYS: ABNORMAL
END TIDAL CARBON DIOXIDE IECO2: 30 MMHG
ERYTHROCYTE [DISTWIDTH] IN BLOOD BY AUTOMATED COUNT: 55.2 FL (ref 35.9–50)
GFR SERPLBLD CREATININE-BSD FMLA CKD-EPI: 46 ML/MIN/1.73 M 2
GLOBULIN SER CALC-MCNC: 3 G/DL (ref 1.9–3.5)
GLUCOSE BLD STRIP.AUTO-MCNC: 110 MG/DL (ref 65–99)
GLUCOSE BLD STRIP.AUTO-MCNC: 115 MG/DL (ref 65–99)
GLUCOSE BLD STRIP.AUTO-MCNC: 142 MG/DL (ref 65–99)
GLUCOSE BLD STRIP.AUTO-MCNC: 143 MG/DL (ref 65–99)
GLUCOSE SERPL-MCNC: 130 MG/DL (ref 65–99)
HCO3 BLDA-SCNC: 37.3 MMOL/L (ref 21–28)
HCO3 BLDA-SCNC: 37.7 MMOL/L (ref 21–28)
HCT VFR BLD AUTO: 36.3 % (ref 42–52)
HGB BLD-MCNC: 11.1 G/DL (ref 14–18)
HOROWITZ INDEX BLDA+IHG-RTO: 112 MM[HG]
HOROWITZ INDEX BLDA+IHG-RTO: 378 MM[HG]
LACTATE BLD-SCNC: 1.2 MMOL/L (ref 0.5–2)
LACTATE BLD-SCNC: 1.5 MMOL/L (ref 0.5–2)
MAGNESIUM SERPL-MCNC: 2.3 MG/DL (ref 1.5–2.5)
MCH RBC QN AUTO: 31.1 PG (ref 27–33)
MCHC RBC AUTO-ENTMCNC: 30.6 G/DL (ref 32.3–36.5)
MCV RBC AUTO: 101.7 FL (ref 81.4–97.8)
MODE IMODE: ABNORMAL
MODE IMODE: ABNORMAL
O2/TOTAL GAS SETTING VFR VENT: 100 %
O2/TOTAL GAS SETTING VFR VENT: 50 %
PCO2 BLDA: 58.3 MMHG (ref 32–48)
PCO2 BLDA: 60.4 MMHG (ref 32–48)
PCO2 TEMP ADJ BLDA: 58.6 MMHG (ref 32–48)
PCO2 TEMP ADJ BLDA: 60.4 MMHG (ref 32–48)
PEEP END EXPIRATORY PRESSURE IPEEP: 8 CMH20
PEEP END EXPIRATORY PRESSURE IPEEP: 8 CMH20
PH BLDA: 7.4 [PH] (ref 7.35–7.45)
PH BLDA: 7.42 [PH] (ref 7.35–7.45)
PH TEMP ADJ BLDA: 7.4 [PH] (ref 7.35–7.45)
PH TEMP ADJ BLDA: 7.42 [PH] (ref 7.35–7.45)
PHOSPHATE SERPL-MCNC: 4.1 MG/DL (ref 2.5–4.5)
PHOSPHATE SERPL-MCNC: 4.4 MG/DL (ref 2.5–4.5)
PHOSPHATE SERPL-MCNC: 7.2 MG/DL (ref 2.5–4.5)
PLATELET # BLD AUTO: 130 K/UL (ref 164–446)
PMV BLD AUTO: 10.7 FL (ref 9–12.9)
PO2 BLDA: 378 MMHG (ref 83–108)
PO2 BLDA: 56 MMHG (ref 83–108)
PO2 TEMP ADJ BLDA: 379 MMHG (ref 83–108)
PO2 TEMP ADJ BLDA: 56 MMHG (ref 83–108)
POTASSIUM SERPL-SCNC: 3.8 MMOL/L (ref 3.6–5.5)
PROLACTIN SERPL-MCNC: 42.4 NG/ML (ref 2.1–17.7)
PROT SERPL-MCNC: 6.4 G/DL (ref 6–8.2)
RBC # BLD AUTO: 3.57 M/UL (ref 4.7–6.1)
SAO2 % BLDA: 100 % (ref 93–99)
SAO2 % BLDA: 88 % (ref 93–99)
SODIUM SERPL-SCNC: 143 MMOL/L (ref 135–145)
SPECIMEN DRAWN FROM PATIENT: ABNORMAL
SPECIMEN DRAWN FROM PATIENT: ABNORMAL
TIDAL VOLUME IVT: 370 ML
TIDAL VOLUME IVT: 430 ML
WBC # BLD AUTO: 16 K/UL (ref 4.8–10.8)

## 2025-04-18 PROCEDURE — 700102 HCHG RX REV CODE 250 W/ 637 OVERRIDE(OP): Performed by: INTERNAL MEDICINE

## 2025-04-18 PROCEDURE — 71045 X-RAY EXAM CHEST 1 VIEW: CPT

## 2025-04-18 PROCEDURE — 4A10X4Z MONITORING OF CENTRAL NERVOUS ELECTRICAL ACTIVITY, EXTERNAL APPROACH: ICD-10-PCS | Performed by: PSYCHIATRY & NEUROLOGY

## 2025-04-18 PROCEDURE — 770022 HCHG ROOM/CARE - ICU (200)

## 2025-04-18 PROCEDURE — 84146 ASSAY OF PROLACTIN: CPT

## 2025-04-18 PROCEDURE — 84100 ASSAY OF PHOSPHORUS: CPT | Mod: 91

## 2025-04-18 PROCEDURE — 700101 HCHG RX REV CODE 250: Performed by: STUDENT IN AN ORGANIZED HEALTH CARE EDUCATION/TRAINING PROGRAM

## 2025-04-18 PROCEDURE — 83605 ASSAY OF LACTIC ACID: CPT | Mod: 91

## 2025-04-18 PROCEDURE — A9270 NON-COVERED ITEM OR SERVICE: HCPCS | Performed by: INTERNAL MEDICINE

## 2025-04-18 PROCEDURE — 83735 ASSAY OF MAGNESIUM: CPT

## 2025-04-18 PROCEDURE — 95714 VEEG EA 12-26 HR UNMNTR: CPT | Performed by: STUDENT IN AN ORGANIZED HEALTH CARE EDUCATION/TRAINING PROGRAM

## 2025-04-18 PROCEDURE — 36600 WITHDRAWAL OF ARTERIAL BLOOD: CPT

## 2025-04-18 PROCEDURE — 94640 AIRWAY INHALATION TREATMENT: CPT

## 2025-04-18 PROCEDURE — 95718 EEG PHYS/QHP 2-12 HR W/VEEG: CPT | Performed by: PSYCHIATRY & NEUROLOGY

## 2025-04-18 PROCEDURE — 700105 HCHG RX REV CODE 258: Performed by: INTERNAL MEDICINE

## 2025-04-18 PROCEDURE — 700111 HCHG RX REV CODE 636 W/ 250 OVERRIDE (IP): Mod: JZ | Performed by: INTERNAL MEDICINE

## 2025-04-18 PROCEDURE — 700101 HCHG RX REV CODE 250: Performed by: INTERNAL MEDICINE

## 2025-04-18 PROCEDURE — 82962 GLUCOSE BLOOD TEST: CPT | Mod: 91

## 2025-04-18 PROCEDURE — 85027 COMPLETE CBC AUTOMATED: CPT

## 2025-04-18 PROCEDURE — 80053 COMPREHEN METABOLIC PANEL: CPT

## 2025-04-18 PROCEDURE — 92950 HEART/LUNG RESUSCITATION CPR: CPT

## 2025-04-18 PROCEDURE — 94799 UNLISTED PULMONARY SVC/PX: CPT

## 2025-04-18 PROCEDURE — 99291 CRITICAL CARE FIRST HOUR: CPT | Performed by: INTERNAL MEDICINE

## 2025-04-18 PROCEDURE — 94003 VENT MGMT INPAT SUBQ DAY: CPT

## 2025-04-18 PROCEDURE — 82803 BLOOD GASES ANY COMBINATION: CPT | Mod: 91

## 2025-04-18 PROCEDURE — 700111 HCHG RX REV CODE 636 W/ 250 OVERRIDE (IP): Performed by: STUDENT IN AN ORGANIZED HEALTH CARE EDUCATION/TRAINING PROGRAM

## 2025-04-18 PROCEDURE — 95711 VEEG 2-12 HR UNMONITORED: CPT | Performed by: PSYCHIATRY & NEUROLOGY

## 2025-04-18 PROCEDURE — 95700 EEG CONT REC W/VID EEG TECH: CPT | Performed by: STUDENT IN AN ORGANIZED HEALTH CARE EDUCATION/TRAINING PROGRAM

## 2025-04-18 RX ORDER — MIDAZOLAM HYDROCHLORIDE 1 MG/ML
INJECTION INTRAMUSCULAR; INTRAVENOUS
Status: COMPLETED
Start: 2025-04-18 | End: 2025-04-19

## 2025-04-18 RX ORDER — MIDAZOLAM HYDROCHLORIDE 1 MG/ML
5 INJECTION INTRAMUSCULAR; INTRAVENOUS
Status: DISCONTINUED | OUTPATIENT
Start: 2025-04-18 | End: 2025-04-23 | Stop reason: HOSPADM

## 2025-04-18 RX ORDER — FUROSEMIDE 10 MG/ML
40 INJECTION INTRAMUSCULAR; INTRAVENOUS ONCE
Status: COMPLETED | OUTPATIENT
Start: 2025-04-18 | End: 2025-04-18

## 2025-04-18 RX ORDER — LEVETIRACETAM 500 MG/5ML
30 INJECTION, SOLUTION, CONCENTRATE INTRAVENOUS ONCE
Status: COMPLETED | OUTPATIENT
Start: 2025-04-18 | End: 2025-04-18

## 2025-04-18 RX ORDER — LEVETIRACETAM 500 MG/5ML
1000 INJECTION, SOLUTION, CONCENTRATE INTRAVENOUS EVERY 12 HOURS
Status: DISCONTINUED | OUTPATIENT
Start: 2025-04-18 | End: 2025-04-21

## 2025-04-18 RX ORDER — MIDAZOLAM HYDROCHLORIDE 1 MG/ML
5 INJECTION INTRAMUSCULAR; INTRAVENOUS ONCE
Status: COMPLETED | OUTPATIENT
Start: 2025-04-18 | End: 2025-04-18

## 2025-04-18 RX ORDER — METHYLPREDNISOLONE SODIUM SUCCINATE 125 MG/2ML
62.5 INJECTION, POWDER, LYOPHILIZED, FOR SOLUTION INTRAMUSCULAR; INTRAVENOUS EVERY 12 HOURS
Status: DISCONTINUED | OUTPATIENT
Start: 2025-04-18 | End: 2025-04-19

## 2025-04-18 RX ORDER — POTASSIUM CHLORIDE 7.45 MG/ML
10 INJECTION INTRAVENOUS
Status: COMPLETED | OUTPATIENT
Start: 2025-04-18 | End: 2025-04-18

## 2025-04-18 RX ADMIN — ASPIRIN 81 MG: 81 TABLET, CHEWABLE ORAL at 05:09

## 2025-04-18 RX ADMIN — PROPOFOL 25 MCG/KG/MIN: 10 INJECTION, EMULSION INTRAVENOUS at 07:27

## 2025-04-18 RX ADMIN — THIAMINE HYDROCHLORIDE 200 MG: 100 INJECTION, SOLUTION INTRAMUSCULAR; INTRAVENOUS at 05:08

## 2025-04-18 RX ADMIN — LACTULOSE 30 ML: 10 SOLUTION ORAL at 05:08

## 2025-04-18 RX ADMIN — IPRATROPIUM BROMIDE AND ALBUTEROL SULFATE 3 ML: .5; 2.5 SOLUTION RESPIRATORY (INHALATION) at 02:17

## 2025-04-18 RX ADMIN — LACTULOSE 30 ML: 10 SOLUTION ORAL at 17:08

## 2025-04-18 RX ADMIN — ALLOPURINOL 50 MG: 100 TABLET ORAL at 05:09

## 2025-04-18 RX ADMIN — ATORVASTATIN CALCIUM 80 MG: 40 TABLET, FILM COATED ORAL at 17:08

## 2025-04-18 RX ADMIN — SENNOSIDES AND DOCUSATE SODIUM 2 TABLET: 50; 8.6 TABLET ORAL at 05:10

## 2025-04-18 RX ADMIN — IPRATROPIUM BROMIDE AND ALBUTEROL SULFATE 3 ML: .5; 2.5 SOLUTION RESPIRATORY (INHALATION) at 10:29

## 2025-04-18 RX ADMIN — LACTULOSE 30 ML: 10 SOLUTION ORAL at 12:14

## 2025-04-18 RX ADMIN — Medication 300 MCG/HR: at 12:27

## 2025-04-18 RX ADMIN — THERA TABS 1 TABLET: TAB at 05:09

## 2025-04-18 RX ADMIN — PROPOFOL 20 MCG/KG/MIN: 10 INJECTION, EMULSION INTRAVENOUS at 18:12

## 2025-04-18 RX ADMIN — METHYLPREDNISOLONE SODIUM SUCCINATE 62.5 MG: 125 INJECTION, POWDER, FOR SOLUTION INTRAMUSCULAR; INTRAVENOUS at 05:08

## 2025-04-18 RX ADMIN — IPRATROPIUM BROMIDE AND ALBUTEROL SULFATE 3 ML: .5; 2.5 SOLUTION RESPIRATORY (INHALATION) at 22:16

## 2025-04-18 RX ADMIN — MIDAZOLAM HYDROCHLORIDE 5 MG: 1 INJECTION, SOLUTION INTRAMUSCULAR; INTRAVENOUS at 07:31

## 2025-04-18 RX ADMIN — IPRATROPIUM BROMIDE AND ALBUTEROL SULFATE 3 ML: .5; 2.5 SOLUTION RESPIRATORY (INHALATION) at 18:53

## 2025-04-18 RX ADMIN — IPRATROPIUM BROMIDE AND ALBUTEROL SULFATE 3 ML: .5; 2.5 SOLUTION RESPIRATORY (INHALATION) at 14:16

## 2025-04-18 RX ADMIN — NOREPINEPHRINE BITARTRATE 0.05 MCG/KG/MIN: 1 INJECTION, SOLUTION, CONCENTRATE INTRAVENOUS at 08:06

## 2025-04-18 RX ADMIN — LEVETIRACETAM 1000 MG: 100 INJECTION, SOLUTION INTRAVENOUS at 17:09

## 2025-04-18 RX ADMIN — FAMOTIDINE 20 MG: 20 TABLET, FILM COATED ORAL at 05:09

## 2025-04-18 RX ADMIN — AMPICILLIN SODIUM, SULBACTAM SODIUM 3 G: 2; 1 INJECTION, POWDER, FOR SOLUTION INTRAMUSCULAR; INTRAVENOUS at 00:21

## 2025-04-18 RX ADMIN — POTASSIUM CHLORIDE 10 MEQ: 7.46 INJECTION, SOLUTION INTRAVENOUS at 07:41

## 2025-04-18 RX ADMIN — HEPARIN SODIUM 5000 UNITS: 5000 INJECTION, SOLUTION INTRAVENOUS; SUBCUTANEOUS at 05:08

## 2025-04-18 RX ADMIN — HEPARIN SODIUM 5000 UNITS: 5000 INJECTION, SOLUTION INTRAVENOUS; SUBCUTANEOUS at 14:48

## 2025-04-18 RX ADMIN — FOLIC ACID 1 MG: 1 TABLET ORAL at 05:09

## 2025-04-18 RX ADMIN — POTASSIUM CHLORIDE 10 MEQ: 7.46 INJECTION, SOLUTION INTRAVENOUS at 08:48

## 2025-04-18 RX ADMIN — AZITHROMYCIN DIHYDRATE 500 MG: 500 INJECTION, POWDER, LYOPHILIZED, FOR SOLUTION INTRAVENOUS at 05:28

## 2025-04-18 RX ADMIN — AMPICILLIN SODIUM, SULBACTAM SODIUM 3 G: 2; 1 INJECTION, POWDER, FOR SOLUTION INTRAMUSCULAR; INTRAVENOUS at 07:29

## 2025-04-18 RX ADMIN — FENTANYL CITRATE 50 MCG: 50 INJECTION, SOLUTION INTRAMUSCULAR; INTRAVENOUS at 07:19

## 2025-04-18 RX ADMIN — FUROSEMIDE 40 MG: 10 INJECTION, SOLUTION INTRAVENOUS at 07:38

## 2025-04-18 RX ADMIN — METHYLPREDNISOLONE SODIUM SUCCINATE 62.5 MG: 125 INJECTION, POWDER, FOR SOLUTION INTRAMUSCULAR; INTRAVENOUS at 17:09

## 2025-04-18 RX ADMIN — Medication 300 MCG/HR: at 18:25

## 2025-04-18 RX ADMIN — Medication 300 MCG/HR: at 02:04

## 2025-04-18 RX ADMIN — HEPARIN SODIUM 5000 UNITS: 5000 INJECTION, SOLUTION INTRAVENOUS; SUBCUTANEOUS at 22:43

## 2025-04-18 RX ADMIN — LEVETIRACETAM 1990 MG: 100 INJECTION, SOLUTION INTRAVENOUS at 07:58

## 2025-04-18 ASSESSMENT — PAIN DESCRIPTION - PAIN TYPE
TYPE: ACUTE PAIN

## 2025-04-18 ASSESSMENT — FIBROSIS 4 INDEX: FIB4 SCORE: 4.6

## 2025-04-18 NOTE — CARE PLAN
Problem: Bronchoconstriction  Goal: Improve in air movement and diminished wheezing  Description: Target End Date:  2 to 3 days1.  Implement inhaled treatments2.  Evaluate and manage medication effects  Outcome: Not Met     Problem: Ventilation  Goal: Ability to achieve and maintain unassisted ventilation or tolerate decreased levels of ventilator support  Description: Target End Date:  4 days Document on Vent flowsheet1.  Support and monitor invasive and noninvasive mechanical ventilation2.  Monitor ventilator weaning response3.  Perform ventilator associated pneumonia prevention interventions4.  Manage ventilation therapy by monitoring diagnostic test results  Outcome: Not Met     Ventilator Daily Summary    Vent Day #2  Airway: 8.0@25    Ventilator settings: CMV R [phone]    Weaning trials:   Respiratory Procedures:     Plan: Continue current ventilator settings and wean mechanical ventilation as tolerated per physician orders.

## 2025-04-18 NOTE — CARE PLAN
The patient is Watcher - Medium risk of patient condition declining or worsening    Shift Goals  Clinical Goals: hemodynamic stability, neurologic stability  Patient Goals: doug  Family Goals: updates    Progress made toward(s) clinical / shift goals:    Problem: Hemodynamic Monitoring  Goal: Patient's hemodynamics, fluid balance and neurologic status will be stable or improve  Outcome: Progressing     Problem: Pain - Standard  Goal: Alleviation of pain or a reduction in pain to the patient’s comfort goal  Outcome: Progressing     Problem: Safety - Medical Restraint  Goal: Remains free of injury from restraints (Restraint for Interference with Medical Device)  Outcome: Progressing       Patient is not progressing towards the following goals:

## 2025-04-18 NOTE — DISCHARGE PLANNING
Care Transition Team Assessment    Patient is a 64 year old male admitted for acute CVA. Please see patient's H&P for prior medical history. RNCM met with patient's spouse at bedside to complete assessment. Patient lives with his spouse in a single story house, Carie Berrios [phone]; emergency contact and NOK. No Advanced directive on file. Prior to admission patient was independent with ADL's and IADL's. Patient does not use any DME at baseline. Patient has support from his spouse and son. Patient receives monthly SSI deposits. Patient's PCP is Dmitry Ritter. Patient's preferred pharmacy is [facility] in [place]. Patient has no history of SNF/IPR nor HHC use in the past. Patient does not have any SA or MH concerns per the spouse. Patient's spouse confirmed medical coverage via [facility].     Patient was discussed in IDT rounds, patient is on propofol and fentanyl, IV abx. MD ordered EEG stat.     Information Source  Orientation Level: Unable to assess  Information Given By: Spouse  Informant's Name: Carie Berrios  Who is responsible for making decisions for patient? : Legal next of kin  Name(s) of Primary Decision Maker: Carie Berrios    Readmission Evaluation  Is this a readmission?: No    Elopement Risk  Legal Hold: No  Ambulatory or Self Mobile in Wheelchair: No-Not an Elopement Risk  Elopement Risk: Not at Risk for Elopement    Discharge Preparedness  What is your plan after discharge?: Home with help  What are your discharge supports?: Spouse, Child  Prior Functional Level: Ambulatory, Independent with Activities of Daily Living, Independent with Medication Management  Difficulity with ADLs: None  Difficulity with IADLs: None    Functional Assesment  Prior Functional Level: Ambulatory, Independent with Activities of Daily Living, Independent with Medication Management    Finances  Financial Barriers to Discharge: No  Prescription Coverage: Yes    Advance Directive  Advance Directive?: None  Advance Directive  offered?: AD Booklet refused    Domestic Abuse  Have you ever been the victim of abuse or violence?: No    Psychological Assessment  History of Substance Abuse: None  History of Psychiatric Problems: No  Non-compliant with Treatment: No  Newly Diagnosed Illness: No    Discharge Risks or Barriers  Discharge risks or barriers?: No  Patient risk factors: Complex medical needs, Substance abuse    Anticipated Discharge Information  Discharge Disposition: Disch to  rehab facility or distinct part unit (62)

## 2025-04-18 NOTE — ASSESSMENT & PLAN NOTE
On 4/18  No hx of seizure, hx of EtOH use but last drink was January per wife  cEEG  MRI reviewed  Keppra  PRN benzo  Seizure precautions

## 2025-04-18 NOTE — PROGRESS NOTES
"Critical Care Progress Note    Date of admission  4/16/2025    Chief Complaint  64 y.o. male admitted 4/16/2025 with AMS    Hospital Course  \"64 y.o. male w/ PMH of CAD s/p PCI, HFmrEF 45%, CKD IIIa, DM2, asthma/COPD on 4lpm home o2, ethanol abuse, tobacco abuse who presented 4/16/2025 with SOB and AMS. Family state pt had mild aphasia on 4/15 that seemed to worsen as the day went on, AM of 4/16 he was not responsive to commands. Per family he had no complaints the day prior.               In the ED vitals showed hypoxia. Labs showed elevated BNP. Trop wnl x2. CT head w/o showed chronic microvasc changes, CTA HN w/o acute dz. CXR wnl. EKG w/ RBBB and LAFB no acute ischemia. Neuro was consultedi n the ED and recommended admission for CVA workup\"     During the evening of admission he became less responsive, ABG showed respiratory acidosis. He was moved to the ICU for intubation. There is no further history available at this time.     4/17: VD #2, weaned off ketamine. Following commands. ABG improved    Interval Problem Update  Reviewed last 24 hour events:   - doing well overnight   - Neuro: following   - HR: 80s-100s   - SBP: 90s-100s, off NE   - GI: TF at goal, last BM this AM   - UOP: 815 mL/24 hrs, Net +1.6L   - Garibay: yes   - Tm: 37.2   - Lines: PIV   - PPx: GI pepcid, DVT heparin   - ABG: compensated respiratory acidosis   - VD#3   - initially doing well on SBT   - CXR (personally reviewed and compared to prior): NAD   - Mobility level 2, eligible for progression yes    Updates: during SBT had a seizure-like event precipitating hypoxia. Midazolam 5 mg given and loaded with keppra. STAT EEG ordered    Yesterday   - transferred overnight for severe hypercapnia, intubated   - Neuro: on ketamine for sedation/bronchodilation   - HR: 80s-110s   - SBP: 90s-140s, NE at 0.08   - GI: TF at 25 mL/hr, last BM PTA   - UOP: 420 mL since transfer   - Garibay: yes   - Tm: 38.4   - Lines: PIV   - PPx: GI pepcid, DVT heparin   - " ABG: , compensated respiratory acidosis   - VD #2   - CXR (personally reviewed and compared to prior): clear, hyperinflated   - Mobility level 1, eligible for progression yes   - 30 mmol of phos    Review of Systems  Review of Systems   Unable to perform ROS: Critical illness        Vital Signs for last 24 hours   Pulse:  [] 92  Resp:  [15-31] 20  BP: ()/(47-70) 102/53  SpO2:  [89 %-98 %] 93 %    Hemodynamic parameters for last 24 hours       Respiratory Information for the last 24 hours  Vent Mode: APVCMV  Rate (breaths/min): 20  Vt Target (mL): 430  PEEP/CPAP: 10  MAP: 13  Control VTE (exp VT): 401    Physical Exam   Physical Exam  Vitals and nursing note reviewed.   Constitutional:       General: He is in acute distress.      Appearance: He is well-developed. He is ill-appearing.      Interventions: He is intubated.   HENT:      Head: Normocephalic and atraumatic.      Right Ear: External ear normal.      Left Ear: External ear normal.      Mouth/Throat:      Comments: ETT in place  Eyes:      Conjunctiva/sclera: Conjunctivae normal.      Pupils: Pupils are equal, round, and reactive to light.   Neck:      Vascular: No JVD.      Trachea: No tracheal deviation.   Cardiovascular:      Rate and Rhythm: Normal rate and regular rhythm.      Pulses: Normal pulses.   Pulmonary:      Effort: He is intubated.      Breath sounds: No wheezing or rales.      Comments: Good air movement, normal vent mechanics  Abdominal:      General: Bowel sounds are normal. There is no distension.      Palpations: Abdomen is soft.   Musculoskeletal:         General: No tenderness.      Cervical back: Neck supple.   Skin:     General: Skin is warm and dry.      Capillary Refill: Capillary refill takes less than 2 seconds.      Findings: No rash.   Neurological:      General: No focal deficit present.      Cranial Nerves: No cranial nerve deficit.      Sensory: No sensory deficit.      Motor: No weakness.      Comments:  Following in all 4   Psychiatric:      Comments: Unable to assess         Medications  Current Facility-Administered Medications   Medication Dose Route Frequency Provider Last Rate Last Admin    potassium chloride (KCL) ivpb 10 mEq  10 mEq Intravenous Q HOUR Tanner Balbuena Jr., D.O.        famotidine (Pepcid) tablet 20 mg  20 mg Enteral Tube DAILY SONIA Kumar Jr..OBrenton   20 mg at 04/18/25 0509    Or    famotidine (Pepcid) injection 20 mg  20 mg Intravenous DAILY BRANDON Kumar Jr.OBrenton        folic acid (Folvite) tablet 1 mg  1 mg Oral DAILY SONIA Kumar Jr..O.   1 mg at 04/18/25 0509    multivitamin tablet 1 Tablet  1 Tablet Oral DAILY SONIA Kumar Jr..OBrenton   1 Tablet at 04/18/25 0509    heparin injection 5,000 Units  5,000 Units Subcutaneous Q8HRS Adelfo Chowdhury D.O.   5,000 Units at 04/18/25 0508    labetalol (Normodyne/Trandate) injection 10 mg  10 mg Intravenous Q4HRS PRN Adelfo Chowdhury D.O.        ipratropium-albuterol (DUONEB) nebulizer solution  3 mL Nebulization Q4HRS (RT) Adelfo Chowdhury D.O.   3 mL at 04/18/25 0217    Pharmacy Consult: Enteral tube insertion - review meds/change route/product selection  1 Each Other PHARMACY TO DOSE Jarrett Greene M.D.        Respiratory Therapy Consult   Nebulization Continuous RT Jarrett Greene M.D.        fentaNYL (Sublimaze) injection 50 mcg  50 mcg Intravenous Q15 MIN PRN Jarrett Greene M.D.        And    fentaNYL (Sublimaze) injection 100 mcg  100 mcg Intravenous Q15 MIN PRN Jarrett Greene M.D.   100 mcg at 04/17/25 1220    And    fentaNYL (SUBLIMAZE) 50 mcg/mL in 50mL (Continuous Infusion)   Intravenous Continuous Jarrett Greene M.D. 6 mL/hr at 04/18/25 0204 300 mcg/hr at 04/18/25 0204    And    propofol (DIPRIVAN) injection  0-40 mcg/kg/min (Ideal) Intravenous Continuous Jarrett Greene M.D. 5.5 mL/hr at 04/18/25 0600 15 mcg/kg/min at 04/18/25 0600    senna-docusate (Pericolace Or Senokot S) 8.6-50 MG per tablet 2  Tablet  2 Tablet Enteral Tube BID Jarrett Greene M.D.   2 Tablet at 04/18/25 0510    And    polyethylene glycol/lytes (Miralax) Packet 1 Packet  1 Packet Enteral Tube QDAY PRN Jarrett Greene M.D.        And    magnesium hydroxide (Milk Of Magnesia) suspension 30 mL  30 mL Enteral Tube QDAY PRN Jarrett Greene M.D.        And    bisacodyl (Dulcolax) suppository 10 mg  10 mg Rectal QDAY PRN Jarrett Greene M.D.        MD Alert...ICU Electrolyte Replacement per Pharmacy   Other PHARMACY TO DOSE Jarrett Greene M.D.        lidocaine (Xylocaine) 1 % injection 2 mL  2 mL Tracheal Tube Q30 MIN PRN Jarrett Greene M.D.        insulin lispro (HumaLOG,AdmeLOG) subcutaneous injection  1-6 Units Subcutaneous Q6HRS Jarrett Greene M.D.   2 Units at 04/17/25 1153    And    dextrose 50 % (D50W) injection 25 g  25 g Intravenous Q15 MIN PRN Jarrett Greene M.D.        norepinephrine (Levophed) 8 mg in 250 mL NS infusion (premix)  0-1 mcg/kg/min (Ideal) Intravenous Continuous Jarrett Greene M.D.   Stopped at 04/17/25 1148    aspirin (Asa) chewable tab 81 mg  81 mg Enteral Tube DAILY Jarrett Greene M.D.   81 mg at 04/18/25 0509    Or    aspirin (Asa) suppository 300 mg  300 mg Rectal DAILY Jarrett Greene M.D.        atorvastatin (Lipitor) tablet 80 mg  80 mg Enteral Tube Q EVENING Jarrett Greene M.D.   80 mg at 04/17/25 1802    allopurinol (Zyloprim) tablet 50 mg  50 mg Enteral Tube DAILY Jarrett Greene M.D.   50 mg at 04/18/25 0509    methylPREDNISolone sod succ (SOLU-MEDROL) 125 MG injection 62.5 mg  62.5 mg Intravenous Q8HRS Jarrett Greene M.D.   62.5 mg at 04/18/25 0508    thiamine (B-1) injection 200 mg  200 mg Intravenous DAILY Jarrett Greene M.D.   200 mg at 04/18/25 0508    lactulose 20 GM/30ML solution 30 mL  30 mL Enteral Tube TID Jarrett Greene M.D.   30 mL at 04/18/25 0508    ampicillin/sulbactam (Unasyn) 3 g in  mL IVPB  3 g Intravenous Q6HRS Jarrett Greene M.D.   Stopped  at 04/18/25 0051    azithromycin (ZITHROMAX) 500 mg in D5W 250 mL IVPB premix  500 mg Intravenous Q24HRS Jarrett Greene M.D. 250 mL/hr at 04/18/25 0528 500 mg at 04/18/25 0528    acetaminophen (Tylenol) tablet 650 mg  650 mg Oral Q4HRS PRN Jarrett Greene M.D.        NS infusion   Intravenous Continuous Jarrett Greene M.D.   Paused at 04/17/25 0510       Fluids    Intake/Output Summary (Last 24 hours) at 4/18/2025 0655  Last data filed at 4/18/2025 0600  Gross per 24 hour   Intake 1797.6 ml   Output 815 ml   Net 982.6 ml       Laboratory  Recent Labs     04/17/25  0337 04/17/25  1411 04/18/25  0308   ISTATAPH 7.412 7.403 7.399   ISTATAPCO2 56.5* 65.3* 60.4*   ISTATAPO2 65* 61* 56*   ISTATATCO2 38 43* 39   QHLZADF5GZW 92* 90* 88*   ISTATARTHCO3 35.9* 40.7* 37.3*   ISTATARTBE 9* 13* 10*   ISTATTEMP 97.3 F 98.6 F 98.6 F   ISTATFIO2 40 50 50   ISTATSPEC Arterial Arterial Arterial   ISTATAPHTC 7.422 7.403 7.399   EOWPLAJL4SE 62* 61* 56*         Recent Labs     04/17/25  0445 04/17/25  1254 04/17/25  1808 04/18/25  0455   SODIUM 145 144  --  143   POTASSIUM 3.4* 3.3*  --  3.8   CHLORIDE 97 98  --  101   CO2 32 34*  --  30   BUN 27* 33*  --  39*   CREATININE 1.48* 1.57*  --  1.64*   MAGNESIUM 2.0  --   --  2.3   PHOSPHORUS <0.4* 1.9* 1.6* 4.4   CALCIUM 10.2 9.8  --  8.9     Recent Labs     04/16/25  2103 04/17/25  0445 04/17/25  1254 04/18/25  0455   ALTSGPT 13 11  --  9   ASTSGOT 30 31  --  37   ALKPHOSPHAT 113* 87  --  74   TBILIRUBIN 0.4 0.5  --  0.3   PREALBUMIN  --  13.1*  --   --    GLUCOSE 122* 228* 202* 130*     Recent Labs     04/16/25  1327 04/16/25  2103 04/17/25  0445 04/18/25  0335 04/18/25  0455   WBC 6.3 7.6 6.1 16.0*  --    NEUTSPOLYS 72.20*  --   --   --   --    LYMPHOCYTES 12.20*  --   --   --   --    MONOCYTES 11.30  --   --   --   --    EOSINOPHILS 1.70  --   --   --   --    BASOPHILS 0.00  --   --   --   --    ASTSGOT 29 30 31  --  37   ALTSGPT 9 13 11  --  9   ALKPHOSPHAT 93 113* 87  --  74    TBILIRUBIN 0.3 0.4 0.5  --  0.3     Recent Labs     04/16/25  1505 04/16/25  2103 04/17/25  0445 04/18/25  0335   RBC  --  4.29* 3.89* 3.57*   HEMOGLOBIN  --  13.2* 12.1* 11.1*   HEMATOCRIT  --  47.3 41.4* 36.3*   PLATELETCT  --  120* 126* 130*   PROTHROMBTM 12.3  --   --   --    APTT 28.2  --   --   --    INR 0.92  --   --   --        Imaging  X-Ray:  I have personally reviewed the images and compared with prior images.  Echo:   Reviewed  MRI:   Reviewed    Assessment/Plan  * Altered mental status- (present on admission)  Assessment & Plan  Uncertain if focal deficits were related to elevated PCO2 upon admission  Not a candidate for endovascular intervention upon admission  MRI brain negative for CVA  Seizure on 4/18  ECHO reviewed  Tele monitoring  cEEG  Neuro checks  A1c/Lipid panel  Secondary risk prevention    Seizure (HCC)  Assessment & Plan  On 4/18  No hx of seizure, hx of EtOH use but last drink was January per wife  cEEG  MRI reviewed  Keppra  PRN benzo  Seizure precautions    Increased ammonia level- (present on admission)  Assessment & Plan  Uncertain if he carries the diagnosis of cirrhosis  Lactulose   Trend     Thrombocytopenia (HCC)- (present on admission)  Assessment & Plan  Chronic in the setting of alcohol abuse  Monitor for bleeding  Conservative transfusion strategy     Acute on chronic respiratory failure (HCC)- (present on admission)  Assessment & Plan  Secondary to COPD exacerbation  04/16/25 intubated  RT/O2 protocols  Daily and PRN ABGs  Titration of ventilator therapy based on ABGs and patient's status  Sedation as tolerated/indicated  Daily CXR  HOB >30 degrees and peridex for VAP prevention  Pepcid for GI prophylaxis  SAT/SBT when able (ABCDEF Bundle)  Early mobility  Viral panel  ECHO  Treatment for COPD  azithro    Acute systolic heart failure (HCC)- (present on admission)  Assessment & Plan  Repeat ECHO with normal EF  GDMT when appropriate  Maintain euvolemia     Alcohol dependence  (HCC)- (present on admission)  Assessment & Plan  Reported history of, quit in January   Monitor for withdrawal  MVI/Folate/Thiamine supplementation  Electrolyte replacement     Acute exacerbation of chronic obstructive pulmonary disease (COPD) (HCC)- (present on admission)  Assessment & Plan  PFT: FEV1/FVC ratio 57 and FEV1 of 0.93 L or 32% predicted. postbronchodilator FEV1 of 1.37 L or 47% predicted. Total lung capacity is within normal limits at 106%   04/16/25 intubated  Duonebs, s/p continuous x1 hr  Off Ketamine  Wean steroids    Stage 3 chronic kidney disease- (present on admission)  Assessment & Plan  Strict I/Os  Renally dosed medications  Avoid nephrotoxic agents as able  Cr trending up, continue to monitor    Type 2 diabetes mellitus (HCC)- (present on admission)  Assessment & Plan  Goal blood glucose 140-180  sliding scale insulin, accuchecks  hypoglycemia protocol  A1c 6.2    Primary hypertension- (present on admission)  Assessment & Plan  Reintroduce oral antihypertensives when appropriate      Dyslipidemia- (present on admission)  Assessment & Plan  Atorvastatin          VTE:  Heparin  Ulcer: H2 Antagonist  Lines: Garibay Catheter  Ongoing indication addressed    I have performed a physical exam and reviewed and updated ROS and Plan today (4/18/2025). In review of yesterday's note (4/17/2025), there are no changes except as documented above.     Discussed patient condition and risk of morbidity and/or mortality with Family, RN, RT, Pharmacy, Code status disscussed, Charge nurse / hot rounds, Patient, and neurology    The patient remains critically ill.  Critical care time = 58 minutes in directly providing and coordinating critical care and extensive data review.  No time overlap and excludes procedures.    Please note that this dictation was created using voice recognition software. The accuracy of the dictation is limited to the abilities of the software. I have made every reasonable attempt to  correct obvious errors, but I expect that there are errors of grammar and possibly content that I did not discover before finalizing the note.

## 2025-04-18 NOTE — PROCEDURES
VIDEO ELECTROENCEPHALOGRAM  REPORT      Referring provider: Dr. Balbuena    DOS:   4/18/2025 to 4/19/25 at 5 am       INDICATION:  Hugo Villela 64 y.o. male presenting with AMS    CURRENT ANTIEPILEPTIC REGIMEN: LEV, PRO      TECHNIQUE: A 30-channel, 8 hrs video electroencephalogram (VEEG) was performed in accordance with the international 10-20 system. This digital study was reviewed in bipolar and referential montages. The recording examined the patient during sedated state.     The EEG was set up and taken down by a Neurodiagnostic technologist who performed education to patient and staff.     A minimum but not limited to 23 electrodes and 23 channel recording was setup and performed by Neurodiagnostic technologist.    Impedances, electrode integrity, and technical impressions were documented a minimum of every 2-24 hour period by a Neurodiagnostic Technologist and reviewed by Interpreting physician.     There was supervised withdrawal of the following medications: n/a    ACTIVATION PROCEDURES:   None     DESCRIPTION OF THE RECORD:  The background consisted of diffuse, symmetric mixed frequencies with predominant theta with intermixed delta and superimposed fast frequencies.No well-formed posterior dominant rhythm or anterior-posterior gradient was seen. No sleep pattern seen. Reactivity and variability seen.   No sleep pattern seen.     ICTAL AND/OR INTERICTAL FINDINGS:   No focal or generalized epileptiform activity was noted. No regional slowing was seen during this study.  No seizures were recorded during the study.     EVENT(S):  Episode of head nodding and arms trembling was reported during nursing care, no ictal EEG correlate seen.     EKG: sampling review of EKG recording demonstrated sinus rhythm.       INTERPRETATION:   This is an abnormal video EEG recording in the sedated state.   1) The diffuse background slowing is consistent with encephalopathy.    2) Episode of head nodding and arms trembling was  reported during nursing care, no ictal EEG correlate seen and was not seizure   3) No epileptiform discharges or seizures were seen. Clinical correlation is recommended.      Julius Estrada MD  Diplomate, American Board of Psychiatry and Neurology   Diplomate, American Board of Psychiatry and Neurology in Epilepsy

## 2025-04-18 NOTE — EEG PROGRESS NOTE
Continuous inpatient video EEG monitoring   Preliminary review    EEG monitoring thus far shows continuous generalized delta slowing, suggestive of marked nonspecific diffuse cerebral dysfunction.     No seizures or epileptiform discharges are observed.     Detailed report to follow in AM.     Mali De La Torre M.D.   Epileptologist/Neurologist

## 2025-04-18 NOTE — PROGRESS NOTES
Late Entry:    0725: During SAT, patient appears agitated and restless, is desynchronous with the ventilator. Patient's pulse ox reading in the 60s. Pulse ox replaced. New read 49% with good wave form. Respiratory in the room, bagging initiated.     0730: Patient appears to be having seizure-like activity. Dr. Balbuena called to bedside. Versed ordered and given (see MAR). Sedation gtts restarted (see MAR). Seizure-like activity appears to have ceased post medication administration.    0800: Patient now hypotensive, Levophed gtt started (see MAR).

## 2025-04-18 NOTE — CARE PLAN
The patient is Watcher - Medium risk of patient condition declining or worsening    Shift Goals  Clinical Goals: Rest, Pulmonary hygiene, stable labs  Patient Goals: LLUVIA  Family Goals: Updates    Progress made toward(s) clinical / shift goals:    Problem: Hemodynamic Monitoring  Goal: Patient's hemodynamics, fluid balance and neurologic status will be stable or improve    4/18/2025 0137 by Silverio Medel R.N.  Outcome: Progressing     Problem: Risk for Aspiration  Goal: Patient's risk for aspiration will be absent or decrease    4/18/2025 0137 by Silverio Medel R.N.  Outcome: Progressing     Problem: Urinary Elimination  Goal: Establish and maintain regular urinary output  Outcome: Progressing     Problem: Pain - Standard  Goal: Alleviation of pain or a reduction in pain to the patient’s comfort goal  Outcome: Progressing     Problem: Skin Integrity  Goal: Skin integrity is maintained or improved  Outcome: Progressing     Problem: Fall Risk  Goal: Patient will remain free from falls  Outcome: Progressing     Problem: Safety - Medical Restraint  Goal: Remains free of injury from restraints (Restraint for Interference with Medical Device)  Outcome: Progressing     Problem: Mechanical Ventilation  Goal: Safe management of artificial airway and ventilation  Outcome: Progressing       Patient is not progressing towards the following goals:      Problem: Safety - Medical Restraint  Goal: Free from restraint(s) (Restraint for Interference with Medical Device)  Outcome: Not Progressing

## 2025-04-18 NOTE — CARE PLAN
Problem: Hemodynamic Monitoring  Goal: Patient's hemodynamics, fluid balance and neurologic status will be stable or improve  Outcome: Progressing     Problem: Urinary Elimination  Goal: Establish and maintain regular urinary output  Outcome: Progressing     Problem: Pain - Standard  Goal: Alleviation of pain or a reduction in pain to the patient’s comfort goal  Outcome: Progressing     Problem: Safety - Medical Restraint  Goal: Remains free of injury from restraints (Restraint for Interference with Medical Device)  Outcome: Progressing  Goal: Free from restraint(s) (Restraint for Interference with Medical Device)  Outcome: Progressing   The patient is Watcher - Medium risk of patient condition declining or worsening    Shift Goals  Clinical Goals: impoved neuro exams, stable labs  Patient Goals: LLUVIA  Family Goals: updates    Progress made toward(s) clinical / shift goals:  yes      Patient is not progressing towards the following goals:

## 2025-04-19 ENCOUNTER — APPOINTMENT (OUTPATIENT)
Dept: RADIOLOGY | Facility: MEDICAL CENTER | Age: 65
DRG: 291 | End: 2025-04-19
Attending: INTERNAL MEDICINE
Payer: COMMERCIAL

## 2025-04-19 LAB
ALBUMIN SERPL BCP-MCNC: 3.6 G/DL (ref 3.2–4.9)
ALBUMIN/GLOB SERPL: 1.2 G/DL
ALP SERPL-CCNC: 81 U/L (ref 30–99)
ALT SERPL-CCNC: 12 U/L (ref 2–50)
ANION GAP SERPL CALC-SCNC: 11 MMOL/L (ref 7–16)
ANION GAP SERPL CALC-SCNC: 12 MMOL/L (ref 7–16)
AST SERPL-CCNC: 37 U/L (ref 12–45)
BASE EXCESS BLDA CALC-SCNC: 13 MMOL/L (ref -4–3)
BILIRUB SERPL-MCNC: <0.2 MG/DL (ref 0.1–1.5)
BODY TEMPERATURE: ABNORMAL DEGREES
BREATHS SETTING VENT: 28
BUN SERPL-MCNC: 45 MG/DL (ref 8–22)
BUN SERPL-MCNC: 50 MG/DL (ref 8–22)
CALCIUM ALBUM COR SERPL-MCNC: 9 MG/DL (ref 8.5–10.5)
CALCIUM SERPL-MCNC: 8.7 MG/DL (ref 8.5–10.5)
CALCIUM SERPL-MCNC: 9.3 MG/DL (ref 8.5–10.5)
CHLORIDE SERPL-SCNC: 100 MMOL/L (ref 96–112)
CHLORIDE SERPL-SCNC: 98 MMOL/L (ref 96–112)
CO2 BLDA-SCNC: 42 MMOL/L (ref 32–48)
CO2 SERPL-SCNC: 34 MMOL/L (ref 20–33)
CO2 SERPL-SCNC: 34 MMOL/L (ref 20–33)
CREAT SERPL-MCNC: 1.91 MG/DL (ref 0.5–1.4)
CREAT SERPL-MCNC: 2.14 MG/DL (ref 0.5–1.4)
DELSYS IDSYS: ABNORMAL
ERYTHROCYTE [DISTWIDTH] IN BLOOD BY AUTOMATED COUNT: 56.1 FL (ref 35.9–50)
GFR SERPLBLD CREATININE-BSD FMLA CKD-EPI: 34 ML/MIN/1.73 M 2
GFR SERPLBLD CREATININE-BSD FMLA CKD-EPI: 38 ML/MIN/1.73 M 2
GLOBULIN SER CALC-MCNC: 3.1 G/DL (ref 1.9–3.5)
GLUCOSE BLD STRIP.AUTO-MCNC: 121 MG/DL (ref 65–99)
GLUCOSE BLD STRIP.AUTO-MCNC: 122 MG/DL (ref 65–99)
GLUCOSE BLD STRIP.AUTO-MCNC: 122 MG/DL (ref 65–99)
GLUCOSE BLD STRIP.AUTO-MCNC: 178 MG/DL (ref 65–99)
GLUCOSE SERPL-MCNC: 127 MG/DL (ref 65–99)
GLUCOSE SERPL-MCNC: 128 MG/DL (ref 65–99)
HCO3 BLDA-SCNC: 39.6 MMOL/L (ref 21–28)
HCT VFR BLD AUTO: 34.4 % (ref 42–52)
HGB BLD-MCNC: 10.8 G/DL (ref 14–18)
HOROWITZ INDEX BLDA+IHG-RTO: 148 MM[HG]
LACTATE BLD-SCNC: 1 MMOL/L (ref 0.5–2)
MAGNESIUM SERPL-MCNC: 2.4 MG/DL (ref 1.5–2.5)
MCH RBC QN AUTO: 31.6 PG (ref 27–33)
MCHC RBC AUTO-ENTMCNC: 31.4 G/DL (ref 32.3–36.5)
MCV RBC AUTO: 100.6 FL (ref 81.4–97.8)
MODE IMODE: ABNORMAL
O2/TOTAL GAS SETTING VFR VENT: 60 %
PCO2 BLDA: 63.1 MMHG (ref 32–48)
PCO2 TEMP ADJ BLDA: 63.4 MMHG (ref 32–48)
PEEP END EXPIRATORY PRESSURE IPEEP: 8 CMH20
PH BLDA: 7.41 [PH] (ref 7.35–7.45)
PH TEMP ADJ BLDA: 7.4 [PH] (ref 7.35–7.45)
PHOSPHATE SERPL-MCNC: 3.5 MG/DL (ref 2.5–4.5)
PHOSPHATE SERPL-MCNC: 3.9 MG/DL (ref 2.5–4.5)
PHOSPHATE SERPL-MCNC: 5.4 MG/DL (ref 2.5–4.5)
PLATELET # BLD AUTO: 144 K/UL (ref 164–446)
PMV BLD AUTO: 10.6 FL (ref 9–12.9)
PO2 BLDA: 89 MMHG (ref 83–108)
PO2 TEMP ADJ BLDA: 90 MMHG (ref 83–108)
POTASSIUM SERPL-SCNC: 4.4 MMOL/L (ref 3.6–5.5)
POTASSIUM SERPL-SCNC: 5.1 MMOL/L (ref 3.6–5.5)
PROT SERPL-MCNC: 6.7 G/DL (ref 6–8.2)
RBC # BLD AUTO: 3.42 M/UL (ref 4.7–6.1)
SAO2 % BLDA: 96 % (ref 93–99)
SODIUM SERPL-SCNC: 144 MMOL/L (ref 135–145)
SODIUM SERPL-SCNC: 145 MMOL/L (ref 135–145)
SPECIMEN DRAWN FROM PATIENT: ABNORMAL
TIDAL VOLUME IVT: 370 ML
TRIGL SERPL-MCNC: 75 MG/DL (ref 0–149)
WBC # BLD AUTO: 17.4 K/UL (ref 4.8–10.8)

## 2025-04-19 PROCEDURE — 36600 WITHDRAWAL OF ARTERIAL BLOOD: CPT

## 2025-04-19 PROCEDURE — 700111 HCHG RX REV CODE 636 W/ 250 OVERRIDE (IP): Performed by: STUDENT IN AN ORGANIZED HEALTH CARE EDUCATION/TRAINING PROGRAM

## 2025-04-19 PROCEDURE — 94640 AIRWAY INHALATION TREATMENT: CPT

## 2025-04-19 PROCEDURE — 83605 ASSAY OF LACTIC ACID: CPT

## 2025-04-19 PROCEDURE — A9270 NON-COVERED ITEM OR SERVICE: HCPCS | Performed by: INTERNAL MEDICINE

## 2025-04-19 PROCEDURE — 95718 EEG PHYS/QHP 2-12 HR W/VEEG: CPT | Performed by: PSYCHIATRY & NEUROLOGY

## 2025-04-19 PROCEDURE — 82803 BLOOD GASES ANY COMBINATION: CPT

## 2025-04-19 PROCEDURE — 85027 COMPLETE CBC AUTOMATED: CPT

## 2025-04-19 PROCEDURE — 71045 X-RAY EXAM CHEST 1 VIEW: CPT

## 2025-04-19 PROCEDURE — 80053 COMPREHEN METABOLIC PANEL: CPT

## 2025-04-19 PROCEDURE — 95714 VEEG EA 12-26 HR UNMNTR: CPT | Performed by: PSYCHIATRY & NEUROLOGY

## 2025-04-19 PROCEDURE — 700111 HCHG RX REV CODE 636 W/ 250 OVERRIDE (IP)

## 2025-04-19 PROCEDURE — 94799 UNLISTED PULMONARY SVC/PX: CPT

## 2025-04-19 PROCEDURE — 83735 ASSAY OF MAGNESIUM: CPT

## 2025-04-19 PROCEDURE — 700102 HCHG RX REV CODE 250 W/ 637 OVERRIDE(OP): Performed by: INTERNAL MEDICINE

## 2025-04-19 PROCEDURE — 700101 HCHG RX REV CODE 250: Performed by: INTERNAL MEDICINE

## 2025-04-19 PROCEDURE — 700111 HCHG RX REV CODE 636 W/ 250 OVERRIDE (IP): Mod: JZ | Performed by: INTERNAL MEDICINE

## 2025-04-19 PROCEDURE — 94003 VENT MGMT INPAT SUBQ DAY: CPT

## 2025-04-19 PROCEDURE — 700105 HCHG RX REV CODE 258: Performed by: INTERNAL MEDICINE

## 2025-04-19 PROCEDURE — 770022 HCHG ROOM/CARE - ICU (200)

## 2025-04-19 PROCEDURE — 80048 BASIC METABOLIC PNL TOTAL CA: CPT

## 2025-04-19 PROCEDURE — 82962 GLUCOSE BLOOD TEST: CPT | Mod: 91

## 2025-04-19 PROCEDURE — 99291 CRITICAL CARE FIRST HOUR: CPT | Performed by: INTERNAL MEDICINE

## 2025-04-19 PROCEDURE — 700101 HCHG RX REV CODE 250: Performed by: STUDENT IN AN ORGANIZED HEALTH CARE EDUCATION/TRAINING PROGRAM

## 2025-04-19 PROCEDURE — 84100 ASSAY OF PHOSPHORUS: CPT

## 2025-04-19 PROCEDURE — 84478 ASSAY OF TRIGLYCERIDES: CPT

## 2025-04-19 RX ORDER — PREDNISONE 20 MG/1
40 TABLET ORAL DAILY
Status: DISCONTINUED | OUTPATIENT
Start: 2025-04-19 | End: 2025-04-20

## 2025-04-19 RX ORDER — IPRATROPIUM BROMIDE AND ALBUTEROL SULFATE 2.5; .5 MG/3ML; MG/3ML
3 SOLUTION RESPIRATORY (INHALATION)
Status: DISCONTINUED | OUTPATIENT
Start: 2025-04-19 | End: 2025-04-23 | Stop reason: HOSPADM

## 2025-04-19 RX ORDER — FUROSEMIDE 10 MG/ML
40 INJECTION INTRAMUSCULAR; INTRAVENOUS ONCE
Status: COMPLETED | OUTPATIENT
Start: 2025-04-19 | End: 2025-04-19

## 2025-04-19 RX ORDER — ACETAMINOPHEN 325 MG/1
650 TABLET ORAL EVERY 4 HOURS PRN
Status: DISCONTINUED | OUTPATIENT
Start: 2025-04-19 | End: 2025-04-20

## 2025-04-19 RX ORDER — FOLIC ACID 1 MG/1
1 TABLET ORAL DAILY
Status: DISCONTINUED | OUTPATIENT
Start: 2025-04-20 | End: 2025-04-20

## 2025-04-19 RX ADMIN — HEPARIN SODIUM 5000 UNITS: 5000 INJECTION, SOLUTION INTRAVENOUS; SUBCUTANEOUS at 22:23

## 2025-04-19 RX ADMIN — PREDNISONE 40 MG: 20 TABLET ORAL at 08:48

## 2025-04-19 RX ADMIN — THERA TABS 1 TABLET: TAB at 05:41

## 2025-04-19 RX ADMIN — PROPOFOL 20 MCG/KG/MIN: 10 INJECTION, EMULSION INTRAVENOUS at 14:58

## 2025-04-19 RX ADMIN — LEVETIRACETAM 1000 MG: 100 INJECTION, SOLUTION INTRAVENOUS at 17:29

## 2025-04-19 RX ADMIN — METHYLPREDNISOLONE SODIUM SUCCINATE 62.5 MG: 125 INJECTION, POWDER, FOR SOLUTION INTRAMUSCULAR; INTRAVENOUS at 05:40

## 2025-04-19 RX ADMIN — LEVETIRACETAM 1000 MG: 100 INJECTION, SOLUTION INTRAVENOUS at 05:41

## 2025-04-19 RX ADMIN — IPRATROPIUM BROMIDE AND ALBUTEROL SULFATE 3 ML: .5; 2.5 SOLUTION RESPIRATORY (INHALATION) at 06:24

## 2025-04-19 RX ADMIN — HEPARIN SODIUM 5000 UNITS: 5000 INJECTION, SOLUTION INTRAVENOUS; SUBCUTANEOUS at 14:03

## 2025-04-19 RX ADMIN — SENNOSIDES AND DOCUSATE SODIUM 2 TABLET: 50; 8.6 TABLET ORAL at 17:29

## 2025-04-19 RX ADMIN — FUROSEMIDE 40 MG: 10 INJECTION, SOLUTION INTRAVENOUS at 14:59

## 2025-04-19 RX ADMIN — ALLOPURINOL 50 MG: 100 TABLET ORAL at 05:45

## 2025-04-19 RX ADMIN — FAMOTIDINE 20 MG: 20 TABLET, FILM COATED ORAL at 05:40

## 2025-04-19 RX ADMIN — LACTULOSE 30 ML: 10 SOLUTION ORAL at 05:41

## 2025-04-19 RX ADMIN — SENNOSIDES AND DOCUSATE SODIUM 2 TABLET: 50; 8.6 TABLET ORAL at 05:40

## 2025-04-19 RX ADMIN — Medication 300 MCG/HR: at 12:23

## 2025-04-19 RX ADMIN — LACTULOSE 30 ML: 10 SOLUTION ORAL at 11:31

## 2025-04-19 RX ADMIN — THIAMINE HYDROCHLORIDE 200 MG: 100 INJECTION, SOLUTION INTRAMUSCULAR; INTRAVENOUS at 05:40

## 2025-04-19 RX ADMIN — ATORVASTATIN CALCIUM 80 MG: 40 TABLET, FILM COATED ORAL at 17:29

## 2025-04-19 RX ADMIN — Medication 300 MCG/HR: at 06:23

## 2025-04-19 RX ADMIN — FOLIC ACID 1 MG: 1 TABLET ORAL at 05:41

## 2025-04-19 RX ADMIN — LABETALOL HYDROCHLORIDE 10 MG: 5 INJECTION, SOLUTION INTRAVENOUS at 11:18

## 2025-04-19 RX ADMIN — PROPOFOL 30 MCG/KG/MIN: 10 INJECTION, EMULSION INTRAVENOUS at 04:31

## 2025-04-19 RX ADMIN — NOREPINEPHRINE BITARTRATE 0.02 MCG/KG/MIN: 1 INJECTION, SOLUTION, CONCENTRATE INTRAVENOUS at 06:26

## 2025-04-19 RX ADMIN — Medication 300 MCG/HR: at 18:31

## 2025-04-19 RX ADMIN — LACTULOSE 30 ML: 10 SOLUTION ORAL at 17:29

## 2025-04-19 RX ADMIN — MIDAZOLAM HYDROCHLORIDE 5 MG: 1 INJECTION, SOLUTION INTRAMUSCULAR; INTRAVENOUS at 11:58

## 2025-04-19 RX ADMIN — Medication 300 MCG/HR: at 00:26

## 2025-04-19 RX ADMIN — MIDAZOLAM HYDROCHLORIDE 5 MG: 1 INJECTION INTRAMUSCULAR; INTRAVENOUS at 11:58

## 2025-04-19 RX ADMIN — HEPARIN SODIUM 5000 UNITS: 5000 INJECTION, SOLUTION INTRAVENOUS; SUBCUTANEOUS at 05:40

## 2025-04-19 RX ADMIN — AZITHROMYCIN DIHYDRATE 500 MG: 500 INJECTION, POWDER, LYOPHILIZED, FOR SOLUTION INTRAVENOUS at 06:38

## 2025-04-19 RX ADMIN — FENTANYL CITRATE 50 MCG: 50 INJECTION, SOLUTION INTRAMUSCULAR; INTRAVENOUS at 11:52

## 2025-04-19 RX ADMIN — IPRATROPIUM BROMIDE AND ALBUTEROL SULFATE 3 ML: .5; 2.5 SOLUTION RESPIRATORY (INHALATION) at 14:26

## 2025-04-19 RX ADMIN — ASPIRIN 81 MG: 81 TABLET, CHEWABLE ORAL at 05:41

## 2025-04-19 RX ADMIN — INSULIN LISPRO 1 UNITS: 100 INJECTION, SOLUTION INTRAVENOUS; SUBCUTANEOUS at 00:15

## 2025-04-19 RX ADMIN — IPRATROPIUM BROMIDE AND ALBUTEROL SULFATE 3 ML: .5; 2.5 SOLUTION RESPIRATORY (INHALATION) at 02:09

## 2025-04-19 ASSESSMENT — PAIN DESCRIPTION - PAIN TYPE
TYPE: ACUTE PAIN

## 2025-04-19 ASSESSMENT — FIBROSIS 4 INDEX: FIB4 SCORE: 4.75

## 2025-04-19 NOTE — CARE PLAN
Problem: Bronchoconstriction  Goal: Improve in air movement and diminished wheezing  Description: Target End Date:  2 to 3 days1.  Implement inhaled treatments2.  Evaluate and manage medication effects  Outcome: Not Met     Problem: Ventilation  Goal: Ability to achieve and maintain unassisted ventilation or tolerate decreased levels of ventilator support  Description: Target End Date:  4 days Document on Vent flowsheet1.  Support and monitor invasive and noninvasive mechanical ventilation2.  Monitor ventilator weaning response3.  Perform ventilator associated pneumonia prevention interventions4.  Manage ventilation therapy by monitoring diagnostic test results  Outcome: Not Met     Ventilator Daily Summary    Vent Day #3  Airway: 8.0@25    Ventilator settings:CMV [phone] [id number]   Weaning trials:   Respiratory Procedures:     Plan: Continue current ventilator settings and wean mechanical ventilation as tolerated per physician orders.

## 2025-04-19 NOTE — CARE PLAN
Problem: Ventilation  Goal: Ability to achieve and maintain unassisted ventilation or tolerate decreased levels of ventilator support  Description: Target End Date:  4 days Document on Vent flowsheet1.  Support and monitor invasive and noninvasive mechanical ventilation2.  Monitor ventilator weaning response3.  Perform ventilator associated pneumonia prevention interventions4.  Manage ventilation therapy by monitoring diagnostic test results  Outcome: Not Progressing     Ventilator Daily Summary    Vent Day #4  Airway: 8@25    Ventilator settings:28/370/8/50   Weaning trials: no   Respiratory Procedures: no     Plan: Continue current ventilator settings and wean mechanical ventilation as tolerated per physician orders.

## 2025-04-19 NOTE — CARE PLAN
The patient is Watcher - Medium risk of patient condition declining or worsening    Shift Goals  Clinical Goals: Hemodynamic stability; Stable neuros; monitor for seizure activity  Patient Goals: LLUVIA  Family Goals: updates, comfort    Progress made toward(s) clinical / shift goals:    Problem: Knowledge Deficit - Standard  Goal: Patient and family/care givers will demonstrate understanding of plan of care, disease process/condition, diagnostic tests and medications  Outcome: Progressing     Problem: Knowledge Deficit - COPD  Goal: Patient/significant other demonstrates understanding of disease process, utilization of the Action Plan, medications and discharge instruction  Outcome: Progressing     Problem: Skin Integrity  Goal: Skin integrity is maintained or improved  Outcome: Progressing     Problem: Fall Risk  Goal: Patient will remain free from falls  Outcome: Progressing     Problem: Safety - Medical Restraint  Goal: Remains free of injury from restraints (Restraint for Interference with Medical Device)  Outcome: Progressing     Problem: Safety - Medical Restraint  Goal: Free from restraint(s) (Restraint for Interference with Medical Device)  Outcome: Progressing     Problem: Seizure Precautions  Goal: Implementation of seizure precautions  Outcome: Progressing     Problem: Seizure EEG Monitoring  Goal: Appropriate Monitoring of EEG patient  Outcome: Progressing       Patient is not progressing towards the following goals:

## 2025-04-19 NOTE — CARE PLAN
The patient is Watcher - Medium risk of patient condition declining or worsening    Shift Goals  Clinical Goals: continuous seizure monitoring, titrate vasopressors, SAT/SBT  Patient Goals: LLUVIA; pt intubated & sedated  Family Goals: updates, pt safety & progress    Problem: Pain - Standard  Goal: Alleviation of pain or a reduction in pain to the patient’s comfort goal  Outcome: Progressing     Problem: Safety - Medical Restraint  Goal: Remains free of injury from restraints (Restraint for Interference with Medical Device)  Outcome: Progressing

## 2025-04-19 NOTE — PROCEDURES
VIDEO ELECTROENCEPHALOGRAM  REPORT      Referring provider: Dr. Balbuena    DOS:   4/19/25       INDICATION:  Hugo Villela 64 y.o. male presenting with AMS    CURRENT ANTIEPILEPTIC REGIMEN: LEV, PRO      TECHNIQUE: A 30-channel, 8 hrs video electroencephalogram (VEEG) was performed in accordance with the international 10-20 system. This digital study was reviewed in bipolar and referential montages. The recording examined the patient during sedated state and brief awake state.     The EEG was set up and taken down by a Neurodiagnostic technologist who performed education to patient and staff.     A minimum but not limited to 23 electrodes and 23 channel recording was setup and performed by Neurodiagnostic technologist.    Impedances, electrode integrity, and technical impressions were documented a minimum of every 2-24 hour period by a Neurodiagnostic Technologist and reviewed by Interpreting physician.     There was supervised withdrawal of the following medications: n/a    ACTIVATION PROCEDURES:   None     DESCRIPTION OF THE RECORD:  The background consisted of diffuse, symmetric mixed frequencies with predominant theta up to 7 Hz with intermixed delta and superimposed fast frequencies.No well-formed posterior dominant rhythm or anterior-posterior gradient was seen. No sleep pattern seen. Reactivity and variability seen.   No sleep pattern seen.     ICTAL AND/OR INTERICTAL FINDINGS:   No focal or generalized epileptiform activity was noted. No regional slowing was seen during this study.  No seizures were recorded during the study.     EVENT(S):  Episode of eyes rolling/blinking and feet twitching was reported during nursing care, no ictal EEG correlate seen.     EKG: sampling review of EKG recording demonstrated sinus rhythm.       INTERPRETATION:   This is an abnormal video EEG recording in the sedated state.   1) The diffuse background slowing is consistent with mild encephalopathy and sedation effect.   2)  Episode of eyes rolling/blinking and feet twitching was reported during nursing care, no ictal EEG correlate seen and was not seizure   3) No epileptiform discharges or seizures were seen. Clinical correlation is recommended.      Julius Estrada MD  Diplomate, American Board of Psychiatry and Neurology   Diplomate, American Board of Psychiatry and Neurology in Epilepsy

## 2025-04-19 NOTE — PROGRESS NOTES
"Critical Care Progress Note    Date of admission  4/16/2025    Chief Complaint  64 y.o. male admitted 4/16/2025 with AMS    Hospital Course  \"64 y.o. male w/ PMH of CAD s/p PCI, HFmrEF 45%, CKD IIIa, DM2, asthma/COPD on 4lpm home o2, ethanol abuse, tobacco abuse who presented 4/16/2025 with SOB and AMS. Family state pt had mild aphasia on 4/15 that seemed to worsen as the day went on, AM of 4/16 he was not responsive to commands. Per family he had no complaints the day prior.               In the ED vitals showed hypoxia. Labs showed elevated BNP. Trop wnl x2. CT head w/o showed chronic microvasc changes, CTA HN w/o acute dz. CXR wnl. EKG w/ RBBB and LAFB no acute ischemia. Neuro was consultedi n the ED and recommended admission for CVA workup\"     During the evening of admission he became less responsive, ABG showed respiratory acidosis. He was moved to the ICU for intubation. There is no further history available at this time.     4/17: VD #2, weaned off ketamine. Following commands. ABG improved  4/18: VD #3, seizure-like activity during SAT/SBT. Given keppra and versed, placed on cEEG, neuro consulted    Interval Problem Update  Reviewed last 24 hour events:   - movements overnight not seizures on EEG   - Neuro: following   - HR: 60s-80s   - SBP: 100s-110s on 0.02 of NE   - GI: TF at goal, last BM yesterday   - UOP: 1.7 L/24 hrs   - Garibay: yes   - Tm: 37.3   - Lines: PIV   - PPx: GI pepcid, DVT heparin   - ABG: , compensated respiratory acidosis   - VD #4   - SBT: VC: 0.8, NIF -15, RSBI 50s   - CXR (personally reviewed and compared to prior): NAD   - Mobility level 2, eligible for progression yes    Updates: the patient had a 2nd seizure-like event during SBT (upward gaze accompanied by unresponsiveness). Wife reports hypoxia -> event. RT/RN reported normal parameters until ~45 mins into SBT then bradypnea and elevated peak pressures followed by hypoxia. No significant bronchospasm on auscultation and no " obstruction/mucus on suctioning. Dr Julius Estrada (epileptologist) reports no seizure on EEG. Events appear to be precipitated by hypoxia rather than ictal events. Etiology of hypoxia is unknown at this time. CXR pending, pulmonary compliance normal after versed given. RN and wife report itching and facial flushing after labetalol dose, will d/c.    Yesterday   - doing well overnight   - Neuro: following   - HR: 80s-100s   - SBP: 90s-100s, off NE   - GI: TF at goal, last BM this AM   - UOP: 815 mL/24 hrs, Net +1.6L   - Garibay: yes   - Tm: 37.2   - Lines: PIV   - PPx: GI pepcid, DVT heparin   - ABG: compensated respiratory acidosis   - VD#3   - initially doing well on SBT   - CXR (personally reviewed and compared to prior): NAD   - Mobility level 2, eligible for progression yes    Updates: during SBT had a seizure-like event precipitating hypoxia. Midazolam 5 mg given and loaded with keppra. STAT EEG ordered    Review of Systems  Review of Systems   Unable to perform ROS: Critical illness        Vital Signs for last 24 hours   Pulse:  [] 74  Resp:  [13-39] 28  BP: ()/(48-72) 118/58  SpO2:  [90 %-99 %] 96 %    Hemodynamic parameters for last 24 hours       Respiratory Information for the last 24 hours  Vent Mode: APVCMV  Rate (breaths/min): 28  Vt Target (mL): 370  PEEP/CPAP: 8  MAP: 16  Control VTE (exp VT): 365    Physical Exam   Physical Exam  Vitals and nursing note reviewed.   Constitutional:       General: He is in acute distress.      Appearance: He is well-developed. He is ill-appearing.      Interventions: He is intubated.   HENT:      Head: Normocephalic and atraumatic.      Right Ear: External ear normal.      Left Ear: External ear normal.      Mouth/Throat:      Comments: ETT in place  Eyes:      Conjunctiva/sclera: Conjunctivae normal.      Pupils: Pupils are equal, round, and reactive to light.   Neck:      Vascular: No JVD.      Trachea: No tracheal deviation.   Cardiovascular:      Rate and  Rhythm: Normal rate and regular rhythm.      Pulses: Normal pulses.   Pulmonary:      Effort: He is intubated.      Breath sounds: No wheezing or rales.      Comments: Good air movement, normal vent mechanics  Abdominal:      General: Bowel sounds are normal. There is no distension.      Palpations: Abdomen is soft.   Musculoskeletal:         General: No tenderness.      Cervical back: Neck supple.   Skin:     General: Skin is warm and dry.      Capillary Refill: Capillary refill takes less than 2 seconds.      Findings: No rash.   Neurological:      General: No focal deficit present.      Cranial Nerves: No cranial nerve deficit.      Sensory: No sensory deficit.      Motor: No weakness.      Comments: Following in all 4   Psychiatric:      Comments: Unable to assess       Medications  Current Facility-Administered Medications   Medication Dose Route Frequency Provider Last Rate Last Admin    acetaminophen (Tylenol) tablet 650 mg  650 mg Enteral Tube Q4HRS PRN Tanner Balbuena Jr., D.O.        levETIRAcetam (Keppra) injection 1,000 mg  1,000 mg Intravenous Q12HRS Tanner Balbuena Jr., D.O.   1,000 mg at 04/19/25 0541    methylPREDNISolone sod succ (SOLU-MEDROL) 125 MG injection 62.5 mg  62.5 mg Intravenous Q12HRS Tanner Balbuena Jr. D.O.   62.5 mg at 04/19/25 0540    midazolam (Versed) injection 5 mg  5 mg Intravenous Q15 MIN PRN Tanner Balbuena Jr., D.O.        famotidine (Pepcid) tablet 20 mg  20 mg Enteral Tube DAILY Tanner Balbuena Jr., D.O.   20 mg at 04/19/25 0540    folic acid (Folvite) tablet 1 mg  1 mg Oral DAILY Tanner Balbuena Jr., D.O.   1 mg at 04/19/25 0541    multivitamin tablet 1 Tablet  1 Tablet Oral DAILY Tanner Balbuena Jr., D.O.   1 Tablet at 04/19/25 0541    heparin injection 5,000 Units  5,000 Units Subcutaneous Q8HRS SONIA Hernandez.OBrenton   5,000 Units at 04/19/25 0540    labetalol (Normodyne/Trandate) injection 10 mg  10 mg Intravenous Q4HRS PRN BRANDON HernandezOBrenton         ipratropium-albuterol (DUONEB) nebulizer solution  3 mL Nebulization Q4HRS (RT) Adelfo Chowdhury D.O.   3 mL at 04/19/25 0624    Pharmacy Consult: Enteral tube insertion - review meds/change route/product selection  1 Each Other PHARMACY TO DOSE Jarrett Greene M.D.        Respiratory Therapy Consult   Nebulization Continuous RT Jarrett Greene M.D.        fentaNYL (Sublimaze) injection 50 mcg  50 mcg Intravenous Q15 MIN PRN Jarrett Greene M.D.   50 mcg at 04/18/25 0719    And    fentaNYL (Sublimaze) injection 100 mcg  100 mcg Intravenous Q15 MIN PRN Jarrett Greene M.D.   100 mcg at 04/17/25 1220    And    fentaNYL (SUBLIMAZE) 50 mcg/mL in 50mL (Continuous Infusion)   Intravenous Continuous Jarrett Greene M.D. 6 mL/hr at 04/19/25 0709 300 mcg/hr at 04/19/25 0709    And    propofol (DIPRIVAN) injection  0-40 mcg/kg/min (Ideal) Intravenous Continuous Jarrett Greene M.D. 7.4 mL/hr at 04/19/25 0529 20 mcg/kg/min at 04/19/25 0529    senna-docusate (Pericolace Or Senokot S) 8.6-50 MG per tablet 2 Tablet  2 Tablet Enteral Tube BID Jarrett Greene M.D.   2 Tablet at 04/19/25 0540    And    polyethylene glycol/lytes (Miralax) Packet 1 Packet  1 Packet Enteral Tube QDAY PRN Jarrett Greene M.D.        And    magnesium hydroxide (Milk Of Magnesia) suspension 30 mL  30 mL Enteral Tube QDAY PRN Jarrett Greene M.D.        And    bisacodyl (Dulcolax) suppository 10 mg  10 mg Rectal QDAY PRN Jarrett Greene M.D. MD Alert...ICU Electrolyte Replacement per Pharmacy   Other PHARMACY TO DOSE Jarrett Greene M.D.        lidocaine (Xylocaine) 1 % injection 2 mL  2 mL Tracheal Tube Q30 MIN PRN Jarrett Greene M.D.        insulin lispro (HumaLOG,AdmeLOG) subcutaneous injection  1-6 Units Subcutaneous Q6HRS Jarrett Greene M.D.   1 Units at 04/19/25 0015    And    dextrose 50 % (D50W) injection 25 g  25 g Intravenous Q15 MIN PRN Jarrett Greene M.D.        norepinephrine (Levophed) 8 mg in 250 mL  NS infusion (premix)  0-1 mcg/kg/min (Ideal) Intravenous Continuous Jarrett Greene M.D. 2.3 mL/hr at 04/19/25 0626 0.02 mcg/kg/min at 04/19/25 0626    aspirin (Asa) chewable tab 81 mg  81 mg Enteral Tube DAILY Jarrett Greene M.D.   81 mg at 04/19/25 0541    atorvastatin (Lipitor) tablet 80 mg  80 mg Enteral Tube Q EVENING Jarrett Greene M.D.   80 mg at 04/18/25 1708    allopurinol (Zyloprim) tablet 50 mg  50 mg Enteral Tube DAILY Jarrett Greene M.D.   50 mg at 04/19/25 0545    lactulose 20 GM/30ML solution 30 mL  30 mL Enteral Tube TID Jarrett Greene M.D.   30 mL at 04/19/25 0541    NS infusion   Intravenous Continuous Jarrett Greene M.D.   Paused at 04/17/25 0510       Fluids    Intake/Output Summary (Last 24 hours) at 4/19/2025 0745  Last data filed at 4/19/2025 0600  Gross per 24 hour   Intake 2124.33 ml   Output 1710 ml   Net 414.33 ml       Laboratory  Recent Labs     04/18/25  0308 04/18/25  1025 04/19/25  0343   ISTATAPH 7.399 7.419 7.406   ISTATAPCO2 60.4* 58.3* 63.1*   ISTATAPO2 56* 378* 89   ISTATATCO2 39 39 42*   [id number] 88* 100* 96   ISTATARTHCO3 37.3* 37.7* 39.6*   ISTATARTBE 10* 11* 13*   ISTATTEMP 98.6 F 98.8 F 98.8 F   ISTATFIO2 50 100 60   ISTATSPEC Arterial Arterial Arterial   ISTATAPHTC 7.399 7.418 7.404   [id number] 56* 379* 90         Recent Labs     04/17/25  0445 04/17/25  1254 04/17/25  1808 04/18/25  0455 04/18/25  0735 04/18/25  1653 04/19/25  0130 04/19/25  0430   SODIUM 145 144  --  143  --   --   --  145   POTASSIUM 3.4* 3.3*  --  3.8  --   --   --  4.4   CHLORIDE 97 98  --  101  --   --   --  100   CO2 32 34*  --  30  --   --   --  34*   BUN 27* 33*  --  39*  --   --   --  45*   CREATININE 1.48* 1.57*  --  1.64*  --   --   --  1.91*   MAGNESIUM 2.0  --   --  2.3  --   --   --  2.4   PHOSPHORUS <0.4* 1.9*   < > 4.4 7.2* 4.1 3.9  --    CALCIUM 10.2 9.8  --  8.9  --   --   --  8.7    < > = values in this interval not displayed.     Recent Labs     04/17/25  6459  04/17/25  1254 04/18/25  0455 04/19/25  0430   ALTSGPT 11  --  9 12   ASTSGOT 31  --  37 37   ALKPHOSPHAT 87  --  74 81   TBILIRUBIN 0.5  --  0.3 <0.2   PREALBUMIN 13.1*  --   --   --    GLUCOSE 228* 202* 130* 128*     Recent Labs     04/16/25  1327 04/16/25  2103 04/17/25 0445 04/18/25 0335 04/18/25 0455 04/19/25 0430   WBC 6.3   < > 6.1 16.0*  --  17.4*   NEUTSPOLYS 72.20*  --   --   --   --   --    LYMPHOCYTES 12.20*  --   --   --   --   --    MONOCYTES 11.30  --   --   --   --   --    EOSINOPHILS 1.70  --   --   --   --   --    BASOPHILS 0.00  --   --   --   --   --    ASTSGOT 29   < > 31  --  37 37   ALTSGPT 9   < > 11  --  9 12   ALKPHOSPHAT 93   < > 87  --  74 81   TBILIRUBIN 0.3   < > 0.5  --  0.3 <0.2    < > = values in this interval not displayed.     Recent Labs     04/16/25  1505 04/16/25 2103 04/17/25 0445 04/18/25 0335 04/19/25 0430   RBC  --    < > 3.89* 3.57* 3.42*   HEMOGLOBIN  --    < > 12.1* 11.1* 10.8*   HEMATOCRIT  --    < > 41.4* 36.3* 34.4*   PLATELETCT  --    < > 126* 130* 144*   PROTHROMBTM 12.3  --   --   --   --    APTT 28.2  --   --   --   --    INR 0.92  --   --   --   --     < > = values in this interval not displayed.       Imaging  X-Ray:  I have personally reviewed the images and compared with prior images.  Echo:   Reviewed  MRI:   Reviewed    Assessment/Plan  * Altered mental status- (present on admission)  Assessment & Plan  Uncertain if focal deficits were related to elevated PCO2 upon admission  Not a candidate for endovascular intervention upon admission  MRI brain negative for CVA  Seizure on 4/18  ECHO reviewed  Tele monitoring  cEEG  Neuro checks  A1c/Lipid panel  Secondary risk prevention    Seizure (HCC)  Assessment & Plan  On 4/18  No hx of seizure, hx of EtOH use but last drink was January per wife  cEEG  MRI reviewed  Keppra  PRN benzo  Seizure precautions    Increased ammonia level- (present on admission)  Assessment & Plan  Uncertain if he carries the  diagnosis of cirrhosis  Lactulose   Trend     Thrombocytopenia (HCC)- (present on admission)  Assessment & Plan  Chronic in the setting of alcohol abuse  Monitor for bleeding  Conservative transfusion strategy     Acute on chronic respiratory failure (HCC)- (present on admission)  Assessment & Plan  Secondary to COPD exacerbation  04/16/25 intubated  RT/O2 protocols  Daily and PRN ABGs  Titration of ventilator therapy based on ABGs and patient's status  Sedation as tolerated/indicated  Daily CXR  HOB >30 degrees and peridex for VAP prevention  Pepcid for GI prophylaxis  SAT/SBT when able (ABCDEF Bundle)  Early mobility  Viral panel  ECHO  Treatment for COPD  azithro    Acute systolic heart failure (HCC)- (present on admission)  Assessment & Plan  Repeat ECHO with normal EF  GDMT when appropriate  Maintain euvolemia     Alcohol dependence (HCC)- (present on admission)  Assessment & Plan  Reported history of, quit in January   Monitor for withdrawal  MVI/Folate/Thiamine supplementation  Electrolyte replacement     Acute exacerbation of chronic obstructive pulmonary disease (COPD) (HCC)- (present on admission)  Assessment & Plan  PFT: FEV1/FVC ratio 57 and FEV1 of 0.93 L or 32% predicted. postbronchodilator FEV1 of 1.37 L or 47% predicted. Total lung capacity is within normal limits at 106%   04/16/25 intubated  PRN duonebs, s/p continuous x1 hr  Off Ketamine  Continue wean steroids    Stage 3 chronic kidney disease- (present on admission)  Assessment & Plan  Strict I/Os  Renally dosed medications  Avoid nephrotoxic agents as able  Cr continues to trend up, continue to monitor  No need for nephro consult at this point, may need one in the future    Type 2 diabetes mellitus (HCC)- (present on admission)  Assessment & Plan  Goal blood glucose 140-180  sliding scale insulin, accuchecks  hypoglycemia protocol  A1c 6.2    Primary hypertension- (present on admission)  Assessment & Plan  Reintroduce oral antihypertensives  when appropriate      Dyslipidemia- (present on admission)  Assessment & Plan  Atorvastatin          VTE:  Heparin  Ulcer: H2 Antagonist  Lines: Garibay Catheter  Ongoing indication addressed    I have performed a physical exam and reviewed and updated ROS and Plan today (4/19/2025). In review of yesterday's note (4/18/2025), there are no changes except as documented above.     Discussed patient condition and risk of morbidity and/or mortality with Family, RN, RT, Pharmacy, Code status disscussed, Charge nurse / hot rounds, Patient, and neurology    The patient remains critically ill.  Critical care time = 66 minutes in directly providing and coordinating critical care and extensive data review.  No time overlap and excludes procedures.    Please note that this dictation was created using voice recognition software. The accuracy of the dictation is limited to the abilities of the software. I have made every reasonable attempt to correct obvious errors, but I expect that there are errors of grammar and possibly content that I did not discover before finalizing the note.

## 2025-04-20 ENCOUNTER — APPOINTMENT (OUTPATIENT)
Dept: RADIOLOGY | Facility: MEDICAL CENTER | Age: 65
DRG: 291 | End: 2025-04-20
Attending: INTERNAL MEDICINE
Payer: COMMERCIAL

## 2025-04-20 LAB
ALBUMIN SERPL BCP-MCNC: 3.4 G/DL (ref 3.2–4.9)
ALBUMIN/GLOB SERPL: 1.2 G/DL
ALP SERPL-CCNC: 76 U/L (ref 30–99)
ALT SERPL-CCNC: 12 U/L (ref 2–50)
ANION GAP SERPL CALC-SCNC: 9 MMOL/L (ref 7–16)
AST SERPL-CCNC: 35 U/L (ref 12–45)
BASE EXCESS BLDA CALC-SCNC: 15 MMOL/L (ref -4–3)
BILIRUB SERPL-MCNC: <0.2 MG/DL (ref 0.1–1.5)
BODY TEMPERATURE: ABNORMAL DEGREES
BREATHS SETTING VENT: 28
BUN SERPL-MCNC: 53 MG/DL (ref 8–22)
CALCIUM ALBUM COR SERPL-MCNC: 9.3 MG/DL (ref 8.5–10.5)
CALCIUM SERPL-MCNC: 8.8 MG/DL (ref 8.5–10.5)
CHLORIDE SERPL-SCNC: 100 MMOL/L (ref 96–112)
CO2 BLDA-SCNC: 42 MMOL/L (ref 32–48)
CO2 SERPL-SCNC: 36 MMOL/L (ref 20–33)
CREAT SERPL-MCNC: 2.02 MG/DL (ref 0.5–1.4)
DELSYS IDSYS: ABNORMAL
END TIDAL CARBON DIOXIDE IECO2: 34 MMHG
ERYTHROCYTE [DISTWIDTH] IN BLOOD BY AUTOMATED COUNT: 56.4 FL (ref 35.9–50)
GFR SERPLBLD CREATININE-BSD FMLA CKD-EPI: 36 ML/MIN/1.73 M 2
GLOBULIN SER CALC-MCNC: 2.9 G/DL (ref 1.9–3.5)
GLUCOSE BLD STRIP.AUTO-MCNC: 110 MG/DL (ref 65–99)
GLUCOSE BLD STRIP.AUTO-MCNC: 119 MG/DL (ref 65–99)
GLUCOSE BLD STRIP.AUTO-MCNC: 125 MG/DL (ref 65–99)
GLUCOSE BLD STRIP.AUTO-MCNC: 130 MG/DL (ref 65–99)
GLUCOSE BLD STRIP.AUTO-MCNC: 91 MG/DL (ref 65–99)
GLUCOSE SERPL-MCNC: 124 MG/DL (ref 65–99)
HCO3 BLDA-SCNC: 40.6 MMOL/L (ref 21–28)
HCT VFR BLD AUTO: 34.7 % (ref 42–52)
HGB BLD-MCNC: 10.4 G/DL (ref 14–18)
HOROWITZ INDEX BLDA+IHG-RTO: 153 MM[HG]
LACTATE BLD-SCNC: 0.7 MMOL/L (ref 0.5–2)
MAGNESIUM SERPL-MCNC: 2.7 MG/DL (ref 1.5–2.5)
MCH RBC QN AUTO: 30.5 PG (ref 27–33)
MCHC RBC AUTO-ENTMCNC: 30 G/DL (ref 32.3–36.5)
MCV RBC AUTO: 101.8 FL (ref 81.4–97.8)
MODE IMODE: ABNORMAL
O2/TOTAL GAS SETTING VFR VENT: 60 %
PCO2 BLDA: 55.3 MMHG (ref 32–48)
PCO2 TEMP ADJ BLDA: 56.5 MMHG (ref 32–48)
PEEP END EXPIRATORY PRESSURE IPEEP: 10 CMH20
PH BLDA: 7.47 [PH] (ref 7.35–7.45)
PH TEMP ADJ BLDA: 7.47 [PH] (ref 7.35–7.45)
PHOSPHATE SERPL-MCNC: 4.2 MG/DL (ref 2.5–4.5)
PLATELET # BLD AUTO: 151 K/UL (ref 164–446)
PMV BLD AUTO: 11.4 FL (ref 9–12.9)
PO2 BLDA: 92 MMHG (ref 83–108)
PO2 TEMP ADJ BLDA: 95 MMHG (ref 83–108)
POTASSIUM SERPL-SCNC: 4.9 MMOL/L (ref 3.6–5.5)
PROT SERPL-MCNC: 6.3 G/DL (ref 6–8.2)
RBC # BLD AUTO: 3.41 M/UL (ref 4.7–6.1)
SAO2 % BLDA: 97 % (ref 93–99)
SODIUM SERPL-SCNC: 145 MMOL/L (ref 135–145)
SPECIMEN DRAWN FROM PATIENT: ABNORMAL
TIDAL VOLUME IVT: 370 ML
WBC # BLD AUTO: 16.1 K/UL (ref 4.8–10.8)

## 2025-04-20 PROCEDURE — 94150 VITAL CAPACITY TEST: CPT

## 2025-04-20 PROCEDURE — 99291 CRITICAL CARE FIRST HOUR: CPT | Performed by: INTERNAL MEDICINE

## 2025-04-20 PROCEDURE — 700102 HCHG RX REV CODE 250 W/ 637 OVERRIDE(OP): Performed by: INTERNAL MEDICINE

## 2025-04-20 PROCEDURE — A9270 NON-COVERED ITEM OR SERVICE: HCPCS | Performed by: INTERNAL MEDICINE

## 2025-04-20 PROCEDURE — 700111 HCHG RX REV CODE 636 W/ 250 OVERRIDE (IP): Mod: JZ | Performed by: INTERNAL MEDICINE

## 2025-04-20 PROCEDURE — 84100 ASSAY OF PHOSPHORUS: CPT

## 2025-04-20 PROCEDURE — 80053 COMPREHEN METABOLIC PANEL: CPT

## 2025-04-20 PROCEDURE — 36600 WITHDRAWAL OF ARTERIAL BLOOD: CPT

## 2025-04-20 PROCEDURE — 700101 HCHG RX REV CODE 250: Performed by: INTERNAL MEDICINE

## 2025-04-20 PROCEDURE — 82803 BLOOD GASES ANY COMBINATION: CPT

## 2025-04-20 PROCEDURE — 85027 COMPLETE CBC AUTOMATED: CPT

## 2025-04-20 PROCEDURE — 82962 GLUCOSE BLOOD TEST: CPT | Mod: 91

## 2025-04-20 PROCEDURE — 74018 RADEX ABDOMEN 1 VIEW: CPT

## 2025-04-20 PROCEDURE — 94799 UNLISTED PULMONARY SVC/PX: CPT

## 2025-04-20 PROCEDURE — 94640 AIRWAY INHALATION TREATMENT: CPT

## 2025-04-20 PROCEDURE — 770022 HCHG ROOM/CARE - ICU (200)

## 2025-04-20 PROCEDURE — 700111 HCHG RX REV CODE 636 W/ 250 OVERRIDE (IP): Performed by: STUDENT IN AN ORGANIZED HEALTH CARE EDUCATION/TRAINING PROGRAM

## 2025-04-20 PROCEDURE — 83605 ASSAY OF LACTIC ACID: CPT

## 2025-04-20 PROCEDURE — 71045 X-RAY EXAM CHEST 1 VIEW: CPT

## 2025-04-20 PROCEDURE — 83735 ASSAY OF MAGNESIUM: CPT

## 2025-04-20 PROCEDURE — 94003 VENT MGMT INPAT SUBQ DAY: CPT

## 2025-04-20 RX ORDER — POLYETHYLENE GLYCOL 3350 17 G/17G
1 POWDER, FOR SOLUTION ORAL
Status: DISCONTINUED | OUTPATIENT
Start: 2025-04-20 | End: 2025-04-21

## 2025-04-20 RX ORDER — FOLIC ACID 1 MG/1
1 TABLET ORAL DAILY
Status: DISCONTINUED | OUTPATIENT
Start: 2025-04-21 | End: 2025-04-23 | Stop reason: HOSPADM

## 2025-04-20 RX ORDER — METOPROLOL SUCCINATE 50 MG/1
50 TABLET, EXTENDED RELEASE ORAL
Status: DISCONTINUED | OUTPATIENT
Start: 2025-04-20 | End: 2025-04-23 | Stop reason: HOSPADM

## 2025-04-20 RX ORDER — BISACODYL 10 MG
10 SUPPOSITORY, RECTAL RECTAL
Status: DISCONTINUED | OUTPATIENT
Start: 2025-04-20 | End: 2025-04-21

## 2025-04-20 RX ORDER — LACTULOSE 10 G/15ML
30 SOLUTION ORAL 3 TIMES DAILY
Status: DISCONTINUED | OUTPATIENT
Start: 2025-04-20 | End: 2025-04-22

## 2025-04-20 RX ORDER — ACETAMINOPHEN 325 MG/1
650 TABLET ORAL EVERY 4 HOURS PRN
Status: DISCONTINUED | OUTPATIENT
Start: 2025-04-20 | End: 2025-04-23 | Stop reason: HOSPADM

## 2025-04-20 RX ORDER — ASPIRIN 81 MG/1
81 TABLET, CHEWABLE ORAL DAILY
Status: DISCONTINUED | OUTPATIENT
Start: 2025-04-21 | End: 2025-04-23 | Stop reason: HOSPADM

## 2025-04-20 RX ORDER — ATORVASTATIN CALCIUM 80 MG/1
80 TABLET, FILM COATED ORAL EVERY EVENING
Status: DISCONTINUED | OUTPATIENT
Start: 2025-04-20 | End: 2025-04-23 | Stop reason: HOSPADM

## 2025-04-20 RX ORDER — DEXTROSE MONOHYDRATE 25 G/50ML
25 INJECTION, SOLUTION INTRAVENOUS
Status: DISCONTINUED | OUTPATIENT
Start: 2025-04-20 | End: 2025-04-23 | Stop reason: HOSPADM

## 2025-04-20 RX ORDER — ALLOPURINOL 100 MG/1
50 TABLET ORAL DAILY
Status: DISCONTINUED | OUTPATIENT
Start: 2025-04-21 | End: 2025-04-23 | Stop reason: HOSPADM

## 2025-04-20 RX ORDER — INSULIN LISPRO 100 [IU]/ML
1-6 INJECTION, SOLUTION INTRAVENOUS; SUBCUTANEOUS
Status: DISCONTINUED | OUTPATIENT
Start: 2025-04-20 | End: 2025-04-23 | Stop reason: HOSPADM

## 2025-04-20 RX ORDER — AMOXICILLIN 250 MG
2 CAPSULE ORAL 2 TIMES DAILY
Status: DISCONTINUED | OUTPATIENT
Start: 2025-04-20 | End: 2025-04-21

## 2025-04-20 RX ADMIN — LEVETIRACETAM 1000 MG: 100 INJECTION, SOLUTION INTRAVENOUS at 05:52

## 2025-04-20 RX ADMIN — PREDNISONE 40 MG: 20 TABLET ORAL at 05:50

## 2025-04-20 RX ADMIN — PROPOFOL 20 MCG/KG/MIN: 10 INJECTION, EMULSION INTRAVENOUS at 01:17

## 2025-04-20 RX ADMIN — LACTULOSE 30 ML: 10 SOLUTION ORAL at 17:28

## 2025-04-20 RX ADMIN — HEPARIN SODIUM 5000 UNITS: 5000 INJECTION, SOLUTION INTRAVENOUS; SUBCUTANEOUS at 14:39

## 2025-04-20 RX ADMIN — IPRATROPIUM BROMIDE AND ALBUTEROL SULFATE 3 ML: .5; 2.5 SOLUTION RESPIRATORY (INHALATION) at 21:43

## 2025-04-20 RX ADMIN — LEVETIRACETAM 1000 MG: 100 INJECTION, SOLUTION INTRAVENOUS at 17:28

## 2025-04-20 RX ADMIN — MULTIVITAMIN TABLET 1 TABLET: TABLET at 05:51

## 2025-04-20 RX ADMIN — Medication 300 MCG/HR: at 01:20

## 2025-04-20 RX ADMIN — FOLIC ACID 1 MG: 1 TABLET ORAL at 05:51

## 2025-04-20 RX ADMIN — HEPARIN SODIUM 5000 UNITS: 5000 INJECTION, SOLUTION INTRAVENOUS; SUBCUTANEOUS at 05:52

## 2025-04-20 RX ADMIN — LACTULOSE 30 ML: 10 SOLUTION ORAL at 05:52

## 2025-04-20 RX ADMIN — ATORVASTATIN CALCIUM 80 MG: 80 TABLET, FILM COATED ORAL at 17:28

## 2025-04-20 RX ADMIN — SENNOSIDES AND DOCUSATE SODIUM 2 TABLET: 50; 8.6 TABLET ORAL at 05:51

## 2025-04-20 RX ADMIN — FAMOTIDINE 20 MG: 20 TABLET, FILM COATED ORAL at 05:51

## 2025-04-20 RX ADMIN — HEPARIN SODIUM 5000 UNITS: 5000 INJECTION, SOLUTION INTRAVENOUS; SUBCUTANEOUS at 22:00

## 2025-04-20 RX ADMIN — ALLOPURINOL 50 MG: 100 TABLET ORAL at 05:51

## 2025-04-20 RX ADMIN — METOPROLOL SUCCINATE 50 MG: 50 TABLET, EXTENDED RELEASE ORAL at 15:03

## 2025-04-20 RX ADMIN — ASPIRIN 81 MG: 81 TABLET, CHEWABLE ORAL at 05:52

## 2025-04-20 ASSESSMENT — PAIN DESCRIPTION - PAIN TYPE
TYPE: ACUTE PAIN

## 2025-04-20 ASSESSMENT — PULMONARY FUNCTION TESTS: FVC: 0.9

## 2025-04-20 ASSESSMENT — PATIENT HEALTH QUESTIONNAIRE - PHQ9
SUM OF ALL RESPONSES TO PHQ9 QUESTIONS 1 AND 2: 0
2. FEELING DOWN, DEPRESSED, IRRITABLE, OR HOPELESS: NOT AT ALL
1. LITTLE INTEREST OR PLEASURE IN DOING THINGS: NOT AT ALL

## 2025-04-20 ASSESSMENT — FIBROSIS 4 INDEX: FIB4 SCORE: 4.75

## 2025-04-20 NOTE — FLOWSHEET NOTE
04/20/25 0815   Weaning Parameters   RR (bpm) 15   $ FVC / Vital Capacity (liters)  0.9   NIF (cm H2O)  -34   Rapid Shallow Breathing Index (RR/VT) 25   Spontaneous VE 7.6   Spontaneous      Cuff leak

## 2025-04-20 NOTE — PROGRESS NOTES
IVÁN Grey updated on patients continued abdominal distention and firmness. 780 mL out in residuals, more could be aspirated. 200 mL re-fed. IVÁN Davey updated and verbalized to stop tube feeding until after SAT/SBT. Post residual check patients abdominal firmness noticeably less firm upon palpation.

## 2025-04-20 NOTE — PROGRESS NOTES
" Critical Care Progress Note    Date of admission  4/16/2025    Chief Complaint  64 y.o. male admitted 4/16/2025 with AMS    Hospital Course  \"64 y.o. male w/ PMH of CAD s/p PCI, HFmrEF 45%, CKD IIIa, DM2, asthma/COPD on 4lpm home o2, ethanol abuse, tobacco abuse who presented 4/16/2025 with SOB and AMS. Family state pt had mild aphasia on 4/15 that seemed to worsen as the day went on, AM of 4/16 he was not responsive to commands. Per family he had no complaints the day prior.               In the ED vitals showed hypoxia. Labs showed elevated BNP. Trop wnl x2. CT head w/o showed chronic microvasc changes, CTA HN w/o acute dz. CXR wnl. EKG w/ RBBB and LAFB no acute ischemia. Neuro was consultedi n the ED and recommended admission for CVA workup\"     During the evening of admission he became less responsive, ABG showed respiratory acidosis. He was moved to the ICU for intubation. There is no further history available at this time.     4/17: VD #2, weaned off ketamine. Following commands. ABG improved  4/18: VD #3, seizure-like activity during SAT/SBT. Given keppra and versed, placed on cEEG, neuro consulted  4/19: VD #4, another seizure-like event while on SBT. EEG showed no ictal discharges. Cr slightly worse    Interval Problem Update  Reviewed last 24 hour events:   - distended overnight. NGT suctioned and improved   - Cr improved   - +flatus and BS   - Neuro: following   - HR: 60s-80s   - SBP: 100s-150s off NE   - GI: TF held for distention, last BM this AM   - UOP: 1.7 L/24 hrs, +1.6L net   - Garibay: yes   - Tm: 37.5   - Lines: PIV   - PPx: GI pepcid, DVT heparin   - ABG: , compensated respiratory acidosis   - VD #5   - SBT: VC: 0.9, NIF -34, RSBI 25   - +cuff leak   - CXR (personally reviewed and compared to prior): NAD   - Mobility level 2, eligible for progression yes   - Trial vent liberation    Updates: Patient extubated to HFNC, doing well AOx4. BM this afternoon. Up to commode/chair    Yesterday   - " movements overnight not seizures on EEG   - Neuro: following   - HR: 60s-80s   - SBP: 100s-110s on 0.02 of NE   - GI: TF at goal, last BM yesterday   - UOP: 1.7 L/24 hrs   - Garibay: yes   - Tm: 37.3   - Lines: PIV   - PPx: GI pepcid, DVT heparin   - ABG: , compensated respiratory acidosis   - VD #4   - SBT: VC: 0.8, NIF -15, RSBI 50s   - CXR (personally reviewed and compared to prior): NAD   - Mobility level 2, eligible for progression yes    Updates: the patient had a 2nd seizure-like event during SBT (upward gaze accompanied by unresponsiveness). Wife reports hypoxia -> event. RT/RN reported normal parameters until ~45 mins into SBT then bradypnea and elevated peak pressures followed by hypoxia. No significant bronchospasm on auscultation and no obstruction/mucus on suctioning. Dr Julius Estrada (epileptologist) reports no seizure on EEG. Events appear to be precipitated by hypoxia rather than ictal events. Etiology of hypoxia is unknown at this time. CXR pending, pulmonary compliance normal after versed given. RN and wife report itching and facial flushing after labetalol dose, will d/c.    Review of Systems  Review of Systems   Unable to perform ROS: Critical illness        Vital Signs for last 24 hours   Pulse:  [] 86  Resp:  [16-32] 22  BP: ()/(52-85) 152/78  SpO2:  [89 %-100 %] 89 %    Hemodynamic parameters for last 24 hours       Respiratory Information for the last 24 hours  Vent Mode: Spont  Rate (breaths/min): 32  Vt Target (mL): 370  PEEP/CPAP: 8  MAP: 11  Control VTE (exp VT): 364    Physical Exam   Physical Exam  Vitals and nursing note reviewed.   Constitutional:       General: He is in acute distress.      Appearance: He is well-developed. He is ill-appearing.      Interventions: He is intubated.   HENT:      Head: Normocephalic and atraumatic.      Right Ear: External ear normal.      Left Ear: External ear normal.      Mouth/Throat:      Comments: ETT in place  Eyes:       Conjunctiva/sclera: Conjunctivae normal.      Pupils: Pupils are equal, round, and reactive to light.   Neck:      Vascular: No JVD.      Trachea: No tracheal deviation.   Cardiovascular:      Rate and Rhythm: Normal rate and regular rhythm.      Pulses: Normal pulses.   Pulmonary:      Effort: He is intubated.      Breath sounds: No wheezing or rales.      Comments: Good air movement, normal vent mechanics  Abdominal:      General: Bowel sounds are normal. There is no distension.      Palpations: Abdomen is soft.   Musculoskeletal:         General: No tenderness.      Cervical back: Neck supple.   Skin:     General: Skin is warm and dry.      Capillary Refill: Capillary refill takes less than 2 seconds.      Findings: No rash.   Neurological:      General: No focal deficit present.      Cranial Nerves: No cranial nerve deficit.      Sensory: No sensory deficit.      Motor: No weakness.      Comments: Following in all 4   Psychiatric:      Comments: Unable to assess       Medications  Current Facility-Administered Medications   Medication Dose Route Frequency Provider Last Rate Last Admin    acetaminophen (Tylenol) tablet 650 mg  650 mg Enteral Tube Q4HRS PRN Tanner Balbuena Jr., D.O.        predniSONE (Deltasone) tablet 40 mg  40 mg Enteral Tube DAILY Tanner Balbuena Jr., D.O.   40 mg at 04/20/25 0550    ipratropium-albuterol (DUONEB) nebulizer solution  3 mL Nebulization Q2HRS PRN Tanner Balbuena Jr., D.O.   3 mL at 04/19/25 1426    folic acid (Folvite) tablet 1 mg  1 mg Enteral Tube DAILY Tanner Balbuena Jr. D.O.   1 mg at 04/20/25 0551    multivitamin tablet 1 Tablet  1 Tablet Enteral Tube DAILY Tanner Balbuena Jr. D.O.   1 Tablet at 04/20/25 0551    levETIRAcetam (Keppra) injection 1,000 mg  1,000 mg Intravenous Q12HRS Tanner Balbuena Jr. D.O.   1,000 mg at 04/20/25 0552    midazolam (Versed) injection 5 mg  5 mg Intravenous Q15 MIN PRN Tanner Balbuena Jr. D.O.   5 mg at 04/19/25 1158    famotidine (Pepcid)  tablet 20 mg  20 mg Enteral Tube DAILY Tanner Balbuena Jr. D.[place]   20 mg at 04/20/25 0551    heparin injection 5,000 Units  5,000 Units Subcutaneous Q8HRS Adelfo Chowdhury D.O.   5,000 Units at 04/20/25 0552    Pharmacy Consult: Enteral tube insertion - review meds/change route/product selection  1 Each Other PHARMACY TO DOSE Jarrett Greene M.D.        Respiratory Therapy Consult   Nebulization Continuous RT Jarrett Greene M.D.        fentaNYL (Sublimaze) injection 50 mcg  50 mcg Intravenous Q15 MIN PRN Jarrett Greene M.D.   50 mcg at 04/19/25 1152    And    fentaNYL (Sublimaze) injection 100 mcg  100 mcg Intravenous Q15 MIN PRN Jarrett Greene M.D.   100 mcg at 04/17/25 1220    And    fentaNYL (SUBLIMAZE) 50 mcg/mL in 50mL (Continuous Infusion)   Intravenous Continuous Jarrett Greene M.D. 6 mL/hr at 04/20/25 0120 300 mcg/hr at 04/20/25 0120    And    propofol (DIPRIVAN) injection  0-40 mcg/kg/min (Ideal) Intravenous Continuous Jarrett Greene M.D. 5.5 mL/hr at 04/20/25 0217 15 mcg/kg/min at 04/20/25 0217    senna-docusate (Pericolace Or Senokot S) 8.6-50 MG per tablet 2 Tablet  2 Tablet Enteral Tube BID Jarrett Greene M.D.   2 Tablet at 04/20/25 0551    And    polyethylene glycol/lytes (Miralax) Packet 1 Packet  1 Packet Enteral Tube QDAY PRN Jarrett Greene M.D.        And    magnesium hydroxide (Milk Of Magnesia) suspension 30 mL  30 mL Enteral Tube QDAY PRN Jarrett Greene M.D.        And    bisacodyl (Dulcolax) suppository 10 mg  10 mg Rectal QDAY PRN Jarrett Greene M.D. MD Alert...ICU Electrolyte Replacement per Pharmacy   Other PHARMACY TO DOSE Jarrett Greene M.D.        lidocaine (Xylocaine) 1 % injection 2 mL  2 mL Tracheal Tube Q30 MIN PRN Jarrett Greene M.D.        insulin lispro (HumaLOG,AdmeLOG) subcutaneous injection  1-6 Units Subcutaneous Q6HRS Jarrett Greene M.D.   1 Units at 04/19/25 0015    And    dextrose 50 % (D50W) injection 25 g  25 g Intravenous  Q15 MIN PRN Jarrett Greene M.D.        norepinephrine (Levophed) 8 mg in 250 mL NS infusion (premix)  0-1 mcg/kg/min (Ideal) Intravenous Continuous Jarrett Greene M.D.   Stopped at 04/19/25 1032    aspirin (Asa) chewable tab 81 mg  81 mg Enteral Tube DAILY Jarrett Greene M.D.   81 mg at 04/20/25 0552    atorvastatin (Lipitor) tablet 80 mg  80 mg Enteral Tube Q EVENING Jarrett Greene M.D.   80 mg at 04/19/25 1729    allopurinol (Zyloprim) tablet 50 mg  50 mg Enteral Tube DAILY Jarrett Greene M.D.   50 mg at 04/20/25 0551    lactulose 20 GM/30ML solution 30 mL  30 mL Enteral Tube TID Jarrett Greene M.D.   30 mL at 04/20/25 0552    NS infusion   Intravenous Continuous Jarrett Greene M.D.   Paused at 04/17/25 0510       Fluids    Intake/Output Summary (Last 24 hours) at 4/20/2025 0656  Last data filed at 4/20/2025 0600  Gross per 24 hour   Intake 1884.21 ml   Output 3010 ml   Net -1125.79 ml       Laboratory  Recent Labs     04/18/25  1025 04/19/25  0343 04/20/25  0527   ISTATAPH 7.419 7.406 7.474*   ISTATAPCO2 58.3* 63.1* 55.3*   ISTATAPO2 378* 89 92   ISTATATCO2 39 42* 42*   FROWUJD0VVO 100* 96 97   ISTATARTHCO3 37.7* 39.6* 40.6*   ISTATARTBE 11* 13* 15*   ISTATTEMP 98.8 F 98.8 F 99.5 F   ISTATFIO2 100 60 60   ISTATSPEC Arterial Arterial Arterial   ISTATAPHTC 7.418 7.404 7.466*   [id number] 379* 90 95         Recent Labs     04/18/25  0455 04/18/25  0735 04/19/25  0430 04/19/25  0845 04/19/25  1715 04/20/25  0105 04/20/25  0600   SODIUM 143  --  145  --  144  --  145   POTASSIUM 3.8  --  4.4  --  5.1  --  4.9   CHLORIDE 101  --  100  --  98  --  100   CO2 30  --  34*  --  34*  --  36*   BUN 39*  --  45*  --  50*  --  53*   CREATININE 1.64*  --  1.91*  --  2.14*  --  2.02*   MAGNESIUM 2.3  --  2.4  --   --   --  2.7*   PHOSPHORUS 4.4   < >  --  3.5 5.4* 4.2  --    CALCIUM 8.9  --  8.7  --  9.3  --  8.8    < > = values in this interval not displayed.     Recent Labs     04/18/25  1194  04/19/25  0430 04/19/25  1715 04/20/25  0600   ALTSGPT 9 12  --  12   ASTSGOT 37 37  --  35   ALKPHOSPHAT 74 81  --  76   TBILIRUBIN 0.3 <0.2  --  <0.2   GLUCOSE 130* 128* 127* 124*     Recent Labs     04/18/25  0335 04/18/25  0455 04/19/25  0430 04/20/25  0600   WBC 16.0*  --  17.4* 16.1*   ASTSGOT  --  37 37 35   ALTSGPT  --  9 12 12   ALKPHOSPHAT  --  74 81 76   TBILIRUBIN  --  0.3 <0.2 <0.2     Recent Labs     04/18/25  0335 04/19/25  0430 04/20/25  0600   RBC 3.57* 3.42* 3.41*   HEMOGLOBIN 11.1* 10.8* 10.4*   HEMATOCRIT 36.3* 34.4* 34.7*   PLATELETCT 130* 144* 151*       Imaging  X-Ray:  I have personally reviewed the images and compared with prior images.  Echo:   Reviewed  MRI:   Reviewed    Assessment/Plan  * Altered mental status- (present on admission)  Assessment & Plan  Uncertain if focal deficits were related to elevated PCO2 upon admission  Not a candidate for endovascular intervention upon admission  MRI brain negative for CVA  Seizure on 4/18  ECHO reviewed  Tele monitoring  cEEG  Neuro checks  A1c/Lipid panel  Secondary risk prevention    Seizure (HCC)  Assessment & Plan  On 4/18  No hx of seizure, hx of EtOH use but last drink was January per wife  cEEG  MRI reviewed  Keppra  PRN benzo  Seizure precautions    Increased ammonia level- (present on admission)  Assessment & Plan  Uncertain if he carries the diagnosis of cirrhosis  Lactulose   Trend     Thrombocytopenia (HCC)- (present on admission)  Assessment & Plan  Chronic in the setting of alcohol abuse  Monitor for bleeding  Conservative transfusion strategy     Acute on chronic respiratory failure (HCC)- (present on admission)  Assessment & Plan  Secondary to COPD exacerbation  04/16/25 intubated  RT/O2 protocols  Daily and PRN ABGs  Titration of ventilator therapy based on ABGs and patient's status  Sedation as tolerated/indicated  Daily CXR  HOB >30 degrees and peridex for VAP prevention  Pepcid for GI prophylaxis  SAT/SBT when able (ABCDEF  Bundle)  Early mobility  Viral panel negative  ECHO with normal  Treatment for COPD  Completed Abx    Acute systolic heart failure (HCC)- (present on admission)  Assessment & Plan  Repeat ECHO with normal EF  GDMT when appropriate  Maintain euvolemia     Alcohol dependence (HCC)- (present on admission)  Assessment & Plan  Reported history of, quit in January   Monitor for withdrawal  MVI/Folate/Thiamine supplementation  Electrolyte replacement     Acute exacerbation of chronic obstructive pulmonary disease (COPD) (HCC)- (present on admission)  Assessment & Plan  PFT: FEV1/FVC ratio 57 and FEV1 of 0.93 L or 32% predicted. postbronchodilator FEV1 of 1.37 L or 47% predicted. Total lung capacity is within normal limits at 106%   04/16/25 intubated  PRN duonebs, s/p continuous x1 hr  Off Ketamine  Stop steroids    Stage 3 chronic kidney disease- (present on admission)  Assessment & Plan  Strict I/Os  Renally dosed medications  Avoid nephrotoxic agents as able  Cr continues to trend up, continue to monitor  No need for nephro consult at this point, may need one in the future but is improving    Type 2 diabetes mellitus (HCC)- (present on admission)  Assessment & Plan  Goal blood glucose 140-180  sliding scale insulin, accuchecks  hypoglycemia protocol  A1c 6.2    Primary hypertension- (present on admission)  Assessment & Plan  Reintroduce oral antihypertensives when appropriate      Dyslipidemia- (present on admission)  Assessment & Plan  Atorvastatin          VTE:  Heparin  Ulcer: H2 Antagonist  Lines: Garibay Catheter  Ongoing indication addressed    I have performed a physical exam and reviewed and updated ROS and Plan today (4/20/2025). In review of yesterday's note (4/19/2025), there are no changes except as documented above.     Discussed patient condition and risk of morbidity and/or mortality with Family, RN, RT, Pharmacy, Code status disscussed, Charge nurse / hot rounds, and Patient    The patient remains  critically ill.  Critical care time = 48 minutes in directly providing and coordinating critical care and extensive data review.  No time overlap and excludes procedures.    Please note that this dictation was created using voice recognition software. The accuracy of the dictation is limited to the abilities of the software. I have made every reasonable attempt to correct obvious errors, but I expect that there are errors of grammar and possibly content that I did not discover before finalizing the note.

## 2025-04-20 NOTE — CARE PLAN
The patient is Watcher - Medium risk of patient condition declining or worsening    Shift Goals  Clinical Goals: no seizure activity, improved abdominal distention, decreased ventilator requirements  Patient Goals: unable to assess  Family Goals: get better, patient safety, SAT/SBT    Progress made toward(s) clinical / shift goals:    Problem: Neuro Status  Goal: Neuro status will remain stable or improve  Outcome: Progressing     Problem: Hemodynamic Monitoring  Goal: Patient's hemodynamics, fluid balance and neurologic status will be stable or improve  Outcome: Progressing     Problem: Pain - Standard  Goal: Alleviation of pain or a reduction in pain to the patient’s comfort goal  Outcome: Progressing     Problem: Safety - Medical Restraint  Goal: Remains free of injury from restraints (Restraint for Interference with Medical Device)  Outcome: Progressing       Patient is not progressing towards the following goals:

## 2025-04-20 NOTE — CARE PLAN
The patient is Watcher - Medium risk of patient condition declining or worsening    Shift Goals  Clinical Goals: no seizure activity, improved abdominal distention, decreased ventilator requirements  Patient Goals: unable to assess  Family Goals: get better, patient safety, SAT/SBT      Problem: Neuro Status  Goal: Neuro status will remain stable or improve  Outcome: Progressing     Problem: Pain - Standard  Goal: Alleviation of pain or a reduction in pain to the patient’s comfort goal  Outcome: Progressing     Problem: Safety - Medical Restraint  Goal: Remains free of injury from restraints (Restraint for Interference with Medical Device)  Outcome: Met  Goal: Free from restraint(s) (Restraint for Interference with Medical Device)  Outcome: Met

## 2025-04-21 LAB
ALBUMIN SERPL BCP-MCNC: 3.2 G/DL (ref 3.2–4.9)
ALBUMIN/GLOB SERPL: 1.1 G/DL
ALP SERPL-CCNC: 69 U/L (ref 30–99)
ALT SERPL-CCNC: 24 U/L (ref 2–50)
ANION GAP SERPL CALC-SCNC: 8 MMOL/L (ref 7–16)
AST SERPL-CCNC: 43 U/L (ref 12–45)
BACTERIA BLD CULT: NORMAL
BACTERIA BLD CULT: NORMAL
BILIRUB SERPL-MCNC: 0.2 MG/DL (ref 0.1–1.5)
BUN SERPL-MCNC: 45 MG/DL (ref 8–22)
CALCIUM ALBUM COR SERPL-MCNC: 9.4 MG/DL (ref 8.5–10.5)
CALCIUM SERPL-MCNC: 8.8 MG/DL (ref 8.5–10.5)
CHLORIDE SERPL-SCNC: 101 MMOL/L (ref 96–112)
CO2 SERPL-SCNC: 36 MMOL/L (ref 20–33)
CREAT SERPL-MCNC: 1.84 MG/DL (ref 0.5–1.4)
ERYTHROCYTE [DISTWIDTH] IN BLOOD BY AUTOMATED COUNT: 55.2 FL (ref 35.9–50)
GFR SERPLBLD CREATININE-BSD FMLA CKD-EPI: 40 ML/MIN/1.73 M 2
GLOBULIN SER CALC-MCNC: 2.9 G/DL (ref 1.9–3.5)
GLUCOSE BLD STRIP.AUTO-MCNC: 100 MG/DL (ref 65–99)
GLUCOSE BLD STRIP.AUTO-MCNC: 110 MG/DL (ref 65–99)
GLUCOSE BLD STRIP.AUTO-MCNC: 137 MG/DL (ref 65–99)
GLUCOSE BLD STRIP.AUTO-MCNC: 68 MG/DL (ref 65–99)
GLUCOSE BLD STRIP.AUTO-MCNC: 97 MG/DL (ref 65–99)
GLUCOSE SERPL-MCNC: 93 MG/DL (ref 65–99)
HCT VFR BLD AUTO: 34.2 % (ref 42–52)
HGB BLD-MCNC: 10.2 G/DL (ref 14–18)
MAGNESIUM SERPL-MCNC: 2.4 MG/DL (ref 1.5–2.5)
MCH RBC QN AUTO: 31.2 PG (ref 27–33)
MCHC RBC AUTO-ENTMCNC: 29.8 G/DL (ref 32.3–36.5)
MCV RBC AUTO: 104.6 FL (ref 81.4–97.8)
PHOSPHATE SERPL-MCNC: 3.1 MG/DL (ref 2.5–4.5)
PLATELET # BLD AUTO: 144 K/UL (ref 164–446)
PMV BLD AUTO: 10.7 FL (ref 9–12.9)
POTASSIUM SERPL-SCNC: 4.7 MMOL/L (ref 3.6–5.5)
PROT SERPL-MCNC: 6.1 G/DL (ref 6–8.2)
RBC # BLD AUTO: 3.27 M/UL (ref 4.7–6.1)
SIGNIFICANT IND 70042: NORMAL
SIGNIFICANT IND 70042: NORMAL
SITE SITE: NORMAL
SITE SITE: NORMAL
SODIUM SERPL-SCNC: 145 MMOL/L (ref 135–145)
SOURCE SOURCE: NORMAL
SOURCE SOURCE: NORMAL
WBC # BLD AUTO: 16.2 K/UL (ref 4.8–10.8)

## 2025-04-21 PROCEDURE — 80053 COMPREHEN METABOLIC PANEL: CPT

## 2025-04-21 PROCEDURE — 97166 OT EVAL MOD COMPLEX 45 MIN: CPT

## 2025-04-21 PROCEDURE — 92610 EVALUATE SWALLOWING FUNCTION: CPT

## 2025-04-21 PROCEDURE — 94640 AIRWAY INHALATION TREATMENT: CPT

## 2025-04-21 PROCEDURE — 700111 HCHG RX REV CODE 636 W/ 250 OVERRIDE (IP): Mod: JZ | Performed by: INTERNAL MEDICINE

## 2025-04-21 PROCEDURE — 82962 GLUCOSE BLOOD TEST: CPT

## 2025-04-21 PROCEDURE — 700111 HCHG RX REV CODE 636 W/ 250 OVERRIDE (IP): Performed by: STUDENT IN AN ORGANIZED HEALTH CARE EDUCATION/TRAINING PROGRAM

## 2025-04-21 PROCEDURE — 97535 SELF CARE MNGMENT TRAINING: CPT

## 2025-04-21 PROCEDURE — 700102 HCHG RX REV CODE 250 W/ 637 OVERRIDE(OP): Performed by: INTERNAL MEDICINE

## 2025-04-21 PROCEDURE — 83735 ASSAY OF MAGNESIUM: CPT

## 2025-04-21 PROCEDURE — 97163 PT EVAL HIGH COMPLEX 45 MIN: CPT

## 2025-04-21 PROCEDURE — 700101 HCHG RX REV CODE 250: Performed by: INTERNAL MEDICINE

## 2025-04-21 PROCEDURE — 94664 DEMO&/EVAL PT USE INHALER: CPT

## 2025-04-21 PROCEDURE — A9270 NON-COVERED ITEM OR SERVICE: HCPCS | Performed by: INTERNAL MEDICINE

## 2025-04-21 PROCEDURE — 770020 HCHG ROOM/CARE - TELE (206)

## 2025-04-21 PROCEDURE — 99291 CRITICAL CARE FIRST HOUR: CPT | Performed by: INTERNAL MEDICINE

## 2025-04-21 PROCEDURE — 84100 ASSAY OF PHOSPHORUS: CPT

## 2025-04-21 PROCEDURE — 99233 SBSQ HOSP IP/OBS HIGH 50: CPT | Performed by: HOSPITALIST

## 2025-04-21 PROCEDURE — 85027 COMPLETE CBC AUTOMATED: CPT

## 2025-04-21 RX ORDER — LEVETIRACETAM 500 MG/1
1000 TABLET ORAL 2 TIMES DAILY
Status: DISCONTINUED | OUTPATIENT
Start: 2025-04-21 | End: 2025-04-22

## 2025-04-21 RX ADMIN — HEPARIN SODIUM 5000 UNITS: 5000 INJECTION, SOLUTION INTRAVENOUS; SUBCUTANEOUS at 15:32

## 2025-04-21 RX ADMIN — HEPARIN SODIUM 5000 UNITS: 5000 INJECTION, SOLUTION INTRAVENOUS; SUBCUTANEOUS at 05:38

## 2025-04-21 RX ADMIN — LACTULOSE 30 ML: 10 SOLUTION ORAL at 05:38

## 2025-04-21 RX ADMIN — ASPIRIN 81 MG: 81 TABLET, CHEWABLE ORAL at 05:38

## 2025-04-21 RX ADMIN — MOMETASONE FUROATE AND FORMOTEROL FUMARATE DIHYDRATE 2 PUFF: 200; 5 AEROSOL RESPIRATORY (INHALATION) at 07:14

## 2025-04-21 RX ADMIN — THERA TABS 1 TABLET: TAB at 05:38

## 2025-04-21 RX ADMIN — HEPARIN SODIUM 5000 UNITS: 5000 INJECTION, SOLUTION INTRAVENOUS; SUBCUTANEOUS at 22:14

## 2025-04-21 RX ADMIN — ATORVASTATIN CALCIUM 80 MG: 80 TABLET, FILM COATED ORAL at 18:00

## 2025-04-21 RX ADMIN — METOPROLOL SUCCINATE 50 MG: 50 TABLET, EXTENDED RELEASE ORAL at 05:38

## 2025-04-21 RX ADMIN — LEVETIRACETAM 1000 MG: 100 INJECTION, SOLUTION INTRAVENOUS at 05:49

## 2025-04-21 RX ADMIN — FOLIC ACID 1 MG: 1 TABLET ORAL at 05:38

## 2025-04-21 RX ADMIN — MOMETASONE FUROATE AND FORMOTEROL FUMARATE DIHYDRATE 2 PUFF: 200; 5 AEROSOL RESPIRATORY (INHALATION) at 20:10

## 2025-04-21 RX ADMIN — IPRATROPIUM BROMIDE AND ALBUTEROL SULFATE 3 ML: .5; 2.5 SOLUTION RESPIRATORY (INHALATION) at 18:19

## 2025-04-21 RX ADMIN — LEVETIRACETAM 1000 MG: 500 TABLET, FILM COATED ORAL at 17:59

## 2025-04-21 RX ADMIN — ALLOPURINOL 50 MG: 100 TABLET ORAL at 05:38

## 2025-04-21 ASSESSMENT — PAIN DESCRIPTION - PAIN TYPE
TYPE: ACUTE PAIN

## 2025-04-21 ASSESSMENT — GAIT ASSESSMENTS
DISTANCE (FEET): 6
GAIT LEVEL OF ASSIST: MINIMAL ASSIST
DEVIATION: BRADYKINETIC;SHUFFLED GAIT
ASSISTIVE DEVICE: HAND HELD ASSIST

## 2025-04-21 ASSESSMENT — COGNITIVE AND FUNCTIONAL STATUS - GENERAL
TOILETING: A LITTLE
SUGGESTED CMS G CODE MODIFIER MOBILITY: CK
WALKING IN HOSPITAL ROOM: A LOT
DRESSING REGULAR UPPER BODY CLOTHING: A LITTLE
HELP NEEDED FOR BATHING: A LITTLE
MOVING TO AND FROM BED TO CHAIR: A LITTLE
TURNING FROM BACK TO SIDE WHILE IN FLAT BAD: A LITTLE
DAILY ACTIVITIY SCORE: 19
MOBILITY SCORE: 15
PERSONAL GROOMING: A LITTLE
SUGGESTED CMS G CODE MODIFIER DAILY ACTIVITY: CK
DRESSING REGULAR LOWER BODY CLOTHING: A LITTLE
STANDING UP FROM CHAIR USING ARMS: A LOT
CLIMB 3 TO 5 STEPS WITH RAILING: A LOT
MOVING FROM LYING ON BACK TO SITTING ON SIDE OF FLAT BED: A LITTLE

## 2025-04-21 ASSESSMENT — FIBROSIS 4 INDEX
FIB4 SCORE: 3.9
FIB4 SCORE: 3.9

## 2025-04-21 ASSESSMENT — ACTIVITIES OF DAILY LIVING (ADL): TOILETING: INDEPENDENT

## 2025-04-21 ASSESSMENT — ENCOUNTER SYMPTOMS
NAUSEA: 0
NERVOUS/ANXIOUS: 0
ABDOMINAL PAIN: 0
SPEECH CHANGE: 0
COUGH: 1
HEADACHES: 0
NECK PAIN: 0
MYALGIAS: 0
SHORTNESS OF BREATH: 1

## 2025-04-21 NOTE — THERAPY
Occupational Therapy   Initial Evaluation     Patient Name: Hugo Villela  Age:  64 y.o., Sex:  male  Medical Record #: [id number]  Today's Date: 4/21/2025     Precautions  Precautions: Fall Risk    Assessment    Patient is 64 y.o. male admitted with AMS. Pt found to have seizures, increased ammonia, thrombocytopenia, respiratory failure (intubated 4/16, extubated 4/20), HFpEF, alcohol dependence, HTN, and DLD. Other pertinent medical history includes coronary artery stent placement, HTN, DM, COPD, and alcohol dependence. Pt seen for OT evaluation and treatment. Pt required CGA-min A for functional mobility, standing handwashing, and donning/doffing socks. Pt noted to desat to 86-87% on 8L oxymask after washing hands at sink. Pt recovered to 92% with seated rest and pursed lip breathing. RN aware. Pt educated regarding the role of OT, energy conservation, and the pathology of bedrest. Pt current functional performance impaired activity tolerance, impaired balance, impaired coordination, impaired cognition, and generalized weakness. Pt will benefit from skilled OT while admitted to acute care.    Plan    Occupational Therapy Initial Treatment Plan   Treatment Interventions: Self Care / Activities of Daily Living, Adaptive Equipment, Neuro Re-Education / Balance, Therapeutic Exercises, Therapeutic Activity, Family / Caregiver Training  Treatment Frequency: 3 Times per Week  Duration: Until Therapy Goals Met    DC Equipment Recommendations: Tub / Shower Seat  Discharge Recommendations: Recommend post-acute placement for additional occupational therapy services prior to discharge home      Objective     04/21/25 1624   Prior Living Situation   Prior Services Home-Independent   Housing / Facility 1 Story House   Steps Into Home 2   Steps In Home 0   Rail None   Bathroom Set up Bathtub / Shower Combination;Walk In Shower   Equipment Owned Front-Wheel Walker;Single Point Cane   Lives with - Patient's Self Care Capacity  Spouse;Adult Children   Comments Pt lives with his wife and 24 year old son. Pt reported they can assist as needed. Pt also has a brother who will be staying to assist temporarily.   Prior Level of ADL Function   Self Feeding Independent   Grooming / Hygiene Independent   Bathing Independent   Dressing Independent   Toileting Independent   Prior Level of IADL Function   Medication Management Independent   Laundry Independent   Kitchen Mobility Independent   Finances Independent   Home Management Independent   Shopping Independent   Prior Level Of Mobility Independent Without Device in Community;Independent Without Device in Home   Driving / Transportation Driving Independent   History of Falls   History of Falls Yes   Date of Last Fall   (per chart, pt has a history of falls)   Precautions   Precautions Fall Risk   Vitals   Vitals Comments Pt desat to 86-87% with functional mobility to sink on 8L oxymask. RN aware. Pt denied increased work of breathing, fatigue, dizziness with mobility. Pt recovered to 92% with seated rest.   Pain   Pain Scales 0 to 10 Scale    Pain 0 - 10 Group   Therapist Pain Assessment Post Activity Pain Same as Prior to Activity;Nurse Notified  (not rated, agreeable to eval)   Non Verbal Descriptors   Non Verbal Scale  Calm   Cognition    Cognition / Consciousness X   Level of Consciousness Alert   Safety Awareness Impaired   New Learning Impaired   Attention Impaired   Comments very pleasant and cooperative, motivated to work with therapy   Passive ROM Upper Body   Passive ROM Upper Body WDL   Active ROM Upper Body   Active ROM Upper Body  WDL   Strength Upper Body   Upper Body Strength  WDL   Sensation Upper Body   Upper Extremity Sensation  WDL   Upper Body Muscle Tone   Upper Body Muscle Tone  WDL   Coordination Upper Body   Comments impaired FTN and KESHA B UE   Balance Assessment   Sitting Balance (Static) Fair   Sitting Balance (Dynamic) Fair -   Standing Balance (Static) Fair -   Standing  Balance (Dynamic) Poor +   Weight Shift Sitting Fair   Weight Shift Standing Fair   Comments w/ FWW, minor LOB at sink requiring min A from therapist to recover when he lifted B hands from sink/walker   Bed Mobility    Comments up to chair pre/post   ADL Assessment   Grooming Standing;Minimal Assist  (washed hands at sink)   Lower Body Dressing Minimal Assist  (don/doff socks)   Toileting   (declined the need)   Functional Mobility   Sit to Stand Contact Guard Assist   Bed, Chair, Wheelchair Transfer Minimal Assist   Transfer Method Stand Step   Mobility chair>sink>chair   Comments w/ FWW   ICU Target Mobility Level   ICU Mobility - Targeted Level Level 4   Visual Perception   Visual Perception  Not Tested   Activity Tolerance   Sitting in Chair up to chair pre/post   Sitting Edge of Bed NT   Standing ~5 min total   Comments limited by weakness, increased work of breathing, O2 desat   Patient / Family Goals   Patient / Family Goal #1 to go home   Short Term Goals   Short Term Goal # 1 Pt will perform ADL transfer w/ supv   Short Term Goal # 2 Pt will perform LB dressing w/ supv and AE PRN   Short Term Goal # 3 Pt will perform standing g/h w/ supv   Education Group   Education Provided Role of Occupational Therapist;Activities of Daily Living;Pathology of bedrest   Role of Occupational Therapist Patient Response Patient;Acceptance;Explanation;Verbal Demonstration   ADL Patient Response Patient;Acceptance;Explanation;Demonstration;Verbal Demonstration;Action Demonstration   Pathology of Bedrest Patient Response Patient;Acceptance;Explanation;Verbal Demonstration   Occupational Therapy Initial Treatment Plan    Treatment Interventions Self Care / Activities of Daily Living;Adaptive Equipment;Neuro Re-Education / Balance;Therapeutic Exercises;Therapeutic Activity;Family / Caregiver Training   Treatment Frequency 3 Times per Week   Duration Until Therapy Goals Met   Problem List   Problem List Decreased Active Daily  Living Skills;Decreased Homemaking Skills;Decreased Functional Mobility;Decreased Activity Tolerance;Safety Awareness Deficits / Cognition;Impaired Coordination Right Upper Extremity;Impaired Coordination Left Upper Extremity;Impaired Cognitive Function;Impaired Postural Control / Balance                  No

## 2025-04-21 NOTE — PROGRESS NOTES
" Critical Care Progress Note    Date of admission  4/16/2025    Chief Complaint  64 y.o. male admitted 4/16/2025 with AMS    Hospital Course  \"64 y.o. male w/ PMH of CAD s/p PCI, HFmrEF 45%, CKD IIIa, DM2, asthma/COPD on 4lpm home o2, ethanol abuse, tobacco abuse who presented 4/16/2025 with SOB and AMS. Family state pt had mild aphasia on 4/15 that seemed to worsen as the day went on, AM of 4/16 he was not responsive to commands. Per family he had no complaints the day prior. In the ED vitals showed hypoxia. Labs showed elevated BNP. Trop wnl x2. CT head w/o showed chronic microvasc changes, CTA HN w/o acute dz. CXR wnl. EKG w/ RBBB and LAFB no acute ischemia. Neuro was consultedi n the ED and recommended admission for CVA workup\"     During the evening of admission he became less responsive, ABG showed respiratory acidosis. He was moved to the ICU for intubation. There is no further history available at this time.     4/17: VD #2, weaned off ketamine. Following commands. ABG improved  4/18: VD #3, seizure-like activity during SAT/SBT. Given keppra and versed, placed on cEEG, neuro consulted  4/19: VD #4, another seizure-like event while on SBT. EEG showed no ictal discharges. Cr slightly worse  4/20 VD#5, extubated,   4/21 HFNC 30L, 50%,     Interval Problem Update  Reviewed last 24 hour events:  A/O x 3, moves all extremities  No evidence of seizures  SR/ST  Tmax 100.2  WBC 16.2  HFNC 30L, 50%  1 episode of desaturation while on commode today, no respiratory distress  CXR: none today  I/O = 0/1450 (-1.4 L, net +143 since admission)  Creatinine 2.14->2.02->1.84  Potassium 4.7  Ileus on abd xr yesterday      Yesterday:   - distended overnight. NGT suctioned and improved   - Cr improved   - +flatus and BS   - Neuro: following   - HR: 60s-80s   - SBP: 100s-150s off NE   - GI: TF held for distention, last BM this AM   - UOP: 1.7 L/24 hrs, +1.6L net   - Garibay: yes   - Tm: 37.5   - Lines: PIV   - PPx: DARNELL pepcid, DVT " heparin   - ABG: , compensated respiratory acidosis   - VD #5   - SBT: VC: 0.9, NIF -34, RSBI 25   - +cuff leak   - CXR (personally reviewed and compared to prior): NAD   - Mobility level 2, eligible for progression yes   - Trial vent liberation    Updates: Patient extubated to HFNC, doing well AOx4. BM this afternoon. Up to commode/chair    Review of Systems  Review of Systems   Unable to perform ROS: Critical illness        Vital Signs for last 24 hours   Temp:  [37.4 °C (99.3 °F)-37.9 °C (100.2 °F)] 37.9 °C (100.2 °F)  Pulse:  [] 77  Resp:  [11-37] 14  BP: (106-197)/() 124/62  SpO2:  [87 %-99 %] 99 %    Hemodynamic parameters for last 24 hours       Respiratory Information for the last 24 hours       Physical Exam   Physical Exam  Vitals and nursing note reviewed.   Constitutional:       Appearance: He is well-developed.      Interventions: He is intubated.   HENT:      Head: Normocephalic and atraumatic.      Right Ear: External ear normal.      Left Ear: External ear normal.   Eyes:      Conjunctiva/sclera: Conjunctivae normal.      Pupils: Pupils are equal, round, and reactive to light.   Neck:      Vascular: No JVD.      Trachea: No tracheal deviation.   Cardiovascular:      Rate and Rhythm: Normal rate and regular rhythm.      Pulses: Normal pulses.   Pulmonary:      Effort: He is intubated.      Breath sounds: No wheezing or rales.      Comments: NC  Abdominal:      General: Bowel sounds are normal. There is no distension.      Palpations: Abdomen is soft.   Musculoskeletal:         General: No tenderness.      Cervical back: Neck supple.   Skin:     General: Skin is warm and dry.      Capillary Refill: Capillary refill takes less than 2 seconds.      Findings: No rash.   Neurological:      General: No focal deficit present.      Mental Status: He is oriented to person, place, and time.      Cranial Nerves: No cranial nerve deficit.      Sensory: No sensory deficit.      Motor: No  weakness.         Medications  Current Facility-Administered Medications   Medication Dose Route Frequency Provider Last Rate Last Admin    allopurinol (Zyloprim) tablet 50 mg  50 mg Oral DAILY SONIA Kumar Jr..O.   50 mg at 04/21/25 0538    aspirin (Asa) chewable tab 81 mg  81 mg Oral DAILY SONIA Kumar Jr..O.   81 mg at 04/21/25 0538    atorvastatin (Lipitor) tablet 80 mg  80 mg Oral Q EVENING SONIA Kumar Jr..OBrenton   80 mg at 04/20/25 1728    folic acid (Folvite) tablet 1 mg  1 mg Oral DAILY SONIA Kumar Jr..O.   1 mg at 04/21/25 0538    insulin lispro (HumaLOG,AdmeLOG) subcutaneous injection  1-6 Units Subcutaneous 4X/DAY ACHS SONIA Kumar Jr..OB[place]on        And    dextrose 50 % (D50W) injection 25 g  25 g Intravenous Q15 MIN PRN BRANDON Kumar Jr.O.        lactulose 20 GM/30ML solution 30 mL  30 mL Oral TID SONIA Kumar Jr..O.   30 mL at 04/21/25 0538    multivitamin tablet 1 Tablet  1 Tablet Oral DAILY SONIA Kumar Jr..O.   1 Tablet at 04/21/25 0538    senna-docusate (Pericolace Or Senokot S) 8.6-50 MG per tablet 2 Tablet  2 Tablet Oral BID SONIA Kumar Jr..OB[place]        And    polyethylene glycol/lytes (Miralax) Packet 1 Packet  1 Packet Oral QDAY PRN SONIA Kumar Jr..[place]        And    magnesium hydroxide (Milk Of Magnesia) suspension 30 mL  30 mL Oral QDAY PRN SONIA Kumar Jr..O.        And    bisacodyl (Dulcolax) suppository 10 mg  10 mg Rectal QDAY PRN SONIA Kumar Jr..O.        acetaminophen (Tylenol) tablet 650 mg  650 mg Oral Q4HRS PRN SONIA Kumar Jr..O.        metoprolol SR (Toprol XL) tablet 50 mg  50 mg Oral Q DAY SONIA Kumar Jr..O.   50 mg at 04/21/25 0538    mometasone-formoterol (Dulera) 200-5 MCG/ACT inhaler 2 Puff  2 Puff Inhalation BID (RT) Tanner Balbuena Jr., D.O.        ipratropium-albuterol (DUONEB) nebulizer solution  3 mL Nebulization Q2HRS PRN Tanner Balbuena Jr., D.O.   3 mL at 04/20/25 2145    levETIRAcetam  (Keppra) injection 1,000 mg  1,000 mg Intravenous Q12HRS Tanner Balbuena Jr., D.O.   1,000 mg at 04/21/25 0549    midazolam (Versed) injection 5 mg  5 mg Intravenous Q15 MIN PRN Tanner Balbuena Jr., D.O.   5 mg at 04/19/25 1158    heparin injection 5,000 Units  5,000 Units Subcutaneous Q8HRS Adelfo Chowdhury D.OBrenton   5,000 Units at 04/21/25 0538    Respiratory Therapy Consult   Nebulization Continuous RT Jarrett Greene M.D.        MD Alert...ICU Electrolyte Replacement per Pharmacy   Other PHARMACY TO DOSE Jarrett Greene M.D.        NS infusion   Intravenous Continuous Jarrett Greene M.D.   Paused at 04/17/25 0510       Fluids    Intake/Output Summary (Last 24 hours) at 4/21/2025 0649  Last data filed at 4/21/2025 0600  Gross per 24 hour   Intake 3.18 ml   Output 1450 ml   Net -1446.82 ml       Laboratory  Recent Labs     04/18/25  1025 04/19/25  0343 04/20/25  0527   ISTATAPH 7.419 7.406 7.474*   ISTATAPCO2 58.3* 63.1* 55.3*   ISTATAPO2 378* 89 92   ISTATATCO2 39 42* 42*   DXRGRCP1SMS 100* 96 97   ISTATARTHCO3 37.7* 39.6* 40.6*   ISTATARTBE 11* 13* 15*   ISTATTEMP 98.8 F 98.8 F 99.5 F   ISTATFIO2 100 60 60   ISTATSPEC Arterial Arterial Arterial   ISTATAPHTC 7.418 7.404 7.466*   WGLMGKZG5PG 379* 90 95         Recent Labs     04/19/25  0430 04/19/25  0845 04/19/25  1715 04/20/25  0105 04/20/25  0600 04/21/25  0425   SODIUM 145  --  144  --  145 145   POTASSIUM 4.4  --  5.1  --  4.9 4.7   CHLORIDE 100  --  98  --  100 101   CO2 34*  --  34*  --  36* 36*   BUN 45*  --  50*  --  53* 45*   CREATININE 1.91*  --  2.14*  --  2.02* 1.84*   MAGNESIUM 2.4  --   --   --  2.7* 2.4   PHOSPHORUS  --    < > 5.4* 4.2  --  3.1   CALCIUM 8.7  --  9.3  --  8.8 8.8    < > = values in this interval not displayed.     Recent Labs     04/19/25  0430 04/19/25  1715 04/20/25  0600 04/21/25  0425   ALTSGPT 12  --  12 24   ASTSGOT 37  --  35 43   ALKPHOSPHAT 81  --  76 69   TBILIRUBIN <0.2  --  <0.2 0.2   GLUCOSE 128* 127* 124* 93      Recent Labs     04/19/25  0430 04/20/25  0600 04/21/25  0425   WBC 17.4* 16.1* 16.2*   ASTSGOT 37 35 43   ALTSGPT 12 12 24   ALKPHOSPHAT 81 76 69   TBILIRUBIN <0.2 <0.2 0.2     Recent Labs     04/19/25  0430 04/20/25  0600 04/21/25  0425   RBC 3.42* 3.41* 3.27*   HEMOGLOBIN 10.8* 10.4* 10.2*   HEMATOCRIT 34.4* 34.7* 34.2*   PLATELETCT 144* 151* 144*       Imaging  X-Ray:  I have personally reviewed the images and compared with prior images.  Echo:   Reviewed  MRI:   Reviewed    Assessment/Plan  Altered mental status- (present on admission)  Assessment & Plan  Uncertain if focal deficits were related to elevated PCO2 upon admission  Not a candidate for endovascular intervention upon admission  MRI brain negative for CVA  Seizure on 4/18  ECHO reviewed  Tele monitoring  cEEG  Neuro checks  A1c/Lipid panel  Secondary risk prevention    Seizure (HCC)  Assessment & Plan  On 4/18  No hx of seizure, hx of EtOH use but last drink was January per wife  cEEG  MRI reviewed  Keppra  PRN benzo  Seizure precautions    Increased ammonia level- (present on admission)  Assessment & Plan  Uncertain if he carries the diagnosis of cirrhosis  Continue lactulose   Trend     Thrombocytopenia (HCC)- (present on admission)  Assessment & Plan  Chronic in the setting of alcohol abuse  Monitor for bleeding  Conservative transfusion strategy     Acute on chronic respiratory failure (HCC)- (present on admission)  Assessment & Plan  Secondary to COPD exacerbation  Intubated 4/16/25   Extubated 4/20  Viral panel negative  ECHO with normal  Treatment for COPD  Completed Abx    HFpEF (HCC)- (present on admission)  Assessment & Plan  Repeat ECHO with normal EF  Maintain euvolemia     Alcohol dependence (HCC)- (present on admission)  Assessment & Plan  Reported history of, quit in January   Monitor for withdrawal  MVI/Folate/Thiamine supplementation  Electrolyte replacement     Acute exacerbation of chronic obstructive pulmonary disease (COPD)  (HCC)- (present on admission)  Assessment & Plan  PFT: FEV1/FVC ratio 57 and FEV1 of 0.93 L or 32% predicted. postbronchodilator FEV1 of 1.37 L or 47% predicted. Total lung capacity is within normal limits at 106%   Intubated 4/16/25   Extubated 4/20/25  Titrated FiO2, wean off HFNC  Completed antibiotics, titrated off corticosteroids  Continue RT protocols, bronchodilators    Stage 3 chronic kidney disease- (present on admission)  Assessment & Plan  Strict I/Os  Renally dosed medications  Avoid nephrotoxic agents as able  Cr continues to trend up, continue to monitor    Type 2 diabetes mellitus (HCC)- (present on admission)  Assessment & Plan  Goal blood glucose 140-180  sliding scale insulin, accuchecks  hypoglycemia protocol  A1c 6.2    Primary hypertension- (present on admission)  Assessment & Plan  Reintroduce oral antihypertensives when appropriate      Dyslipidemia- (present on admission)  Assessment & Plan  Atorvastatin     Updated plan:  Titrate down FiO2, discontinue HFNC today  Continue Dulera, RT protocols  Off antibiotics  Continue lactulose  Transfer out of ICU today    VTE:  Heparin  Ulcer: H2 Antagonist  Lines: Garibay Catheter  Ongoing indication addressed    I have performed a physical exam and reviewed and updated ROS and Plan today (4/21/2025). In review of yesterday's note (4/20/2025), there are no changes except as documented above.     Discussed patient condition and risk of morbidity and/or mortality with Family, RN, RT, Pharmacy, Code status disscussed, Charge nurse / hot rounds, and Patient    The patient remains critically ill.  Remains on HFNC, high risk for deterioration, examined and reexamined.  Critical care time = 32 minutes in directly providing and coordinating critical care and extensive data review.  No time overlap and excludes procedures.

## 2025-04-21 NOTE — CARE PLAN
The patient is Watcher - Medium risk of patient condition declining or worsening    Shift Goals  Clinical Goals: stable oxygenation, improved mobility  Patient Goals: sleep  Family Goals: not present    Progress made toward(s) clinical / shift goals:    Problem: Pain - Standard  Goal: Alleviation of pain or a reduction in pain to the patient’s comfort goal  Outcome: Progressing     Problem: Risk for Aspiration  Goal: Patient's risk for aspiration will be absent or decrease  Outcome: Progressing     Problem: Hemodynamic Monitoring  Goal: Patient's hemodynamics, fluid balance and neurologic status will be stable or improve  Outcome: Progressing     Problem: Neuro Status  Goal: Neuro status will remain stable or improve  Outcome: Progressing       Patient is not progressing towards the following goals:

## 2025-04-21 NOTE — THERAPY
"Physical Therapy   Initial Evaluation     Patient Name: Hugo Villela  Age:  64 y.o., Sex:  male  Medical Record #: [id number]  Today's Date: 4/21/2025     Precautions  Precautions: Fall Risk    Assessment   Hugo Villela is a 64 y.o. male with a past medical history of COPD (Asthma overlap syndrome), HTN, HLD, DM II, CAD s/p PCI (x2 2017), CKD 3b, and EtOH dependence who presented on 04/16/2025 with altered mental status that started yesterday per family. This has progressed to more confusion throughout today, in addition to increased work of breathing. Reports of right sided weakness, although on exam he is moving all 4 extremities equally. Some paucity of speech but able to answer name and year correctly. Noncontrast CT head was negative for acute findings. CTA head and neck were negative for large and medium vessel occlusions, dissection, and critical flow limiting stenoses. CT perfusion was negative for ischemic penumbra.     Patient seen for PT eval and presents with impaired balance, strength, mobility and activity tolerance. At baseline he is independent without the use of an AD.  Today he needed ModA HHA to ambulate to and from the sink.  He desatted to low 80s with teeth brushing at the sink.  HE is not functioning at his baseline at this time and will benefit from placement for further therapy. Will continue to follow while in house.     Plan    Physical Therapy Initial Treatment Plan   Treatment Plan : Bed Mobility, Equipment, Gait Training, Neuro Re-Education / Balance, Self Care / Home Evaluation, Stair Training, Therapeutic Exercise, Therapeutic Activities, Family / Caregiver Training  Treatment Frequency: 4 Times per Week  Duration: Until Therapy Goals Met    DC Equipment Recommendations: Unable to determine at this time  Discharge Recommendations: Recommend post-acute placement for additional physical therapy services prior to discharge home       Subjective    \"I'm feeling a lot better\"   "   Objective       04/21/25 1055   Precautions   Precautions Fall Risk   Vitals   Pulse Oximetry (!) 86 %   O2 (LPM) 30   O2 Delivery Device Heated High Flow Nasal Cannula   Vitals Comments desatting while standing at sink   Pain 0 - 10 Group   Therapist Pain Assessment 0;Post Activity Pain Same as Prior to Activity   Prior Living Situation   Prior Services Home-Independent   Housing / Facility 1 Story House   Steps Into Home 2   Steps In Home 0   Rail None   Bathroom Set up Bathtub / Shower Combination;Walk In Shower   Equipment Owned None   Lives with - Patient's Self Care Capacity Spouse;Adult Children   Comments patient lives with his wife, who is a phlebotamist here at at [facility], and his step-son who is 23 and works full time   Prior Level of Functional Mobility   Bed Mobility Independent   Transfer Status Independent   Ambulation Independent   Assistive Devices Used None   Stairs Independent   Comments indep and retired, recently quit smoking   History of Falls   History of Falls Yes   Cognition    Cognition / Consciousness WDL   Level of Consciousness Alert   Comments pleasant and cooperative   Active ROM Upper Body   Active ROM Upper Body  WDL   Dominant Hand Right   Strength Upper Body   Upper Body Strength  WDL   Sensation Upper Body   Upper Extremity Sensation  WDL   Active ROM Lower Body    Active ROM Lower Body  WDL   Strength Lower Body   Lower Body Strength  WDL   Sensation Lower Body   Lower Extremity Sensation   WDL   Other Treatments   Other Treatments Provided educated about DC recs, fall prevention and the importance of continued mobility   Balance Assessment   Sitting Balance (Static) Fair   Sitting Balance (Dynamic) Fair   Standing Balance (Static) Fair -   Standing Balance (Dynamic) Poor +   Weight Shift Sitting Fair   Weight Shift Standing Fair   Comments w/FWW   Bed Mobility    Comments found and left sitting in the chair   Gait Analysis   Gait Level Of Assist Minimal Assist   Assistive  Device Hand Held Assist   Distance (Feet) 6   # of Times Distance was Traveled 2   Deviation Bradykinetic;Shuffled Gait   Comments distance limited by SOB   Functional Mobility   Sit to Stand Contact Guard Assist   Bed, Chair, Wheelchair Transfer Contact Guard Assist   Mobility chair>stand at sink>chair   ICU Target Mobility Level   ICU Mobility - Targeted Level Level 4   6 Clicks Assessment - How much HELP from from another person do you currently need... (If the patient hasn't done an activity recently, how much help from another person do you think he/she would need if he/she tried?)   Turning from your back to your side while in a flat bed without using bedrails? 3   Moving from lying on your back to sitting on the side of a flat bed without using bedrails? 3   Moving to and from a bed to a chair (including a wheelchair)? 3   Standing up from a chair using your arms (e.g., wheelchair, or bedside chair)? 2   Walking in hospital room? 2   Climbing 3-5 steps with a railing? 2   6 clicks Mobility Score 15   Activity Tolerance   Sitting in Chair pre-post session   Standing 5 min   Comments limited by SOB   Patient / Family Goals    Patient / Family Goal #1 to go home   Short Term Goals    Short Term Goal # 1 in 6 visits patient will demo all bed mobility indep from flat surface for safe DC   Short Term Goal # 2 in 6 visits patient will demo all functional transfers with Sup and LRAD for safe DC   Short Term Goal # 3 in 6 visits patient will ambualte 200' with Sup and LRAD for safe DC   Short Term Goal # 4 in 6 visits patient will navigate 2 stairs with Sup and LRAD for safe DC   Education Group   Education Provided Role of Physical Therapist;Gait Training;Use of Assistive Device   Role of Physical Therapist Patient Response Patient;Acceptance;Explanation;Demonstration;Verbal Demonstration;Action Demonstration   Gait Training Patient Response Patient;Acceptance;Explanation;Demonstration;Verbal Demonstration;Action  Demonstration   Use of Assistive Device Patient Response Patient;Acceptance;Explanation;Demonstration;Verbal Demonstration;Action Demonstration   Physical Therapy Initial Treatment Plan    Treatment Plan  Bed Mobility;Equipment;Gait Training;Neuro Re-Education / Balance;Self Care / Home Evaluation;Stair Training;Therapeutic Exercise;Therapeutic Activities;Family / Caregiver Training   Treatment Frequency 4 Times per Week   Duration Until Therapy Goals Met   Problem List    Problems Pain;Impaired Bed Mobility;Impaired Transfers;Impaired Ambulation;Functional Strength Deficit;Impaired Balance;Impaired Coordination;Decreased Activity Tolerance   Anticipated Discharge Equipment and Recommendations   DC Equipment Recommendations Unable to determine at this time   Discharge Recommendations Recommend post-acute placement for additional physical therapy services prior to discharge home     Teresa Barrera, PT, DPT, GCS

## 2025-04-21 NOTE — THERAPY
"Speech Language Pathology   Clinical Swallow Evaluation     Patient Name: Hugo Villela  AGE:  64 y.o., SEX:  male  Medical Record #: [id number]  Date of Service: 2025    History of Present Illness  65 y/o M admit  as a code stroke d/t progressively worsening AMS. Head imaging was negative for CVA. Pt was also hypoxic necessitating intubation -     PMH: COPD, MI, CVA , asthma, DM, CAD s/p PCI x2, CKD III, ETOH    CXR : \"1.  No acute cardiopulmonary disease\"    General Information:  Vitals  O2 (LPM): 30  O2 Delivery Device: Heated High Flow Nasal Cannula  Level of Consciousness: Alert, Awake     Orientation: Self, , General place, Current month, Current year  Follows Directives: Yes    Prior Living Situation & Level of Function:  Comments: Pt reported he lived with 3 family members including his spouse. He was independent for iADLs including driving at baseline     Communication: WNL  Swallowing: WNL     Oral Mechanism Evaluation:  Dentition: Natural dentition, Some missing dentition   Facial Symmetry: Equal        Labial Observations: WFL   Lingual Observations: Midline  Motor Speech: WNL       Laryngeal Function:  Secretion Management: Adequate  Voice Quality: WFL     Cough: Perceptually WNL    Subjective  Pt seen alert & grossly oriented, saturating on 3)L HFNC    Assessment  Current Method of Nutrition: Oral diet (Thin/all Liquids, Regular Solids)  Positioning: Chavez's (60-90 degrees)  Bolus Administration: Patient  O2 (LPM): 30 O2 Delivery Device: Heated High Flow Nasal Cannula  Factor(s) Affecting Performance: None     Swallowing Trials:  Swallowing Trials  Thin Liquid (TN0): WFL  Regular (RG7): WFL    Comments: The pt self-fed w/ appropriate rate & volume control. RR remained < 30 and swallow-breath coordination appeared WNL. Bolus stripping and containment was WNL. Oral stage c/b timely mastication and no gross oral residue post swallow. No upper airway wetness, throat clearing or " coughing was noted. Pt denied globus sensation or any other symptoms of dysphagia.     Clinical Impressions    - Signs of oropharyngeal swallow function at baseline and appropriate for a regular diet with thin/all liquids at this time. No further ST services addressing dysphagia dx are indicated.      - A Speech-Language/Cognitive Evaluation was ordered d/t stroke rule out. All imaging has been negative for CVA. Pt was grossly oriented and he felt he was at his cognitive baseline. ST at d/c cognitive evaluation at this time as it does not appear indicated.      Recommendations  Diet Consistency: Thin/all Liquids, Regular Solids  Instrumentation: None indicated at this time  Medication: As tolerated  Supervision: Independent  Positioning: Fully upright and midline during oral intake  Risk Management : Small bites/sips, Slow rate of intake  Oral Care: BID     SLP Treatment Plan  Treatment Plan: None Indicated  SLP Frequency: N/A - Evaluation Only  Estimated Duration: N/A - Evaluation Only    Anticipated Discharge Needs  Discharge Recommendations: Anticipate that the patient will have no further speech therapy needs after discharge from the hospital   Therapy Recommendations Upon DC: Not Indicated      Deborah Keys, SLP

## 2025-04-21 NOTE — DIETARY
Nutrition Services: Follow-up for TF to PO diet   Day 5 of admit. Hugo Villela is a 64 y.o. male with admitting DX of Acute CVA (cerebrovascular accident)     Pt previously on tube feeding.  Pt was extubated yesterday, enteral tube removed.  Diet was advanced to Consistent CHO with thin liquids yesterday afternoon.  PO per flow sheet was % for dinner last night.  Swallow evaluation with SLP today who recommended to continue regular diet texture with thin liquids.  Tube feeding order has been discontinued.    Current diet order:   Consistent CHO diet, which remains appropriate d/t hx of T2DM     Malnutrition risk: No new risk identified     Nutrition Dx: Inadequate Oral Intake related to intubation as evidenced by patient NPO with need for nutrition support.      Nutrition Dx Status: Resolved     Problem: Nutritional:  Goal: Achieve adequate nutritional intake  Description: Patient will consume 50% of meals  Outcome: Progressing     Plan/ Recommendations:      Encourage intake of meals, goal for 50% consumption from meals and/or supplements  Document intake of all meals as % taken in ADLs to provide interdisciplinary communication across all shifts.   Monitor weight.  Nutrition rep available to see patient for ongoing meal and snack preferences.  Obtain supplement order per RD as needed.      RD following

## 2025-04-21 NOTE — DISCHARGE PLANNING
0851: PMR consult remains pended, awaiting therapy evals when appropriate. TCC will continue to follow.    1228: PMR consult remains pending, awaiting OT eval. TCC will continue to follow.

## 2025-04-21 NOTE — RESPIRATORY CARE
"COPD EDUCATION by COPD CLINICAL EDUCATOR  (Phone: [phone])  4/21/2025 at 2:04 PM by Yarelis Johnson, RRT     Patient seen by the education team to complete Block 1 of a 2-part series. Reference material about the program was given to patient along with our contact information.  Part #1 includes: What is COPD (how the lungs work), common treatments for COPD, the difference between \"rescue\" medications and \"daily\" medications, bronchial hygiene with an explanation of COPD Action Plan. We reviewed the appropriate medication techniques -including a demonstration. We discussed the correct way to care for and clean respiratory equipment and when applicable,     Patient states that emotions and conflict initiate his COPD/Asthma flares.  COPD Screen  COPD Risk Screening  Do you have a history of COPD?: Yes  Do you have a Pulmonologist?: Yes    COPD Assessment  COPD Clinical Specialists ONLY  COPD Education Initiated: Yes--Full Intervention (pt with ACO, home O2 at 4 lpm, takes BREO, AL, rarely uses nebulizer, quit smoking last year)  COPD Education Session 1: Yes  Is this a COPD exacerbation patient?: Yes  DME Company: [employer] Equipment Type: home O2 at 4 lpm, nebulizer  Physician Name: Dmitry Ritter M.D.  Pulmonary Follow Up Appointment:  (will call for follow up)  Pulmonologist Name: [facility]  Palliative Care: Yes  Durable Power of : No  Advance Directive: No  Discussion with others: No  Is POLST on file?: No  Referrals Initiated: Yes  Pulmonary Rehab: Yes  Smoking Cessation:  (quit 11 months ago)  Hospice: No  Home Health Care: N/A  Mobile Urgent Care Services: N/A  Geriatric Specialty Group: N/A  Private In-Home Care Agency: N/A  $ Demo/Eval of SVN's, MDI's and Aerosols: Yes (spacer. peak flowmeter)  (OP) Pulmonary Function Testing: Yes (04/2022 FEV1 0.93 32%, FEv1/FVc 57%, DLCo 88, )  Interdisciplinary Rounds: Attendance at Rounds (30 Min)    PFT Results    04/2022 FEV1 0.93 32%, FEv1/FVc " "57%, DLCo 88,     Meds to Beds  [facility] provides bedside medication delivery for all eligible patients at discharge and you have been automatically enrolled in the Meds to Beds Program. Would you like to opt out of this program for any reason?: No - Stay Opted In     MY COPD ACTION PLAN     It is recommended that patients and physicians/healthcare providers complete this action plan together. This plan should be discussed at each physician visit and updated as needed.    The green, yellow and red zones show groups of symptoms of COPD. This list of symptoms is not comprehensive, and you may experience other symptoms. In the \"Actions\" column, your healthcare provider has recommended actions for you to take based on your symptoms.    Patient Name: Hugo Villela   YOB: 1960   Last Updated on: 12/20/2024 12:15 PM   Green Zone:  I am doing well today Actions     Usual activitiy and exercise level   Take daily medications     Usual amounts of cough and phlegm/mucus   Use oxygen as prescribed     Sleep well at night   Continue regular exercise/diet plan     Appetite is good   At all times avoid cigarette smoke, inhaled irritants     Daily Medications (these medications are taken every day):   Fluticasone Furoate and Vilanterol trifenatate (Breo)  Tiotropium Bromide Monohydrate (Spiriva)           Additional Information:  Take medication as prescribed. Rinse mouth after using breo ellipta    Yellow Zone:  I am having a bad day or a COPD flare Actions     More breathless than usual   Continue daily medications     I have less energy for my daily activities   Use quick relief inhaler as ordered     Increased or thicker phlegm/mucus   Use oxygen as prescribed     Using quick relief inhaler/nebulizer more often   Get plenty of rest     Swelling of ankles more than usual   Use pursed lip breathing     More coughing than usual   At all times avoid cigarette smoke, inhaled irritants     I feel like I have a " "\"chest cold\"     Poor sleep and my symptoms woke me up     My appetite is not good     My medicine is not helping      Call provider immediately if symptoms don’t improve     Continue daily medications, add rescue medications:   Albuterol 2 Puffs Twice Daily PRN       Medications to be used during a flare up, (as Discussed with Provider):           Additional Information:  Use with provided spacer    Red Zone:  I need urgent medical care Actions     Severe shortness of breath even at rest   Call 911 or seek medical care immediately     Not able to do any activity because of breathing      Fever or shaking chills      Feeling confused or very drowsy       Chest pains      Coughing up blood                  "

## 2025-04-22 LAB
ALBUMIN SERPL BCP-MCNC: 3.2 G/DL (ref 3.2–4.9)
ALBUMIN/GLOB SERPL: 1.1 G/DL
ALP SERPL-CCNC: 110 U/L (ref 30–99)
ALT SERPL-CCNC: 20 U/L (ref 2–50)
ANION GAP SERPL CALC-SCNC: 9 MMOL/L (ref 7–16)
AST SERPL-CCNC: 33 U/L (ref 12–45)
BILIRUB SERPL-MCNC: 0.3 MG/DL (ref 0.1–1.5)
BUN SERPL-MCNC: 36 MG/DL (ref 8–22)
CALCIUM ALBUM COR SERPL-MCNC: 9.3 MG/DL (ref 8.5–10.5)
CALCIUM SERPL-MCNC: 8.7 MG/DL (ref 8.5–10.5)
CHLORIDE SERPL-SCNC: 101 MMOL/L (ref 96–112)
CO2 SERPL-SCNC: 34 MMOL/L (ref 20–33)
CREAT SERPL-MCNC: 1.63 MG/DL (ref 0.5–1.4)
ERYTHROCYTE [DISTWIDTH] IN BLOOD BY AUTOMATED COUNT: 52.8 FL (ref 35.9–50)
GFR SERPLBLD CREATININE-BSD FMLA CKD-EPI: 47 ML/MIN/1.73 M 2
GLOBULIN SER CALC-MCNC: 2.9 G/DL (ref 1.9–3.5)
GLUCOSE BLD STRIP.AUTO-MCNC: 102 MG/DL (ref 65–99)
GLUCOSE BLD STRIP.AUTO-MCNC: 103 MG/DL (ref 65–99)
GLUCOSE BLD STRIP.AUTO-MCNC: 78 MG/DL (ref 65–99)
GLUCOSE BLD STRIP.AUTO-MCNC: 97 MG/DL (ref 65–99)
GLUCOSE SERPL-MCNC: 85 MG/DL (ref 65–99)
HCT VFR BLD AUTO: 35.8 % (ref 42–52)
HGB BLD-MCNC: 10.7 G/DL (ref 14–18)
MAGNESIUM SERPL-MCNC: 2.4 MG/DL (ref 1.5–2.5)
MCH RBC QN AUTO: 30.9 PG (ref 27–33)
MCHC RBC AUTO-ENTMCNC: 29.9 G/DL (ref 32.3–36.5)
MCV RBC AUTO: 103.5 FL (ref 81.4–97.8)
PHOSPHATE SERPL-MCNC: 3 MG/DL (ref 2.5–4.5)
PLATELET # BLD AUTO: 133 K/UL (ref 164–446)
PMV BLD AUTO: 10.6 FL (ref 9–12.9)
POTASSIUM SERPL-SCNC: 4.2 MMOL/L (ref 3.6–5.5)
PROT SERPL-MCNC: 6.1 G/DL (ref 6–8.2)
RBC # BLD AUTO: 3.46 M/UL (ref 4.7–6.1)
SODIUM SERPL-SCNC: 144 MMOL/L (ref 135–145)
WBC # BLD AUTO: 11.7 K/UL (ref 4.8–10.8)

## 2025-04-22 PROCEDURE — 99255 IP/OBS CONSLTJ NEW/EST HI 80: CPT | Performed by: PHYSICAL MEDICINE & REHABILITATION

## 2025-04-22 PROCEDURE — 700102 HCHG RX REV CODE 250 W/ 637 OVERRIDE(OP): Performed by: INTERNAL MEDICINE

## 2025-04-22 PROCEDURE — 770020 HCHG ROOM/CARE - TELE (206)

## 2025-04-22 PROCEDURE — 700102 HCHG RX REV CODE 250 W/ 637 OVERRIDE(OP): Performed by: HOSPITALIST

## 2025-04-22 PROCEDURE — 85027 COMPLETE CBC AUTOMATED: CPT

## 2025-04-22 PROCEDURE — 99233 SBSQ HOSP IP/OBS HIGH 50: CPT | Mod: GC | Performed by: INTERNAL MEDICINE

## 2025-04-22 PROCEDURE — 82962 GLUCOSE BLOOD TEST: CPT | Mod: 91

## 2025-04-22 PROCEDURE — 83735 ASSAY OF MAGNESIUM: CPT

## 2025-04-22 PROCEDURE — 99232 SBSQ HOSP IP/OBS MODERATE 35: CPT | Performed by: HOSPITALIST

## 2025-04-22 PROCEDURE — A9270 NON-COVERED ITEM OR SERVICE: HCPCS | Performed by: INTERNAL MEDICINE

## 2025-04-22 PROCEDURE — 700111 HCHG RX REV CODE 636 W/ 250 OVERRIDE (IP): Performed by: STUDENT IN AN ORGANIZED HEALTH CARE EDUCATION/TRAINING PROGRAM

## 2025-04-22 PROCEDURE — 84100 ASSAY OF PHOSPHORUS: CPT

## 2025-04-22 PROCEDURE — 80053 COMPREHEN METABOLIC PANEL: CPT

## 2025-04-22 PROCEDURE — A9270 NON-COVERED ITEM OR SERVICE: HCPCS | Performed by: HOSPITALIST

## 2025-04-22 RX ORDER — LEVETIRACETAM 500 MG/1
500 TABLET ORAL 2 TIMES DAILY
Status: DISCONTINUED | OUTPATIENT
Start: 2025-04-22 | End: 2025-04-23 | Stop reason: HOSPADM

## 2025-04-22 RX ORDER — LACTULOSE 10 G/15ML
30 SOLUTION ORAL EVERY EVENING
Status: DISCONTINUED | OUTPATIENT
Start: 2025-04-22 | End: 2025-04-23 | Stop reason: HOSPADM

## 2025-04-22 RX ADMIN — METOPROLOL SUCCINATE 50 MG: 50 TABLET, EXTENDED RELEASE ORAL at 04:42

## 2025-04-22 RX ADMIN — LEVETIRACETAM 500 MG: 500 TABLET, FILM COATED ORAL at 17:28

## 2025-04-22 RX ADMIN — FOLIC ACID 1 MG: 1 TABLET ORAL at 04:42

## 2025-04-22 RX ADMIN — ASPIRIN 81 MG: 81 TABLET, CHEWABLE ORAL at 04:42

## 2025-04-22 RX ADMIN — LEVETIRACETAM 1000 MG: 500 TABLET, FILM COATED ORAL at 04:42

## 2025-04-22 RX ADMIN — TIOTROPIUM BROMIDE INHALATION SPRAY 5 MCG: 3.12 SPRAY, METERED RESPIRATORY (INHALATION) at 09:11

## 2025-04-22 RX ADMIN — HEPARIN SODIUM 5000 UNITS: 5000 INJECTION, SOLUTION INTRAVENOUS; SUBCUTANEOUS at 14:30

## 2025-04-22 RX ADMIN — LACTULOSE 30 ML: 10 SOLUTION ORAL at 04:44

## 2025-04-22 RX ADMIN — THERA TABS 1 TABLET: TAB at 04:42

## 2025-04-22 RX ADMIN — LACTULOSE 30 ML: 10 SOLUTION ORAL at 11:06

## 2025-04-22 RX ADMIN — HEPARIN SODIUM 5000 UNITS: 5000 INJECTION, SOLUTION INTRAVENOUS; SUBCUTANEOUS at 20:55

## 2025-04-22 RX ADMIN — LACTULOSE 30 ML: 10 SOLUTION ORAL at 17:28

## 2025-04-22 RX ADMIN — HEPARIN SODIUM 5000 UNITS: 5000 INJECTION, SOLUTION INTRAVENOUS; SUBCUTANEOUS at 04:42

## 2025-04-22 RX ADMIN — ATORVASTATIN CALCIUM 80 MG: 80 TABLET, FILM COATED ORAL at 16:37

## 2025-04-22 RX ADMIN — ALLOPURINOL 50 MG: 100 TABLET ORAL at 04:42

## 2025-04-22 ASSESSMENT — ENCOUNTER SYMPTOMS
MUSCULOSKELETAL NEGATIVE: 1
VOMITING: 0
TINGLING: 0
GASTROINTESTINAL NEGATIVE: 1
CARDIOVASCULAR NEGATIVE: 1
HEADACHES: 0
EYES NEGATIVE: 1
CHILLS: 0
PSYCHIATRIC NEGATIVE: 1
ORTHOPNEA: 0
HALLUCINATIONS: 0
FEVER: 0
HEARTBURN: 0
NAUSEA: 0
MYALGIAS: 0
NEUROLOGICAL NEGATIVE: 1
CONSTITUTIONAL NEGATIVE: 1
COUGH: 1
WEIGHT LOSS: 0
COUGH: 0
SPUTUM PRODUCTION: 0
DIZZINESS: 0
PALPITATIONS: 0
HEMOPTYSIS: 0
ABDOMINAL PAIN: 0
DEPRESSION: 0
SHORTNESS OF BREATH: 1

## 2025-04-22 ASSESSMENT — LIFESTYLE VARIABLES
EVER HAD A DRINK FIRST THING IN THE MORNING TO STEADY YOUR NERVES TO GET RID OF A HANGOVER: NO
EVER FELT BAD OR GUILTY ABOUT YOUR DRINKING: NO
ALCOHOL_USE: NO
TOTAL SCORE: 0
HAVE PEOPLE ANNOYED YOU BY CRITICIZING YOUR DRINKING: NO
AVERAGE NUMBER OF DAYS PER WEEK YOU HAVE A DRINK CONTAINING ALCOHOL: 0
HAVE YOU EVER FELT YOU SHOULD CUT DOWN ON YOUR DRINKING: NO
HOW MANY TIMES IN THE PAST YEAR HAVE YOU HAD 5 OR MORE DRINKS IN A DAY: 0
CONSUMPTION TOTAL: NEGATIVE
DOES PATIENT WANT TO STOP DRINKING: NO
ON A TYPICAL DAY WHEN YOU DRINK ALCOHOL HOW MANY DRINKS DO YOU HAVE: 0

## 2025-04-22 ASSESSMENT — PAIN DESCRIPTION - PAIN TYPE
TYPE: ACUTE PAIN
TYPE: ACUTE PAIN

## 2025-04-22 ASSESSMENT — SOCIAL DETERMINANTS OF HEALTH (SDOH)
IN THE PAST 12 MONTHS, HAS THE ELECTRIC, GAS, OIL, OR WATER COMPANY THREATENED TO SHUT OFF SERVICE IN YOUR HOME?: NO
WITHIN THE LAST YEAR, HAVE YOU BEEN AFRAID OF YOUR PARTNER OR EX-PARTNER?: NO
WITHIN THE LAST YEAR, HAVE TO BEEN RAPED OR FORCED TO HAVE ANY KIND OF SEXUAL ACTIVITY BY YOUR PARTNER OR EX-PARTNER?: NO
WITHIN THE PAST 12 MONTHS, THE FOOD YOU BOUGHT JUST DIDN'T LAST AND YOU DIDN'T HAVE MONEY TO GET MORE: NEVER TRUE
WITHIN THE LAST YEAR, HAVE YOU BEEN KICKED, HIT, SLAPPED, OR OTHERWISE PHYSICALLY HURT BY YOUR PARTNER OR EX-PARTNER?: NO
WITHIN THE LAST YEAR, HAVE YOU BEEN HUMILIATED OR EMOTIONALLY ABUSED IN OTHER WAYS BY YOUR PARTNER OR EX-PARTNER?: NO
WITHIN THE PAST 12 MONTHS, YOU WORRIED THAT YOUR FOOD WOULD RUN OUT BEFORE YOU GOT THE MONEY TO BUY MORE: NEVER TRUE

## 2025-04-22 ASSESSMENT — FIBROSIS 4 INDEX: FIB4 SCORE: 3.55

## 2025-04-22 NOTE — CARE PLAN
The patient is Watcher - Medium risk of patient condition declining or worsening    Shift Goals  Clinical Goals: monitor O2  Patient Goals: rest  Family Goals: support    Progress made toward(s) clinical / shift goals:    Problem: Optimal Care of the Stroke Patient  Goal: Optimal acute care for the stroke patient  Outcome: Progressing  Note: Stroke ruled out.      Problem: Neuro Status  Goal: Neuro status will remain stable or improve  Outcome: Progressing  Note: Patient remains AxO 4 with full mobility of all extremities.        Patient is not progressing towards the following goals:      Problem: Ineffective Airway Clearance  Goal: Patient will maintain patent airway with clear/clearing breath sounds  Outcome: Not Progressing  Note: Patient requiring between 8-10L oxymask.

## 2025-04-22 NOTE — ASSESSMENT & PLAN NOTE
Ruled out   reviewed EEGs no seizures noted during his seizure-like activity  Will taper off Keppra

## 2025-04-22 NOTE — DISCHARGE PLANNING
[facility] Transitional [facility] Coordination    Physiatry to consult.     1400p - Physiatry Dr. Duran recommending candidate for acute rehab.  Call out to spouse Carie to discuss IPR referral, clarify discharge support.  No answer.  Left message requesting return call to [facility]. Volate update to [place] Lamas.      IPR admission will require prior auth from [facility] insurance.  Reached out to [facility] CM seeking consideration for IRF level care.

## 2025-04-22 NOTE — PROGRESS NOTES
Bedside report received from off going RN/tech: Umu, assumed care of patient.     Fall Risk Score: HIGH RISK  Fall risk interventions in place: Place yellow fall risk ID band on patient, Provide patient/family education based on risk assessment, Educate patient/family to call staff for assistance when getting out of bed, Place fall precaution signage outside patient door, and Utilize bed/chair fall alarm  Bed type: Regular (Tate Score less than 17 interventions in place)  Patient on cardiac monitor: Yes  IVF/IV medications: Not Applicable   Oxygen: How many liters 9L, Traced the line to wall oxygen, and No oxygen tank in room  Bedside sitter: Not Applicable   Isolation: Not applicable

## 2025-04-22 NOTE — PROGRESS NOTES
Bedside report received from off going RN/tech: Gisella, assumed care of patient.     Fall Risk Score: HIGH RISK  Fall risk interventions in place: Provide patient/family education based on risk assessment, Educate patient/family to call staff for assistance when getting out of bed, Place fall precaution signage outside patient door, and Utilize bed/chair fall alarm  Bed type: Regular (Tate Score less than 17 interventions in place)  Patient on cardiac monitor: Yes  IVF/IV medications: Not Applicable   Oxygen: How many liters 8L  Bedside sitter: Not Applicable   Isolation: Not applicable

## 2025-04-22 NOTE — DISCHARGE PLANNING
[facility] [id number]    Case Management Discharge Planning    Admission Date: 4/16/2025  GMLOS: 3.9  ALOS: 6    6-Clicks ADL Score: 19  6-Clicks Mobility Score: 15  PT and/or OT Eval ordered: Yes  PT/OT:Recommending post acute placement    Post-acute Referrals Ordered: Yes  Post-acute Choice Obtained: NA  Has referral(s) been sent to post-acute provider:  NA      Anticipated Discharge Dispo: Discharge Disposition: Disch to  rehab facility or distinct part unit (62)    DME Needed: Pending hospital course     Action(s) Taken: Chart reviewed and pt discussed in IDT rounds pending kidney function.  PT/OT recommending post acute placement. IPR following. LMSW reached out to [facility] that recs are now in.     Escalations Completed: None    Medically Clear: No    Next Steps: F/U with HCM needs     Barriers to Discharge: Medical clearance    Is the patient up for discharge tomorrow: No

## 2025-04-22 NOTE — CARE PLAN
The patient is Watcher - Medium risk of patient condition declining or worsening    Shift Goals  Clinical Goals: Wean oxygenation; increase mobility  Patient Goals: Get better  Family Goals: Not present    Progress made toward(s) clinical / shift goals:      Problem: Neuro Status  Goal: Neuro status will remain stable or improve  4/21/2025 1945 by Candy Viramontes, R.N.  Outcome: Progressing  4/21/2025 1728 by Candy Viramontes, R.N.  Outcome: Progressing     Problem: Dysphagia  Goal: Dysphagia will improve  Outcome: Progressing     Problem: Pain - Standard  Goal: Alleviation of pain or a reduction in pain to the patient’s comfort goal  4/21/2025 1945 by Candy Viramontes, R.N.  Outcome: Progressing  4/21/2025 1728 by Candy Viramontes, R.N.  Outcome: Progressing

## 2025-04-22 NOTE — CONSULTS
Physical Medicine and Rehabilitation Consultation              Date of initial consultation: 4/22/2025  Requesting provider: ordered by Adelfo Chowdhury D.O. at 04/22/25 1303    Consulting provider: Chrissy Duran D.O.  Reason for consultation: assess for acute inpatient rehab appropriateness  LOS: 6 Day(s)    Chief complaint: AMS     HPI: The patient is a 64 y.o.  male with a past medical history of CAD s/p PIC, HFrEF 45%, CKD IIIa, DM, tobacco use, asthma and COPD on 4 L at home ;  who presented on 4/16/2025  2:20 PM with AMS. Per documentation, patient's family noted some mild aphasia the day before presentation, then on 4/16 patient was not responding to commands. Upon eval in the ED, patient requried intubation, CTA head/neck were negative for acute findings. CT head showed chronic microvascular changes but no acute findings. EKG was noted to have RBBB but not ischemia. Neuro consulted, MRI brain obtained negative for acute intracranial process or infarcts. There was concern for seizure like activity and patient was started on keppra.  EEG obtained was negative for epileptiform discharges and seizures. Patient was able to be extubated on 4/20, post extubation he required HFNC, now on 4 L o2. Altered mental status work up negative, but suspected to be possibly related to hypoxia vs hypercapnia. Patient did have elevated ammonia, he is on lactulose.  Patient continues to have some wheezing, now on duonebs, spiriva, and dulera     Patient seen and examined at bedside, patient reports he feels pretty good today. Stating today is his best day. Reports improved SOB, some intermittent coughing.  Does not report HA, lightheadedness, CP, abdominal pain. Reports some numbness in bottom of feet, which he states is new since being in the hospital.       Social Hx:  Patient lives with spouse and adult son in a 1 story house with 2 JOHANA , wife is a phlebotomist at [employer]   2 JOHANA  At prior level of function patient was  Independent with mobility and ADLs.       Tobacco: current smoker, has a 10.8 pack year smoking history.   Alcohol: prior alcohol abuse   Drugs: Denied     THERAPY:  Restrictions: Fall Risk   PT: Functional mobility   4/21 Min A HHA x 6 ft x 2. CGA sit to stand     OT: ADLs  4/21  Min A transfers, CGA sit to stand., Min A lower body dressing, Min A standing grooming     SLP:   4/21 regular solids and thins     IMAGING:  [id number] VIEW  Narrative:  4/20/2025 7:24 AM    HISTORY/REASON FOR EXAM: Distention    TECHNIQUE/EXAM DESCRIPTION: Single AP view of the abdomen    COMPARISON:  April 16, 2025    FINDINGS:    Limited views of the lung bases are clear.    Nasogastric tube is seen, the tip overlies the lumbar spine.  Scattered air-filled loops of small bowel are seen.    The bony structures appear age-appropriate.  Impression: 1.  Air-filled distended loops of bowel are seen with reactive mucosal pattern, appearance suggests ileus or enteritis versus evolving obstructive changes. Recommend radiographic followup to resolution to exclude progression to obstruction.  2.  Nasogastric tube is coiled within the stomach, the tip terminates overlying the expected location of the gastric cardia.  DX-CHEST-PORTABLE (1 VIEW)  Narrative:   4/20/2025 7:23 AM    HISTORY/REASON FOR EXAM: Shortness of Breath    TECHNIQUE/EXAM DESCRIPTION:  Single AP view of the chest.    COMPARISON: Yesterday    FINDINGS:    Position of medical devices appears stable. The cardiac silhouette appears within normal limits.    The mediastinal contour appears within normal limits.  The central pulmonary vasculature appears normal.    Bilateral lung volumes are diminished.  Bilateral lungs are clear.    No significant pleural effusions are identified.    The bony structures appear age-appropriate.  Impression: 1.  No acute cardiopulmonary disease.        PROCEDURES:  None     PMH:  Past Medical History:   Diagnosis Date    Acute CVA (cerebrovascular  accident) ([facility]) 4/16/2025    Acute myocardial infarction of other inferior wall, episode of care unspecified 06/25/2012    Acute myocardial infarction of other lateral wall, episode of care unspecified 06/25/2012    Asthma     Breath shortness     Bronchitis     Chest pain, unspecified 06/25/2012    Coronary atherosclerosis of native coronary artery 06/25/2012    Dental disorder     Diabetes ([facility])     Dizziness and giddiness 06/25/2012    High cholesterol     Hypertension     Mixed hyperlipidemia 06/25/2012    Pure hyperglyceridemia 06/25/2012    Renal disorder     S/P coronary artery stent placement 06/25/2012    Seizure ([facility]) 4/18/2025    Tobacco use disorder 06/25/2012       PSH:  Past Surgical History:   Procedure Laterality Date    ORIF, FRACTURE, FEMUR  1994    OTHER      Right femur    OTHER CARDIAC SURGERY      STENT PLACEMENT         FHX:  Family History   Problem Relation Age of Onset    Stroke Mother     Cancer Sister        Medications:  Current Facility-Administered Medications   Medication Dose    mometasone-formoterol (Dulera) 200-5 MCG/ACT inhaler 2 Puff  2 Puff    tiotropium (Spiriva Respimat) 2.5 MCG/ACT inhalation spray 5 mcg  5 mcg    levETIRAcetam (Keppra) tablet 1,000 mg  1,000 mg    allopurinol (Zyloprim) tablet 50 mg  50 mg    aspirin (Asa) chewable tab 81 mg  81 mg    atorvastatin (Lipitor) tablet 80 mg  80 mg    folic acid (Folvite) tablet 1 mg  1 mg    insulin lispro (HumaLOG,AdmeLOG) subcutaneous injection  1-6 Units    And    dextrose 50 % (D50W) injection 25 g  25 g    lactulose 20 GM/30ML solution 30 mL  30 mL    multivitamin tablet 1 Tablet  1 Tablet    acetaminophen (Tylenol) tablet 650 mg  650 mg    metoprolol SR (Toprol XL) tablet 50 mg  50 mg    ipratropium-albuterol (DUONEB) nebulizer solution  3 mL    midazolam (Versed) injection 5 mg  5 mg    heparin injection 5,000 Units  5,000 Units    Respiratory Therapy Consult      NS infusion         Allergies:  Allergies   Allergen  "Reactions    Labetalol Itching and Swelling     Slight swelling to R eye    Lisinopril Cough         Physical Exam:  Vitals: BP (!) 149/76   Pulse 64   Temp 36 °C (96.8 °F) (Temporal)   Resp 17   Ht 1.651 m (5' 5\")   Wt 64.1 kg (141 lb 5 oz)   SpO2 94%   Gen: NAD, laying comfortably in bed, wife sleeping in room   Head:  NC/AT  Eyes/ Nose/ Mouth: PERRLA, moist mucous membranes  Cardio: RRR, good distal perfusion, warm extremities  Pulm: normal respiratory effort, no cyanosis, on 4 L , +intermittent coughing   Abd: Soft NTND, negative borborygmi   Ext: No peripheral edema. No calf tenderness. No clubbing.    Mental status:  A&Ox4 (person, place, date, situation) answers questions appropriately follows commands  Speech: fluent, no aphasia or dysarthria    CRANIAL NERVES:  2,3: visual acuity grossly intact, PERRL  3,4,6: EOMI bilaterally, no nystagmus or diplopia  5: sensation intact to light touch bilaterally and symmetric  7: no facial asymmetry  8: hearing grossly intact    Motor: MMT done in quick, slightly impulsive manner       Upper Extremity  Myotome R L   Shoulder flexion C5 5/5 5/5   Elbow flexion C5 5/5 5/5   Wrist extension C6 5/5 5/5   Elbow extension C7 5/5 5/5   Finger flexion C8 5/5 5/5   Finger abduction T1 5/5 5/5     Lower Extremity Myotome R L   Hip flexion L2 5/5 5/5   Knee extension L3 5/5 5/5   Ankle dorsiflexion L4 5/5 5/5   Toe extension L5 5/5 5/5   Ankle plantarflexion S1 5/5 5/5       Negative Pronator drift bilaterally    Sensory:   intact to light touch through out    DTRs: 2+ in bilateral  biceps  No clonus at bilateral ankles  Negative Saavedra b/l     Tone: no spasticity noted    Coordination:   intact fine motor with fingers bilaterally      Labs: Reviewed and significant for   Recent Labs     04/20/25  0600 04/21/25  0425 04/22/25  0037   RBC 3.41* 3.27* 3.46*   HEMOGLOBIN 10.4* 10.2* 10.7*   HEMATOCRIT 34.7* 34.2* 35.8*   PLATELETCT 151* 144* 133*     Recent Labs     " 04/20/25  0600 04/21/25  0425 04/22/25  0037   SODIUM 145 145 144   POTASSIUM 4.9 4.7 4.2   CHLORIDE 100 101 101   CO2 36* 36* 34*   GLUCOSE 124* 93 85   BUN 53* 45* 36*   CREATININE 2.02* 1.84* 1.63*   CALCIUM 8.8 8.8 8.7     Recent Results (from the past 24 hours)   POCT glucose device results    Collection Time: 04/21/25 12:18 PM   Result Value Ref Range    POC Glucose, Blood 97 65 - 99 mg/dL   POCT glucose device results    Collection Time: 04/21/25  5:56 PM   Result Value Ref Range    POC Glucose, Blood 100 (H) 65 - 99 mg/dL   POCT glucose device results    Collection Time: 04/21/25  8:06 PM   Result Value Ref Range    POC Glucose, Blood 110 (H) 65 - 99 mg/dL   CBC WITHOUT DIFFERENTIAL    Collection Time: 04/22/25 12:37 AM   Result Value Ref Range    WBC 11.7 (H) 4.8 - 10.8 K/uL    RBC 3.46 (L) 4.70 - 6.10 M/uL    Hemoglobin 10.7 (L) 14.0 - 18.0 g/dL    Hematocrit 35.8 (L) 42.0 - 52.0 %    .5 (H) 81.4 - 97.8 fL    MCH 30.9 27.0 - 33.0 pg    MCHC 29.9 (L) 32.3 - 36.5 g/dL    RDW 52.8 (H) 35.9 - 50.0 fL    Platelet Count 133 (L) 164 - 446 K/uL    MPV 10.6 9.0 - 12.9 fL   Comp Metabolic Panel    Collection Time: 04/22/25 12:37 AM   Result Value Ref Range    Sodium 144 135 - 145 mmol/L    Potassium 4.2 3.6 - 5.5 mmol/L    Chloride 101 96 - 112 mmol/L    Co2 34 (H) 20 - 33 mmol/L    Anion Gap 9.0 7.0 - 16.0    Glucose 85 65 - 99 mg/dL    Bun 36 (H) 8 - 22 mg/dL    Creatinine 1.63 (H) 0.50 - 1.40 mg/dL    Calcium 8.7 8.5 - 10.5 mg/dL    Correct Calcium 9.3 8.5 - 10.5 mg/dL    AST(SGOT) 33 12 - 45 U/L    ALT(SGPT) 20 2 - 50 U/L    Alkaline Phosphatase 110 (H) 30 - 99 U/L    Total Bilirubin 0.3 0.1 - 1.5 mg/dL    Albumin 3.2 3.2 - 4.9 g/dL    Total Protein 6.1 6.0 - 8.2 g/dL    Globulin 2.9 1.9 - 3.5 g/dL    A-G Ratio 1.1 g/dL   Magnesium    Collection Time: 04/22/25 12:37 AM   Result Value Ref Range    Magnesium 2.4 1.5 - 2.5 mg/dL   Phosphorus    Collection Time: 04/22/25 12:37 AM   Result Value Ref Range     Phosphorus 3.0 2.5 - 4.5 mg/dL   ESTIMATED GFR    Collection Time: 04/22/25 12:37 AM   Result Value Ref Range    GFR (CKD-EPI) 47 (A) >60 mL/min/1.73 m 2   POCT glucose device results    Collection Time: 04/22/25  7:43 AM   Result Value Ref Range    POC Glucose, Blood 78 65 - 99 mg/dL   POCT glucose device results    Collection Time: 04/22/25 11:05 AM   Result Value Ref Range    POC Glucose, Blood 103 (H) 65 - 99 mg/dL         ASSESSMENT:  Patient is a 64 y.o. male admitted with AMS and respiratory failure      [facility] Code / Diagnosis to Support: 0002.1 - Brain Dysfunction: Non-Traumatic  See DISPO details below for recommendations on appropriate level of rehab for this diagnosis.    Barriers to transfer include: Insurance authorization, TCCs to verify disposition, medical clearance and bed availability     Assessment and Plan:  Altered Mental status   Encephalopathy   - initial work up for concern for stroke, CT head, CTA head/neck all negative for acute findings   - neurology consulted   - MRI brain negative for ischemic infarct   - AMS suspected secondary to hypercapnia vs elevated ammonia   - ammonia elevated to 69, on lactulose   - continues to require PT/OT/SLP, calm and cooperative, but moves quickly in a slightly impulsive manner     Seizure like activity   - concern for seizure like activity   - on keppra   - EEG negative for definitive seizure activity     Hypoxia   COPD   - known history of COPD   - required intuabation due to AMS, extubated 4/20   - required HFN after extubation, now on 4 L o 2   - on spiriva, duonebs, and dulera   - pulmonology consulted   - on 4 L at home at baseline     CKD stage III   - know history of CKD   - Cr improving   - 4/22 Cr 1.63   - primary team monitoring renal function     HTN   - on home dose metoprolol     CAD s/p PCI    - on ASA and statin     Tobcco use   - current smoker   - smoking cessation education provided  > 3 mins     HLD   - on home dose statin     Bowel  Last  BM: 04/21/25  - on lactulose     DISPO:  - patient is currently functioning below their level of baseline, recommend post acute rehab  - recommend IRF level therapy with 3hr of therapy 5 days per week   - prior to acceptance to IRF, will need insurance auth from [facility]    - [facility] to assist with insurance auth and DC support from wife at least at CGA level with intermittent support       Medical Complexity:  Altered Mental status   Encephalopathy   Hypoxia   COPD   CKD stage III   HTN   CAD s/p PIC   Tobcco use   HLD   Gout   Impaired mobility and ADLs    DVT PPX: heparin subq       Thank you for allowing us to participate in the care of this patient.     Patient was seen for >80 minutes on unit/floor of which > 50% of time was spent on counseling and coordination of care regarding the above, including prognosis, risk reduction, benefits of treatment, and options for next stage of care.    Chrissy Mercer, DO   Physical Medicine and Rehabilitation     Please note that this dictation was created using voice recognition software. I have made every reasonable attempt to correct obvious errors, but there may be errors of grammar and possibly content that I did not discover before finalizing the note.

## 2025-04-22 NOTE — PROGRESS NOTES
Hospital Medicine Daily Progress Note    Date of Service  4/22/2025    Chief Complaint      Hospital Course  Hugo Villela is a Turkish and English speaking 64 y.o. male w/ hx of DMII, COPD on 4lpm at home, EtOH, tobacco use, [id number]%, CAD.  He was admitted 4/16/2025 with shortness of breath and altered mentation. Shortly after admit he had become less responsive and was moved to ICU for intubation but extubated 4/20.  He has now been on HFNC.  There was a concern of seizure like activity and he was loaded on keppra and given versed 4/18.    Interval Problem Update    Patient is afebrile on 4 L nasal cannula  He is alert oriented x 4  I reviewed hospitalization course  I reviewed MRI of the brain with no acute pathology  Reviewed CBC WBC 11.7 hemoglobin 10.7 platelets 133  I reviewed chemistry panel BUN 36 creatinine 1.63  I discussed with pulmonary medicine    I have discussed this patient's plan of care and discharge plan at IDT rounds today with Case Management, Nursing, Nursing leadership, and other members of the IDT team.    Consultants/Specialty  critical care and neurology    Code Status  Full Code    Disposition  Medically Cleared  I have placed the appropriate orders for post-discharge needs.    Review of Systems  Review of Systems   Constitutional:  Negative for fever.   Respiratory:  Positive for cough and shortness of breath (Improved).    Gastrointestinal:  Negative for abdominal pain, nausea and vomiting.        Physical Exam  Temp:  [36 °C (96.8 °F)-36.9 °C (98.5 °F)] 36.8 °C (98.3 °F)  Pulse:  [60-85] 66  Resp:  [17-33] 18  BP: (123-159)/(60-76) 132/71  SpO2:  [90 %-99 %] 96 %    Physical Exam  Cardiovascular:      Rate and Rhythm: Normal rate and regular rhythm.      Heart sounds: No murmur heard.  Pulmonary:      Breath sounds: Decreased breath sounds present. No wheezing or rales.   Abdominal:      General: There is no distension.      Palpations: Abdomen is soft.      Tenderness: There is no  abdominal tenderness.   Musculoskeletal:         General: No swelling.   Skin:     General: Skin is warm and dry.   Neurological:      General: No focal deficit present.      Mental Status: He is alert.   Psychiatric:         Mood and Affect: Mood normal.         Fluids    Intake/Output Summary (Last 24 hours) at 4/22/2025 1519  Last data filed at 4/22/2025 0400  Gross per 24 hour   Intake 350 ml   Output 1250 ml   Net -900 ml        Laboratory  Recent Labs     04/20/25  0600 04/21/25  0425 04/22/25  0037   WBC 16.1* 16.2* 11.7*   RBC 3.41* 3.27* 3.46*   HEMOGLOBIN 10.4* 10.2* 10.7*   HEMATOCRIT 34.7* 34.2* 35.8*   .8* 104.6* 103.5*   MCH 30.5 31.2 30.9   MCHC 30.0* 29.8* 29.9*   RDW 56.4* 55.2* 52.8*   PLATELETCT 151* 144* 133*   MPV 11.4 10.7 10.6     Recent Labs     04/20/25  0600 04/21/25 0425 04/22/25  0037   SODIUM 145 145 144   POTASSIUM 4.9 4.7 4.2   CHLORIDE 100 101 101   CO2 36* 36* 34*   GLUCOSE 124* 93 85   BUN 53* 45* 36*   CREATININE 2.02* 1.84* 1.63*   CALCIUM 8.8 8.8 8.7                     Imaging  [id number] VIEW   Final Result         1.  Air-filled distended loops of bowel are seen with reactive mucosal pattern, appearance suggests ileus or enteritis versus evolving obstructive changes. Recommend radiographic followup to resolution to exclude progression to obstruction.   2.  Nasogastric tube is coiled within the stomach, the tip terminates overlying the expected location of the gastric cardia.      DX-CHEST-PORTABLE (1 VIEW)   Final Result         1.  No acute cardiopulmonary disease.      DX-CHEST-PORTABLE (1 VIEW)   Final Result         No acute cardiac or pulmonary abnormality is identified.      DX-CHEST-PORTABLE (1 VIEW)   Final Result         1.  No acute cardiopulmonary disease.   2.  Atherosclerosis      MR-BRAIN-W/O   Final Result      1.  No acute abnormality.   2.  Unremarkable noncontrast MR examination of the brain except for minimal nonspecific foci of gliosis.     "  EC-ECHOCARDIOGRAM COMPLETE W/O CONT   Final Result      DX-CHEST-PORTABLE (1 VIEW)   Final Result         1.  No acute cardiopulmonary disease.   2.  Atherosclerosis      DX-CHEST-LIMITED (1 VIEW)   Final Result         1.  No acute cardiopulmonary disease.   2.  Atherosclerosis      DX-ABDOMEN FOR TUBE PLACEMENT   Final Result      Gastric tube terminates in the stomach.      DX-CHEST-PORTABLE (1 VIEW)   Final Result         1.  Slight hazy left lung base density, could represent subtle infiltrates   2.  Atherosclerosis      CT-CTA NECK WITH & W/O-POST PROCESSING   Final Result         1. There is atherosclerosis with less than 50% diameter stenosis of the internal carotid arteries.   2. There is calcific plaque and severe stenosis at the origin of the right vertebral artery.   3. COPD.      CT-CEREBRAL PERFUSION ANALYSIS   Final Result      1. Cerebral blood flow less than 30% possibly representing completed infarct = 0 mL. Based on distribution of this finding, this is unlikely to represent artifact.      2. T Max more than 6 seconds possibly representing combination of completed infarct and ischemia = 0 mL. Based on the distribution of this finding, this is unlikely to represent artifact.      3. Mismatched volume possibly representing ischemic brain/penumbra= 0 mL      4.  Please note that this cerebral perfusion study and report is Quantitative and targets supratentorial (cerebral) perfusion for evaluation of large vessel territory acute ischemia/infarction. For example, lacunar infarcts, and brainstem/posterior fossa    ischemia/infarction are not evaluated on this study.  Data acquisition is subject to artifacts which can yield non-anatomically plausible perfusion maps which may be due to motion, bolus timing, signal to noise ratio, or other technical factors.    Perfusion map abnormalities which show non-anatomic distributions are likely artifact.   This study is not \"stand-alone\" and should only be " utilized for diagnosis, management/treatment in correlation with CT, CTA, and/or MRI and clinical factors.         CT-CTA HEAD WITH & W/O-POST PROCESS   Final Result      Atherosclerosis without significant stenosis, occlusion, or aneurysm.      CT-HEAD W/O   Final Result      1. No acute intracranial abnormality.   2. Age-related central and cortical atrophy and diffuse chronic microangiopathic white matter changes versus demyelination or gliosis.   3. Left frontoparietal scalp lesions. Correlate clinically.   4. Left maxillary sinus disease.               DX-CHEST-PORTABLE (1 VIEW)   Final Result      No acute cardiac or pulmonary abnormalities are identified.           Assessment/Plan  * Altered mental status- (present on admission)  Assessment & Plan  Likely metabolic encephalopathy   Clinically improved   .  4/17 MRI brain w/o acute finding  EEGs with no seizure activity    Seizure (HCC)  Assessment & Plan  Ruled out   reviewed EEGs no seizures noted during his seizure-like activity  Will taper off Keppra    Increased ammonia level- (present on admission)  Assessment & Plan  Hx of EtoH and likely more liver damage then aware.  Stopped EtOH 6 months ago.    Will decrease lactulose to once a day  Outpatient GI evaluation      Thrombocytopenia (HCC)- (present on admission)  Assessment & Plan  Likely secondary to liver disease and acute illness    No clinical signs of bleeding    Alcohol dependence (HCC)- (present on admission)  Assessment & Plan  Reports he has been sober for 6 months  Encourage continued abstinence    Acute exacerbation of chronic obstructive pulmonary disease (COPD) (HCC)- (present on admission)  Assessment & Plan  Continue Dulera and Spiriva  Continue oxygen RT protocol    Stage 3 chronic kidney disease- (present on admission)  Assessment & Plan  Stable at baseline    Type 2 diabetes mellitus (HCC)- (present on admission)  Assessment & Plan  Continue insulin sliding scale monitor CBGs  Most  recent hemoglobin A1c 6.2    Primary hypertension- (present on admission)  Assessment & Plan  Continue metoprolol  Consider resuming losartan if renal function remains stable    S/P coronary artery stent placement- (present on admission)  Assessment & Plan  Continue current medical therapy with aspirin atorvastatin metoprolol    Tobacco use disorder- (present on admission)  Assessment & Plan  Counseled on cessation      Dyslipidemia- (present on admission)  Assessment & Plan  statin         VTE prophylaxis:    heparin ppx      I have performed a physical exam and reviewed and updated ROS and Plan today (4/22/2025). In review of yesterday's note (4/21/2025), there are no changes except as documented above.

## 2025-04-22 NOTE — CONSULTS
Progress note    HPI: Hugo Villela is a 64 y.o. male admitted to medicine floor 4/16/2025 from home for acute on chronic hypoxic respiratory failure complicated by altered mental status with neuro workup negative.  Transferred to the ICU on same day for intubation.      Past medical history:  - Chronic hypoxic respiratory failure secondary to COPD/asthma NC 4 L (Breo Ellipta, albuterol twice daily).  PFT (2022) FEV1 0.93 (32%) FEV1/FVC 57% DLCO 88 .  Relatively new diagnosis with treatment started January 2025.  At home, no cough/sputum but does use oxygen as detailed above.  This is his second admission for respiratory issues.  History of sporadic asthma with no treatment with last episode at least 30 years ago, no history of allergies, no family history.  Still tobacco use but only 3 cigarettes after diagnosis.  - Coronary artery disease status post PCI x 2 in 2017 (aspirin, high intensity atorvastatin)  - HFmrEF 45% (metoprolol, dapagliflozin, losartan)  - CKD IIIB.  Followed by nephrology  - Chronic macrocytic anemia  - Chronic thrombocytopenia  - Hypertension  - Hyperlipidemia  - Non-insulin-dependent type 2 diabetes mellitus  - Gout (allopurinol)  - Alcohol use disorder  - Tobacco use disorder, between 6-8 cigarettes for at least 50 years.  Has tried nicotine replacement unsuccessfully, has never tried Chantix.    INTERVAL HISTORY:   4/16/2025: Admission to telemetry floor, worsening oxygen requirements.  Transferred to ICU for intubation  4/17/2025: VD#2.  Wean off ketamine.  Negative neurowork-up, neurology signed off.  4/18/2025: VD#3.  Seizure-like activity during SAT/SBT, started Keppra/Versed.  Neuro reconsulted  4/19/2025: VD#4.  Seizure-like event while on SBT.  Worsening creatinine  4/20/2025: VD#5.  Extubated  4/21/2025: HFNC 30/50, downgraded to telemetry floor.  4/22/2025: Significant improvement, still sore throat after extubation.  NC 6 L 96%.  Lab work shows chronic microcytic  anemia, improving leukocytosis, chronic thrombocytopenia, improving kidney function.     Vitals:    04/22/25 0431 04/22/25 0442 04/22/25 0737 04/22/25 0754   BP: 129/69   (!) 149/76   Pulse: 60 71  64   Resp: 20   17   Temp: 36.2 °C (97.2 °F)   36 °C (96.8 °F)   TempSrc: Temporal   Temporal   SpO2: 99%  95% 96%   Weight: 64.1 kg (141 lb 5 oz)      Height:       Body mass index is 23.52 kg/m².    Review of Systems   Constitutional: Negative.  Negative for chills, fever and weight loss.   HENT: Negative.     Eyes: Negative.    Respiratory:  Positive for shortness of breath. Negative for cough, hemoptysis and sputum production.    Cardiovascular: Negative.  Negative for chest pain, palpitations and orthopnea.   Gastrointestinal: Negative.  Negative for abdominal pain and heartburn.   Genitourinary: Negative.  Negative for dysuria.   Musculoskeletal: Negative.  Negative for myalgias.   Skin: Negative.  Negative for rash.   Neurological: Negative.  Negative for dizziness, tingling and headaches.   Endo/Heme/Allergies: Negative.    Psychiatric/Behavioral: Negative.  Negative for depression, hallucinations and suicidal ideas.    All other systems reviewed and are negative.     Physical Exam  Vitals and nursing note reviewed.   Constitutional:       General: He is not in acute distress.     Appearance: Normal appearance. He is not ill-appearing, toxic-appearing or diaphoretic.   Cardiovascular:      Rate and Rhythm: Normal rate and regular rhythm.      Pulses: Normal pulses.      Heart sounds: Normal heart sounds. No murmur heard.  Pulmonary:      Effort: Pulmonary effort is normal. No respiratory distress.      Breath sounds: No stridor. Wheezing, rhonchi and rales present.   Abdominal:      General: Bowel sounds are normal. There is no distension.      Palpations: Abdomen is soft.      Tenderness: There is no abdominal tenderness. There is no guarding.   Musculoskeletal:         General: No swelling or tenderness. Normal  range of motion.      Right lower leg: No edema.      Left lower leg: No edema.   Skin:     General: Skin is warm.      Capillary Refill: Capillary refill takes less than 2 seconds.      Coloration: Skin is not jaundiced or pale.      Findings: No bruising.   Neurological:      General: No focal deficit present.      Mental Status: He is alert. Mental status is at baseline.      Cranial Nerves: No cranial nerve deficit.      Sensory: No sensory deficit.      Coordination: Coordination normal.      Gait: Gait normal.      Deep Tendon Reflexes: Reflexes normal.   Psychiatric:         Mood and Affect: Mood normal.         Behavior: Behavior normal.         Thought Content: Thought content normal.         Judgment: Judgment normal.         LABS:   Recent Labs     04/20/25  0600 04/21/25 0425 04/22/25 0037   WBC 16.1* 16.2* 11.7*   RBC 3.41* 3.27* 3.46*   HEMOGLOBIN 10.4* 10.2* 10.7*   HEMATOCRIT 34.7* 34.2* 35.8*   .8* 104.6* 103.5*   MCH 30.5 31.2 30.9   MCHC 30.0* 29.8* 29.9*   RDW 56.4* 55.2* 52.8*   PLATELETCT 151* 144* 133*   MPV 11.4 10.7 10.6      Recent Labs     04/20/25  0600 04/21/25 0425 04/22/25  0037   SODIUM 145 145 144   POTASSIUM 4.9 4.7 4.2   CHLORIDE 100 101 101   CO2 36* 36* 34*   GLUCOSE 124* 93 85   BUN 53* 45* 36*   CREATININE 2.02* 1.84* 1.63*   CALCIUM 8.8 8.8 8.7      Recent Labs     04/20/25  0600 04/21/25 0425 04/22/25  0037   ALTSGPT 12 24 20   ASTSGOT 35 43 33   ALKPHOSPHAT 76 69 110*   TBILIRUBIN <0.2 0.2 0.3   GLUCOSE 124* 93 85      RADIOLOGY:   [id number] VIEW   Final Result         1.  Air-filled distended loops of bowel are seen with reactive mucosal pattern, appearance suggests ileus or enteritis versus evolving obstructive changes. Recommend radiographic followup to resolution to exclude progression to obstruction.   2.  Nasogastric tube is coiled within the stomach, the tip terminates overlying the expected location of the gastric cardia.      DX-CHEST-PORTABLE (1 VIEW)    Final Result         1.  No acute cardiopulmonary disease.      DX-CHEST-PORTABLE (1 VIEW)   Final Result         No acute cardiac or pulmonary abnormality is identified.      DX-CHEST-PORTABLE (1 VIEW)   Final Result         1.  No acute cardiopulmonary disease.   2.  Atherosclerosis      MR-BRAIN-W/O   Final Result      1.  No acute abnormality.   2.  Unremarkable noncontrast MR examination of the brain except for minimal nonspecific foci of gliosis.      EC-ECHOCARDIOGRAM COMPLETE W/O CONT   Final Result      DX-CHEST-PORTABLE (1 VIEW)   Final Result         1.  No acute cardiopulmonary disease.   2.  Atherosclerosis      DX-CHEST-LIMITED (1 VIEW)   Final Result         1.  No acute cardiopulmonary disease.   2.  Atherosclerosis      DX-ABDOMEN FOR TUBE PLACEMENT   Final Result      Gastric tube terminates in the stomach.      DX-CHEST-PORTABLE (1 VIEW)   Final Result         1.  Slight hazy left lung base density, could represent subtle infiltrates   2.  Atherosclerosis      CT-CTA NECK WITH & W/O-POST PROCESSING   Final Result         1. There is atherosclerosis with less than 50% diameter stenosis of the internal carotid arteries.   2. There is calcific plaque and severe stenosis at the origin of the right vertebral artery.   3. COPD.      CT-CEREBRAL PERFUSION ANALYSIS   Final Result      1. Cerebral blood flow less than 30% possibly representing completed infarct = 0 mL. Based on distribution of this finding, this is unlikely to represent artifact.      2. T Max more than 6 seconds possibly representing combination of completed infarct and ischemia = 0 mL. Based on the distribution of this finding, this is unlikely to represent artifact.      3. Mismatched volume possibly representing ischemic brain/penumbra= 0 mL      4.  Please note that this cerebral perfusion study and report is Quantitative and targets supratentorial (cerebral) perfusion for evaluation of large vessel territory acute  "ischemia/infarction. For example, lacunar infarcts, and brainstem/posterior fossa    ischemia/infarction are not evaluated on this study.  Data acquisition is subject to artifacts which can yield non-anatomically plausible perfusion maps which may be due to motion, bolus timing, signal to noise ratio, or other technical factors.    Perfusion map abnormalities which show non-anatomic distributions are likely artifact.   This study is not \"stand-alone\" and should only be utilized for diagnosis, management/treatment in correlation with CT, CTA, and/or MRI and clinical factors.         CT-CTA HEAD WITH & W/O-POST PROCESS   Final Result      Atherosclerosis without significant stenosis, occlusion, or aneurysm.      CT-HEAD W/O   Final Result      1. No acute intracranial abnormality.   2. Age-related central and cortical atrophy and diffuse chronic microangiopathic white matter changes versus demyelination or gliosis.   3. Left frontoparietal scalp lesions. Correlate clinically.   4. Left maxillary sinus disease.               DX-CHEST-PORTABLE (1 VIEW)   Final Result      No acute cardiac or pulmonary abnormalities are identified.           ASSESSMENT/PLAN:   Acute on chronic hypoxic respiratory failure secondary to exacerbation of COPD/asthma from suspected pneumonia.  Gold 3-E.  At baseline compliant with Breo Ellipta/albuterol and NC 4 L.  On admission, initially required 6 L but then intubated for 5 days (extubated 4/20/2025).  Exacerbation close to resolved.  - Should be on LAMA/LABA/ICS (eosinophil less than 300 but asthma component)  - Continue chronic oxygen supplementation  - Counseled on smoking cessation.  Interested in Chantix.  - Severity calculated per PFTs in 2022, most likely has worsened since then.  Will need repeat PFTs to follow Z-scores  - Pulmonary rehabilitation on discharge  - Outpatient pulmonary to consider chronic azithromycin (smoker) and/or dupilumab (despite not hide eosinophils)    Tobacco " use disorder: Used to smokes 6-8 cigarette for 50 years, cut back to 3 after January admission.  - Chantix recommended on discharge, patient agrees    Armando R. Reyes Yparraguirre, M.D.  Internal Medicine Senior Resident   [facility]      This note was generated using voice recognition software which has a small chance of producing errors of grammar and possibly content. I have made every reasonable attempt to find and correct any obvious errors but expect that some may not be found prior to finalization of this note.

## 2025-04-22 NOTE — PROGRESS NOTES
4 Eyes Skin Assessment Completed by KRISHNA Pettit and TIEN Doherty.    Head WDL  Ears Redness and Blanching  Nose WDL  Mouth WDL  Neck WDL  Breast/Chest WDL  Shoulder Blades WDL  Spine WDL  (R) Arm/Elbow/Hand WDL  (L) Arm/Elbow/Hand WDL  Abdomen Scab  Groin WDL  Scrotum/Coccyx/Buttocks WDL  (R) Leg WDL  (L) Leg WDL  (R) Heel/Foot/Toe WDL  (L) Heel/Foot/Toe WDL          Devices In Places Blood Pressure Cuff, Pulse Ox, and SCD's      Interventions In Place Sacral Mepilex and Pillows    Possible Skin Injury No    Pictures Uploaded Into Epic Yes  Wound Consult Placed N/A  RN Wound Prevention Protocol Ordered No

## 2025-04-22 NOTE — ASSESSMENT & PLAN NOTE
Hx of EtoH and likely more liver damage then aware.  Stopped EtOH 6 months ago.    Will decrease lactulose to once a day  Outpatient GI evaluation

## 2025-04-22 NOTE — RESPIRATORY CARE
"COPD EDUCATION by COPD CLINICAL EDUCATOR  (Phone: [phone])  4/21/2025 at 6:01 PM by Yarelis Johnson, RRT    Patient seen by the education team to complete their final block of education.  This session discussed the signs and symptoms of an exacerbation (flare-up), triggers that can create flare-ups and reiteration of an \"Action Plan\" to reference daily. This will help to categorize their symptoms and utilize appropriate therapy.  Further education included breathing techniques to treat acute symptoms and home Oxygen safety.  Question and answer session followed.  Smoking Cessation was discussed as appropriate to this patient.      COPD Screen  COPD Risk Screening  Do you have a history of COPD?: Yes  Do you have a Pulmonologist?: Yes    COPD Assessment  COPD Clinical Specialists ONLY  COPD Education Initiated: Yes--Full Intervention (pt with ACO, home O2 at 4 lpm, takes BREO, AL, rarely uses nebulizer, quit smoking last year)  COPD Education Session 1: Yes  Is this a COPD exacerbation patient?: Yes  DME Company: [employer] Equipment Type: home O2 at 4 lpm, nebulizer  Physician Name: Dmitry Ritter M.D.  Pulmonary Follow Up Appointment:  (will call for follow up)  Pulmonologist Name: [facility]  Palliative Care: Yes  Durable Power of : No  Advance Directive: No  Discussion with others: No  Is POLST on file?: No  Referrals Initiated: Yes  Pulmonary Rehab: Yes  Smoking Cessation:  (quit 11 months ago)  Hospice: No  Home Health Care: N/A  Mobile Urgent Care Services: N/A  Geriatric Specialty Group: N/A  Private In-Home Care Agency: N/A  $ Demo/Eval of SVN's, MDI's and Aerosols: Yes (spacer. peak flowmeter)  (OP) Pulmonary Function Testing: Yes (04/2022 FEV1 0.93 32%, FEv1/FVc 57%, DLCo 88, )  Interdisciplinary Rounds: Attendance at Rounds (30 Min)    PFT Results    04/2022 FEV1 0.93 32%, FEv1/FVc 57%, DLCo 88,     Meds to Beds  [facility] provides bedside medication delivery for all eligible " "patients at discharge and you have been automatically enrolled in the Meds to Beds Program. Would you like to opt out of this program for any reason?: No - Stay Opted In     MY COPD ACTION PLAN     It is recommended that patients and physicians/healthcare providers complete this action plan together. This plan should be discussed at each physician visit and updated as needed.    The green, yellow and red zones show groups of symptoms of COPD. This list of symptoms is not comprehensive, and you may experience other symptoms. In the \"Actions\" column, your healthcare provider has recommended actions for you to take based on your symptoms.    Patient Name: Hugo Villela   YOB: 1960   Last Updated on: 4/21/2025  5:59 PM   Green Zone:  I am doing well today Actions     Usual activitiy and exercise level   Take daily medications     Usual amounts of cough and phlegm/mucus   Use oxygen as prescribed     Sleep well at night   Continue regular exercise/diet plan     Appetite is good   At all times avoid cigarette smoke, inhaled irritants     Daily Medications (these medications are taken every day):   Fluticasone Furoate and Vilanterol trifenatate (Breo)  Umeclidinium (Incurse Ellipta) 1 Puff  1 Puff Once daily  Once daily     Additional Information:  Take medication as prescribed. Rinse mouth after using breo ellipta.    Yellow Zone:  I am having a bad day or a COPD flare Actions     More breathless than usual   Continue daily medications     I have less energy for my daily activities   Use quick relief inhaler as ordered     Increased or thicker phlegm/mucus   Use oxygen as prescribed     Using quick relief inhaler/nebulizer more often   Get plenty of rest     Swelling of ankles more than usual   Use pursed lip breathing     More coughing than usual   At all times avoid cigarette smoke, inhaled irritants     I feel like I have a \"chest cold\"     Poor sleep and my symptoms woke me up     My appetite is not " good     My medicine is not helping      Call provider immediately if symptoms don’t improve     Continue daily medications, add rescue medications:   Albuterol  Albuterol 2 Puffs  3mL via nebulizer Twice Daily PRN  Every 4 hours PRN       Medications to be used during a flare up, (as Discussed with Provider):           Additional Information:  Use with provided spacer.    Please clean nebulizer daily after using    Red Zone:  I need urgent medical care Actions     Severe shortness of breath even at rest   Call 911 or seek medical care immediately     Not able to do any activity because of breathing      Fever or shaking chills      Feeling confused or very drowsy       Chest pains      Coughing up blood

## 2025-04-22 NOTE — RESPIRATORY CARE
"COPD EDUCATION by COPD CLINICAL EDUCATOR  4/22/2025 at 2:07 PM by Yarelis Johnson, RRT     Patient interviewed by COPD education team. Patient refused COPD program at this time. An Action Plan was updated in the EMR to reflect current Respiratory Medication use.                  Smoking Cessation Intervention and education completed, 4 minutes spent on smoking cessation education with patient.  Provided smoking cessation packet with \"Tips to Quit\" and brochure for \"Free Smoking Cessation Classes\".       Action Plan updated to include Spiriva Handihaler. Patient understands how to use. MD aware.    COPD Screen  COPD Risk Screening  Do you have a history of COPD?: Yes  Do you have a Pulmonologist?: Yes    COPD Assessment  COPD Clinical Specialists ONLY  COPD Education Initiated: Yes--Full Intervention (pt with ACO, home O2 at 4 lpm, takes BREO, AL, rarely uses nebulizer, quit smoking last year)  COPD Education Session 1: Yes  Is this a COPD exacerbation patient?: Yes  DME Company: [employer] Equipment Type: home O2 at 4 lpm, nebulizer  Physician Name: Dmitry Ritter M.D.  Pulmonary Follow Up Appointment:  (will call for follow up)  Pulmonologist Name: [facility]  Palliative Care: Yes  Durable Power of : No  Advance Directive: No  Discussion with others: No  Is POLST on file?: No  Referrals Initiated: Yes  Pulmonary Rehab: Yes  Smoking Cessation:  (quit 11 months ago)  Hospice: No  Home Health Care: N/A  Mobile Urgent Care Services: N/A  Geriatric Specialty Group: N/A  Private In-Home Care Agency: N/A  $ Demo/Eval of SVN's, MDI's and Aerosols: Yes (spacer. peak flowmeter)  (OP) Pulmonary Function Testing: Yes (04/2022 FEV1 0.93 32%, FEv1/FVc 57%, DLCo 88, )  Interdisciplinary Rounds: Attendance at Rounds (30 Min)    PFT Results    No results found for: \"PFT\"    Meds to [facility] provides bedside medication delivery for all eligible patients at discharge and you have been automatically enrolled " "in the Meds to Beds Program. Would you like to opt out of this program for any reason?: No - Stay Opted In     MY COPD ACTION PLAN     It is recommended that patients and physicians/healthcare providers complete this action plan together. This plan should be discussed at each physician visit and updated as needed.    The green, yellow and red zones show groups of symptoms of COPD. This list of symptoms is not comprehensive, and you may experience other symptoms. In the \"Actions\" column, your healthcare provider has recommended actions for you to take based on your symptoms.    Patient Name: Hugo Villela   YOB: 1960   Last Updated on: 4/22/2025  2:06 PM   Green Zone:  I am doing well today Actions     Usual activitiy and exercise level   Take daily medications     Usual amounts of cough and phlegm/mucus   Use oxygen as prescribed     Sleep well at night   Continue regular exercise/diet plan     Appetite is good   At all times avoid cigarette smoke, inhaled irritants     Daily Medications (these medications are taken every day):   Fluticasone Furoate and Vilanterol trifenatate (Breo)  Tiotropium Bromide Monohydrate (Spiriva) 1 Puff  1 Puff Once daily  Once daily     Additional Information:  Take medication as prescribed. Rinse mouth after using breo ellipta.    Yellow Zone:  I am having a bad day or a COPD flare Actions     More breathless than usual   Continue daily medications     I have less energy for my daily activities   Use quick relief inhaler as ordered     Increased or thicker phlegm/mucus   Use oxygen as prescribed     Using quick relief inhaler/nebulizer more often   Get plenty of rest     Swelling of ankles more than usual   Use pursed lip breathing     More coughing than usual   At all times avoid cigarette smoke, inhaled irritants     I feel like I have a \"chest cold\"     Poor sleep and my symptoms woke me up     My appetite is not good     My medicine is not helping      Call " provider immediately if symptoms don’t improve     Continue daily medications, add rescue medications:   Albuterol  Albuterol 2 Puffs  3mL via nebulizer Twice Daily PRN  Every 4 hours PRN       Medications to be used during a flare up, (as Discussed with Provider):           Additional Information:  Use with provided spacer.    Please clean nebulizer daily after using    Red Zone:  I need urgent medical care Actions     Severe shortness of breath even at rest   Call 911 or seek medical care immediately     Not able to do any activity because of breathing      Fever or shaking chills      Feeling confused or very drowsy       Chest pains      Coughing up blood

## 2025-04-22 NOTE — CARE PLAN
The patient is Watcher - Medium risk of patient condition declining or worsening    Shift Goals  Clinical Goals: wean o2, monitor skin integrity, monitor glucose levels  Patient Goals: comfort  Family Goals: updates    Progress made toward(s) clinical / shift goals:        Problem: Knowledge Deficit - Stroke Education  Goal: Patient's knowledge of stroke and risk factors will improve  Outcome: Progressing  Note: Educated patient about need for follow up appointment after discharge, worsening signs and symptoms, medication compliance, lifestyle changes.      Problem: Neuro Status  Goal: Neuro status will remain stable or improve  Outcome: Progressing     Problem: Pain - Standard  Goal: Alleviation of pain or a reduction in pain to the patient’s comfort goal  Outcome: Progressing     Problem: Knowledge Deficit - COPD  Goal: Patient/significant other demonstrates understanding of disease process, utilization of the Action Plan, medications and discharge instruction  Outcome: Progressing  Note: Educated patient about medical follow up care. Discussed worsening signs and symptoms of flares and exacerbations. Educated patient on importance of medication compliance and lifestyle changes.      Problem: Skin Integrity  Goal: Skin integrity is maintained or improved  Outcome: Progressing     Problem: Fall Risk  Goal: Patient will remain free from falls  Outcome: Met     Problem: Seizure Precautions  Goal: Implementation of seizure precautions  Outcome: Progressing

## 2025-04-23 ENCOUNTER — PHARMACY VISIT (OUTPATIENT)
Dept: PHARMACY | Facility: MEDICAL CENTER | Age: 65
End: 2025-04-23
Payer: COMMERCIAL

## 2025-04-23 ENCOUNTER — HOME HEALTH ADMISSION (OUTPATIENT)
Dept: HOME HEALTH SERVICES | Facility: HOME HEALTHCARE | Age: 65
End: 2025-04-23
Payer: COMMERCIAL

## 2025-04-23 VITALS
BODY MASS INDEX: 23.51 KG/M2 | RESPIRATION RATE: 18 BRPM | OXYGEN SATURATION: 93 % | SYSTOLIC BLOOD PRESSURE: 136 MMHG | WEIGHT: 141.09 LBS | TEMPERATURE: 97.9 F | HEART RATE: 68 BPM | DIASTOLIC BLOOD PRESSURE: 68 MMHG | HEIGHT: 65 IN

## 2025-04-23 LAB
ALBUMIN SERPL BCP-MCNC: 3.2 G/DL (ref 3.2–4.9)
ALBUMIN/GLOB SERPL: 1.1 G/DL
ALP SERPL-CCNC: 64 U/L (ref 30–99)
ALT SERPL-CCNC: 23 U/L (ref 2–50)
ANION GAP SERPL CALC-SCNC: 8 MMOL/L (ref 7–16)
AST SERPL-CCNC: 38 U/L (ref 12–45)
BILIRUB SERPL-MCNC: 0.3 MG/DL (ref 0.1–1.5)
BUN SERPL-MCNC: 25 MG/DL (ref 8–22)
CALCIUM ALBUM COR SERPL-MCNC: 9.1 MG/DL (ref 8.5–10.5)
CALCIUM SERPL-MCNC: 8.5 MG/DL (ref 8.5–10.5)
CHLORIDE SERPL-SCNC: 100 MMOL/L (ref 96–112)
CO2 SERPL-SCNC: 32 MMOL/L (ref 20–33)
CREAT SERPL-MCNC: 1.34 MG/DL (ref 0.5–1.4)
ERYTHROCYTE [DISTWIDTH] IN BLOOD BY AUTOMATED COUNT: 49.9 FL (ref 35.9–50)
GFR SERPLBLD CREATININE-BSD FMLA CKD-EPI: 59 ML/MIN/1.73 M 2
GLOBULIN SER CALC-MCNC: 3 G/DL (ref 1.9–3.5)
GLUCOSE BLD STRIP.AUTO-MCNC: 94 MG/DL (ref 65–99)
GLUCOSE BLD STRIP.AUTO-MCNC: 96 MG/DL (ref 65–99)
GLUCOSE SERPL-MCNC: 103 MG/DL (ref 65–99)
HCT VFR BLD AUTO: 33.8 % (ref 42–52)
HGB BLD-MCNC: 10.7 G/DL (ref 14–18)
MAGNESIUM SERPL-MCNC: 2.3 MG/DL (ref 1.5–2.5)
MCH RBC QN AUTO: 31.9 PG (ref 27–33)
MCHC RBC AUTO-ENTMCNC: 31.7 G/DL (ref 32.3–36.5)
MCV RBC AUTO: 100.9 FL (ref 81.4–97.8)
PHOSPHATE SERPL-MCNC: 2.4 MG/DL (ref 2.5–4.5)
PLATELET # BLD AUTO: 145 K/UL (ref 164–446)
PMV BLD AUTO: 10.7 FL (ref 9–12.9)
POTASSIUM SERPL-SCNC: 4 MMOL/L (ref 3.6–5.5)
PROT SERPL-MCNC: 6.2 G/DL (ref 6–8.2)
RBC # BLD AUTO: 3.35 M/UL (ref 4.7–6.1)
SODIUM SERPL-SCNC: 140 MMOL/L (ref 135–145)
WBC # BLD AUTO: 8.4 K/UL (ref 4.8–10.8)

## 2025-04-23 PROCEDURE — 84100 ASSAY OF PHOSPHORUS: CPT

## 2025-04-23 PROCEDURE — A9270 NON-COVERED ITEM OR SERVICE: HCPCS | Performed by: HOSPITALIST

## 2025-04-23 PROCEDURE — 85027 COMPLETE CBC AUTOMATED: CPT

## 2025-04-23 PROCEDURE — 83735 ASSAY OF MAGNESIUM: CPT

## 2025-04-23 PROCEDURE — 700111 HCHG RX REV CODE 636 W/ 250 OVERRIDE (IP): Performed by: STUDENT IN AN ORGANIZED HEALTH CARE EDUCATION/TRAINING PROGRAM

## 2025-04-23 PROCEDURE — RXMED WILLOW AMBULATORY MEDICATION CHARGE: Performed by: HOSPITALIST

## 2025-04-23 PROCEDURE — A9270 NON-COVERED ITEM OR SERVICE: HCPCS | Performed by: INTERNAL MEDICINE

## 2025-04-23 PROCEDURE — 80053 COMPREHEN METABOLIC PANEL: CPT

## 2025-04-23 PROCEDURE — 700102 HCHG RX REV CODE 250 W/ 637 OVERRIDE(OP): Performed by: HOSPITALIST

## 2025-04-23 PROCEDURE — 82962 GLUCOSE BLOOD TEST: CPT

## 2025-04-23 PROCEDURE — 99239 HOSP IP/OBS DSCHRG MGMT >30: CPT | Performed by: HOSPITALIST

## 2025-04-23 PROCEDURE — 700102 HCHG RX REV CODE 250 W/ 637 OVERRIDE(OP): Performed by: INTERNAL MEDICINE

## 2025-04-23 RX ORDER — LACTULOSE 10 G/15ML
30 SOLUTION ORAL EVERY EVENING
Qty: 900 ML | Refills: 0 | Status: SHIPPED | OUTPATIENT
Start: 2025-04-23

## 2025-04-23 RX ADMIN — DIBASIC SODIUM PHOSPHATE, MONOBASIC POTASSIUM PHOSPHATE AND MONOBASIC SODIUM PHOSPHATE 250 MG: 852; 155; 130 TABLET ORAL at 11:43

## 2025-04-23 RX ADMIN — LEVETIRACETAM 500 MG: 500 TABLET, FILM COATED ORAL at 04:30

## 2025-04-23 RX ADMIN — DIBASIC SODIUM PHOSPHATE, MONOBASIC POTASSIUM PHOSPHATE AND MONOBASIC SODIUM PHOSPHATE 250 MG: 852; 155; 130 TABLET ORAL at 09:04

## 2025-04-23 RX ADMIN — FOLIC ACID 1 MG: 1 TABLET ORAL at 04:30

## 2025-04-23 RX ADMIN — THERA TABS 1 TABLET: TAB at 04:30

## 2025-04-23 RX ADMIN — ASPIRIN 81 MG: 81 TABLET, CHEWABLE ORAL at 04:30

## 2025-04-23 RX ADMIN — HEPARIN SODIUM 5000 UNITS: 5000 INJECTION, SOLUTION INTRAVENOUS; SUBCUTANEOUS at 04:31

## 2025-04-23 RX ADMIN — METOPROLOL SUCCINATE 50 MG: 50 TABLET, EXTENDED RELEASE ORAL at 04:30

## 2025-04-23 RX ADMIN — ALLOPURINOL 50 MG: 100 TABLET ORAL at 04:30

## 2025-04-23 RX ADMIN — TIOTROPIUM BROMIDE INHALATION SPRAY 5 MCG: 3.12 SPRAY, METERED RESPIRATORY (INHALATION) at 04:31

## 2025-04-23 ASSESSMENT — PAIN DESCRIPTION - PAIN TYPE: TYPE: ACUTE PAIN

## 2025-04-23 ASSESSMENT — FIBROSIS 4 INDEX: FIB4 SCORE: 3.5

## 2025-04-23 NOTE — CARE PLAN
The patient is Stable - Low risk of patient condition declining or worsening    Shift Goals  Clinical Goals: monitor O2  Patient Goals: sleep  Family Goals: none at bedside    Progress made toward(s) clinical / shift goals:    Problem: Hemodynamic Monitoring  Goal: Patient's hemodynamics, fluid balance and neurologic status will be stable or improve  Outcome: Progressing     Problem: Respiratory - Stroke Patient  Goal: Patient will achieve/maintain optimum respiratory rate/effort  Outcome: Progressing     Problem: Knowledge Deficit - Standard  Goal: Patient and family/care givers will demonstrate understanding of plan of care, disease process/condition, diagnostic tests and medications  Outcome: Progressing     Problem: Pain - Standard  Goal: Alleviation of pain or a reduction in pain to the patient’s comfort goal  Outcome: Progressing     Problem: Knowledge Deficit - COPD  Goal: Patient/significant other demonstrates understanding of disease process, utilization of the Action Plan, medications and discharge instruction  Outcome: Progressing     Problem: Ineffective Airway Clearance  Goal: Patient will maintain patent airway with clear/clearing breath sounds  Outcome: Progressing       Patient is not progressing towards the following goals:

## 2025-04-23 NOTE — DISCHARGE SUMMARY
Discharge Summary    CHIEF COMPLAINT ON ADMISSION  Chief Complaint   Patient presents with    Shortness of Breath     Hx of COPD. Audible wheezing noted.    ALOC     Patient presents confused and disoriented per family that happened 24 hours ago. Family states it is cyclical and happens every week where he is confused.          Reason for Admission  dizzy     Admission Date  4/16/2025         HPI & HOSPITAL COURSE  Patient 64-year-old male w/ hx of DMII, COPD on 4lpm at home, EtOH, tobacco use, KSmhAT34%, CAD. He was admitted 4/16/2025 with shortness of breath and altered mentation. Shortly after admit he had become less responsive and was moved to ICU for intubation but extubated 4/20. He has now been on HFNC. There was a concern of seizure like activity and he was loaded on keppra and given versed 4/18.  EEG was negative for seizure activity and has been tapered off Keppra.  MRI was negative for acute pathology.  Patient was started on lactulose for elevated ammonia  His oxygen requirements improved and is back to his 4 L nasal cannula baseline oxygen requirements.  He was counseled on the importance of smoking cessation and to continue to abstain from alcohol..  Patient had ROBERT his renal function has improved back to baseline.  Initial plan was to transfer to inpatient rehab however due to high co-pay cost patient declined going to rehab.  I have ordered home health.  Patient is otherwise clinically stable for discharge      Therefore, he is discharged in good and stable condition to home with close outpatient follow-up.    The patient met 2-midnight criteria for an inpatient stay at the time of discharge.    Discharge Date  4/23/2025    FOLLOW UP ITEMS POST DISCHARGE  Follow-up with PCP with repeat chemistry panel  Outpatient GI evaluation for suspected liver cirrhosis  Outpatient follow-up with pulmonary medicine    DISCHARGE DIAGNOSES  Principal Problem:    Altered mental status (POA: Yes)  Active Problems:     "Dyslipidemia (POA: Yes)    Tobacco use disorder (POA: Yes)    S/P coronary artery stent placement (POA: Yes)    Primary hypertension (POA: Yes)      Overview: On losartan 100mg      In setting of whitecoat hypertension      His wife works in the laboratory and report does checks patient's blood       pressure annually and no blood pressure between 130s at home    Type 2 diabetes mellitus (HCC) (POA: Yes)      Overview: Well-controlled    Stage 3 chronic kidney disease (POA: Yes)      Overview: This is a chronic condition.      First noted around 2022, in the setting of hypertension, diabetes       (controlled lifestyle), denies NSAID use.  Initial creatinine was 1.8 that       improved to 1.4.      From 2022 through 2023 patient was in [place] reportedly had followed with       nephrologist and had ultrasound which did not show any lesions/anatomical       abnormalities of the kidneys per translation.  Recent creatinine back to       1.9.  There is proteinuria            \" 7/9/2022 ultrasound from [place] \" el estudio no demostro lesiones en las       estructuras anatomicas de los rinones. \"           Acute exacerbation of chronic obstructive pulmonary disease (COPD) (HCC) (POA: Yes)      Overview:       Prior PFT: FEV1/FVC ratio 57 and FEV1 of 0.93 L or 32% predicted      postbronchodilator FEV1 of 1.37 L or 47% predicted.      Total lung capacity is within normal limits at 106%     Alcohol dependence (HCC) (POA: Yes)    Acute systolic heart failure (HCC) (POA: Yes)    Acute on chronic respiratory failure (HCC) (POA: Yes)    Thrombocytopenia (HCC) (POA: Yes)    Increased ammonia level (POA: Yes)    Seizure (HCC) (POA: Clinically Undetermined)  Resolved Problems:    * No resolved hospital problems. *      FOLLOW UP  Future Appointments   Date Time Provider Department Center   5/5/2025 11:30 AM DESIRAE M[facility] None     Dmitry Ritter M.D.  [address] [facility] [address]  [place] " [id number] [phone]    Call in 1 week(s)      [facility]  [address]  [place] [address]  [phone]          MEDICATIONS ON DISCHARGE     Medication List        START taking these medications        Instructions   lactulose 10 g/15mL Soln   Take 30 mL by mouth every evening.  Dose: 30 mL     Spiriva Respimat 2.5 MCG/ACT Aers  Start taking on: April 24, 2025  Generic drug: tiotropium   Inhale 2 Inhalations every day.  Dose: 5 mcg            CONTINUE taking these medications        Instructions   albuterol 108 (90 Base) MCG/ACT Aers inhalation aerosol   Doctor's comments: Give albuterol that is patient or insurance preference please  Inhale 2 Puffs every four hours as needed for Shortness of Breath.  Dose: 2 Puff     allopurinol 100 MG Tabs  Commonly known as: Zyloprim   Take 0.5 Tablets by mouth every day.  Dose: 50 mg     aspirin EC 81 MG Tbec  Commonly known as: Ecotrin   Take 1 Tablet by mouth every day.  Dose: 81 mg     atorvastatin 80 MG tablet  Commonly known as: Lipitor   Take 1 Tablet by mouth at bedtime.  Dose: 80 mg     fluticasone furoate-vilanterol 200-25 MCG/ACT Aepb  Commonly known as: Breo Ellipta   Inhale 1 Puff every day. Rinse mouth after use.  Dose: 1 Puff     losartan 25 MG Tabs  Commonly known as: Cozaar   Take 1 Tablet by mouth every day.  Dose: 25 mg     metoprolol SR 50 MG Tb24  Commonly known as: Toprol XL   Take 1 Tablet by mouth every day.  Dose: 50 mg              Allergies  Allergies   Allergen Reactions    Labetalol Itching and Swelling     Slight swelling to R eye    Lisinopril Cough       DIET  No orders of the defined types were placed in this encounter.      ACTIVITY  As tolerated.  Weight bearing as tolerated    CONSULTATIONS  Pulmonary critical care  Neurology    PROCEDURES  EEG    LABORATORY  Lab Results   Component Value Date    SODIUM 140 04/23/2025    POTASSIUM 4.0 04/23/2025    CHLORIDE 100 04/23/2025    CO2 32 04/23/2025    GLUCOSE 103 (H)  04/23/2025    BUN 25 (H) 04/23/2025    CREATININE 1.34 04/23/2025        Lab Results   Component Value Date    WBC 8.4 04/23/2025    HEMOGLOBIN 10.7 (L) 04/23/2025    HEMATOCRIT 33.8 (L) 04/23/2025    PLATELETCT 145 (L) 04/23/2025        Total time of the discharge process exceeds 35 minutes.

## 2025-04-23 NOTE — DISCHARGE PLANNING
-1210  DPA notified Elsa at [facility] that pt will need portable concentrator to get home, as well as home concentrator serviced. Elsa will contact [facility] manager to see what can be arranged.     -1230  DPA informed by Elsa with [facility] that a portable tank will be delivered to bedside in the next couple of hours. The  will speak with pt during portable delivery to coordinate a time for home concentrator to be delivered to pt's home today.

## 2025-04-23 NOTE — DISCHARGE PLANNING
This referral has been escalated to a Clinical Supervisor for review in order to determine Home Health appropriateness.  This issue will be resolved as quickly as possible. Thank you!

## 2025-04-23 NOTE — PROGRESS NOTES
Bedside report received from off going RN/tech: Umu KELLER, assumed care of patient.     Fall Risk Score: MODERATE RISK  Fall risk interventions in place: Provide patient/family education based on risk assessment, Educate patient/family to call staff for assistance when getting out of bed, Place fall precaution signage outside patient door, Place patient in room close to nursing station, Utilize bed/chair fall alarm, and Bed alarm connected correctly  Bed type: Regular (Tate Score less than 17 interventions in place)  Patient on cardiac monitor: Yes  IVF/IV medications: Not Applicable   Oxygen: How many liters 4L  Bedside sitter: Not Applicable   Isolation: Not applicable

## 2025-04-23 NOTE — DISCHARGE PLANNING
Case Management Discharge Planning    Admission Date: 4/16/2025  GMLOS: 3.9  ALOS: 7    6-Clicks ADL Score: 19  6-Clicks Mobility Score: 15  PT and/or OT Eval ordered: Yes  PT/OT:Recommend post acute placement   Post-acute Referrals Ordered: Yes  Post-acute Choice Obtained: NA  Has referral(s) been sent to post-acute provider:  LEATHA      Anticipated Discharge Dispo: Discharge Disposition: Disch to  rehab facility or distinct part unit ()    DME Needed: Pending hospital course     Action(s) Taken: LMSW reached out to [facility] informing them that pt is  id they have insurance auth. [facility] to reach back out to .     1040 LMSW met with pt to verify DC plan. Pt wants to go home and states that he believe he does not need rehab. Pt stated that his concentrator from [facility] does not work. Spouse and pt are requesting that they get and new concentrator and a tank at bed side. LMSW collaborated with [facility] to reach out to [facility].     1236  Per they are going to deliver a tank here and they said when they deliver it here, they will talk with him on when he will be home to switch out the concentrators. Though it maybe e a couple hours because the  is on lunch and already have other orders to deliver.    1240 Pt and spouse are both agreeable with HH. Choice for [facility] due to insurance.     Escalations Completed: None    Medically Clear: Yes    Next Steps: F/U with auth     Barriers to Discharge: Pending Placement

## 2025-04-23 NOTE — DISCHARGE PLANNING
[facility] Transitional Care Coordination    Call out to spouse Carie to discuss potential IPR admission, clarify discharge support.  Conversation conducted by [facility] AMINATA Oliver.  Melody explained IPR option, including [facility] insurance benefits - $5000.00 MAX OOP per year.  Patient/spouse provided update [facility] has authorized inpatient rehab admission.  Patient stated he would like to consider IPR option, would call [facility] back when decision made.      Spouse confirmed family able to to provide return to community support - herself, son and patients brother.      Volate update to DALLAS Lamas.  Will follow for choice.      1132a - Per DALLAS Lamas, pt declined IPR admission, prefers to discharge home.  Will sign off.

## 2025-04-23 NOTE — PROGRESS NOTES
Discharge instructions reviewed with patient. Per pt no IV. Meds to beds provided. Discussed follow up appts. All questions answered.

## 2025-04-23 NOTE — DISCHARGE PLANNING
ATTN: Case Management  RE: Referral for Home Health    As of 4/23/25, we have accepted the Home Health referral for the patient listed above.    [facility]  will contact the patient within 48 hours. If you have any questions or concerns regarding the patient’s transition to Home Health, please do not hesitate to contact us at [phone].      We look forward to collaborating with you,  [facility] Team

## 2025-04-25 ENCOUNTER — TELEPHONE (OUTPATIENT)
Dept: HOME HEALTH SERVICES | Facility: HOME HEALTHCARE | Age: 65
End: 2025-04-25
Payer: COMMERCIAL

## 2025-04-25 NOTE — TELEPHONE ENCOUNTER
Called and spoke to patient about scheduling delay, patient is agreeable to a call in the next few days for scheduling. Later start of care for 4/27 per request.

## 2025-04-27 ENCOUNTER — TELEPHONE (OUTPATIENT)
Dept: HOME HEALTH SERVICES | Facility: HOME HEALTHCARE | Age: 65
End: 2025-04-27
Payer: COMMERCIAL

## 2025-04-28 ENCOUNTER — TELEPHONE (OUTPATIENT)
Dept: HOME HEALTH SERVICES | Facility: HOME HEALTHCARE | Age: 65
End: 2025-04-28
Payer: COMMERCIAL

## 2025-04-28 NOTE — TELEPHONE ENCOUNTER
Spoke to patient, he is unavailable for home health start of care tomorrow due to conflicting appointment. He is ok with a call in a couple of days unless there is availability sooner.

## 2025-04-29 ENCOUNTER — TELEPHONE (OUTPATIENT)
Dept: HOME HEALTH SERVICES | Facility: HOME HEALTHCARE | Age: 65
End: 2025-04-29
Payer: COMMERCIAL

## 2025-04-29 NOTE — TELEPHONE ENCOUNTER
Called and spoke to patient regarding home health scheduling. Start of care scheduled for 4/30 between 9-10am. Later start of care for 4/30 per request.

## 2025-04-30 ENCOUNTER — HOME CARE VISIT (OUTPATIENT)
Dept: HOME HEALTH SERVICES | Facility: HOME HEALTHCARE | Age: 65
End: 2025-04-30

## 2025-04-30 ENCOUNTER — TELEPHONE (OUTPATIENT)
Dept: HOME HEALTH SERVICES | Facility: HOME HEALTHCARE | Age: 65
End: 2025-04-30
Payer: COMMERCIAL

## 2025-04-30 ENCOUNTER — TELEPHONE (OUTPATIENT)
Dept: RESPIRATORY THERAPY | Facility: MEDICAL CENTER | Age: 65
End: 2025-04-30
Payer: COMMERCIAL

## 2025-04-30 NOTE — TELEPHONE ENCOUNTER
"COPD Program Discharge follow up phone call:    Do you remember your education with [facility]? \"No, sorry\"  Do you have the Action Plan they filled out for you? Yes  Did you refill your medications? Yes  Did you take them every day as prescribed? Yes  Do you have any questions regarding your medications? No  Have you seen your PCP since discharging from the hospital? No  As a reminder you have a follow up appointment with Jack MINOR on May 5, 2025 @ 1130  Have you seen Home Health since discharging from the hospital? No   Did you stop smoking because of the program? Yes!  Not smoking at time of follow up? Yes, not smoking! :-)  "

## 2025-05-01 ENCOUNTER — OFFICE VISIT (OUTPATIENT)
Dept: NEUROLOGY | Facility: MEDICAL CENTER | Age: 65
End: 2025-05-01
Attending: PSYCHIATRY & NEUROLOGY
Payer: COMMERCIAL

## 2025-05-01 VITALS
WEIGHT: 137.57 LBS | SYSTOLIC BLOOD PRESSURE: 106 MMHG | BODY MASS INDEX: 22.92 KG/M2 | HEIGHT: 65 IN | TEMPERATURE: 97.7 F | OXYGEN SATURATION: 91 % | HEART RATE: 70 BPM | DIASTOLIC BLOOD PRESSURE: 60 MMHG

## 2025-05-01 DIAGNOSIS — R41.0 EPISODIC CONFUSION: ICD-10-CM

## 2025-05-01 PROBLEM — R56.9 SEIZURE (HCC): Status: RESOLVED | Noted: 2025-04-18 | Resolved: 2025-05-01

## 2025-05-01 PROCEDURE — 99205 OFFICE O/P NEW HI 60 MIN: CPT | Performed by: PSYCHIATRY & NEUROLOGY

## 2025-05-01 PROCEDURE — 3074F SYST BP LT 130 MM HG: CPT | Performed by: PSYCHIATRY & NEUROLOGY

## 2025-05-01 PROCEDURE — 99417 PROLNG OP E/M EACH 15 MIN: CPT | Performed by: PSYCHIATRY & NEUROLOGY

## 2025-05-01 PROCEDURE — 3078F DIAST BP <80 MM HG: CPT | Performed by: PSYCHIATRY & NEUROLOGY

## 2025-05-01 PROCEDURE — 99213 OFFICE O/P EST LOW 20 MIN: CPT

## 2025-05-01 ASSESSMENT — ENCOUNTER SYMPTOMS
HEADACHES: 0
SPEECH CHANGE: 0
WHEEZING: 1
DEPRESSION: 0
SEIZURES: 0
FOCAL WEAKNESS: 0
SHORTNESS OF BREATH: 1
MEMORY LOSS: 0
LOSS OF CONSCIOUSNESS: 0
TINGLING: 0

## 2025-05-01 ASSESSMENT — PATIENT HEALTH QUESTIONNAIRE - PHQ9: CLINICAL INTERPRETATION OF PHQ2 SCORE: 0

## 2025-05-01 ASSESSMENT — FIBROSIS 4 INDEX: FIB4 SCORE: 3.5

## 2025-05-01 NOTE — PROGRESS NOTES
Subjective     Hugo Villela is a 64 y.o. male who presents with his wife Carie, as a hospital follow-up, for consultation, admitted for episode of altered sensorium and loss of consciousness on April 16, 2025.  Issues got from review of records as well as discussions with the patient and his wife.  Primarily South Korean-speaking, translation services were offered and they were refused.  Adequate communication was possible nonetheless.     LUISA Simpson is a very pleasant 64-year-old right-handed gentleman whose episode started on April 15, 2025.  On home oxygen, 4 L nasal cannula, he was using his oxygen but his wife noted changes in speech quality.  He feels that he was slurring his speech, he was having difficulty finding words.  He was intermittently aware of the symptoms.  He denied fever, sweats, there was never loss of consciousness, seizure-like activity, balance difficulties, headache, etc.  He fell asleep at night, remembers getting out of bed and then patient felt that he collapsed to the ground.  He remembers nothing after this until awakening in the ICU.    According to his wife he became unresponsive, family was there, they helped him to his feet, he was actually able to help them use his inhaler but was noncommunicative.  He was brought to the emergency room.    Review of records in the emergency room demonstrated the patient was hypoxic, lactate was elevated at 7.9, ammonia level was also found to be elevated at 69.  Serum alcohol was negative, as well as UDS.  CBC did not reveal evidence of leukocytosis, sodium, BUN/creatinine, glucose, calcium and liver profiles were normal.  He remained severely hypoxic, warranting eventual ICU transfer and intubation.  Stroke protocol was initiated, CT of the brain revealed no acute changes, perfusion study revealed no mismatch, CTA of the brain and neck revealed no LVO.  He was not deemed a candidate for thrombolytic therapy.  While in the ICU, EEG study was  obtained, during this time there was also some seizure-like activity seen by staff.  There was no epileptic correlate on the tracing.  Given Keppra for seizure prophylaxis, the drug was titrated off.  MRI scan of the brain was normal, no evidence of acute change.  Echocardiogram revealed no structural pathology absent aortic stenosis, ejection fraction 50-55%.  Neurology consultation felt that this was a nonneurologic cause for the episode of confusion, no additional evaluations were recommended.  Patient was eventually extubated, transferred to the floor, and at the time of discharge he felt he was back to his baseline.  Inpatient rehabilitation was offered but because of expense, the patient opted for home care.  At home, he has been in his usual state of health.    He suffered from another episode of confusion related to hypoxia on , hospitalized, with recovery.  This admission was felt due to hypoxia as well as primary pulmonary issues of infection.  Between this event and his subsequent admission 4 months later, he was in his usual state of health.  He is communicative, is active with his daily routines, never having problems with balance difficulty, incontinence, loss of consciousness, headache, dizziness, etc.  He and his wife deny episodes suggestive of altered sensorium, the complex partial seizure inventory for olfactory or gustatory hallucination, depersonalization or derealization, déjà vu, etc. was unremarkable.    He has a history of gout, CAD, hypertension, COPD, alcohol abuse (his last beverage was preceding the  admission), no history of CVA, MS, seizure, migraine, malignancy, autoimmune disease, JORDON, or diabetes.    There is no surgical history of note from my standpoint.    No one in the family that he is aware of has ever suffered from seizure, stroke, neurodegenerative disease, or MS.  His father  at 101, his mother at 80.  He has 11 siblings, none of whom has  "suffer from similar symptoms or held a diagnosis of seizure, MS, CVA, or malignancy.    He quit tobacco use in April of this year with this most recent admission, the alcohol history is as above.  He lives at home with his wife, has been fully independent to date.    He is on Spiriva, Breo Ellipta and albuterol inhalers, Cozaar, Toprol-XL, Lipitor, Zyloprim and baby aspirin daily.    Review of Systems   Respiratory:  Positive for shortness of breath and wheezing.    Neurological:  Negative for tingling, speech change, focal weakness, seizures, loss of consciousness and headaches.   Psychiatric/Behavioral:  Negative for depression and memory loss.    All other systems reviewed and are negative.    Objective     /60 (BP Location: Left arm, Patient Position: Standing, BP Cuff Size: Adult)   Pulse 70   Temp 36.5 °C (97.7 °F) (Temporal)   Ht 1.651 m (5' 5\")   Wt 62.4 kg (137 lb 9.1 oz)   SpO2 91% Comment: with 4L of oxygen  BMI 22.89 kg/m²      Physical Exam    He presents in no acute distress.  He is using a 4 L nasal cannula, SPO2 91%, his other vital signs are stable.  He is quite cooperative, pleasant in spirit and demeanor.  There is no malar rash or jaw claudication.  The neck is supple.  Carotid pulses are present bilaterally without asymmetry or bruits.  Cardiac evaluation reveals a regular rhythm.     Neurological Exam    He is fully oriented, he names and repeats easily, he follows multistep commands consistently.  There is no apraxia or inattention.    PERRLA/EOMI, visual fields are full, facial movements are symmetric.  Sensory exam is intact to temperature, light touch and pinprick bilaterally.  There is no extinction.  The tongue and uvula are midline, there is no bulbar dysfunction.  Jaw movements are intact.  Shoulder shrug and head rotation are symmetric.    Musculoskeletal exam reveals normal tone bilaterally, there is no tremor, asterixis, or drift.  Strength is 5/5 in all 4 " extremities.    Reflexes are present throughout, there are no asymmetries, the ankle jerks are diminished notably bilaterally.  Both toes are downgoing.    He stands easily enough, station is slightly widened, he has some difficulty with tandem walking, heel and toe walking can be done independently.  Armswing is symmetric, he maintains stride length when he walks.  There is no appendicular dystaxia.  Repetitive movements are normal and symmetric throughout, amplitude and frequencies maintained.    Sensory exam is intact to vibration, temperature and pinprick.  Romberg is absent.  Assessment & Plan  Episodic confusion  Though he has had 2 episodes of confusion, the first seems to be more likely a pulmonary issue, not neurologically based.  The most recent episode as well I suspect is metabolically mediated, not a primary neurologic issue.  Still, there was an interval where he was slurring his speech, possibly aphasic and the day prior to his admission, this could be metabolically based, but nonconvulsive seizures still could be considered.  The seizure-like activity described during his hospitalization was not associated with an epileptic correlate on his EEG.  The 72-hour EEG tracing should be obtained for thoroughness.    I doubt that this was related to CNS ischemia, absent acute findings on MRI imaging.  Vascular compromise was ruled out, echocardiography as well revealed normal EF and no structural pathology that might explain the symptoms either from an ischemic or hypotensive standpoint.  With resolution of all of his symptoms simply by correction of his toxic and metabolic issues, this is consistent with this impression.  Alcohol was not playing a role.    I spent some time talking with the patient and his wife in regards to all of the above.  We talked about signs or symptoms suggestive of seizure activity and what they should do in the circumstances, specifically get him back into the emergency room.  As  long as he remains symptom-free, there is no need for additional neurologic follow-up long-term, certainly no need to introduce medication.  The other hand if his EEG is abnormal, this issue will need to be revisited.  We will call him with the EEG results if necessary.  Follow-up appointment is not scheduled at this time.    Orders:    EEG; Future    Time: 80 minutes in total spent on patient care including pre-charting, record review, discussion with healthcare staff and documentation.  This includes face-to-face time exam, review, discussion, as well as counseling and coordinating care.

## 2025-05-01 NOTE — ASSESSMENT & PLAN NOTE
Though he has had 2 episodes of confusion, the first seems to be more likely a pulmonary issue, not neurologically based.  The most recent episode as well I suspect is metabolically mediated, not a primary neurologic issue.  Still, there was an interval where he was slurring his speech, possibly aphasic and the day prior to his admission, this could be metabolically based, but nonconvulsive seizures still could be considered.  The seizure-like activity described during his hospitalization was not associated with an epileptic correlate on his EEG.  The 72-hour EEG tracing should be obtained for thoroughness.    I doubt that this was related to CNS ischemia, absent acute findings on MRI imaging.  Vascular compromise was ruled out, echocardiography as well revealed normal EF and no structural pathology that might explain the symptoms either from an ischemic or hypotensive standpoint.  With resolution of all of his symptoms simply by correction of his toxic and metabolic issues, this is consistent with this impression.  Alcohol was not playing a role.    I spent some time talking with the patient and his wife in regards to all of the above.  We talked about signs or symptoms suggestive of seizure activity and what they should do in the circumstances, specifically get him back into the emergency room.  As long as he remains symptom-free, there is no need for additional neurologic follow-up long-term, certainly no need to introduce medication.  The other hand if his EEG is abnormal, this issue will need to be revisited.  We will call him with the EEG results if necessary.  Follow-up appointment is not scheduled at this time.    Orders:    EEG; Future     Yes

## 2025-05-01 NOTE — Clinical Note
REFERRAL APPROVAL NOTICE         Sent on May 1, 2025                   [address]o [facility]la  [address] [facility] [address] [facility] [address]                   Dear Mr. Villela,    After a careful review of the medical information and benefit coverage, [facility] has processed your referral. See below for additional details.    If applicable, you must be actively enrolled with your insurance for coverage of the authorized service. If you have any questions regarding your coverage, please contact your insurance directly.    REFERRAL INFORMATION   Referral #:  [id number]  Referred-To Provider    Referred-By Provider:  Gastroenterology    Abelardo Sarmiento M.D.   [facility]      [address] [facility] [id number] [phone] [address] [address]  [phone]    Referral Start Date:  04/23/2025  Referral End Date:   04/23/2026             SCHEDULING  If you do not already have an appointment, please call [phone] to make an appointment.     MORE INFORMATION  If you do not already have a [facility] account, sign up at: [email or web address]  You can access your medical information, make appointments, see lab results, billing information, and more.  If you have questions regarding this referral, please contact  the [facility] Referrals department at:             [phone]. Monday - Friday 8:00AM - 5:00PM.     Sincerely,    [facility]

## 2025-05-05 ENCOUNTER — OFFICE VISIT (OUTPATIENT)
Dept: SLEEP MEDICINE | Facility: MEDICAL CENTER | Age: 65
End: 2025-05-05
Payer: COMMERCIAL

## 2025-05-05 VITALS
OXYGEN SATURATION: 95 % | RESPIRATION RATE: 16 BRPM | HEART RATE: 71 BPM | SYSTOLIC BLOOD PRESSURE: 112 MMHG | HEIGHT: 65 IN | BODY MASS INDEX: 23.43 KG/M2 | DIASTOLIC BLOOD PRESSURE: 60 MMHG | WEIGHT: 140.6 LBS

## 2025-05-05 DIAGNOSIS — R91.8 PULMONARY NODULES: ICD-10-CM

## 2025-05-05 DIAGNOSIS — J96.11 CHRONIC RESPIRATORY FAILURE WITH HYPOXIA (HCC): ICD-10-CM

## 2025-05-05 DIAGNOSIS — J44.89 ASTHMA-COPD OVERLAP SYNDROME (HCC): Primary | ICD-10-CM

## 2025-05-05 PROCEDURE — 99213 OFFICE O/P EST LOW 20 MIN: CPT

## 2025-05-05 RX ORDER — AZITHROMYCIN 250 MG/1
TABLET, FILM COATED ORAL
Qty: 6 TABLET | Refills: 0 | Status: SHIPPED | OUTPATIENT
Start: 2025-05-05

## 2025-05-05 RX ORDER — METHYLPREDNISOLONE 4 MG/1
TABLET ORAL
Qty: 21 TABLET | Refills: 0 | Status: SHIPPED | OUTPATIENT
Start: 2025-05-05

## 2025-05-05 ASSESSMENT — ENCOUNTER SYMPTOMS
SHORTNESS OF BREATH: 1
WHEEZING: 0
DEPRESSION: 0
STRIDOR: 0
FEVER: 0
PALPITATIONS: 0
CHILLS: 0
DIZZINESS: 0
COUGH: 0
WEIGHT LOSS: 0
HEARTBURN: 0
HEMOPTYSIS: 0
SPUTUM PRODUCTION: 0

## 2025-05-05 ASSESSMENT — FIBROSIS 4 INDEX: FIB4 SCORE: 3.5

## 2025-05-05 NOTE — PATIENT INSTRUCTIONS
[facility]  [address] [place] [id number]  Phone: [phone]    Please get the following vaccines:     Prevar 20  RSV

## 2025-05-05 NOTE — ASSESSMENT & PLAN NOTE
Oxygen at 4 LPM  Chronic hypoxia 2/2 severe COPD  Patient is compliant with treatment.   Patient is benefiting from treatment.

## 2025-05-05 NOTE — ASSESSMENT & PLAN NOTE
3.4 mm nodule right lower lobe image 74.  There are scattered smaller nodules within both lungs.  .  8.5 mm groundglass opacity right lower lobe image 75.  4.3 mm groundglass opacity right lower lobe image 76.     8.1 mm groundglass opacity left lower lobe image 55.  5.1 mm groundglass opacity left lower lobe image 64.    Plan:   Repeat CT in February 2026

## 2025-05-05 NOTE — PROGRESS NOTES
Pulmonary Clinic follow up    Date of Service: 5/5/2025    Reason for follow up:  COPD (/Dx/Last seen: HFV 4/16/25-4/23/25 Paula for exacerbation of COPD/asthma //O2 or/and CPAP: 4 LPM of oxygen 24/7)    This is a moderate medical complexity visit, which in addition to above, included, if applicable, medication reconciliation and management, obtaining and reviewing discharge information, reviewing the need for diagnostic tests/treatments and/or follow-up on pending diagnostic tests/treatments, medical education, assistance with scheduling follow-up visits with providers and services.      History of Present Illness:     Hugo Villela is a 64 y.o. male being evaluated in clinic today for hospital follow up. Patient was hospitalized from 4/16/2025 - 4/23/2025 for shortness of breath and altered mentation. Patient was found to have elevated ammonia levels and has heavy drinking history. Patient was admitted to ICU and intubated during his stay. He was also worked up for seizures and EEG was negative, he was tapered off Keppra prior to discharge with home health services. PMH includes asthma/COPD overlap syndrome, CHF, CKD, alcohol dependence in remission, CAD, HTN, DM II. Patient was last seen in pulmonary clinic by Dr. Melendez on 1/21/2025. Patient presents today back on his baseline 4LPM. Patient is now on Breo 200 + Spiriva and he states he feels much better with 3 medications opposed to the Breo alone. Patient stopped smoking April 2025 and stopped drinking January 2025. Patient is taking his lactulose as prescribed with 2 Bms daily but wasn't aware he needed to see GI, details were given. Patient denies any cough or congestion at this time.     PMH:  CAD, HLD, elevated LFTs, episodic confusion suspected 2/2 liver disease with elevated ammonia levels, HTN, CKD, DM II.     MMRC Grade:   0- Breathless only during strenuous exercise  1- Short of breath when hurrying or going up a small hill  2- Walks slower  than friends due to breathlessness, has to stop at own pace  3- Stops to catch breath on level ground after 100m  4- Breathless with ambulating around house or ADLs     Review of Systems   Constitutional:  Negative for chills, fever and weight loss.   Respiratory:  Positive for shortness of breath. Negative for cough, hemoptysis, sputum production, wheezing and stridor.    Cardiovascular:  Negative for chest pain, palpitations and leg swelling.   Gastrointestinal:  Negative for heartburn.   Neurological:  Negative for dizziness.   Psychiatric/Behavioral:  Negative for depression.        Current Outpatient Medications on File Prior to Visit   Medication Sig Dispense Refill    lactulose 20 GM/30ML Solution Take 30 mL by mouth every evening. 900 mL 0    tiotropium (SPIRIVA RESPIMAT) 2.5 MCG/ACT Aero Soln Inhale 2 Inhalations every day. 4 g 1    fluticasone furoate-vilanterol (BREO ELLIPTA) 200-25 MCG/ACT AEROSOL POWDER, BREATH ACTIVATED Inhale 1 Puff every day. Rinse mouth after use. 90 Each 3    albuterol 108 (90 Base) MCG/ACT Aero Soln inhalation aerosol Inhale 2 Puffs every four hours as needed for Shortness of Breath. 1 Each 11    losartan (COZAAR) 25 MG Tab Take 1 Tablet by mouth every day. 30 Tablet 0    metoprolol SR (TOPROL XL) 50 MG TABLET SR 24 HR Take 1 Tablet by mouth every day. 90 Tablet 3    allopurinol (ZYLOPRIM) 100 MG Tab Take 0.5 Tablets by mouth every day. 45 Tablet 3    atorvastatin (LIPITOR) 80 MG tablet Take 1 Tablet by mouth at bedtime. 90 Tablet 3    aspirin EC (ECOTRIN) 81 MG Tablet Delayed Response Take 1 Tablet by mouth every day. 30 Tablet 3     No current facility-administered medications on file prior to visit.       Social History     Tobacco Use    Smoking status: Former     Current packs/day: 0.25     Average packs/day: 0.2 packs/day for 43.1 years (10.8 ttl pk-yrs)     Types: Cigarettes     Start date: 4/5/1982    Smokeless tobacco: Never    Tobacco comments:     Only 6 cigarettes  "daily.  Patient declines smoking cessation at this time.    Vaping Use    Vaping status: Never Used   Substance Use Topics    Alcohol use: Not Currently     Alcohol/week: 2.4 - 3.6 oz     Types: 4 - 6 Cans of beer per week    Drug use: Never        Past Medical History:   Diagnosis Date    Acute CVA (cerebrovascular accident) ([facility]) 4/16/2025    Acute myocardial infarction of other inferior wall, episode of care unspecified 06/25/2012    Acute myocardial infarction of other lateral wall, episode of care unspecified 06/25/2012    Asthma     Breath shortness     Bronchitis     Chest pain, unspecified 06/25/2012    Coronary atherosclerosis of native coronary artery 06/25/2012    Dental disorder     Diabetes ([facility])     Dizziness and giddiness 06/25/2012    High cholesterol     Hypertension     Mixed hyperlipidemia 06/25/2012    Pure hyperglyceridemia 06/25/2012    Renal disorder     S/P coronary artery stent placement 06/25/2012    Seizure ([facility]) 4/18/2025    Tobacco use disorder 06/25/2012       Past Surgical History:   Procedure Laterality Date    ORIF, FRACTURE, FEMUR  1994    OTHER      Right femur    OTHER CARDIAC SURGERY      STENT PLACEMENT         Labetalol and Lisinopril    Family History   Problem Relation Age of Onset    Stroke Mother     Cancer Sister        /60 (BP Location: Right arm, Patient Position: Sitting, BP Cuff Size: Adult)   Pulse 71   Resp 16   Ht 1.651 m (5' 5\")   Wt 63.8 kg (140 lb 9.6 oz)   SpO2 95%      Physical Exam  Constitutional:       General: He is not in acute distress.  HENT:      Head: Normocephalic.      Nose: Nose normal.      Mouth/Throat:      Mouth: Mucous membranes are moist.   Eyes:      Conjunctiva/sclera: Conjunctivae normal.   Cardiovascular:      Rate and Rhythm: Normal rate and regular rhythm.      Heart sounds: No murmur heard.  Pulmonary:      Effort: Prolonged expiration present. No respiratory distress.      Breath sounds: Decreased breath sounds present. No " wheezing.   Abdominal:      General: There is no distension.   Musculoskeletal:      Right lower leg: No edema.      Left lower leg: No edema.   Skin:     General: Skin is warm and dry.      Capillary Refill: Capillary refill takes less than 2 seconds.      Nails: There is no clubbing.   Neurological:      General: No focal deficit present.      Mental Status: He is alert and oriented to person, place, and time.   Psychiatric:         Mood and Affect: Mood normal.       Results:    PFT 4/20/2022:    Interpretation;   Baseline spirometry shows airflow obstruction with FEV1/FVC ratio 57 and FEV1 of 0.93 L or 32% predicted.  There is robust bronchodilator response with postbronchodilator FEV1 of 1.37 L or 47% predicted.  Total lung capacity is within normal limits at 106% predicted however there is significant air trapping with residual volume of 194% predicted.  Diffusion capacity is preserved at 88% predicted.  Pulmonary function testing shows airflow obstruction with significant bronchodilator responsiveness and air trapping with preserved DLCO.  Constellation of findings may be related to COPD, alternatively bronchiectasis or chronic obstructive asthma may present similarly.  Correlate clinically.    CT chest 2/21/2025:    Lungs:  3.4 mm nodule right lower lobe image 74.  There are scattered smaller nodules within both lungs.  .  8.5 mm groundglass opacity right lower lobe image 75.  4.3 mm groundglass opacity right lower lobe image 76.     8.1 mm groundglass opacity left lower lobe image 55.  5.1 mm groundglass opacity left lower lobe image 64.     Previously demonstrated lung base atelectasis has almost completely cleared.  Scattered linear atelectasis.     Mediastinum/Tarah: No significant adenopathy.     Pleura: No pleural effusion.    Echocardiogram 4/16/2025:    CONCLUSIONS  Compared to the prior study on 3/31/2025 - LVEF is slightly improved.  Normal left ventricular chamber size.  The left ventricular  ejection fraction is visually estimated to be 50-55%.    Dilated inferior vena cava without inspiratory collapse.     Vaccinations:    Covid: complete   Flu: due, encouraged to receive annually  Pneumonia: due, encouraged to receive   RSV: due, encouraged to receive      Assessment & Plan  Asthma-COPD overlap syndrome (HCC)    Continue Breo 200 + Spiriva. Patient managed on 4LPM continuous, which was his baseline prior to hospitalization. No cough or congestion at this time. MMRC of 1. Gold A    Maintain current on vaccinations  Wash hands often and wear masks around large groups of people   Air purifiers at home if available   Emergency dosing of medrol and z-pack given to patient    Orders:    azithromycin (ZITHROMAX) 250 MG Tab; Take 2 tablets on day 1, then take 1 tablet a day for 4 days.    methylPREDNISolone (MEDROL DOSEPAK) 4 MG Tablet Therapy Pack; Take as directed.    PULMONARY FUNCTION TESTS -Test requested: Complete Pulmonary Function Test; Future    Pulmonary nodules    3.4 mm nodule right lower lobe image 74.  There are scattered smaller nodules within both lungs.  .  8.5 mm groundglass opacity right lower lobe image 75.  4.3 mm groundglass opacity right lower lobe image 76.     8.1 mm groundglass opacity left lower lobe image 55.  5.1 mm groundglass opacity left lower lobe image 64.    Plan:   Repeat CT in February 2026         Chronic respiratory failure with hypoxia (HCC)    Oxygen at 4 LPM  Chronic hypoxia 2/2 severe COPD  Patient is compliant with treatment.   Patient is benefiting from treatment.          Return in about 6 months (around 11/5/2025), or if symptoms worsen or fail to improve, for f/u on PFT .     This note was generated using voice recognition software which has a chance of producing errors of grammar and possibly content.  I have made every reasonable attempt to find and correct any obvious errors, but it should be expected that some may not be found prior to finalization of this  note.    Time spent in record review prior to patient arrival, reviewing results, and in face-to-face encounter totaled 41 min, excluding any procedures if performed.    Jack Dozier APRN  [facility]   Yes

## 2025-05-05 NOTE — ASSESSMENT & PLAN NOTE
Continue Breo 200 + Spiriva. Patient managed on 4LPM continuous, which was his baseline prior to hospitalization. No cough or congestion at this time. MMRC of 1. Gold A    Maintain current on vaccinations  Wash hands often and wear masks around large groups of people   Air purifiers at home if available   Emergency dosing of medrol and z-pack given to patient    Orders:    azithromycin (ZITHROMAX) 250 MG Tab; Take 2 tablets on day 1, then take 1 tablet a day for 4 days.    methylPREDNISolone (MEDROL DOSEPAK) 4 MG Tablet Therapy Pack; Take as directed.    PULMONARY FUNCTION TESTS -Test requested: Complete Pulmonary Function Test; Future

## 2025-05-08 ENCOUNTER — OFFICE VISIT (OUTPATIENT)
Dept: MEDICAL GROUP | Facility: MEDICAL CENTER | Age: 65
End: 2025-05-08
Payer: COMMERCIAL

## 2025-05-08 VITALS
DIASTOLIC BLOOD PRESSURE: 48 MMHG | TEMPERATURE: 98.1 F | OXYGEN SATURATION: 99 % | SYSTOLIC BLOOD PRESSURE: 98 MMHG | HEART RATE: 68 BPM | RESPIRATION RATE: 20 BRPM | BODY MASS INDEX: 23.54 KG/M2 | WEIGHT: 141.31 LBS | HEIGHT: 65 IN

## 2025-05-08 DIAGNOSIS — J44.89 ASTHMA-COPD OVERLAP SYNDROME (HCC): ICD-10-CM

## 2025-05-08 DIAGNOSIS — I10 PRIMARY HYPERTENSION: ICD-10-CM

## 2025-05-08 DIAGNOSIS — E11.9 TYPE 2 DIABETES MELLITUS WITHOUT COMPLICATION, WITHOUT LONG-TERM CURRENT USE OF INSULIN (HCC): ICD-10-CM

## 2025-05-08 DIAGNOSIS — J96.11 CHRONIC HYPOXEMIC RESPIRATORY FAILURE (HCC): ICD-10-CM

## 2025-05-08 DIAGNOSIS — I25.10 CORONARY ARTERY DISEASE INVOLVING NATIVE CORONARY ARTERY OF NATIVE HEART WITHOUT ANGINA PECTORIS: ICD-10-CM

## 2025-05-08 DIAGNOSIS — N18.32 CKD STAGE 3B, GFR 30-44 ML/MIN: ICD-10-CM

## 2025-05-08 DIAGNOSIS — D50.9 MICROCYTIC ANEMIA: ICD-10-CM

## 2025-05-08 PROCEDURE — 3074F SYST BP LT 130 MM HG: CPT | Performed by: STUDENT IN AN ORGANIZED HEALTH CARE EDUCATION/TRAINING PROGRAM

## 2025-05-08 PROCEDURE — 99214 OFFICE O/P EST MOD 30 MIN: CPT | Performed by: STUDENT IN AN ORGANIZED HEALTH CARE EDUCATION/TRAINING PROGRAM

## 2025-05-08 PROCEDURE — 3078F DIAST BP <80 MM HG: CPT | Performed by: STUDENT IN AN ORGANIZED HEALTH CARE EDUCATION/TRAINING PROGRAM

## 2025-05-08 RX ORDER — LOSARTAN POTASSIUM 25 MG/1
25 TABLET ORAL DAILY
Qty: 90 TABLET | Refills: 3 | Status: SHIPPED | OUTPATIENT
Start: 2025-05-08

## 2025-05-08 RX ORDER — DAPAGLIFLOZIN 10 MG/1
10 TABLET, FILM COATED ORAL DAILY
Qty: 90 TABLET | Refills: 3 | COMMUNITY
Start: 2025-05-08

## 2025-05-08 RX ORDER — ATORVASTATIN CALCIUM 80 MG/1
80 TABLET, FILM COATED ORAL
Qty: 90 TABLET | Refills: 3 | Status: SHIPPED | OUTPATIENT
Start: 2025-05-08

## 2025-05-08 RX ORDER — TIOTROPIUM BROMIDE 18 UG/1
18 CAPSULE ORAL; RESPIRATORY (INHALATION) DAILY
Qty: 30 CAPSULE | Refills: 3 | Status: SHIPPED | OUTPATIENT
Start: 2025-05-08

## 2025-05-08 ASSESSMENT — ENCOUNTER SYMPTOMS
SHORTNESS OF BREATH: 0
WEIGHT LOSS: 0
NAUSEA: 0
HEADACHES: 0
PALPITATIONS: 0
VOMITING: 0
FEVER: 0
WHEEZING: 0
DIZZINESS: 0
CHILLS: 0

## 2025-05-08 ASSESSMENT — FIBROSIS 4 INDEX: FIB4 SCORE: 3.5

## 2025-05-08 NOTE — PROGRESS NOTES
Subjective:     CC:     HPI:   Hugo presents today with    Past medical history of CAD status post PCI, CKD 3B, DLD, T2DM, asthma/ COPD on 4L NC, alcohol use disorder, tobacco use  Specialist  - nephrology- [facility]  - cardiology- [facility]  - pulmonology- asthma/ copd    Admission 4/16-4/23 for COPD/ asthma/ ams. Eeg done, did not find seizure. He has since had follow up with neurology for episodic confusion. Base on assessment, most recent ams hospitalization seems to correlate with toxic/metabolic issue. He has follow up EEG.     CBC   CMP     Macrocytic anemia hgb   Last alcohol use was  Out patient GI evaluation Liver cirrhosis  Pcp repeat chemistry   [facility]    Verbal consent was acquired by the patient to use Going ambient listening note generation during this visit Yes   History of Present Illness  The patient presents for evaluation of dizziness, numbness in his hands and feet, and medication management. He is accompanied by his brother.    He reports a general sense of well-being, although he experiences intermittent dizziness, a symptom that has persisted since his hospital discharge. His brother corroborates this, noting an improvement in his condition compared to the pre-hospitalization state. He has abstained from alcohol for the past 5 months. He is currently on Spiriva, which he finds effective, but requires a refill. He was previously prescribed Breo but switched to Spiriva due to cost concerns. He also takes lactulose and has a 2-month supply of metoprolol. He has a 3-month supply of atorvastatin.    He expresses concern about the necessity of continuing Farxiga, as he was unable to refill it and took his last dose approximately 2 weeks ago. He is also out of losartan, which was recently reduced from 100 mg to 25 mg, but he has been taking the 100 mg dose due to lack of refills.    He has an upcoming appointment with a gastroenterologist for liver-related issues. He recalls his  "first hospitalization in 12/2024, during which he was informed of liver and kidney dysfunction.    He reports numbness in his hands and feet, which he attributes to nerve compression in certain positions. This numbness is not associated with weakness and typically resolves upon changing position. He also experiences numbness below his knees when sitting, but not when walking.    He has seen neurology and they have ordered an EEG for him. He has a pulmonary function test scheduled with the pulmonologist to follow up on his lungs. He is still following with his nephrologist and cardiologist.    SOCIAL HISTORY  The patient reports no current alcohol consumption and mentions it has been about 4 to 5 months since the last intake.            Health Maintenance:     ROS:  Review of Systems   Constitutional:  Negative for chills, fever and weight loss.   HENT:  Negative for hearing loss.    Respiratory:  Negative for shortness of breath and wheezing.    Cardiovascular:  Negative for chest pain and palpitations.   Gastrointestinal:  Negative for nausea and vomiting.   Genitourinary:  Negative for frequency and urgency.   Skin:  Negative for rash.   Neurological:  Negative for dizziness and headaches.       Objective:     Exam:  BP 98/48 (BP Location: Left arm, Patient Position: Sitting, BP Cuff Size: Adult)   Pulse 68   Temp 36.7 °C (98.1 °F) (Temporal)   Resp 20   Ht 1.651 m (5' 5\") Comment: pt reported  Wt 64.1 kg (141 lb 5 oz)   SpO2 99% Comment: on 4L of oxygen  BMI 23.52 kg/m²  Body mass index is 23.52 kg/m².    Physical Exam  Constitutional:       Appearance: Normal appearance.   Cardiovascular:      Rate and Rhythm: Normal rate and regular rhythm.   Pulmonary:      Effort: Pulmonary effort is normal.      Breath sounds: Normal breath sounds.   Musculoskeletal:      Cervical back: Normal range of motion and neck supple.   Lymphadenopathy:      Cervical: No cervical adenopathy.   Neurological:      Mental Status: " He is alert.           Labs:     Assessment & Plan:     64 y.o. male with the following -     1. Asthma-COPD overlap syndrome (HCC)  Chronic stable  Followed with pulm started on azithromycin  Report trelegy is financially prohibitive  Currently on triple therapy  Reports having some issue administering spriva handihaler, recommend talking with pharmacist   - tiotropium (SPIRIVA HANDIHALER) 18 MCG Cap; Place 1 Capsule into inhaler and inhale every day.  Dispense: 30 Capsule; Refill: 3    2. Chronic hypoxemic respiratory failure (HCC)  Chronic stable on 4L  Following with pulmonology  Overall repor breathing is much better    3. Microcytic anemia  Chronic stable  Sober from alcohol for at least 5 months   Will check iron   - CBC WITHOUT DIFFERENTIAL; Future  - VITAMIN B12; Future  - IRON/TOTAL IRON BIND; Future  - FERRITIN; Future    4. CKD stage 3b, GFR 30-44 ml/min  Chronic stable  Avoid NSAID  GFR up trending  - TSH WITH REFLEX TO FT4; Future  - Comp Metabolic Panel; Future  - dapagliflozin propanediol (FARXIGA) 10 MG Tab; Take 1 Tablet by mouth every day.  Dispense: 90 Tablet; Refill: 3    5. Type 2 diabetes mellitus without complication, without long-term current use of insulin (HCC)  Chronic stable  Unclear why farxiga was dropped from med list, will restart  - Comp Metabolic Panel; Future    6. Primary hypertension  Chronic stable on   Losartan 25mg daily. He report he took losartan 100mg this AM because he ran out of prescription  - TSH WITH REFLEX TO FT4; Future  - losartan (COZAAR) 25 MG Tab; Take 1 Tablet by mouth every day.  Dispense: 90 Tablet; Refill: 3    7. Coronary artery disease involving native coronary artery of native heart without angina pectoris  Chronic stable on atorvastatin   - atorvastatin (LIPITOR) 80 MG tablet; Take 1 Tablet by mouth at bedtime.  Dispense: 90 Tablet; Refill: 3      Return in about 3 months (around 8/8/2025) for Lab review, Med check.    Please note that this dictation was  created using voice recognition software. I have made every reasonable attempt to correct obvious errors, but I expect that there are errors of grammar and possibly content that I did not discover before finalizing the note.

## 2025-05-15 DIAGNOSIS — N18.32 STAGE 3B CHRONIC KIDNEY DISEASE (CKD): ICD-10-CM

## 2025-05-15 DIAGNOSIS — M10.9 GOUT, UNSPECIFIED CAUSE, UNSPECIFIED CHRONICITY, UNSPECIFIED SITE: ICD-10-CM

## 2025-05-15 RX ORDER — ALLOPURINOL 100 MG/1
50 TABLET ORAL DAILY
Qty: 45 TABLET | Refills: 3 | Status: SHIPPED | OUTPATIENT
Start: 2025-05-15

## 2025-05-15 NOTE — TELEPHONE ENCOUNTER
Received request via: Pharmacy    Was the patient seen in the last year in this department? Yes    Does the patient have an active prescription (recently filled or refills available) for medication(s) requested? No    Pharmacy Name: [facility]/pharmacy #[id number] - [place], [place] - [address]     Does the patient have FPC Plus and need 100-day supply? (This applies to ALL medications) Patient does not have SCP

## 2025-06-17 DIAGNOSIS — I25.10 CORONARY ARTERY DISEASE INVOLVING NATIVE CORONARY ARTERY OF NATIVE HEART WITHOUT ANGINA PECTORIS: ICD-10-CM

## 2025-06-17 DIAGNOSIS — I10 PRIMARY HYPERTENSION: ICD-10-CM

## 2025-06-17 DIAGNOSIS — J44.89 ASTHMA-COPD OVERLAP SYNDROME (HCC): ICD-10-CM

## 2025-06-17 RX ORDER — METOPROLOL SUCCINATE 50 MG/1
50 TABLET, EXTENDED RELEASE ORAL DAILY
Qty: 90 TABLET | Refills: 3 | Status: SHIPPED | OUTPATIENT
Start: 2025-06-17 | End: 2027-06-17

## 2025-06-17 RX ORDER — LOSARTAN POTASSIUM 25 MG/1
25 TABLET ORAL DAILY
Qty: 90 TABLET | Refills: 3 | Status: SHIPPED | OUTPATIENT
Start: 2025-06-17

## 2025-06-17 RX ORDER — TIOTROPIUM BROMIDE 18 UG/1
18 CAPSULE ORAL; RESPIRATORY (INHALATION) DAILY
Qty: 30 CAPSULE | Refills: 3 | Status: SHIPPED | OUTPATIENT
Start: 2025-06-17

## 2025-07-09 ENCOUNTER — TELEPHONE (OUTPATIENT)
Dept: RESPIRATORY THERAPY | Facility: MEDICAL CENTER | Age: 65
End: 2025-07-09
Payer: COMMERCIAL

## 2025-07-09 NOTE — TELEPHONE ENCOUNTER
"COPD Program 3 month follow up phone call:     How is your breathing? \"Doing pretty good\"  Do you have any questions regarding your COPD? No  Are you taking your medications every day as prescribed? Yes  Do you have any questions regarding your medications? No  Have you used your Action Plan since the last time we spoke? No  Have you seen your PCP since discharging from the hospital? Yes  Have you seen Pulmonology since discharging from the hospital? Yes  Have you quit smoking? Yes!!   Not smoking at time of follow up? Still not smoking!   Please feel free to contact us at [phone] if you have any questions.  "